# Patient Record
Sex: MALE | Race: WHITE | NOT HISPANIC OR LATINO | Employment: FULL TIME | URBAN - METROPOLITAN AREA
[De-identification: names, ages, dates, MRNs, and addresses within clinical notes are randomized per-mention and may not be internally consistent; named-entity substitution may affect disease eponyms.]

---

## 2017-01-04 ENCOUNTER — OFFICE VISIT (OUTPATIENT)
Dept: ENDOCRINOLOGY | Facility: CLINIC | Age: 67
End: 2017-01-04
Payer: COMMERCIAL

## 2017-01-04 VITALS
DIASTOLIC BLOOD PRESSURE: 86 MMHG | HEIGHT: 69 IN | BODY MASS INDEX: 30.21 KG/M2 | HEART RATE: 95 BPM | WEIGHT: 204 LBS | SYSTOLIC BLOOD PRESSURE: 123 MMHG

## 2017-01-04 DIAGNOSIS — E11.42 TYPE 2 DIABETES MELLITUS WITH DIABETIC POLYNEUROPATHY, WITH LONG-TERM CURRENT USE OF INSULIN: ICD-10-CM

## 2017-01-04 DIAGNOSIS — I10 ESSENTIAL HYPERTENSION: ICD-10-CM

## 2017-01-04 DIAGNOSIS — E66.9 OBESITY (BMI 30-39.9): ICD-10-CM

## 2017-01-04 DIAGNOSIS — Z79.4 TYPE 2 DIABETES MELLITUS WITH DIABETIC POLYNEUROPATHY, WITH LONG-TERM CURRENT USE OF INSULIN: ICD-10-CM

## 2017-01-04 DIAGNOSIS — E78.5 HYPERLIPIDEMIA LDL GOAL <100: ICD-10-CM

## 2017-01-04 DIAGNOSIS — I10 HTN (HYPERTENSION), BENIGN: ICD-10-CM

## 2017-01-04 PROCEDURE — 99214 OFFICE O/P EST MOD 30 MIN: CPT | Mod: S$GLB,,, | Performed by: NURSE PRACTITIONER

## 2017-01-04 PROCEDURE — 2022F DILAT RTA XM EVC RTNOPTHY: CPT | Mod: S$GLB,,, | Performed by: NURSE PRACTITIONER

## 2017-01-04 PROCEDURE — 3045F PR MOST RECENT HEMOGLOBIN A1C LEVEL 7.0-9.0%: CPT | Mod: S$GLB,,, | Performed by: NURSE PRACTITIONER

## 2017-01-04 PROCEDURE — 4010F ACE/ARB THERAPY RXD/TAKEN: CPT | Mod: S$GLB,,, | Performed by: NURSE PRACTITIONER

## 2017-01-04 PROCEDURE — 3074F SYST BP LT 130 MM HG: CPT | Mod: S$GLB,,, | Performed by: NURSE PRACTITIONER

## 2017-01-04 PROCEDURE — 99999 PR PBB SHADOW E&M-EST. PATIENT-LVL III: CPT | Mod: PBBFAC,,, | Performed by: NURSE PRACTITIONER

## 2017-01-04 PROCEDURE — 3079F DIAST BP 80-89 MM HG: CPT | Mod: S$GLB,,, | Performed by: NURSE PRACTITIONER

## 2017-01-04 PROCEDURE — 1159F MED LIST DOCD IN RCRD: CPT | Mod: S$GLB,,, | Performed by: NURSE PRACTITIONER

## 2017-01-04 RX ORDER — CARVEDILOL 3.12 MG/1
3.12 TABLET ORAL DAILY
Qty: 90 TABLET | Refills: 2 | Status: SHIPPED | OUTPATIENT
Start: 2017-01-04 | End: 2018-01-04 | Stop reason: SDUPTHER

## 2017-01-04 RX ORDER — INSULIN ASPART 100 [IU]/ML
INJECTION, SOLUTION INTRAVENOUS; SUBCUTANEOUS
Qty: 18 ML | Refills: 6
Start: 2017-01-04 | End: 2017-03-14 | Stop reason: SDUPTHER

## 2017-01-04 RX ORDER — RAMIPRIL 5 MG/1
5 CAPSULE ORAL DAILY
Qty: 90 CAPSULE | Refills: 2 | Status: SHIPPED | OUTPATIENT
Start: 2017-01-04 | End: 2018-01-04 | Stop reason: SDUPTHER

## 2017-01-04 NOTE — PROGRESS NOTES
CC: This 66 y.o.   male presents for management of Diabetes Mellitus   along with the current chronic medical conditions including:  Patient Active Problem List   Diagnosis    Hyperlipidemia LDL goal <100    Essential hypertension    Type II or unspecified type diabetes mellitus without mention of complication, uncontrolled    HTN (hypertension), benign    Hyperlipidemia    Allergic rhinitis    Type 2 diabetes, uncontrolled, with neuropathy    Staph skin infection    Hypertension associated with diabetes    Obesity (BMI 30-39.9)      Status of these conditions is pending review.    HPI:   Diagnosed with T2DM in 2003/2004. Pt was last seen by me x 3 mos ago in 2016 and is now being seen by me again today.  Pt has never been hospitalized r/t DM. A1c elevated this visit   Lab Results   Component Value Date    HGBA1C 8.3 (H) 12/28/2016       CURRENT DM MEDS:  toujeo 60 units nightly, novolog 22-28 units   Metformin 1000 mg bid     Pt is monitoring 2-3 times a day    100-200s  Hypos (forgot to eat)   Not often    Patria Nieto did bring logs to clinic today.     DIET/ MEAL PATTERN: 2-3 meals a day, usually skips lunch. Denies snacking. Drinks water.  Drinks etoh occasionally  [place] milk  Sugar free desserts  Diet drinks  Fish, vegetables, limiting rice/bread  Reduce sugar -orange juice  Pt is aware of carbs    EXERCISE: runs/walks 3/week for 1-1.5 miles  Runs 5K here and there    STANDARDS OF CARE:   Eye exam: (1/2016), (outside facility)  Foot exam: none  ASA: yes  Statin: yes  ACE-I/ARB: yes    ROS:   GEN: No fatigue or weakness, no change in appetite  CV: No chest pain, palpitations, edema or cyanosis, denies syncope  SKIN: Skin is intact and heals well, no rashes, pruritis, easy bruising, no hair changes, no intolerance to heat/cold.  RESP: No SOB, + cough (bronchitis/sinusitis in the past month)  FEN/GI: Nml bowel movements, no abdominal pain, normal appetite, No nausea/vomiting  RENAL: No urinary  complaints, no dysuria/hematuia/oliguria; denies nocturia  ENDO: no heat/cold intolerance, no fatigue, no change in weight  PSYCH: Denies drug/ETOH abuse, no hx. of eating disorders or depression  MS/NEURO: + numbness/ tingling in left great toe-improved; FROM of joints without swelling or pain.    Hemoglobin A1C   Date Value Ref Range Status   12/28/2016 8.3 (H) 4.5 - 6.2 % Final     Comment:     According to ADA guidelines, hemoglobin A1C <7.0% represents  optimal control in non-pregnant diabetic patients.  Different  metrics may apply to specific populations.   Standards of Medical Care in Diabetes - 2016.  For the purpose of screening for the presence of diabetes:  <5.7%     Consistent with the absence of diabetes  5.7-6.4%  Consistent with increasing risk for diabetes   (prediabetes)  >or=6.5%  Consistent with diabetes  Currently no consensus exists for use of hemoglobin A1C  for diagnosis of diabetes for children.     09/21/2016 7.9 (H) 4.5 - 6.2 % Final     Comment:     According to ADA guidelines, hemoglobin A1C <7.0% represents  optimal control in non-pregnant diabetic patients.  Different  metrics may apply to specific populations.   Standards of Medical Care in Diabetes - 2016.  For the purpose of screening for the presence of diabetes:  <5.7%     Consistent with the absence of diabetes  5.7-6.4%  Consistent with increasing risk for diabetes   (prediabetes)  >or=6.5%  Consistent with diabetes  Currently no consensus exists for use of hemoglobin A1C  for diagnosis of diabetes for children.     04/13/2016 7.9 (H) 4.5 - 6.2 % Final     Lab Results   Component Value Date    TSH 2.212 09/21/2016       Chemistry        Component Value Date/Time     12/28/2016 0709    K 4.3 12/28/2016 0709     12/28/2016 0709    CO2 26 12/28/2016 0709    BUN 17 12/28/2016 0709    CREATININE 1.2 12/28/2016 0709     (H) 12/28/2016 0709        Component Value Date/Time    CALCIUM 9.1 12/28/2016 0709    ALKPHOS 56  12/28/2016 0709    AST 13 12/28/2016 0709    ALT 23 12/28/2016 0709    BILITOT 1.8 (H) 12/28/2016 0709        Lab Results   Component Value Date    LDLCALC 56.6 (L) 12/28/2016       PE:  GEN: Patient WNWD, WF, NAD, AAOx3, Friendly, talkative, well groomed  HEENT: EOMI, no lid lag  NECK:trachea midline   CV: Regular rate and rhythm  RESP:No cough, wheeze.  ABD: soft non distended   EXT: No C/C/Edema, No skin rash/breakdown  NEURO: CN 2-12 intact, 5/5 strength in 4 extremities, nml gait  FEET: No pressure areas, blisters, ulcers, calluses. Footware appropriate.    ASSESSMENT and PLAN:  Patria was seen today for diabetes mellitus.    Diagnoses and associated orders for this visit:    Type II or unspecified type diabetes mellitus, with neurological manifestations, uncontrolled,   DM uncontrolled  Goal less than 7%  victoza not started yet r/t cost/approval  Print rx for victoza   Reviewed importance of adding glp1a for a1c reduction  Reviewed last a1c  Continue toujeo 60 units qhs  novolog 22-26 units ac   F/u in 3 mos  Add victoza max to 1.8 mg daily, coupon given  Logs given.  Discussed hypoglycemia, 15 gm/15 min rule    Hyperlipidemia- controlled, continue statin.   Lab Results   Component Value Date    LDLCALC 56.6 (L) 12/28/2016       Discussed diet/lifestyle manifestations    Hypertension- controlled. Discussed diet/lifestyle manifestations, continue meds    Obesity Body mass index is 30.13 kg/(m^2). reviewed, continue exercise, watch cho, carb book given.      Other Orders    Orders Placed This Encounter   Procedures    Hemoglobin A1c     Standing Status:   Future     Standing Expiration Date:   3/5/2018      Return in about 3 months (around 4/4/2017).

## 2017-01-04 NOTE — PATIENT INSTRUCTIONS
Snacks can be an important part of a balanced, healthy meal plan. They allow you to eat more frequently, feeling full and satisfied throughout the day. Also, they allow you to spread carbohydrates evenly, which may stabilize blood sugars.  Plus, snacks are enjoyable!     The amount of carbohydrate needed at snacks varies. Generally, about 15-30 grams of carbohydrate per snack is recommended.  Below you will find some tasty treats.       0-5 gm carb   Crystal Light   Vitamin Water Zero   Herbal tea, unsweetened   2 tsp peanut butter on celery   1./2 cup sugar-free jell-o   1 sugar-free popsicle   ¼ cup blueberries   8oz Blue Mana unsweetened almond milk   5 baby carrots & celery sticks, cucumbers, bell peppers dipped in ¼ cup salsa, 2Tbsp light ranch dressing or 2Tbsp plain Greek yogurt   10 Goldfish crackers   ½ oz low-fat cheese or string cheese   1 closed handful of nuts, unsalted   1 Tbsp of sunflower seeds, unsalted   1 cup Smart Pop popcorn   1 whole grain brown rice cake        15 gm carb   1 small piece of fruit or ½ banana or 1/2 cup lite canned fruit   3 marlon cracker squares   3 cups Smart Pop popcorn, top spray butter, Ortiz lite salt or cinnamon and Truvia   5 Vanilla Wafers   ½ cup low fat, no added sugar ice cream or frozen yogurt (Blue bell, Blue Bunny, Weight Watchers, Skinny Cow)   ½ turkey, ham, or chicken sandwich   ½ c fruit with ½ c Cottage cheese   4-6 unsalted wheat crackers with 1 oz low fat cheese or 1 tbsp peanut butter    30-45 goldfish crackers (depending on flavor)    7-8 Druze mini brown rice cakes (caramel, apple cinnamon, chocolate)    12 Druze mini brown rice cakes (cheddar, bbq, ranch)    1/3 cup hummus dip with raw veg   1/2 whole wheat fernandez, 1Tbsp hummus   Mini Pizza (1/2 whole wheat English muffin, low-fat  cheese, tomato sauce)   100 calorie snack pack (Oreo, Chips Ahoy, Ritz Mix, Baked Cheetos)   4-6 oz. light or Greek Style yogurt  (Juan, Noah, [facility], [facility])   ½ cup sugar-free pudding     6 in. wheat tortilla or fernandez oven toasted chips (topped with spray butter flavoring, cinnamon, Truvia OR spray butter, garlic powder, chili powder)    18 BBQ Popchips (available at Target, Whole Foods, Fresh Market)

## 2017-03-14 RX ORDER — INSULIN ASPART 100 [IU]/ML
INJECTION, SOLUTION INTRAVENOUS; SUBCUTANEOUS
Qty: 18 ML | Refills: 6 | Status: SHIPPED | OUTPATIENT
Start: 2017-03-14 | End: 2017-03-28 | Stop reason: SDUPTHER

## 2017-03-14 RX ORDER — INSULIN GLARGINE 300 [IU]/ML
INJECTION, SOLUTION SUBCUTANEOUS
Qty: 4 SYRINGE | Refills: 6 | Status: SHIPPED | OUTPATIENT
Start: 2017-03-14 | End: 2017-04-18 | Stop reason: SDUPTHER

## 2017-03-21 ENCOUNTER — LAB VISIT (OUTPATIENT)
Dept: LAB | Facility: HOSPITAL | Age: 67
End: 2017-03-21
Attending: NURSE PRACTITIONER
Payer: COMMERCIAL

## 2017-03-21 LAB
ESTIMATED AVG GLUCOSE: 160 MG/DL
HBA1C MFR BLD HPLC: 7.2 %

## 2017-03-21 PROCEDURE — 83036 HEMOGLOBIN GLYCOSYLATED A1C: CPT

## 2017-03-21 PROCEDURE — 36415 COLL VENOUS BLD VENIPUNCTURE: CPT | Mod: PO

## 2017-03-28 ENCOUNTER — OFFICE VISIT (OUTPATIENT)
Dept: ENDOCRINOLOGY | Facility: CLINIC | Age: 67
End: 2017-03-28
Payer: COMMERCIAL

## 2017-03-28 VITALS
SYSTOLIC BLOOD PRESSURE: 123 MMHG | BODY MASS INDEX: 31.08 KG/M2 | HEIGHT: 69 IN | DIASTOLIC BLOOD PRESSURE: 86 MMHG | HEART RATE: 104 BPM | WEIGHT: 209.81 LBS

## 2017-03-28 DIAGNOSIS — E66.9 OBESITY (BMI 30-39.9): ICD-10-CM

## 2017-03-28 DIAGNOSIS — E11.59 HYPERTENSION ASSOCIATED WITH DIABETES: ICD-10-CM

## 2017-03-28 DIAGNOSIS — I15.2 HYPERTENSION ASSOCIATED WITH DIABETES: ICD-10-CM

## 2017-03-28 DIAGNOSIS — E78.5 HYPERLIPIDEMIA LDL GOAL <100: ICD-10-CM

## 2017-03-28 PROCEDURE — 3045F PR MOST RECENT HEMOGLOBIN A1C LEVEL 7.0-9.0%: CPT | Mod: S$GLB,,, | Performed by: NURSE PRACTITIONER

## 2017-03-28 PROCEDURE — 1160F RVW MEDS BY RX/DR IN RCRD: CPT | Mod: S$GLB,,, | Performed by: NURSE PRACTITIONER

## 2017-03-28 PROCEDURE — 99999 PR PBB SHADOW E&M-EST. PATIENT-LVL III: CPT | Mod: PBBFAC,,, | Performed by: NURSE PRACTITIONER

## 2017-03-28 PROCEDURE — 4010F ACE/ARB THERAPY RXD/TAKEN: CPT | Mod: S$GLB,,, | Performed by: NURSE PRACTITIONER

## 2017-03-28 PROCEDURE — 2022F DILAT RTA XM EVC RTNOPTHY: CPT | Mod: S$GLB,,, | Performed by: NURSE PRACTITIONER

## 2017-03-28 PROCEDURE — 99214 OFFICE O/P EST MOD 30 MIN: CPT | Mod: S$GLB,,, | Performed by: NURSE PRACTITIONER

## 2017-03-28 PROCEDURE — 3079F DIAST BP 80-89 MM HG: CPT | Mod: S$GLB,,, | Performed by: NURSE PRACTITIONER

## 2017-03-28 PROCEDURE — 3074F SYST BP LT 130 MM HG: CPT | Mod: S$GLB,,, | Performed by: NURSE PRACTITIONER

## 2017-03-28 RX ORDER — INSULIN ASPART 100 [IU]/ML
INJECTION, SOLUTION INTRAVENOUS; SUBCUTANEOUS
Qty: 18 ML | Refills: 6
Start: 2017-03-28 | End: 2017-05-01 | Stop reason: SDUPTHER

## 2017-03-28 NOTE — PROGRESS NOTES
CC: This 66 y.o.   male presents for management of Diabetes Mellitus   along with the current chronic medical conditions including:  Patient Active Problem List   Diagnosis    Hyperlipidemia LDL goal <100    Essential hypertension    Type II or unspecified type diabetes mellitus without mention of complication, uncontrolled    HTN (hypertension), benign    Hyperlipidemia    Allergic rhinitis    Type 2 diabetes, uncontrolled, with neuropathy    Staph skin infection    Hypertension associated with diabetes    Obesity (BMI 30-39.9)      Status of these conditions is pending review.    HPI:   Diagnosed with T2DM in 2003/2004. Pt January 2017 and is now being seen by me again today.  Pt has never been hospitalized r/t DM. A1c has improved, has not started victoza yet.   Arrived late r/t epistaxis, resolved.   Lab Results   Component Value Date    HGBA1C 7.2 (H) 03/21/2017       CURRENT DM MEDS:  toujeo 60 units nightly, novolog 22-26 units   Metformin 1000 mg bid  Not of victoza yet     Pt is monitoring 2-3 times a day    BG 120s-160s mg/dl   Hypos (1-2 times a month) 60-70s (forget to eat)  Not often    Patria Nieto did bring logs to clinic today.     DIET/ MEAL PATTERN: 2-3 meals a day, usually skips lunch. Denies snacking. Drinks water.  Drinks etoh occasionally  [place] milk  Sugar free desserts  Diet drinks  Fish, vegetables, limiting rice/bread  Reduce sugar -orange juice  Pt is aware of carbs    EXERCISE: runs/walks 3/week for 1-1.5 miles  Runs 5K here and there    STANDARDS OF CARE:   Eye exam: (1/2016), (outside facility)-due  Foot exam: none  ASA: yes  Statin: yes  ACE-I/ARB: yes    ROS:   GEN: No fatigue or weakness, no change in appetite  CV: No chest pain, palpitations, edema or cyanosis, denies syncope  SKIN: Skin is intact and heals well, no rashes, pruritis, easy bruising, no hair changes, no intolerance to heat/cold.  RESP: No SOB, + cough (bronchitis/sinusitis in the past  month)  FEN/GI: Nml bowel movements, no abdominal pain, normal appetite, No nausea/vomiting  RENAL: No urinary complaints, no dysuria/hematuia/oliguria; denies nocturia  ENDO: no heat/cold intolerance, no fatigue, no change in weight  PSYCH: Denies drug/ETOH abuse, no hx. of eating disorders or depression  MS/NEURO: + numbness/ tingling in left great toe-improved; FROM of joints without swelling or pain.    Hemoglobin A1C   Date Value Ref Range Status   03/21/2017 7.2 (H) 4.5 - 6.2 % Final     Comment:     According to ADA guidelines, hemoglobin A1C <7.0% represents  optimal control in non-pregnant diabetic patients.  Different  metrics may apply to specific populations.   Standards of Medical Care in Diabetes - 2016.  For the purpose of screening for the presence of diabetes:  <5.7%     Consistent with the absence of diabetes  5.7-6.4%  Consistent with increasing risk for diabetes   (prediabetes)  >or=6.5%  Consistent with diabetes  Currently no consensus exists for use of hemoglobin A1C  for diagnosis of diabetes for children.     12/28/2016 8.3 (H) 4.5 - 6.2 % Final     Comment:     According to ADA guidelines, hemoglobin A1C <7.0% represents  optimal control in non-pregnant diabetic patients.  Different  metrics may apply to specific populations.   Standards of Medical Care in Diabetes - 2016.  For the purpose of screening for the presence of diabetes:  <5.7%     Consistent with the absence of diabetes  5.7-6.4%  Consistent with increasing risk for diabetes   (prediabetes)  >or=6.5%  Consistent with diabetes  Currently no consensus exists for use of hemoglobin A1C  for diagnosis of diabetes for children.     09/21/2016 7.9 (H) 4.5 - 6.2 % Final     Comment:     According to ADA guidelines, hemoglobin A1C <7.0% represents  optimal control in non-pregnant diabetic patients.  Different  metrics may apply to specific populations.   Standards of Medical Care in Diabetes - 2016.  For the purpose of screening for the  presence of diabetes:  <5.7%     Consistent with the absence of diabetes  5.7-6.4%  Consistent with increasing risk for diabetes   (prediabetes)  >or=6.5%  Consistent with diabetes  Currently no consensus exists for use of hemoglobin A1C  for diagnosis of diabetes for children.       Lab Results   Component Value Date    TSH 2.212 09/21/2016       Chemistry        Component Value Date/Time     12/28/2016 0709    K 4.3 12/28/2016 0709     12/28/2016 0709    CO2 26 12/28/2016 0709    BUN 17 12/28/2016 0709    CREATININE 1.2 12/28/2016 0709     (H) 12/28/2016 0709        Component Value Date/Time    CALCIUM 9.1 12/28/2016 0709    ALKPHOS 56 12/28/2016 0709    AST 13 12/28/2016 0709    ALT 23 12/28/2016 0709    BILITOT 1.8 (H) 12/28/2016 0709        Lab Results   Component Value Date    LDLCALC 56.6 (L) 12/28/2016       PE:  GEN: Patient WNWD, WF, NAD, AAOx3, Friendly, talkative, well groomed  HEENT: EOMI, no lid lag  NECK:trachea midline   CV: Regular rate and rhythm  RESP:No cough, wheeze.  ABD: soft non distended   EXT: No C/C/Edema, No skin rash/breakdown  NEURO: CN 2-12 intact, 5/5 strength in 4 extremities, nml gait  FEET: No pressure areas, blisters, ulcers, calluses. Footware appropriate.    ASSESSMENT and PLAN:  Patria was seen today for diabetes mellitus.    Diagnoses and associated orders for this visit:    1. Type 2 diabetes, uncontrolled, with neuropathy  a1c has improved, goal less than 7%  Current a1c review  7.2%  Add victoza, has not started-went over titration, reviewed MOA/SEs  toujeo continue at 60 units qhs  novolog decrease to 18 units ac w/ mod dose 180-230 +2, etc  F/u in 3 mos  a1c next time    2. Hypertension associated with diabetes  Controlled, continue med(s)   3. Hyperlipidemia LDL goal <100  Lab Results   Component Value Date    LDLCALC 56.6 (L) 12/28/2016     controlled   4. Obesity (BMI 30-39.9)  Body mass index is 30.98 kg/(m^2). may increase insulin resistance.       Orders Placed This Encounter   Procedures    Hemoglobin A1c     Standing Status:   Future     Standing Expiration Date:   5/27/2018    Microalbumin/creatinine urine ratio     Standing Status:   Future     Standing Expiration Date:   5/27/2018     Order Specific Question:   Specimen Source     Answer:   Urine      Return in about 3 months (around 6/28/2017).

## 2017-03-28 NOTE — MR AVS SNAPSHOT
[facility] - Endocrinology  [address]  [facility] [address]  Phone: [phone]                  Patria Nieto   3/28/2017 7:30 AM   Office Visit    Description:  Male : 1950   Provider:  LIYAH Angel, RAHUL   Department:  [facility] - Endocrinology           Reason for Visit     Diabetes Mellitus           Diagnoses this Visit        Comments    Type 2 diabetes, uncontrolled, with neuropathy    -  Primary     Hypertension associated with diabetes         Hyperlipidemia LDL goal <100         Obesity (BMI 30-39.9)                To Do List           Future Appointments        Provider Department Dept Phone    2017 9:00 AM Félix Rojas MD [place] - Internal Medicine [phone]    2017 7:30 AM LAB, [place] [facility] [phone]    2017 8:00 AM SPECIMEN, [place] [facility] [phone]    2017 7:30 AM LIYAH Angel, RAHUL [facility] [phone]      Goals (5 Years of Data)     None      Follow-Up and Disposition     Return in about 3 months (around 2017).    Follow-up and Disposition History       These Medications        Disp Refills Start End    insulin aspart (NOVOLOG FLEXPEN) 100 unit/mL InPn pen 18 mL 6 3/28/2017     Inject 18 units into skin with correction scale 150-200 +2, 201-250 +4, 251-300 +6, 301-350 +8, >380.    Pharmacy: [facility] [address] [place], [place] [address] [address] [place] [facility] AT [facility] Jeffrey Neri Ph #: [phone]         [facility]     [facility] -  Assistance  Registered nurses in the [facility] provide clinical advisement, health education, appointment booking, and other advisory services.  Call for this free service at [phone].             Medications           Message regarding Medications     Verify the changes and/or additions to your medication regime listed below are the same as discussed with your  "clinician today.  If any of these changes or additions are incorrect, please notify your healthcare provider.        CHANGE how you are taking these medications     Start Taking Instead of    insulin aspart (NOVOLOG FLEXPEN) 100 unit/mL InPn pen insulin aspart (NOVOLOG FLEXPEN) 100 unit/mL InPn pen    Dosage:  Inject 18 units into skin with correction scale 150-200 +2, 201-250 +4, 251-300 +6, 301-350 +8, >380. Dosage:  Inject 22-26 units into skin with correction scale 150-200 +2, 201-250 +4, 251-300 +6, 301-350 +8, >380.    Reason for Change:  Reorder            Verify that the below list of medications is an accurate representation of the medications you are currently taking.  If none reported, the list may be blank. If incorrect, please contact your healthcare provider. Carry this list with you in case of emergency.           Current Medications     aspirin 81 MG Chew Take 81 mg by mouth once daily.    atorvastatin (LIPITOR) 80 MG tablet Take 1 tablet (80 mg total) by mouth once daily.    carvedilol (COREG) 3.125 MG tablet Take 1 tablet (3.125 mg total) by mouth once daily.    fexofenadine (ALLEGRA) 180 MG tablet Take 180 mg by mouth daily as needed.     insulin aspart (NOVOLOG FLEXPEN) 100 unit/mL InPn pen Inject 18 units into skin with correction scale 150-200 +2, 201-250 +4, 251-300 +6, 301-350 +8, >380.    insulin glargine, TOUJEO, (TOUJEO SOLOSTAR) 300 unit/mL (1.5 mL) InPn pen Inject 60 units at night.    liraglutide 0.6 mg/0.1 mL, 18 mg/3 mL, subq PNIJ (VICTOZA 3-JOAN) 0.6 mg/0.1 mL (18 mg/3 mL) PnIj Inject 0.6 mg daily x 1 week, if tolerable 1.2 mg daily following week, then 1.8 mg daily. On metformin and insulin already.    metformin (GLUCOPHAGE) 1000 MG tablet TAKE 1 TABLET BY MOUTH TWICE DAILY    pen needle, diabetic (BD INSULIN PEN NEEDLE UF MINI) 31 gauge x 3/16" Ndle Uses 5 times a day.    ramipril (ALTACE) 5 MG capsule Take 1 capsule (5 mg total) by mouth once daily.    sulfamethoxazole-trimethoprim " "800-160mg (BACTRIM DS) 800-160 mg Tab Take 1 tablet by mouth 2 (two) times daily.           Clinical Reference Information           Your Vitals Were     BP Pulse Height Weight BMI    123/86 (BP Location: Left arm, Patient Position: Sitting) 104 5' 9" (1.753 m) 95.2 kg (209 lb 12.8 oz) 30.98 kg/m2      Blood Pressure          Most Recent Value    BP  123/86      Allergies as of 3/28/2017     Hydrochlorothiazide    Metaproterenol      Immunizations Administered on Date of Encounter - 3/28/2017     None      Orders Placed During Today's Visit     Future Labs/Procedures Expected by Expires    Hemoglobin A1c  3/28/2017 5/27/2018    Microalbumin/creatinine urine ratio  3/28/2017 5/27/2018      Instructions    Snacks can be an important part of a balanced, healthy meal plan. They allow you to eat more frequently, feeling full and satisfied throughout the day. Also, they allow you to spread carbohydrates evenly, which may stabilize blood sugars.  Plus, snacks are enjoyable!     The amount of carbohydrate needed at snacks varies. Generally, about 15-30 grams of carbohydrate per snack is recommended.  Below you will find some tasty treats.       0-5 gm carb   Crystal Light   Vitamin Water Zero   Herbal tea, unsweetened   2 tsp peanut butter on celery   1./2 cup sugar-free jell-o   1 sugar-free popsicle   ¼ cup blueberries   8oz Blue Mana unsweetened almond milk   5 baby carrots & celery sticks, cucumbers, bell peppers dipped in ¼ cup salsa, 2Tbsp light ranch dressing or 2Tbsp plain Greek yogurt   10 Goldfish crackers   ½ oz low-fat cheese or string cheese   1 closed handful of nuts, unsalted   1 Tbsp of sunflower seeds, unsalted   1 cup Smart Pop popcorn   1 whole grain brown rice cake        15 gm carb   1 small piece of fruit or ½ banana or 1/2 cup lite canned fruit   3 marlon cracker squares   3 cups Smart Pop popcorn, top spray butter, Ortiz lite salt or cinnamon and Truvia   5 Vanilla Wafers   ½ " cup low fat, no added sugar ice cream or frozen yogurt (Blue bell, Blue Bunny, Weight Watchers, Skinny Cow)   ½ turkey, ham, or chicken sandwich   ½ c fruit with ½ c Cottage cheese   4-6 unsalted wheat crackers with 1 oz low fat cheese or 1 tbsp peanut butter    30-45 goldfish crackers (depending on flavor)    7-8 [place] mini brown rice cakes (caramel, apple cinnamon, chocolate)    12 [place] mini brown rice cakes (cheddar, bbq, ranch)    1/3 cup hummus dip with raw veg   1/2 whole wheat fernandez, 1Tbsp hummus   Mini Pizza (1/2 whole wheat English muffin, low-fat  cheese, tomato sauce)   100 calorie snack pack (Oreo, Chips Ahoy, Ritz Mix, Baked Cheetos)   4-6 oz. light or Greek Style yogurt (Chobani, Yoplait, Okios, [place])   ½ cup sugar-free pudding     6 in. wheat tortilla or fernandez oven toasted chips (topped with spray butter flavoring, cinnamon, Truvia OR spray butter, garlic powder, chili powder)    18 BBQ Popchips (available at Target, Whole Foods, Fresh Market)                   Diabetes Support Group Meetings    Date Topics   February 9 Health Promotion/Nutrition   March 9 Taking Care of Your Kidneys   April 13 Taking Care of Your Feet   May 11 Ease Your Mind with Diabetes   June 8 Hurricane and Emergency Preparedness   July 13 Family & Caregiver Support for Diabetes   *August 17  Taking Care of Your Eyes   September 14 Devices & Technology   October 12 Recipes & Treats   November 9 Getting Pumped Up for Diabetes   December 14 Year-End Close Out            Meetings are held in the [place] (A) of the [facility] located at [address] [place] [id number]. Please call [phone] for additional information.    Free service, offered every 2nd Thursday of every month, except in August! Family members and/or friends are welcome as well!  Support group is for patients with type 1 or type 2 diabetes.    From 3:30p to 4:30p             Language  Assistance Services     ATTENTION: Language assistance services are available, free of charge. Please call [phone].      ATENCIÓN: Si habla tejaañol, tiene a wu disposición servicios gratuitos de asistencia lingüística. Llame al [phone].     CHÚ Ý: N?u b?n nói Ti?ng Vi?t, có các d?ch v? h? tr? ngôn ng? mi?n phí dành cho b?n. G?i s? [phone].         [facility] complies with applicable Federal civil rights laws and does not discriminate on the basis of race, color, national origin, age, disability, or sex.

## 2017-03-28 NOTE — PATIENT INSTRUCTIONS
Snacks can be an important part of a balanced, healthy meal plan. They allow you to eat more frequently, feeling full and satisfied throughout the day. Also, they allow you to spread carbohydrates evenly, which may stabilize blood sugars.  Plus, snacks are enjoyable!     The amount of carbohydrate needed at snacks varies. Generally, about 15-30 grams of carbohydrate per snack is recommended.  Below you will find some tasty treats.       0-5 gm carb   Crystal Light   Vitamin Water Zero   Herbal tea, unsweetened   2 tsp peanut butter on celery   1./2 cup sugar-free jell-o   1 sugar-free popsicle   ¼ cup blueberries   8oz Blue Mana unsweetened almond milk   5 baby carrots & celery sticks, cucumbers, bell peppers dipped in ¼ cup salsa, 2Tbsp light ranch dressing or 2Tbsp plain Greek yogurt   10 Goldfish crackers   ½ oz low-fat cheese or string cheese   1 closed handful of nuts, unsalted   1 Tbsp of sunflower seeds, unsalted   1 cup Smart Pop popcorn   1 whole grain brown rice cake        15 gm carb   1 small piece of fruit or ½ banana or 1/2 cup lite canned fruit   3 marlon cracker squares   3 cups Smart Pop popcorn, top spray butter, Ortiz lite salt or cinnamon and Truvia   5 Vanilla Wafers   ½ cup low fat, no added sugar ice cream or frozen yogurt (Blue bell, Blue Bunny, Weight Watchers, Skinny Cow)   ½ turkey, ham, or chicken sandwich   ½ c fruit with ½ c Cottage cheese   4-6 unsalted wheat crackers with 1 oz low fat cheese or 1 tbsp peanut butter    30-45 goldfish crackers (depending on flavor)    7-8 Restoration mini brown rice cakes (caramel, apple cinnamon, chocolate)    12 Restoration mini brown rice cakes (cheddar, bbq, ranch)    1/3 cup hummus dip with raw veg   1/2 whole wheat fernandez, 1Tbsp hummus   Mini Pizza (1/2 whole wheat English muffin, low-fat  cheese, tomato sauce)   100 calorie snack pack (Oreo, Chips Ahoy, Ritz Mix, Baked Cheetos)   4-6 oz. light or Greek Style yogurt  (Noah Martinez, Catrachita, [facility])   ½ cup sugar-free pudding     6 in. wheat tortilla or fernandez oven toasted chips (topped with spray butter flavoring, cinnamon, Truvia OR spray butter, garlic powder, chili powder)    18 BBQ Popchips (available at Target, Whole Foods, Fresh Market)                   Diabetes Support Group Meetings    Date Topics   February 9 Health Promotion/Nutrition   March 9 Taking Care of Your Kidneys   April 13 Taking Care of Your Feet   May 11 Ease Your Mind with Diabetes   June 8 Hurricane and Emergency Preparedness   July 13 Family & Caregiver Support for Diabetes   *August 17  Taking Care of Your Eyes   September 14 Devices & Technology   October 12 Recipes & Treats   November 9 Getting Pumped Up for Diabetes   December 14 Year-End Close Out            Meetings are held in the [place] [facility] (A) of the [facility] located at [address] [place] [address]. Please call [phone] for additional information.    Free service, offered every 2nd Thursday of every month, except in August! Family members and/or friends are welcome as well!  Support group is for patients with type 1 or type 2 diabetes.    From 3:30p to 4:30p

## 2017-04-18 RX ORDER — SULFAMETHOXAZOLE AND TRIMETHOPRIM 800; 160 MG/1; MG/1
1 TABLET ORAL 2 TIMES DAILY
Qty: 14 TABLET | Refills: 1 | Status: SHIPPED | OUTPATIENT
Start: 2017-04-18 | End: 2017-09-06 | Stop reason: ALTCHOICE

## 2017-04-18 RX ORDER — PEN NEEDLE, DIABETIC 30 GX3/16"
NEEDLE, DISPOSABLE MISCELLANEOUS
Qty: 200 EACH | Refills: 6 | Status: SHIPPED | OUTPATIENT
Start: 2017-04-18 | End: 2018-10-09 | Stop reason: SDUPTHER

## 2017-04-18 RX ORDER — INSULIN GLARGINE 300 [IU]/ML
INJECTION, SOLUTION SUBCUTANEOUS
Qty: 4 SYRINGE | Refills: 6 | Status: SHIPPED | OUTPATIENT
Start: 2017-04-18 | End: 2017-09-06 | Stop reason: SDUPTHER

## 2017-04-18 RX ORDER — ATORVASTATIN CALCIUM 80 MG/1
80 TABLET, FILM COATED ORAL DAILY
Qty: 90 TABLET | Refills: 2 | Status: SHIPPED | OUTPATIENT
Start: 2017-04-18 | End: 2018-05-15 | Stop reason: SDUPTHER

## 2017-04-19 ENCOUNTER — OFFICE VISIT (OUTPATIENT)
Dept: INTERNAL MEDICINE | Facility: CLINIC | Age: 67
End: 2017-04-19
Payer: COMMERCIAL

## 2017-04-19 VITALS
DIASTOLIC BLOOD PRESSURE: 68 MMHG | HEIGHT: 69 IN | HEART RATE: 76 BPM | SYSTOLIC BLOOD PRESSURE: 134 MMHG | WEIGHT: 207.69 LBS | BODY MASS INDEX: 30.76 KG/M2 | OXYGEN SATURATION: 96 %

## 2017-04-19 DIAGNOSIS — Z12.5 PROSTATE CANCER SCREENING: ICD-10-CM

## 2017-04-19 DIAGNOSIS — Z00.00 ANNUAL PHYSICAL EXAM: Primary | ICD-10-CM

## 2017-04-19 DIAGNOSIS — F52.0 LACK OF LIBIDO: ICD-10-CM

## 2017-04-19 DIAGNOSIS — N53.9 MALE SEXUAL DYSFUNCTION: ICD-10-CM

## 2017-04-19 DIAGNOSIS — Z11.59 ENCOUNTER FOR HEPATITIS C SCREENING TEST FOR LOW RISK PATIENT: ICD-10-CM

## 2017-04-19 PROCEDURE — 3075F SYST BP GE 130 - 139MM HG: CPT | Mod: S$GLB,,, | Performed by: INTERNAL MEDICINE

## 2017-04-19 PROCEDURE — 99387 INIT PM E/M NEW PAT 65+ YRS: CPT | Mod: S$GLB,,, | Performed by: INTERNAL MEDICINE

## 2017-04-19 PROCEDURE — 3078F DIAST BP <80 MM HG: CPT | Mod: S$GLB,,, | Performed by: INTERNAL MEDICINE

## 2017-04-19 PROCEDURE — 99999 PR PBB SHADOW E&M-EST. PATIENT-LVL III: CPT | Mod: PBBFAC,,, | Performed by: INTERNAL MEDICINE

## 2017-04-19 NOTE — PROGRESS NOTES
"Portions of this note are generated with voice recognition software. Typographical errors may exist.       Patient Name:DONNA LANGLEY  Patient MRN:   [id number]    History of Present Illness   ================================================================  DONNA LANGLEY is a 66 y.o. male here for primary care visit for  Chief Complaint   Patient presents with    Diabetes       Past Medical History:   Diagnosis Date    Diabetes mellitus type II     Hx MRSA infection        No past surgical history on file.    Review of patient's allergies indicates:   Allergen Reactions    Hydrochlorothiazide     Metaproterenol        Current Outpatient Prescriptions on File Prior to Visit   Medication Sig Dispense Refill    aspirin 81 MG Chew Take 81 mg by mouth once daily.      atorvastatin (LIPITOR) 80 MG tablet Take 1 tablet (80 mg total) by mouth once daily. 90 tablet 2    carvedilol (COREG) 3.125 MG tablet Take 1 tablet (3.125 mg total) by mouth once daily. 90 tablet 2    fexofenadine (ALLEGRA) 180 MG tablet Take 180 mg by mouth daily as needed.       insulin aspart (NOVOLOG FLEXPEN) 100 unit/mL InPn pen Inject 18 units into skin with correction scale 150-200 +2, 201-250 +4, 251-300 +6, 301-350 +8, >380. 18 mL 6    insulin glargine, TOUJEO, (TOUJEO SOLOSTAR) 300 unit/mL (1.5 mL) InPn pen Inject 60 units at night. 4 Syringe 6    metformin (GLUCOPHAGE) 1000 MG tablet TAKE 1 TABLET BY MOUTH TWICE DAILY 180 tablet 2    pen needle, diabetic (BD INSULIN PEN NEEDLE UF MINI) 31 gauge x 3/16" Ndle Uses 5 times a day. 200 each 6    ramipril (ALTACE) 5 MG capsule Take 1 capsule (5 mg total) by mouth once daily. 90 capsule 2    sulfamethoxazole-trimethoprim 800-160mg (BACTRIM DS) 800-160 mg Tab Take 1 tablet by mouth 2 (two) times daily. 14 tablet 1    [DISCONTINUED] liraglutide 0.6 mg/0.1 mL, 18 mg/3 mL, subq PNIJ (VICTOZA 3-JOAN) 0.6 mg/0.1 mL (18 mg/3 mL) PnIj Inject 0.6 mg daily x 1 week, if tolerable 1.2 mg daily following " week, then 1.8 mg daily. On metformin and insulin already. 9 mL 6     No current facility-administered medications on file prior to visit.        Family History   Problem Relation Age of Onset    Heart disease Mother     Heart disease Father     Diabetes Father     Diabetes Sister        Social History     Social History    Marital status:      Spouse name: N/A    Number of children: N/A    Years of education: N/A     Occupational History    Not on file.     Social History Main Topics    Smoking status: Never Smoker    Smokeless tobacco: Not on file    Alcohol use 2.4 oz/week     4 Cans of beer per week    Drug use: No    Sexual activity: Yes     Other Topics Concern    Not on file     Social History Narrative       History   Sexual Activity    Sexual activity: Yes         SUBJECTIVE:    Diabetes.  Patient states that he did not comply with Victoza.  States that he is not ready to incorporate more medication into his diabetes plans so long as his numbers are improving.    Hyperlipidemia.  Patient has also been reluctant to follow aspirin and statin therapy.  States that he needs more reassurance about the necessity of this medication.    Microalbuminuria.  Patient states also that he is ambivalent about continuing REM a pro.    Male sexual dysfunction.  In passing the patient states that he has problems with libido and sexual performance.  States that he also has nocturia 2-3 times nightly.  States that this has been a persistent issue.  States he has not had medical evaluation for this issue in the past.      Medications Reviewed and Updated    Past medical, family, and social histories were reviewed and updated.    Review of Systems negative unless otherwise noted in history of present illness-   General ROS: negative  Psychological ROS: negative  ENT ROS: negative  Allergy and Immunology ROS: negative  Cardiovascular ROS: negative  Gastrointestinal ROS: negative  Genito-Urinary ROS:  negative  Musculoskeletal ROS: negative  Neurological ROS: negative  Dermatological ROS: negative      Allergic:    Review of patient's allergies indicates:   Allergen Reactions    Hydrochlorothiazide     Metaproterenol        OBJECTIVE:  BP: 134/68 Pulse: 76    Wt Readings from Last 3 Encounters:   04/19/17 94.2 kg (207 lb 10.8 oz)   03/28/17 95.2 kg (209 lb 12.8 oz)   01/04/17 92.5 kg (204 lb)    Body mass index is 30.67 kg/(m^2).  Previous Blood Pressure Readings :   BP Readings from Last 3 Encounters:   04/19/17 134/68   03/28/17 123/86   01/04/17 123/86       GEN: healthy appearing  HEENT: sclera non-icteric, conjunctiva clear  CV: no peripheral edema  PULM: breathing non-labored  ABD: obese   PSYCH: appropriate affect  MSK: able to rise from chair without assistance  SKIN: normal skin turgor        Neuropathy Screen     Dermatologic Skin  - cracking: No  - thickened nails  Yes  - blistering  No    Infection:   - fungal infection between toes: No    Ulceration:  No    Musculoskeletal Deformity  - claw toes No  - prominent metatarsal heads  No  - charcot joint  No  - muscle wasting (guttering between metatarsals) No    Left DPP Pulse Diminished: No  Right DPP Pulse Diminished:  No      Pertinent Labs Reviewed           ASSESSMENT/PLAN:    Annual physical exam    Type 2 diabetes mellitus.  Improving.  Recommend compliance with current medical therapy.    Lack of libido.  Nocturia symptoms seem compatible with BPH.  -     PSA, Screening; Future; Expected date: 4/19/17    Male sexual dysfunction.  Etiology likely multifactorial.  Do not suspect significant peripheral arterial disease.  Sleep disorder and stress reaction likely  -     Testosterone; Future; Expected date: 4/19/17    Encounter for hepatitis C screening test for low risk patient  -     Hepatitis C antibody; Future; Expected date: 4/19/17    Prostate cancer screening  -     TSH; Future; Expected date: 4/19/17          Future Appointments  Date Time  Provider Department [place]   7/11/2017 7:30 AM LABVJ LAB [place]   7/11/2017 8:00 AM SPECIMEN, ALDAIR MARTINES SPECLAB [place]   7/18/2017 7:30 AM [facility]  4/19/2017  9:44 AM

## 2017-04-19 NOTE — MR AVS SNAPSHOT
[facility]   [place]  [place] [address]  Phone: [phone]  Fax: [phone]                  Patria Nieto   2017 9:00 AM   Office Visit    Description:  Male : 1950   Provider:  Félix Rojas MD   Department:  [facility]           Reason for Visit     Diabetes           Diagnoses this Visit        Comments    Annual physical exam    -  Primary     Lack of libido         Male sexual dysfunction         Encounter for hepatitis C screening test for low risk patient         Prostate cancer screening                To Do List           Future Appointments        Provider Department Dept Phone    2017 7:15 AM LAB, [place] [facility] [phone]    2017 7:30 AM LAB, [place] [facility] [phone]    2017 8:00 AM SPECIMEN, [place] [facility] [phone]    2017 7:30 AM LIYAH Angel, LILLYP [facility] - Endocrinology [phone]    10/19/2017 8:00 AM Félix Rojas MD [facility] [phone]      Goals (5 Years of Data)     None      [facility]     [facility] -  Assistance  Unless otherwise directed by your provider, please contact [facility], our nurse care line that is available for  assistance.     Registered nurses in the [facility] provide: appointment scheduling, clinical advisement, health education, and other advisory services.  Call: [phone] (toll free)               Medications           Message regarding Medications     Verify the changes and/or additions to your medication regime listed below are the same as discussed with your clinician today.  If any of these changes or additions are incorrect, please notify your healthcare provider.        STOP taking these medications     liraglutide 0.6 mg/0.1 mL, 18 mg/3 mL, subq PNIJ (VICTOZA 3-JOAN) 0.6 mg/0.1 mL (18 mg/3 mL)  "PnIj Inject 0.6 mg daily x 1 week, if tolerable 1.2 mg daily following week, then 1.8 mg daily. On metformin and insulin already.           Verify that the below list of medications is an accurate representation of the medications you are currently taking.  If none reported, the list may be blank. If incorrect, please contact your healthcare provider. Carry this list with you in case of emergency.           Current Medications     aspirin 81 MG Chew Take 81 mg by mouth once daily.    atorvastatin (LIPITOR) 80 MG tablet Take 1 tablet (80 mg total) by mouth once daily.    carvedilol (COREG) 3.125 MG tablet Take 1 tablet (3.125 mg total) by mouth once daily.    fexofenadine (ALLEGRA) 180 MG tablet Take 180 mg by mouth daily as needed.     insulin aspart (NOVOLOG FLEXPEN) 100 unit/mL InPn pen Inject 18 units into skin with correction scale 150-200 +2, 201-250 +4, 251-300 +6, 301-350 +8, >380.    insulin glargine, TOUJEO, (TOUJEO SOLOSTAR) 300 unit/mL (1.5 mL) InPn pen Inject 60 units at night.    metformin (GLUCOPHAGE) 1000 MG tablet TAKE 1 TABLET BY MOUTH TWICE DAILY    pen needle, diabetic (BD INSULIN PEN NEEDLE UF MINI) 31 gauge x 3/16" Ndle Uses 5 times a day.    ramipril (ALTACE) 5 MG capsule Take 1 capsule (5 mg total) by mouth once daily.    sulfamethoxazole-trimethoprim 800-160mg (BACTRIM DS) 800-160 mg Tab Take 1 tablet by mouth 2 (two) times daily.           Clinical Reference Information           Your Vitals Were     BP Pulse Height Weight SpO2 BMI    134/68 (BP Location: Right arm, Patient Position: Sitting, BP Method: Manual) 76 5' 9" (1.753 m) 94.2 kg (207 lb 10.8 oz) 96% 30.67 kg/m2      Blood Pressure          Most Recent Value    BP  134/68      Allergies as of 4/19/2017     Hydrochlorothiazide    Metaproterenol      Immunizations Administered on Date of Encounter - 4/19/2017     None      Orders Placed During Today's Visit     Future Labs/Procedures Expected by Expires    Hepatitis C antibody  " 4/19/2017 6/18/2018    PSA, Screening  4/19/2017 7/18/2017    Testosterone  4/19/2017 6/18/2018    TSH  4/19/2017 6/18/2018      Language Assistance Services     ATTENTION: Language assistance services are available, free of charge. Please call [phone].      ATENCIÓN: Si habla español, tiene a wu disposición servicios gratuitos de asistencia lingüística. Llame al [phone].     CHÚ Ý: N?u b?n nói Ti?ng Vi?t, có các d?ch v? h? tr? ngôn ng? mi?n phí dành cho b?n. G?i s? [phone].         [facility] complies with applicable Federal civil rights laws and does not discriminate on the basis of race, color, national origin, age, disability, or sex.

## 2017-04-20 ENCOUNTER — LAB VISIT (OUTPATIENT)
Dept: LAB | Facility: HOSPITAL | Age: 67
End: 2017-04-20
Attending: INTERNAL MEDICINE
Payer: COMMERCIAL

## 2017-04-20 DIAGNOSIS — Z11.59 ENCOUNTER FOR HEPATITIS C SCREENING TEST FOR LOW RISK PATIENT: ICD-10-CM

## 2017-04-20 DIAGNOSIS — F52.0 LACK OF LIBIDO: ICD-10-CM

## 2017-04-20 DIAGNOSIS — N53.9 MALE SEXUAL DYSFUNCTION: ICD-10-CM

## 2017-04-20 DIAGNOSIS — Z12.5 PROSTATE CANCER SCREENING: ICD-10-CM

## 2017-04-20 LAB
COMPLEXED PSA SERPL-MCNC: 0.38 NG/ML
HCV AB SERPL QL IA: NEGATIVE
TESTOST SERPL-MCNC: 474 NG/DL
TSH SERPL DL<=0.005 MIU/L-ACNC: 3.24 UIU/ML

## 2017-04-20 PROCEDURE — 84443 ASSAY THYROID STIM HORMONE: CPT

## 2017-04-20 PROCEDURE — 84153 ASSAY OF PSA TOTAL: CPT

## 2017-04-20 PROCEDURE — 84403 ASSAY OF TOTAL TESTOSTERONE: CPT

## 2017-04-20 PROCEDURE — 36415 COLL VENOUS BLD VENIPUNCTURE: CPT | Mod: PO

## 2017-04-20 PROCEDURE — 86803 HEPATITIS C AB TEST: CPT

## 2017-05-01 RX ORDER — INSULIN ASPART 100 [IU]/ML
INJECTION, SOLUTION INTRAVENOUS; SUBCUTANEOUS
Qty: 2 BOX | Refills: 6 | Status: SHIPPED | OUTPATIENT
Start: 2017-05-01 | End: 2017-11-16 | Stop reason: SDUPTHER

## 2017-07-11 ENCOUNTER — TELEPHONE (OUTPATIENT)
Dept: INTERNAL MEDICINE | Facility: CLINIC | Age: 67
End: 2017-07-11

## 2017-07-11 ENCOUNTER — LAB VISIT (OUTPATIENT)
Dept: LAB | Facility: HOSPITAL | Age: 67
End: 2017-07-11
Attending: NURSE PRACTITIONER
Payer: COMMERCIAL

## 2017-07-11 LAB
ESTIMATED AVG GLUCOSE: 186 MG/DL
HBA1C MFR BLD HPLC: 8.1 %

## 2017-07-11 PROCEDURE — 36415 COLL VENOUS BLD VENIPUNCTURE: CPT | Mod: PO

## 2017-07-11 PROCEDURE — 83036 HEMOGLOBIN GLYCOSYLATED A1C: CPT

## 2017-07-11 NOTE — TELEPHONE ENCOUNTER
----- Message from Félix Rojas MD sent at 7/11/2017  2:22 PM CDT -----  Diabetes not under control.  Therefore contact patient to reschedule appointment.  Recommend appointment 3-4 weeks after her endocrinology appointment to validate that sugar numbers are truly improving.

## 2017-07-12 ENCOUNTER — PATIENT MESSAGE (OUTPATIENT)
Dept: INTERNAL MEDICINE | Facility: CLINIC | Age: 67
End: 2017-07-12

## 2017-07-12 ENCOUNTER — TELEPHONE (OUTPATIENT)
Dept: INTERNAL MEDICINE | Facility: CLINIC | Age: 67
End: 2017-07-12

## 2017-07-12 NOTE — TELEPHONE ENCOUNTER
Pt reschedule to come in on 8/16/2017 at 11:20am. A voicemail was left and a portal message sent.

## 2017-07-17 ENCOUNTER — PATIENT MESSAGE (OUTPATIENT)
Dept: ENDOCRINOLOGY | Facility: CLINIC | Age: 67
End: 2017-07-17

## 2017-07-31 ENCOUNTER — TELEPHONE (OUTPATIENT)
Dept: INTERNAL MEDICINE | Facility: CLINIC | Age: 67
End: 2017-07-31

## 2017-07-31 NOTE — TELEPHONE ENCOUNTER
----- Message from Ciara Dukes sent at 7/31/2017 10:32 AM CDT -----  Contact:  [phone]/Pt's wife Veronica  Patient would like to be seen sooner than the next available appointment. Please advise.

## 2017-07-31 NOTE — TELEPHONE ENCOUNTER
Spoke with pt's wife who states that the pt has an appointment with ENT and wants to reschedule appointment with Dr. Rojas. Pt will now be seen on 9/6/2017 at 8:00am. Understanding voiced.

## 2017-08-10 ENCOUNTER — OFFICE VISIT (OUTPATIENT)
Dept: FAMILY MEDICINE | Facility: CLINIC | Age: 67
End: 2017-08-10
Payer: COMMERCIAL

## 2017-08-10 VITALS
HEIGHT: 69 IN | BODY MASS INDEX: 30.92 KG/M2 | OXYGEN SATURATION: 95 % | WEIGHT: 208.75 LBS | HEART RATE: 103 BPM | DIASTOLIC BLOOD PRESSURE: 78 MMHG | SYSTOLIC BLOOD PRESSURE: 112 MMHG | TEMPERATURE: 99 F

## 2017-08-10 DIAGNOSIS — J32.9 CHRONIC SINUSITIS, UNSPECIFIED LOCATION: ICD-10-CM

## 2017-08-10 DIAGNOSIS — R06.2 WHEEZING: Primary | ICD-10-CM

## 2017-08-10 PROCEDURE — 4010F ACE/ARB THERAPY RXD/TAKEN: CPT | Mod: S$GLB,,, | Performed by: FAMILY MEDICINE

## 2017-08-10 PROCEDURE — 3078F DIAST BP <80 MM HG: CPT | Mod: S$GLB,,, | Performed by: FAMILY MEDICINE

## 2017-08-10 PROCEDURE — 1126F AMNT PAIN NOTED NONE PRSNT: CPT | Mod: S$GLB,,, | Performed by: FAMILY MEDICINE

## 2017-08-10 PROCEDURE — 1159F MED LIST DOCD IN RCRD: CPT | Mod: S$GLB,,, | Performed by: FAMILY MEDICINE

## 2017-08-10 PROCEDURE — 99999 PR PBB SHADOW E&M-EST. PATIENT-LVL III: CPT | Mod: PBBFAC,,, | Performed by: FAMILY MEDICINE

## 2017-08-10 PROCEDURE — 3008F BODY MASS INDEX DOCD: CPT | Mod: S$GLB,,, | Performed by: FAMILY MEDICINE

## 2017-08-10 PROCEDURE — 99214 OFFICE O/P EST MOD 30 MIN: CPT | Mod: S$GLB,,, | Performed by: FAMILY MEDICINE

## 2017-08-10 PROCEDURE — 3074F SYST BP LT 130 MM HG: CPT | Mod: S$GLB,,, | Performed by: FAMILY MEDICINE

## 2017-08-10 PROCEDURE — 3045F PR MOST RECENT HEMOGLOBIN A1C LEVEL 7.0-9.0%: CPT | Mod: S$GLB,,, | Performed by: FAMILY MEDICINE

## 2017-08-10 RX ORDER — ALBUTEROL SULFATE 90 UG/1
2 AEROSOL, METERED RESPIRATORY (INHALATION) EVERY 6 HOURS PRN
Qty: 18 G | Refills: 0 | Status: SHIPPED | OUTPATIENT
Start: 2017-08-10 | End: 2017-09-06 | Stop reason: SDUPTHER

## 2017-09-06 ENCOUNTER — OFFICE VISIT (OUTPATIENT)
Dept: INTERNAL MEDICINE | Facility: CLINIC | Age: 67
End: 2017-09-06
Payer: COMMERCIAL

## 2017-09-06 VITALS
WEIGHT: 211 LBS | DIASTOLIC BLOOD PRESSURE: 80 MMHG | SYSTOLIC BLOOD PRESSURE: 118 MMHG | BODY MASS INDEX: 31.25 KG/M2 | HEART RATE: 90 BPM | HEIGHT: 69 IN | OXYGEN SATURATION: 97 %

## 2017-09-06 DIAGNOSIS — J30.9 ALLERGIC SINUSITIS: ICD-10-CM

## 2017-09-06 DIAGNOSIS — R05.8 UPPER AIRWAY COUGH SYNDROME: ICD-10-CM

## 2017-09-06 LAB — GLUCOSE SERPL-MCNC: 174 MG/DL (ref 70–110)

## 2017-09-06 PROCEDURE — 82948 REAGENT STRIP/BLOOD GLUCOSE: CPT | Mod: S$GLB,,, | Performed by: INTERNAL MEDICINE

## 2017-09-06 PROCEDURE — 3008F BODY MASS INDEX DOCD: CPT | Mod: S$GLB,,, | Performed by: INTERNAL MEDICINE

## 2017-09-06 PROCEDURE — 1159F MED LIST DOCD IN RCRD: CPT | Mod: S$GLB,,, | Performed by: INTERNAL MEDICINE

## 2017-09-06 PROCEDURE — 99213 OFFICE O/P EST LOW 20 MIN: CPT | Mod: S$GLB,,, | Performed by: INTERNAL MEDICINE

## 2017-09-06 PROCEDURE — 4010F ACE/ARB THERAPY RXD/TAKEN: CPT | Mod: S$GLB,,, | Performed by: INTERNAL MEDICINE

## 2017-09-06 PROCEDURE — 1126F AMNT PAIN NOTED NONE PRSNT: CPT | Mod: S$GLB,,, | Performed by: INTERNAL MEDICINE

## 2017-09-06 PROCEDURE — 3074F SYST BP LT 130 MM HG: CPT | Mod: S$GLB,,, | Performed by: INTERNAL MEDICINE

## 2017-09-06 PROCEDURE — 3045F PR MOST RECENT HEMOGLOBIN A1C LEVEL 7.0-9.0%: CPT | Mod: S$GLB,,, | Performed by: INTERNAL MEDICINE

## 2017-09-06 PROCEDURE — 3079F DIAST BP 80-89 MM HG: CPT | Mod: S$GLB,,, | Performed by: INTERNAL MEDICINE

## 2017-09-06 PROCEDURE — 99999 PR PBB SHADOW E&M-EST. PATIENT-LVL III: CPT | Mod: PBBFAC,,, | Performed by: INTERNAL MEDICINE

## 2017-09-06 RX ORDER — ALBUTEROL SULFATE 90 UG/1
2 AEROSOL, METERED RESPIRATORY (INHALATION) EVERY 6 HOURS PRN
Qty: 18 G | Refills: 0 | Status: SHIPPED | OUTPATIENT
Start: 2017-09-06 | End: 2019-11-26

## 2017-09-06 RX ORDER — INSULIN GLARGINE 300 [IU]/ML
50 INJECTION, SOLUTION SUBCUTANEOUS NIGHTLY
Qty: 4 SYRINGE | Refills: 6 | Status: SHIPPED | OUTPATIENT
Start: 2017-09-06 | End: 2017-11-16 | Stop reason: SDUPTHER

## 2017-09-06 NOTE — PATIENT INSTRUCTIONS
Recommendations for Today    We highly recommend that if anyone prescribes steroids medication in the future that you contact my office for additional support adjusting insulin.  Do not adjust insulin without medical consultation.    Continue checking blood sugar before meals.  Start creating a chart with sugar numbers and with total insulin numbers given each day and always bring this chart with your sugar meter to clinical appointments for review by your medical provider.  Target blood sugar is between 90 and 130.  If blood sugar is ever less than 85 we highly recommend that you contact the clinic for recommendations to avoid low sugar.    Please pay close attention to the recommended units for mealtime insulin and nighttime long-acting insulin.

## 2017-09-06 NOTE — PROGRESS NOTES
Portions of this note are generated with voice recognition software. Typographical errors may exist.     SUBJECTIVE:    This is a/an 67 y.o. male here for primary care visit for  Chief Complaint   Patient presents with    Diabetes     follow up     Patient states that he has difficulty understanding insulin recommendations.  Has not been paying close attention to recommended insulin dosages.  States that he might have been utilizing approximately 40-50 units on average long-acting insulin.  He cannot recall using 60 units despite prescription information showing that 60 units was the recommended amount since March 2017.  Other discrepancies exist with regard to execution of insulin plan generated by endocrinology.  By memory the patient states that range for mealtime insulin has been anywhere from 10-22 at maximum.  Patient did not seem to recognize that the longest recommended value for mealtime insulin was 18.  Patient voices anticipatory fear of hypoglycemia but has not had verified episodes recently.  Does not come today with blood glucose numbers.  States that he is fearful of sugar as low as 100.  States that in the past he has had a lot of mistrust in glucometers.  Routinely changes glucometer every 3 months.  States that he feels like he will get hypoglycemic symptoms with a sugar as low as 100.    Patient states that as a result of a respiratory illness that lasted about 5 weeks he received steroids injection at ENT clinic in addition to a prednisone taper.  States that he did this without medical consultation to adjust insulin.  He received the injection but then only completed 2-3 days of the Medrol dose pack because he was noting severely elevated blood sugar numbers.  States that respiratory symptoms have improved but he still has some postnasal drip.  Finds that albuterol does help with some symptoms regarding postnasal drip.  Does not carry a formal diagnosis of asthma.      Medications Reviewed and  Updated    Past medical, family, and social histories were reviewed and updated.    Review of Systems negative unless otherwise noted in history of present illness-  ROS    General ROS: negative  Psychological ROS: negative  ENT ROS: negative  Allergy and Immunology ROS: negative  Cardiovascular ROS: negative  Gastrointestinal ROS: negative  Genito-Urinary ROS: negative  Musculoskeletal ROS: negative        Allergic:    Review of patient's allergies indicates:   Allergen Reactions    Hydrochlorothiazide     Metaproterenol        OBJECTIVE:  BP: 118/80 Pulse: 90    Wt Readings from Last 3 Encounters:   09/06/17 95.7 kg (210 lb 15.7 oz)   08/10/17 94.7 kg (208 lb 12.4 oz)   04/19/17 94.2 kg (207 lb 10.8 oz)    Body mass index is 31.16 kg/m².  Previous Blood Pressure Readings :   BP Readings from Last 3 Encounters:   09/06/17 118/80   08/10/17 112/78   04/19/17 134/68       Physical Exam    GEN: No apparent distress  HEENT: sclera non-icteric, conjunctiva clear  CV: no peripheral edema.  Regular rate and rhythm.  PULM: breathing non-labored  ABD: Obese, protuberant abdomen.  PSYCH: appropriate affect  MSK: able to rise from chair without assistance  SKIN: normal skin turgor    Pertinent Labs Reviewed       ASSESSMENT/PLAN:    Type 2 diabetes, uncontrolled, with neuropathy.Condition not optimally controlled. Detailed counseling on self care measures. Plan to monitor clinically in addition to plan below.   -     POCT glucose  -     insulin glargine, TOUJEO, (TOUJEO SOLOSTAR) 300 unit/mL (1.5 mL) InPn pen; Inject 50 Units into the skin every evening.  Dispense: 4 Syringe; Refill: 6    Upper airway cough syndrome.Condition stable.  Counseling on self-care measures. Plan to monitor clinically. Continue current medical plan.   -     albuterol 90 mcg/actuation inhaler; Inhale 2 puffs into the lungs every 6 (six) hours as needed for Wheezing. Rescue  Dispense: 18 g; Refill: 0    ALLERGIC sinusitis..Condition stable.   Counseling on self-care measures. Plan to monitor clinically. Continue current medical plan.     Future Appointments  Date Time Provider Department Center   10/9/2017 9:20 AM Félix Rojas MD [facility]       Félix Rojas  9/6/2017  8:46 AM

## 2017-10-09 ENCOUNTER — PATIENT MESSAGE (OUTPATIENT)
Dept: INTERNAL MEDICINE | Facility: CLINIC | Age: 67
End: 2017-10-09

## 2017-11-16 ENCOUNTER — LAB VISIT (OUTPATIENT)
Dept: LAB | Facility: HOSPITAL | Age: 67
End: 2017-11-16
Attending: INTERNAL MEDICINE
Payer: COMMERCIAL

## 2017-11-16 ENCOUNTER — OFFICE VISIT (OUTPATIENT)
Dept: INTERNAL MEDICINE | Facility: CLINIC | Age: 67
End: 2017-11-16
Payer: COMMERCIAL

## 2017-11-16 VITALS
WEIGHT: 211 LBS | HEIGHT: 69 IN | BODY MASS INDEX: 31.25 KG/M2 | SYSTOLIC BLOOD PRESSURE: 126 MMHG | OXYGEN SATURATION: 96 % | HEART RATE: 98 BPM | DIASTOLIC BLOOD PRESSURE: 82 MMHG

## 2017-11-16 DIAGNOSIS — Z79.4 UNCONTROLLED TYPE 2 DIABETES MELLITUS WITH HYPERGLYCEMIA, WITH LONG-TERM CURRENT USE OF INSULIN: Primary | ICD-10-CM

## 2017-11-16 DIAGNOSIS — E66.9 OBESITY (BMI 30-39.9): ICD-10-CM

## 2017-11-16 DIAGNOSIS — Z23 NEED FOR IMMUNIZATION AGAINST INFLUENZA: ICD-10-CM

## 2017-11-16 DIAGNOSIS — Z79.4 UNCONTROLLED TYPE 2 DIABETES MELLITUS WITH HYPERGLYCEMIA, WITH LONG-TERM CURRENT USE OF INSULIN: ICD-10-CM

## 2017-11-16 DIAGNOSIS — E11.65 UNCONTROLLED TYPE 2 DIABETES MELLITUS WITH HYPERGLYCEMIA, WITH LONG-TERM CURRENT USE OF INSULIN: Primary | ICD-10-CM

## 2017-11-16 DIAGNOSIS — E11.65 UNCONTROLLED TYPE 2 DIABETES MELLITUS WITH HYPERGLYCEMIA, WITH LONG-TERM CURRENT USE OF INSULIN: ICD-10-CM

## 2017-11-16 LAB
ALBUMIN SERPL BCP-MCNC: 3.7 G/DL
ANION GAP SERPL CALC-SCNC: 6 MMOL/L
BUN SERPL-MCNC: 11 MG/DL
CALCIUM SERPL-MCNC: 9.3 MG/DL
CHLORIDE SERPL-SCNC: 111 MMOL/L
CO2 SERPL-SCNC: 26 MMOL/L
CREAT SERPL-MCNC: 1.2 MG/DL
EST. GFR  (AFRICAN AMERICAN): >60 ML/MIN/1.73 M^2
EST. GFR  (NON AFRICAN AMERICAN): >60 ML/MIN/1.73 M^2
ESTIMATED AVG GLUCOSE: 163 MG/DL
GLUCOSE SERPL-MCNC: 58 MG/DL
HBA1C MFR BLD HPLC: 7.3 %
PHOSPHATE SERPL-MCNC: 2.7 MG/DL
POTASSIUM SERPL-SCNC: 4.1 MMOL/L
SODIUM SERPL-SCNC: 143 MMOL/L

## 2017-11-16 PROCEDURE — 80069 RENAL FUNCTION PANEL: CPT

## 2017-11-16 PROCEDURE — 36415 COLL VENOUS BLD VENIPUNCTURE: CPT | Mod: PO

## 2017-11-16 PROCEDURE — 99214 OFFICE O/P EST MOD 30 MIN: CPT | Mod: 25,S$GLB,, | Performed by: INTERNAL MEDICINE

## 2017-11-16 PROCEDURE — 90471 IMMUNIZATION ADMIN: CPT | Mod: S$GLB,,, | Performed by: INTERNAL MEDICINE

## 2017-11-16 PROCEDURE — 83036 HEMOGLOBIN GLYCOSYLATED A1C: CPT

## 2017-11-16 PROCEDURE — 99999 PR PBB SHADOW E&M-EST. PATIENT-LVL III: CPT | Mod: PBBFAC,,, | Performed by: INTERNAL MEDICINE

## 2017-11-16 PROCEDURE — 90662 IIV NO PRSV INCREASED AG IM: CPT | Mod: S$GLB,,, | Performed by: INTERNAL MEDICINE

## 2017-11-16 RX ORDER — INSULIN GLARGINE 300 [IU]/ML
54 INJECTION, SOLUTION SUBCUTANEOUS NIGHTLY
Qty: 4 SYRINGE | Refills: 6 | Status: SHIPPED | OUTPATIENT
Start: 2017-11-16 | End: 2018-05-15 | Stop reason: SDUPTHER

## 2017-11-16 RX ORDER — INSULIN ASPART 100 [IU]/ML
INJECTION, SOLUTION INTRAVENOUS; SUBCUTANEOUS
Qty: 2 BOX | Refills: 6 | Status: SHIPPED | OUTPATIENT
Start: 2017-11-16 | End: 2018-11-19 | Stop reason: SDUPTHER

## 2017-11-16 NOTE — PROGRESS NOTES
Portions of this note are generated with voice recognition software. Typographical errors may exist.     SUBJECTIVE:    This is a/an 67 y.o. male here for primary care visit for  Chief Complaint   Patient presents with    Diabetes     follow up     Patient truggling a lot with food choices. Complying with diabetes meds. Sedentary but patient is willing to start physical activity.         Medications Reviewed and Updated    Past medical, family, and social histories were reviewed and updated.    Review of Systems negative unless otherwise noted in history of present illness-  ROS    General ROS: negative  Psychological ROS: negative  Cardiovascular ROS: negative  Gastrointestinal ROS: negative  Genito-Urinary ROS: negative  Musculoskeletal ROS: negative  Neurological ROS: negative      Allergic:    Review of patient's allergies indicates:   Allergen Reactions    Hydrochlorothiazide        OBJECTIVE:  BP: 126/82 Pulse: 98    Wt Readings from Last 3 Encounters:   11/16/17 95.7 kg (210 lb 15.7 oz)   09/06/17 95.7 kg (210 lb 15.7 oz)   08/10/17 94.7 kg (208 lb 12.4 oz)    Body mass index is 31.16 kg/m².  Previous Blood Pressure Readings :   BP Readings from Last 3 Encounters:   11/16/17 126/82   09/06/17 118/80   08/10/17 112/78       Physical Exam    GEN: No apparent distress  HEENT: sclera non-icteric, conjunctiva clear  CV: no peripheral edema, RRR, no r/m/g   PULM: breathing non-labored  ABD: Obese, protuberant abdomen.  PSYCH: appropriate affect  MSK: able to rise from chair without assistance  SKIN: normal skin turgor    Pertinent Labs Reviewed       ASSESSMENT/PLAN:    Uncontrolled type 2 diabetes mellitus with hyperglycemia, with long-term current use of insulin.Condition not optimally controlled. Detailed counseling on self care measures. Plan to monitor clinically in addition to plan below.   -     Hemoglobin A1c; Future; Expected date: 11/16/2017  -     Renal function panel; Future; Expected date:  11/16/2017  -     Ambulatory consult to Diabetic Education  -     insulin glargine, TOUJEO, (TOUJEO SOLOSTAR) 300 unit/mL (1.5 mL) InPn pen; Inject 54 Units into the skin every evening.  Dispense: 4 Syringe; Refill: 6  -     insulin aspart (NOVOLOG FLEXPEN) 100 unit/mL InPn pen; Inject 20 units into skin with correction scale 150-200 +2, 201-250 +4, 251-300 +6, 301-350 +8, >380. Max daily dose=84 units  Dispense: 2 Box; Refill: 6    Obesity (BMI 30-39.9).Condition not optimally controlled. Detailed counseling on self care measures. Plan to monitor clinically in addition to plan below.     Need for immunization against influenza  -     Influenza - High Dose (65+) (PF) (IM)          Future Appointments  Date Time Provider Department Center   1/16/2018 8:00 AM Félix Rojas MD [facility]       Félix Rojas  11/19/2017  9:18 AM

## 2017-11-16 NOTE — PATIENT INSTRUCTIONS
Diet Tips   · Breading on the fried food is what increasing the sugar   · Fried food also adds dangerous amounts of cholesterol   · You have a very heavy cholesterol rich diet. Leaner meats will protect you from a heart attack. Instead of regular granado, consider turkey granado. Consider making it an ocassional treat and not a daily rotuine. For using eggs, 2 egg yolks can easily cause overdose in cholesterol. Egg whites best     Checking Blood Sugar  · We recommend that you check blood sugar 3 times per day.   · First blood sugar of the day should be fasting in the morning  · You should also check before other meals of the day  · Write this down on a glucose log with the date and time and any special circumstances if sugar is below 85 (low sugar) or above 130 (high sugar). For example did you skip meals prior to low sugar? Did you eat a sugary or carb rich meal or snack before a high sugar?   · Write down a comment every time you have a sugar in the low 80s letting us know why the sugar might be lower than usual.  For example did you skip a meal?  Did you give yourself too much insulin?    AFTER CARE INSTRUCTIONS   · Please bring your glucometer at each visit or your glucose logs   · Please make sure you call the clinic if you have low sugar, lower than 85   · Please call the clinic if you ever have a sugar > 200      We recommend that you contact the clinic any time blood sugar is 85 or less.  This can help us to investigate why blood sugar dropped so low and help prevent this.       Contact the clinic any time that you have acute illness causing dehydration and if such illness does not improve significantly beyond 3 days.           We recommend insulin dosages as follows       Long Acting Insulin (Name Toujeo). 54 Units given in the evening      Mealtime Insulin (Name Novolog) before every meal plus correction scale as follows:        Breakfast 20 units   Lunch 20 units   Dinner 20 units       Mealtime Insulin  before every meal when very physically active plus correction scale       Breakfast 18 units   Lunch 18 units   Dinner 18 units       Do not make further adjustments to insulin without contacting medical provider.

## 2017-12-07 ENCOUNTER — CLINICAL SUPPORT (OUTPATIENT)
Dept: DIABETES | Facility: CLINIC | Age: 67
End: 2017-12-07
Payer: COMMERCIAL

## 2017-12-07 VITALS — WEIGHT: 210 LBS | BODY MASS INDEX: 31.01 KG/M2

## 2017-12-07 DIAGNOSIS — Z79.4 UNCONTROLLED TYPE 2 DIABETES MELLITUS WITH HYPERGLYCEMIA, WITH LONG-TERM CURRENT USE OF INSULIN: ICD-10-CM

## 2017-12-07 DIAGNOSIS — E11.65 UNCONTROLLED TYPE 2 DIABETES MELLITUS WITH HYPERGLYCEMIA, WITH LONG-TERM CURRENT USE OF INSULIN: ICD-10-CM

## 2017-12-07 PROCEDURE — 99999 PR PBB SHADOW E&M-EST. PATIENT-LVL I: CPT | Mod: PBBFAC,,,

## 2017-12-07 PROCEDURE — G0108 DIAB MANAGE TRN  PER INDIV: HCPCS | Mod: S$GLB,,, | Performed by: INTERNAL MEDICINE

## 2017-12-07 NOTE — PROGRESS NOTES
Diabetes Education  Author: Simon Ruiz RN  Date: 12/7/2017    Diabetes Education Visit  Diabetes Education Record Assessment/Progress: Initial  Diabetes Type  Diabetes Type : Type II  Diabetes History  Diabetes Diagnosis: >10 years  Nutrition  Meal Planning: skipping meals, water, eats out seldom  What type of sweetener do you use?: Sweet N Low  What type of beverages do you drink?: milk, juice, water  Meal Plan 24 Hour Recall - Breakfast: eggs, granado  Meal Plan 24 Hour Recall - Lunch: bbq ribs, broccoli, carrots, potato salad  Meal Plan 24 Hour Recall - Dinner: hamburger, bun, ramos, brussel sprouts  Monitoring   Self Monitoring : pt has a meter and is testing 3 times a day before injections,  Blood Glucose Logs: No  Exercise   Exercise Type: use exercise equipment  Intensity: Low  Frequency: 3-5 Times per week  Duration: 15 min  Current Diabetes Treatment   Current Treatment: Oral Medication, Diet, Injectable, Exercise, Insulin  Social History  Preferred Learning Method: Face to Face, Group Education, Demonstration, Reading Materials  Primary Support: Spouse  Educational Level: College Graduate  Occupation:   Smoking Status: Never a Smoker  Alcohol Use: Weekly    DDS-2 Score  ( > 3 = SIGNIFICANT DISTRESS): 2     Barriers to Change  Barriers to Change: None  Learning Challenges : None  Readiness to Learn   Readiness to Learn : Acceptance  Cultural Influences  Cultural Influences: None  Diabetes Education Assessment/Progress  Diabetes Disease Process (diabetes disease process and treatment options): Discussion, Instructed, Individual Session, Demonstrates Understanding/Competency(verbalizes/demonstrates), Written Materials Provided  Nutrition (Incorporating nutritional management into one's lifestyle): Discussion, Demonstration, Instructed, Individual Session, Demonstrates Understanding/Competency (verbalizes/demonstrates)  Physical Activity (incorporating physical activity into one's lifestyle):  Discussion, Instructed, Individual Session, Demonstrates Understanding/Competency (verbalizes/demonstrates), Written Materials Provided  Medications (states correct name, dose, onset, peak, duration, side effects & timing of meds): Discussion, Instructed, Individual Session, Demonstrates Understanding/Competency(verbalizes/demonstrates), Written Materials Provided  Monitoring (monitoring blood glucose/other parameters & using results): Discussion, Instructed, Individual Session, Demonstrates Understanding/Competency (verbalizes/demonstrates), Written Materials Provided  Acute Complications (preventing, detecting, and treating acute complications): Discussion, Instructed, Individual Session, Demonstrates Understanding/Competency (verbalizes/demonstrates), Written Materials Provided  Chronic Complications (preventing, detecting, and treating chronic complications): Discussion, Instructed, Individual Session, Demonstrates Understanding/Competency (verbalizes/demonstrates), Written Materials Provided  Clinical (diabetes and other pertinent medical history): Discussion, Instructed, Individual Session, Demonstrates Understanding/Competency (verbalizes/demonstrates)  Cognitive (knowledge of self-management skills, functional health literacy): Discussion, Instructed, Individual Session, Demonstrates Understanding/Competency (verbalizes/demonstrates)  Psychosocial (emotional response to diabetes): Discussion, Instructed, Individual Session, Demonstrates Understanding/Competency (verbalizes/demonstrates)  Diabetes Distress and Support Systems: Discussion, Instructed, Individual Session, Demonstrates Understanding/Competency (verbalizes/demonstrates)  Behavioral (readiness for change, lifestyle practices, self-care behaviors): Discussion, Instructed, Individual Session, Demonstrates Understanding/Competency (verbalizes/demonstrates)  Goals  Patient has selected/evaluated goals during today's session: Yes, selected  Healthy  Eating: Set  Start Date: 12/07/17  Target Date: 03/07/18    Diabetes Care Plan/Intervention  Education Plan/Intervention: Group Follow-Up MNT, Foot Exam  Diabetes Meal Plan  Restrictions: Restricted Carbohydrate  Calories: 1800  Carbohydrate Per Meal: 45-60g  Carbohydrate Per Snack : 15-20g  Pt usually skips lunch and his lunch time dose of Novolog. Discussed with the adverse effects of doing this and explained how important it is to eat 3 balanced meals per day and take his insulin as prescribed. Discussed with pt how he could bring lunch to work to make sure that he is eating and taking his insulin. Pt had good understanding of explanation and will work on starting this habit.  Education Units of Time   Time Spent: 60 min      Health Maintenance Topics with due status: Not Due       Topic Last Completion Date    Colonoscopy 02/10/2011    TETANUS VACCINE 08/05/2013    Lipid Panel 12/28/2016    Foot Exam 04/19/2017    Eye Exam 07/11/2017    Hemoglobin A1c 11/16/2017     Health Maintenance Due   Topic Date Due    Zoster Vaccine  07/25/2011    Pneumococcal (65+) (1 of 2 - PCV13) 07/25/2016

## 2018-01-01 ENCOUNTER — HOSPITAL ENCOUNTER (EMERGENCY)
Facility: HOSPITAL | Age: 68
Discharge: HOME | End: 2018-01-01
Attending: EMERGENCY MEDICINE
Payer: COMMERCIAL

## 2018-01-01 VITALS
BODY MASS INDEX: 31.1 KG/M2 | WEIGHT: 210 LBS | TEMPERATURE: 98 F | HEIGHT: 69 IN | OXYGEN SATURATION: 98 % | RESPIRATION RATE: 18 BRPM | HEART RATE: 86 BPM | DIASTOLIC BLOOD PRESSURE: 90 MMHG | SYSTOLIC BLOOD PRESSURE: 136 MMHG

## 2018-01-01 DIAGNOSIS — G56.32 RADIAL NERVE PALSY, LEFT: Primary | ICD-10-CM

## 2018-01-01 LAB
GLUCOSE SERPL-MCNC: 186 MG/DL (ref 70–110)
POCT GLUCOSE: 186 MG/DL (ref 70–110)

## 2018-01-01 PROCEDURE — 82962 GLUCOSE BLOOD TEST: CPT

## 2018-01-01 PROCEDURE — 99284 EMERGENCY DEPT VISIT MOD MDM: CPT | Mod: 25

## 2018-01-02 NOTE — DISCHARGE INSTRUCTIONS
There is no evidence of any venous injury in your left arm - your presentation is typical for a radial nerve palsy (often called a Saturday night palsy_). Your ulnar and radial nerves are operating normally in both arms .

## 2018-01-02 NOTE — ED PROVIDER NOTES
Encounter Date: 1/1/2018       History     Chief Complaint   Patient presents with    Extremity Weakness     pt fell asleep in a jacuzzi at about 1500 today, with left arm hanging out of the jacuzzi. Woke up at about 1600 with numbness and weakness to left arm. C/o continued numbness and mild weakness to left forearm and wrist. Denies any other numbness or weakness. Able to move all fingers. Strong hand  bilaterally.      Patient reports a weakness in his left hand and forearm started approximately 30 minutes prior to arrival.  He states that he fell asleep in the Jacuzzi tub with his left arm out of the side of the tub.  Since he woke up 30 minutes later noted could not lift his thumb could not do a dorsiflexion of the wrist feels a numbness in his forearm into his dorsum of his hand primarily his thumb.  He knows that he can close his fist and spread his fingers is no numbness on the palmar side of his hand.  Never had this before he is concerned he might have a probable stroke service quite worried.    He reports no other weakness or numbness in any extremity additionally no change in speech no change to vision or change in hearing and no change in his right arm..  Wife also is supporting this same story of his particular neurological findings.    Additionally reports no fevers chills cough swelling or throat problems with his breathing no problems with chest pain or palpitations no nausea or vomiting abdominal pain and diarrhea constipation no urinary changes no problems with swelling at his lower extremities.          Review of patient's allergies indicates:   Allergen Reactions    Hydrochlorothiazide      Past Medical History:   Diagnosis Date    Diabetes mellitus type II     Hx MRSA infection     Hypertension     MRSA infection 2002     History reviewed. No pertinent surgical history.  Family History   Problem Relation Age of Onset    Heart disease Mother     Heart disease Father     Diabetes  Father     Diabetes Sister      Social History   Substance Use Topics    Smoking status: Never Smoker    Smokeless tobacco: Never Used    Alcohol use 2.4 oz/week     4 Cans of beer per week     Review of Systems   Constitutional: Negative for activity change, appetite change, chills, fatigue and fever.   HENT: Negative for congestion, rhinorrhea, sneezing, sore throat and trouble swallowing.    Eyes: Negative for pain, redness and visual disturbance.   Respiratory: Negative for apnea and shortness of breath.    Cardiovascular: Negative for chest pain, palpitations and leg swelling.   Gastrointestinal: Negative for abdominal distention, abdominal pain, constipation, diarrhea, nausea and vomiting.   Endocrine: Negative for polyphagia and polyuria.   Genitourinary: Negative for difficulty urinating, dysuria, frequency and hematuria.   Musculoskeletal: Negative for arthralgias, back pain, joint swelling, myalgias and neck pain.   Skin: Negative for color change and pallor.   Neurological: Positive for weakness and numbness. Negative for dizziness, syncope and headaches.        See notes in both physical exam and history of present illness settings regarding the numbness and weakness he describes   Hematological: Does not bruise/bleed easily.   Psychiatric/Behavioral: Negative for confusion.       Physical Exam     Initial Vitals [01/01/18 1702]   BP Pulse Resp Temp SpO2   (!) 151/98 86 18 97.6 °F (36.4 °C) 98 %      MAP       115.67         Physical Exam    Constitutional: He appears well-developed and well-nourished. He is not diaphoretic.   HENT:   Head: Normocephalic and atraumatic.   Mouth/Throat: Oropharynx is clear and moist.   Eyes: Conjunctivae and EOM are normal. Pupils are equal, round, and reactive to light. Right eye exhibits no discharge. Left eye exhibits no discharge. No scleral icterus.   Neck: Normal range of motion. Neck supple.   Cardiovascular: Normal rate, regular rhythm and normal heart sounds.    No murmur heard.  Pulmonary/Chest: Breath sounds normal. No respiratory distress. He exhibits no tenderness.   Abdominal: Soft. Bowel sounds are normal. He exhibits no distension. There is no tenderness.   Musculoskeletal: He exhibits no edema or tenderness.   Patient shows weakness in the left radial nerve outflow with weakness at wrist dorsiflexion weakness at thumb elevation and change the sensation over the dorsum of the forearm and dorsum of hand and dorsum of the thumb.  Somewhat less numbness in the lateral sides of his on the right.  He is able to palmar flex at his left wrist and close the fist he has good sensation in the palmar side of digits 2 through 5  He Is completely intact with the same tests on his right hand wrist forearm.   Lymphadenopathy:     He has no cervical adenopathy.   Neurological: He is alert. A sensory deficit is present.   Skin: Skin is warm and dry. Capillary refill takes less than 2 seconds. No erythema. No pallor.   Psychiatric: He has a normal mood and affect.         ED Course   Procedures  Labs Reviewed - No data to display          Medical Decision Making:   Initial Assessment:   Patient presents with appears to be a classic Saturday night palsy.  We will check a veins of the left arm to make sure there are no blood clots formed but the pattern of having chest radial nerve affect that certainly sounds unlikely that we have a problem with the vessels.  Differential Diagnosis:   Saturday night (radial nerve) palsy,  left arm great vein occlusion.  Clinical Tests:   Radiological Study: Ordered and Reviewed      Patient to follow up with is PCP and may need neurological or PT support                 ED Course      Clinical Impression:   The encounter diagnosis was Radial nerve palsy, left.      Disposition:   Disposition: Discharged  Condition: Stable                        Robert Andrade MD  01/01/18 2114

## 2018-01-04 DIAGNOSIS — E11.42 TYPE 2 DIABETES MELLITUS WITH DIABETIC POLYNEUROPATHY, WITH LONG-TERM CURRENT USE OF INSULIN: ICD-10-CM

## 2018-01-04 DIAGNOSIS — Z79.4 TYPE 2 DIABETES MELLITUS WITH DIABETIC POLYNEUROPATHY, WITH LONG-TERM CURRENT USE OF INSULIN: ICD-10-CM

## 2018-01-04 RX ORDER — CARVEDILOL 3.12 MG/1
3.12 TABLET ORAL DAILY
Qty: 90 TABLET | Refills: 2 | Status: SHIPPED | OUTPATIENT
Start: 2018-01-04 | End: 2018-12-31 | Stop reason: SDUPTHER

## 2018-01-04 RX ORDER — RAMIPRIL 5 MG/1
5 CAPSULE ORAL DAILY
Qty: 90 CAPSULE | Refills: 2 | Status: SHIPPED | OUTPATIENT
Start: 2018-01-04 | End: 2018-05-15 | Stop reason: SDUPTHER

## 2018-01-19 DIAGNOSIS — E11.9 TYPE 2 DIABETES MELLITUS WITHOUT COMPLICATION: ICD-10-CM

## 2018-03-08 ENCOUNTER — CLINICAL SUPPORT (OUTPATIENT)
Dept: DIABETES | Facility: CLINIC | Age: 68
End: 2018-03-08
Payer: COMMERCIAL

## 2018-03-08 VITALS — WEIGHT: 210 LBS | BODY MASS INDEX: 31.01 KG/M2

## 2018-03-08 PROCEDURE — 99999 PR PBB SHADOW E&M-EST. PATIENT-LVL I: CPT | Mod: PBBFAC,,,

## 2018-03-08 PROCEDURE — G0108 DIAB MANAGE TRN  PER INDIV: HCPCS | Mod: S$GLB,,, | Performed by: INTERNAL MEDICINE

## 2018-03-08 NOTE — PROGRESS NOTES
Diabetes Education  Author: Simon Ruiz RN  Date: 3/8/2018    Diabetes Education Visit  Diabetes Education Record Assessment/Progress: Post Program/Follow-up  Diabetes Type  Diabetes Type : Type II  Nutrition  Meal Planning: skipping meals, water, diet drinks, artificial sweeteners, eats out often, snacks between meal  What type of sweetener do you use?: Splenda  What type of beverages do you drink?: diet soda/tea, juice, milk, water  Meal Plan 24 Hour Recall - Breakfast: 2 strips granado, egg beaters, 1 slice toast, 2 cups coffee, 12 oz juice which is 3 carbs  Meal Plan 24 Hour Recall - Lunch: hamburger 8 oz, bun,orange  Meal Plan 24 Hour Recall - Dinner: 10 oz steak, 1 cup mashed potatoes, broccoli  Meal Plan 24 Hour Recall - Snack: banana  Monitoring   Self Monitoring : pt has a meter and is testing 2-4 times a day.Pt reports bs ranges from 140-160  Blood Glucose Logs: Yes  In the last month, how often have you had a low blood sugar reaction?: once  What are your symptoms of low blood sugar?: light headed  How do you treat low blood sugar?: skittles  Can you tell when your blood sugar is too high?: no  Exercise   Frequency: Never  Current Diabetes Treatment   Current Treatment: Oral Medication, Diet, Injectable, Exercise, Insulin  Social History  Preferred Learning Method: Face to Face, Demonstration, Reading Materials  Primary Support: Spouse    DDS-2 Score  ( > 3 = SIGNIFICANT DISTRESS): 1   Barriers to Change  Barriers to Change: None  Learning Challenges : None  Readiness to Learn   Readiness to Learn : Acceptance  Cultural Influences  Cultural Influences: None  Diabetes Education Assessment/Progress  Diabetes Disease Process (diabetes disease process and treatment options): Discussion, Instructed, Individual Session, Demonstrates Understanding/Competency(verbalizes/demonstrates)  Nutrition (Incorporating nutritional management into one's lifestyle): Discussion, Demonstration, Instructed, Individual  Session, Demonstrates Understanding/Competency (verbalizes/demonstrates), Written Materials Provided  Physical Activity (incorporating physical activity into one's lifestyle): Discussion, Instructed, Individual Session, Demonstrates Understanding/Competency (verbalizes/demonstrates)  Medications (states correct name, dose, onset, peak, duration, side effects & timing of meds): Discussion, Instructed, Individual Session, Demonstrates Understanding/Competency(verbalizes/demonstrates)  Monitoring (monitoring blood glucose/other parameters & using results): Discussion, Instructed, Individual Session, Demonstrates Understanding/Competency (verbalizes/demonstrates)  Acute Complications (preventing, detecting, and treating acute complications): Not Covered/Deferred  Clinical (diabetes, other pertinent medical history, and relevant comorbidities reviewed during visit): Discussion, Instructed, Individual Session, Demonstrates Understanding/Competency (verbalizes/demonstrates)  Cognitive (knowledge of self-management skills, functional health literacy): Discussion, Instructed, Individual Session, Demonstrates Understanding/Competency (verbalizes/demonstrates)  Psychosocial (emotional response to diabetes): Not Covered/Deferred  Diabetes Distress and Support Systems: Discussion, Instructed, Individual Session, Demonstrates Understanding/Competency (verbalizes/demonstrates)  Behavioral (readiness for change, lifestyle practices, self-care behaviors): Discussion, Instructed, Individual Session, Demonstrates Understanding/Competency (verbalizes/demonstrates)  24 hour meal recall showed that pt is not eating enough carbs at his meals. Pt is eating about 8-10 oz of protein at his meals which needs to be cut in half. Reviewed diabetic meal plan in depth and pt had better understanding. Pt to follow up as needed.  Goals  Patient has selected/evaluated goals during today's session: Yes, evaluated  Met Percentage : 75%    Diabetes  Self-Management Support Plan  Diabetes Learning: DM websites  Review Status: Patient has selected and agrees to support plan., Patient was provided Diabetes Self-Management Support Plan document that includes support options.  Diabetes Meal Plan  Restrictions: Restricted Carbohydrate  Calories: 1800  Carbohydrate Per Meal: 45-60g  Carbohydrate Per Snack : 15-20g  Education Units of Time   Time Spent: 60 min    Health Maintenance was reviewed today with patient. Discussed with patient importance of routine eye exams, foot exams/foot care, blood work (i.e.: A1c, microalbumin, and lipid), dental visits, yearly flu vaccine, and pneumonia vaccine as indicated by PCP. Patient verbalized understanding.     Health Maintenance Topics with due status: Not Due       Topic Last Completion Date    Colonoscopy 02/10/2011    TETANUS VACCINE 08/05/2013    Eye Exam 07/11/2017    Low Dose Statin 11/16/2017    Hemoglobin A1c 11/16/2017     Health Maintenance Due   Topic Date Due    Zoster Vaccine  07/25/2011    Pneumococcal (65+) (1 of 2 - PCV13) 07/25/2016    Lipid Panel  12/28/2017    Foot Exam  04/19/2018

## 2018-03-08 NOTE — LETTER
March 8, 2018      Félix Rojas MD  [address] [facility] [address]         Patient: Patria Nieto   MR Number: [id number]   YOB: 1950   Date of Visit: 3/8/2018       Dear Dr. Rojas:    Thank you for referring Patria for diabetes self-management education and support. He has completed all components of our Diabetes Management Program and his Self-Management Support Plan. Below is a summary of his clinical outcomes and goal progress.    Patient Outcomes:    A1c Status:   Lab Results   Component Value Date    HGBA1C 7.3 (H) 11/16/2017    HGBA1C 8.1 (H) 07/11/2017     Goals  Patient has selected/evaluated goals during today's session: Yes, evaluated  Met Percentage : 75%    Diabetes Self-Management Support Plan  Diabetes Learning: DM websites  Review Status: Patient has selected and agrees to support plan., Patient was provided Diabetes Self-Management Support Plan document that includes support options.    Follow up:   · Patria to follow diabetes support plan indicated above  · Patria to attend medical appointments as scheduled  · Patria to update you on his DM education progress as needed      If you have questions, please do not hesitate to call me. I look forward to providing additional education and support as needed.    Sincerely,    Simon Ruiz RN

## 2018-04-06 ENCOUNTER — TELEPHONE (OUTPATIENT)
Dept: ADMINISTRATIVE | Facility: HOSPITAL | Age: 68
End: 2018-04-06

## 2018-05-15 ENCOUNTER — PATIENT MESSAGE (OUTPATIENT)
Dept: INTERNAL MEDICINE | Facility: CLINIC | Age: 68
End: 2018-05-15

## 2018-05-15 DIAGNOSIS — Z79.4 TYPE 2 DIABETES MELLITUS WITH DIABETIC POLYNEUROPATHY, WITH LONG-TERM CURRENT USE OF INSULIN: ICD-10-CM

## 2018-05-15 DIAGNOSIS — Z79.4 UNCONTROLLED TYPE 2 DIABETES MELLITUS WITH HYPERGLYCEMIA, WITH LONG-TERM CURRENT USE OF INSULIN: ICD-10-CM

## 2018-05-15 DIAGNOSIS — E11.65 UNCONTROLLED TYPE 2 DIABETES MELLITUS WITH HYPERGLYCEMIA, WITH LONG-TERM CURRENT USE OF INSULIN: ICD-10-CM

## 2018-05-15 DIAGNOSIS — Z79.4 UNCONTROLLED TYPE 2 DIABETES MELLITUS WITH HYPERGLYCEMIA, WITH LONG-TERM CURRENT USE OF INSULIN: Primary | ICD-10-CM

## 2018-05-15 DIAGNOSIS — E11.42 TYPE 2 DIABETES MELLITUS WITH DIABETIC POLYNEUROPATHY, WITH LONG-TERM CURRENT USE OF INSULIN: ICD-10-CM

## 2018-05-15 DIAGNOSIS — E11.65 UNCONTROLLED TYPE 2 DIABETES MELLITUS WITH HYPERGLYCEMIA, WITH LONG-TERM CURRENT USE OF INSULIN: Primary | ICD-10-CM

## 2018-05-15 RX ORDER — ATORVASTATIN CALCIUM 80 MG/1
80 TABLET, FILM COATED ORAL DAILY
Qty: 90 TABLET | Refills: 2 | Status: SHIPPED | OUTPATIENT
Start: 2018-05-15 | End: 2019-01-14 | Stop reason: SDUPTHER

## 2018-05-15 RX ORDER — METFORMIN HYDROCHLORIDE 1000 MG/1
1000 TABLET ORAL 2 TIMES DAILY
Qty: 180 TABLET | Refills: 2 | Status: SHIPPED | OUTPATIENT
Start: 2018-05-15 | End: 2018-11-19 | Stop reason: SDUPTHER

## 2018-05-15 RX ORDER — INSULIN GLARGINE 300 [IU]/ML
54 INJECTION, SOLUTION SUBCUTANEOUS NIGHTLY
Qty: 4 SYRINGE | Refills: 6 | Status: SHIPPED | OUTPATIENT
Start: 2018-05-15 | End: 2018-06-29 | Stop reason: SDUPTHER

## 2018-05-15 RX ORDER — RAMIPRIL 5 MG/1
5 CAPSULE ORAL DAILY
Qty: 90 CAPSULE | Refills: 2 | Status: SHIPPED | OUTPATIENT
Start: 2018-05-15 | End: 2019-04-12 | Stop reason: SDUPTHER

## 2018-05-25 ENCOUNTER — LAB VISIT (OUTPATIENT)
Dept: LAB | Facility: HOSPITAL | Age: 68
End: 2018-05-25
Attending: INTERNAL MEDICINE
Payer: COMMERCIAL

## 2018-05-25 DIAGNOSIS — E11.9 TYPE 2 DIABETES MELLITUS WITHOUT COMPLICATION: ICD-10-CM

## 2018-05-25 LAB
CHOLEST SERPL-MCNC: 149 MG/DL
CHOLEST/HDLC SERPL: 3.5 {RATIO}
HDLC SERPL-MCNC: 42 MG/DL
HDLC SERPL: 28.2 %
LDLC SERPL CALC-MCNC: 91.2 MG/DL
NONHDLC SERPL-MCNC: 107 MG/DL
TRIGL SERPL-MCNC: 79 MG/DL

## 2018-05-25 PROCEDURE — 36415 COLL VENOUS BLD VENIPUNCTURE: CPT | Mod: PO

## 2018-05-25 PROCEDURE — 80061 LIPID PANEL: CPT

## 2018-05-31 ENCOUNTER — OFFICE VISIT (OUTPATIENT)
Dept: INTERNAL MEDICINE | Facility: CLINIC | Age: 68
End: 2018-05-31
Payer: COMMERCIAL

## 2018-05-31 VITALS
WEIGHT: 210.75 LBS | HEART RATE: 96 BPM | HEIGHT: 69 IN | OXYGEN SATURATION: 96 % | DIASTOLIC BLOOD PRESSURE: 70 MMHG | SYSTOLIC BLOOD PRESSURE: 118 MMHG | BODY MASS INDEX: 31.21 KG/M2

## 2018-05-31 DIAGNOSIS — E11.42 DIABETIC POLYNEUROPATHY ASSOCIATED WITH TYPE 2 DIABETES MELLITUS: ICD-10-CM

## 2018-05-31 DIAGNOSIS — E11.59 HYPERTENSION ASSOCIATED WITH DIABETES: ICD-10-CM

## 2018-05-31 DIAGNOSIS — R79.89 PLATELET COUNT LESS 140,000 PER CUBIC MILLIMETER: ICD-10-CM

## 2018-05-31 DIAGNOSIS — I15.2 HYPERTENSION ASSOCIATED WITH DIABETES: ICD-10-CM

## 2018-05-31 DIAGNOSIS — E80.6 HYPERBILIRUBINEMIA: ICD-10-CM

## 2018-05-31 DIAGNOSIS — E11.65 UNCONTROLLED TYPE 2 DIABETES MELLITUS WITH HYPERGLYCEMIA, WITH LONG-TERM CURRENT USE OF INSULIN: Primary | ICD-10-CM

## 2018-05-31 DIAGNOSIS — Z79.4 UNCONTROLLED TYPE 2 DIABETES MELLITUS WITH HYPERGLYCEMIA, WITH LONG-TERM CURRENT USE OF INSULIN: Primary | ICD-10-CM

## 2018-05-31 PROCEDURE — 3045F PR MOST RECENT HEMOGLOBIN A1C LEVEL 7.0-9.0%: CPT | Mod: CPTII,S$GLB,, | Performed by: INTERNAL MEDICINE

## 2018-05-31 PROCEDURE — 99214 OFFICE O/P EST MOD 30 MIN: CPT | Mod: S$GLB,,, | Performed by: INTERNAL MEDICINE

## 2018-05-31 PROCEDURE — 3078F DIAST BP <80 MM HG: CPT | Mod: CPTII,S$GLB,, | Performed by: INTERNAL MEDICINE

## 2018-05-31 PROCEDURE — 99999 PR PBB SHADOW E&M-EST. PATIENT-LVL III: CPT | Mod: PBBFAC,,, | Performed by: INTERNAL MEDICINE

## 2018-05-31 PROCEDURE — 3074F SYST BP LT 130 MM HG: CPT | Mod: CPTII,S$GLB,, | Performed by: INTERNAL MEDICINE

## 2018-05-31 NOTE — PROGRESS NOTES
Portions of this note are generated with voice recognition software. Typographical errors may exist.     SUBJECTIVE:    This is a/an 67 y.o. male here for primary care visit for  Chief Complaint   Patient presents with    Diabetes     follow up     Results     Patient states that over the last 6 weeks he has had 2 corticosteroid injections to help with bronchitis type illness.  Patient did not think to contact the office to have insulin adjusted.  States that he had significant hyperglycemia following each injection.  Since these shots the patient has actually had a few episodes of severe hypoglycemia.  This was associated with giving himself mealtime insulin and then getting distracted.  Finds that this is something that happens because he has difficulty with work priorities in self-care for insulin.      Patient states that he has good exertional capacity.  He was seen by cardiologist many years ago with remarks that he might have a reduced ejection fraction but the patient states that he has not had further cardiology evaluation and is ambivalent about doing aggressive evaluation at this time given his excellent exertional capacity.  He denies any angina symptoms    14 day 153  30 day 166     2 shots. CSI over 6 weeks    Last shot was     145  140  139  86  99  116  147    Medications Reviewed and Updated    Past medical, family, and social histories were reviewed and updated.    Review of Systems negative unless otherwise noted in history of present illness-  ROS    General ROS: negative  Psychological ROS: negative  ENT ROS: negative  Endocrine ROS: Negative  Allergy and Immunology ROS: negative  Cardiovascular ROS: negative  Pulmonary ROS: Negative  Gastrointestinal ROS: negative  Genito-Urinary ROS: negative  Musculoskeletal ROS: negative  Neurological ROS: negative  Dermatological ROS: negative        Allergic:    Review of patient's allergies indicates:   Allergen Reactions    Hydrochlorothiazide         OBJECTIVE:  BP: 118/70 Pulse: 96    Wt Readings from Last 3 Encounters:   05/31/18 95.6 kg (210 lb 12.2 oz)   03/08/18 95.3 kg (210 lb)   01/01/18 95.3 kg (210 lb)    Body mass index is 31.12 kg/m².  Previous Blood Pressure Readings :   BP Readings from Last 3 Encounters:   05/31/18 118/70   01/01/18 (!) 136/90   11/16/17 126/82       Physical Exam    GEN: No apparent distress  HEENT: sclera non-icteric, conjunctiva clear  CV: no peripheral edema regular rate and rhythm. No carotid bruits.  No murmurs.  PULM: breathing non-labored  ABD: Obese, protuberant abdomen.  PSYCH: appropriate affect  MSK: able to rise from chair without assistance  SKIN: normal skin turgor    Echocardiogram from 2008 with ejection fraction of 40%.  Patient did not have further cardiology follow-up.  Denies angina symptoms with moderate activities of daily living.        Neuropathy Screen   Left: Filament test present 9 sites  Right:   Filament test present 9 sites    Dermatologic Skin  - cracking: No  - thickened nails  No  - blistering  No    Infection:   - fungal infection between toes: No    Ulceration:  No    Musculoskeletal Deformity  - claw toes Other  - prominent metatarsal heads  N/A  - charcot joint  No  - muscle wasting (guttering between metatarsals) No    Left DPP Pulse Diminished: 1+  Right DPP Pulse Diminished:  1+        Pertinent Labs Reviewed       ASSESSMENT/PLAN:    Uncontrolled type 2 diabetes mellitus with hyperglycemia, with long-term current use of insulin.Condition not optimally controlled. Detailed counseling on self care measures. Plan to monitor clinically in addition to plan below and on After Visit Summary.   -     Hemoglobin A1c; Standing    Diabetic polyneuropathy associated with type 2 diabetes mellitus.Condition stable.  Counseling on self-care measures. Plan to monitor clinically. Continue current medical plan.     Hyperbilirubinemia.Further evaluation warranted.  Recommendations as below.  -      Hepatitis B surface antigen; Future; Expected date: 05/31/2018    Platelet count less 140,000 per cubic millimeter  -     Vitamin B12; Future; Expected date: 05/31/2018  -     Folate; Future; Expected date: 05/31/2018    Hypertension associated with diabetes  -     Brain natriuretic peptide; Future; Expected date: 05/31/2018          Future Appointments  Date Time Provider Department Center   6/25/2018 7:00 AM LAB, [facility] [place]   6/29/2018 9:20 AM Félix Rojas MD [facility]  6/1/2018  3:23 PM

## 2018-05-31 NOTE — PATIENT INSTRUCTIONS
Recommendations for today    Take the following precautions so that you do not have further episodes of low sugar.  · Get out of the habit of going more than 4 hr without eating something  · Do not inject mealtime insulin until food is in front of you to prevent injecting with insulin and then forgetting to eat    Always contact my clinic should you received a medication from another medical provider that will raise blood sugar.    What Is Angina?  Angina is a warning that your heart muscle is not getting enough oxygen-rich blood and is at risk for damage. Medicines, certain medical procedures, and lifestyle changes can help control angina. Talk with your healthcare provider about how to prevent angina and what to do if you get it.    How does angina feel?  Angina is often described as chest pain, but this can be misleading. Angina is not always painful, and it isnt always felt in the chest. Angina might feel like:  · Discomfort, aching, tightness, or pressure that comes and goes. You may feel this in your chest, upper back, upper abdomen, either arm, shoulder, neck, or jaw and gets worse with physical exertion.  · Tiredness that gets worse or you have more tiredness than usual for no clear reason  · Shortness of breath while doing something that used to be easy  · Heartburn, indigestion, nausea, or sweating that does not get better with immediate relief antacid medicines      Call 911 right away if any of your symptoms lasts for more than a few minutes. Or if they go away and come back. Or if they happen at rest and don't go away after taking nitroglycerin. Or if they continue to get worse. You could be having a heart attack (acute myocardial infarction).  When does angina happen?  · Angina usually happens during activity. It can also occur when youre upset or after a large meal. Sometimes angina can happen when the weather is too hot or too cold. All of these things can put more stress on your body and your  heart.  · You may have unstable angina if angina starts occurring more often, lasts longer, happens even when you are resting, or causes more discomfort. Its a sign that your heart problem may be getting worse. You need to call your healthcare provider right away.  Date Last Reviewed: 10/1/2016  © 7322-9593 [employer]. [address] [place] [address], [place] [address]. All rights reserved. This information is not intended as a substitute for professional medical care. Always follow your healthcare professional's instructions.        Diabetes and Alcohol Consumption    If you have diabetes, you need to be careful with alcohol. Alcohol can affect how well you control your blood sugar (glucose) level. It can also increase risks to your health. Before choosing to drink alcohol, discuss it with your healthcare provider. He or she can help you decide whether you can drink safely. This sheet tells you more about the risks of drinking alcohol. It also gives you tips for staying safe when you drink.  How alcohol can affect your diabetes  Here are some of the ways alcohol can affect your health if you have diabetes:  · It can make certain health problems worse. Alcohol may worsen disease of the liver, kidney, or pancreas. It may also make nerve or eye damage more likely. If you have any of these health problems, your healthcare provider will likely advise you not to drink alcohol. It can increase your blood pressure and triglycerides.   · It can increase your risk for low blood sugar (hypoglycemia). The liver helps prevent low blood sugar by releasing extra glucose into the blood. Alcohol in the blood keeps the liver from doing this. Low blood sugar is more likely if you drink alcohol on an empty stomach or during or right after exercise. It is also more likely if you take insulin or medicines that help lower blood sugar. Also, alcohol may affect your ability to tell whether you have symptoms of low blood sugar. This  may keep you from getting needed treatment.  · It can increase your risk for high blood sugar (hyperglycemia). Many alcoholic drinks contain carbohydrates (carbs). These include beers, sweeter julieta, and drinks mixed with fruit juices or sugar. Carbs raise blood sugar levels higher and faster than other kinds of foods. Drinking may throw off your ability to monitor your carbs. Be sure to check your blood sugar more often if you are drinking alcohol.   · It can affect how well you manage your weight. Alcohol is high in calories and has no nutrition. If you are on a meal plan to help control your weight, you will need to count alcohol as part of your daily calorie intake. A standard drink is usually counted as 90 calories or 2 fat exchanges. In addition, alcohol can cause you to feel hungrier than normal. This makes you more likely to overeat. This can affect your weight and blood sugar level.  Tips for safer drinking  Your healthcare provider may give you the OK to drink in moderation. Here are some steps you can take to drink safely:  · Strictly follow the drink limits given to you by your healthcare provider. Or use the American Diabetes Association guidelines (see box below).  · Check your blood sugar level before drinking. Do not drink if your blood sugar level is too low or too high. Also, check your blood sugar level after drinking or before going to bed. This is because alcohol can stay in the blood for many hours after drinking. If your blood sugar level is low or dropping, you may be able to treat it with a snack or glucose tablet before it worsens.  · If you use carbohydrate counting to match your meals with your insulin doses, do not count calories from alcohol to substitute for nutritious food.   · Ask your healthcare provider, including your pharmacist, how alcohol will affect insulin or any medicines you take.  · Never drink on an empty stomach.  · Never drink during or after exercise.  · Do not drink  any alcohol if you are going to drive.  · Be smart about what you drink. This means choosing drinks that are lower in alcohol, calories, and carbohydrates. Choices include dry julieta, light beers, or mixed drinks with sugar-free juice, club soda, or sparkling water.  · Carry medical ID that tells others you have diabetes. This helps ensure that you receive proper treatment, if needed.  American Diabetes Association Alcohol guidelines  If your healthcare provider has cleared you to drink, limit drinking to:  · Women: No more than 1 drink a day  · Men: No more than 2 drinks a day  One drink equals 12 ounces of beer, 5 ounces of wine, or 1.5 ounces of hard liquor (for example, vodka, whiskey, or gin).    Date Last Reviewed: 5/1/2016  © 8806-3669 [employer]. [address] [place] [address]. All rights reserved. This information is not intended as a substitute for professional medical care. Always follow your healthcare professional's instructions.

## 2018-06-25 ENCOUNTER — LAB VISIT (OUTPATIENT)
Dept: LAB | Facility: HOSPITAL | Age: 68
End: 2018-06-25
Attending: INTERNAL MEDICINE
Payer: COMMERCIAL

## 2018-06-25 DIAGNOSIS — E80.6 HYPERBILIRUBINEMIA: ICD-10-CM

## 2018-06-25 DIAGNOSIS — E11.65 UNCONTROLLED TYPE 2 DIABETES MELLITUS WITH HYPERGLYCEMIA, WITH LONG-TERM CURRENT USE OF INSULIN: ICD-10-CM

## 2018-06-25 DIAGNOSIS — E11.59 HYPERTENSION ASSOCIATED WITH DIABETES: ICD-10-CM

## 2018-06-25 DIAGNOSIS — R79.89 PLATELET COUNT LESS 140,000 PER CUBIC MILLIMETER: ICD-10-CM

## 2018-06-25 DIAGNOSIS — Z79.4 UNCONTROLLED TYPE 2 DIABETES MELLITUS WITH HYPERGLYCEMIA, WITH LONG-TERM CURRENT USE OF INSULIN: ICD-10-CM

## 2018-06-25 DIAGNOSIS — I15.2 HYPERTENSION ASSOCIATED WITH DIABETES: ICD-10-CM

## 2018-06-25 LAB
BNP SERPL-MCNC: <10 PG/ML
ESTIMATED AVG GLUCOSE: 183 MG/DL
FOLATE SERPL-MCNC: 6.5 NG/ML
HBA1C MFR BLD HPLC: 8 %
VIT B12 SERPL-MCNC: 466 PG/ML

## 2018-06-25 PROCEDURE — 82607 VITAMIN B-12: CPT

## 2018-06-25 PROCEDURE — 36415 COLL VENOUS BLD VENIPUNCTURE: CPT | Mod: PO

## 2018-06-25 PROCEDURE — 83036 HEMOGLOBIN GLYCOSYLATED A1C: CPT

## 2018-06-25 PROCEDURE — 82746 ASSAY OF FOLIC ACID SERUM: CPT

## 2018-06-25 PROCEDURE — 87340 HEPATITIS B SURFACE AG IA: CPT

## 2018-06-25 PROCEDURE — 83880 ASSAY OF NATRIURETIC PEPTIDE: CPT

## 2018-06-26 LAB — HBV SURFACE AG SERPL QL IA: NEGATIVE

## 2018-06-29 ENCOUNTER — OFFICE VISIT (OUTPATIENT)
Dept: INTERNAL MEDICINE | Facility: CLINIC | Age: 68
End: 2018-06-29
Payer: COMMERCIAL

## 2018-06-29 VITALS
HEIGHT: 69 IN | DIASTOLIC BLOOD PRESSURE: 76 MMHG | HEART RATE: 94 BPM | WEIGHT: 212.94 LBS | SYSTOLIC BLOOD PRESSURE: 124 MMHG | OXYGEN SATURATION: 95 % | BODY MASS INDEX: 31.54 KG/M2

## 2018-06-29 DIAGNOSIS — E11.65 UNCONTROLLED TYPE 2 DIABETES MELLITUS WITH HYPERGLYCEMIA, WITH LONG-TERM CURRENT USE OF INSULIN: Primary | ICD-10-CM

## 2018-06-29 DIAGNOSIS — Z79.4 UNCONTROLLED TYPE 2 DIABETES MELLITUS WITH HYPERGLYCEMIA, WITH LONG-TERM CURRENT USE OF INSULIN: Primary | ICD-10-CM

## 2018-06-29 DIAGNOSIS — J30.9 ALLERGIC RHINITIS, UNSPECIFIED SEASONALITY, UNSPECIFIED TRIGGER: ICD-10-CM

## 2018-06-29 DIAGNOSIS — R79.89 PLATELET COUNT LESS 140,000 PER CUBIC MILLIMETER: ICD-10-CM

## 2018-06-29 DIAGNOSIS — E80.6 HYPERBILIRUBINEMIA: ICD-10-CM

## 2018-06-29 PROCEDURE — 3045F PR MOST RECENT HEMOGLOBIN A1C LEVEL 7.0-9.0%: CPT | Mod: CPTII,S$GLB,, | Performed by: INTERNAL MEDICINE

## 2018-06-29 PROCEDURE — 3074F SYST BP LT 130 MM HG: CPT | Mod: CPTII,S$GLB,, | Performed by: INTERNAL MEDICINE

## 2018-06-29 PROCEDURE — 3078F DIAST BP <80 MM HG: CPT | Mod: CPTII,S$GLB,, | Performed by: INTERNAL MEDICINE

## 2018-06-29 PROCEDURE — 99999 PR PBB SHADOW E&M-EST. PATIENT-LVL III: CPT | Mod: PBBFAC,,, | Performed by: INTERNAL MEDICINE

## 2018-06-29 PROCEDURE — 99214 OFFICE O/P EST MOD 30 MIN: CPT | Mod: S$GLB,,, | Performed by: INTERNAL MEDICINE

## 2018-06-29 RX ORDER — INSULIN GLARGINE 300 [IU]/ML
59 INJECTION, SOLUTION SUBCUTANEOUS NIGHTLY
Qty: 17.7 ML | Refills: 3 | Status: SHIPPED | OUTPATIENT
Start: 2018-06-29 | End: 2018-08-30

## 2018-06-29 NOTE — PROGRESS NOTES
Portions of this note are generated with voice recognition software. Typographical errors may exist.     SUBJECTIVE:    This is a/an 67 y.o. male here for primary care visit for  Chief Complaint   Patient presents with    Diabetes     follow up      Patient states that he has adopted some changes in nutrition as a result of diabetic education.  He is now more conscious of reading labels for carbohydrates.  He has not had any severe hypoglycemic episodes.  The    Patient is complying with insulin and is willing to adjust the dosage modestly.      140 14 day    155  106  157  152    The patient says that he has not had formal evaluation for hyperbilirubinemia nor low platelets.  If the the patient remarks that this has been in his chart going back to 2004.  The patient states that he has not had formal evaluation for fatty liver noticed in 2011 on liver imaging.  This was done during an evaluation for abdominal pain that never became a recurring problem.  At the time the patient was not taking cholesterol medication but since then has had excellent control of cholesterol and has been consistent with taking statin medication.      The patient states that he has improved symptoms regarding rhinosinusitis and is willing to avoid high-dose steroid medication in the future to prevent worsening of glycemic control        Medications Reviewed and Updated    Past medical, family, and social histories were reviewed and updated.    Review of Systems negative unless otherwise noted in history of present illness-  ROS    General ROS: negative  Psychological ROS: negative  ENT ROS: negative  Endocrine ROS: Negative  Allergy and Immunology ROS: negative  Cardiovascular ROS: negative  Pulmonary ROS: Negative  Gastrointestinal ROS: negative  Genito-Urinary ROS: negative  Musculoskeletal ROS: negative  Neurological ROS: negative  Dermatological ROS: negative        Allergic:    Review of patient's allergies indicates:   Allergen  Reactions    Hydrochlorothiazide        OBJECTIVE:  BP: 124/76 Pulse: 94    Wt Readings from Last 3 Encounters:   06/29/18 96.6 kg (212 lb 15.4 oz)   05/31/18 95.6 kg (210 lb 12.2 oz)   03/08/18 95.3 kg (210 lb)    Body mass index is 31.45 kg/m².  Previous Blood Pressure Readings :   BP Readings from Last 3 Encounters:   06/29/18 124/76   05/31/18 118/70   01/01/18 (!) 136/90       Physical Exam    GEN: No apparent distress  HEENT: sclera non-icteric, conjunctiva clear  CV: no peripheral edema  PULM: breathing non-labored  ABD: Obese, protuberant abdomen.  PSYCH: appropriate affect  MSK: able to rise from chair without assistance  SKIN: normal skin turgor    Pertinent Labs Reviewed       ASSESSMENT/PLAN:    Uncontrolled type 2 diabetes mellitus with hyperglycemia, with long-term current use of insulin.Condition improving.  Counseling on self-care measures today.  Continue with current plan in addition to recommendations written on After Visit Summary.   -     insulin glargine, TOUJEO, (TOUJEO SOLOSTAR U-300 INSULIN) 300 unit/mL (1.5 mL) InPn pen; Inject 59 Units into the skin every evening.  Dispense: 17.7 mL; Refill: 3    Platelet count less 140,000 per cubic millimeter.Etiology unclear. Not controlled. Further evaluation warranted.  Recommendations as below or as written on After Visit Summary.   -     US Abdomen Complete; Future; Expected date: 06/29/2018    Hyperbilirubinemia.Etiology unclear. Not controlled. Further evaluation warranted.  Recommendations as below or as written on After Visit Summary.   -     US Abdomen Complete; Future; Expected date: 06/29/2018    Allergic rhinitis, unspecified seasonality, unspecified trigger.Condition stable.  Counseling given today on self-care measures. Plan to monitor clinically. Continue current medical plan.     No future appointments.    Félix Rojas  6/29/2018  9:39 AM

## 2018-06-29 NOTE — PATIENT INSTRUCTIONS
Recommendations for today    We recommend increasing the dosage of long-acting insulin in making additional changes to diet to help bring blood sugar down.    We recommend that you continue to have something for lunch time to prevent low sugar episodes in to prevent over eating in the evening    Any time that you are asked to be fasting we recommend that you changed the use of long-acting insulin.  Give yourself half of the dose in the morning and then half of the dose midday    When receiving treatment for sinusitis we recommend that you avoid steroid medication high dose in the form of pills or injections.  ENT should try as hard as possible to find alternative treatments for sinusitis

## 2018-07-05 ENCOUNTER — PATIENT MESSAGE (OUTPATIENT)
Dept: INTERNAL MEDICINE | Facility: CLINIC | Age: 68
End: 2018-07-05

## 2018-08-03 DIAGNOSIS — E11.9 TYPE 2 DIABETES MELLITUS WITHOUT COMPLICATION, UNSPECIFIED WHETHER LONG TERM INSULIN USE: ICD-10-CM

## 2018-08-10 ENCOUNTER — PATIENT MESSAGE (OUTPATIENT)
Dept: INTERNAL MEDICINE | Facility: CLINIC | Age: 68
End: 2018-08-10

## 2018-08-10 DIAGNOSIS — B95.8 INFECTION, SKIN, STAPH: Primary | ICD-10-CM

## 2018-08-10 DIAGNOSIS — L08.9 INFECTION, SKIN, STAPH: Primary | ICD-10-CM

## 2018-08-10 RX ORDER — SULFAMETHOXAZOLE AND TRIMETHOPRIM 800; 160 MG/1; MG/1
1 TABLET ORAL 2 TIMES DAILY
Qty: 6 TABLET | Refills: 0 | Status: SHIPPED | OUTPATIENT
Start: 2018-08-10 | End: 2018-08-13

## 2018-08-23 ENCOUNTER — LAB VISIT (OUTPATIENT)
Dept: LAB | Facility: HOSPITAL | Age: 68
End: 2018-08-23
Attending: INTERNAL MEDICINE
Payer: COMMERCIAL

## 2018-08-23 DIAGNOSIS — E11.65 UNCONTROLLED TYPE 2 DIABETES MELLITUS WITH HYPERGLYCEMIA, WITH LONG-TERM CURRENT USE OF INSULIN: ICD-10-CM

## 2018-08-23 DIAGNOSIS — Z79.4 UNCONTROLLED TYPE 2 DIABETES MELLITUS WITH HYPERGLYCEMIA, WITH LONG-TERM CURRENT USE OF INSULIN: ICD-10-CM

## 2018-08-23 LAB
ESTIMATED AVG GLUCOSE: 180 MG/DL
HBA1C MFR BLD HPLC: 7.9 %

## 2018-08-23 PROCEDURE — 83036 HEMOGLOBIN GLYCOSYLATED A1C: CPT

## 2018-08-23 PROCEDURE — 36415 COLL VENOUS BLD VENIPUNCTURE: CPT | Mod: PO

## 2018-08-30 ENCOUNTER — OFFICE VISIT (OUTPATIENT)
Dept: INTERNAL MEDICINE | Facility: CLINIC | Age: 68
End: 2018-08-30
Payer: COMMERCIAL

## 2018-08-30 VITALS
WEIGHT: 212.5 LBS | SYSTOLIC BLOOD PRESSURE: 112 MMHG | BODY MASS INDEX: 31.47 KG/M2 | OXYGEN SATURATION: 96 % | HEART RATE: 92 BPM | HEIGHT: 69 IN | DIASTOLIC BLOOD PRESSURE: 70 MMHG

## 2018-08-30 DIAGNOSIS — E11.65 UNCONTROLLED TYPE 2 DIABETES MELLITUS WITH HYPERGLYCEMIA, WITH LONG-TERM CURRENT USE OF INSULIN: Primary | ICD-10-CM

## 2018-08-30 DIAGNOSIS — Z79.4 UNCONTROLLED TYPE 2 DIABETES MELLITUS WITH HYPERGLYCEMIA, WITH LONG-TERM CURRENT USE OF INSULIN: Primary | ICD-10-CM

## 2018-08-30 DIAGNOSIS — R79.89 PLATELET COUNT LESS 140,000 PER CUBIC MILLIMETER: ICD-10-CM

## 2018-08-30 DIAGNOSIS — R17 ELEVATED BILIRUBIN: ICD-10-CM

## 2018-08-30 PROCEDURE — 3074F SYST BP LT 130 MM HG: CPT | Mod: CPTII,S$GLB,, | Performed by: INTERNAL MEDICINE

## 2018-08-30 PROCEDURE — 99999 PR PBB SHADOW E&M-EST. PATIENT-LVL III: CPT | Mod: PBBFAC,,, | Performed by: INTERNAL MEDICINE

## 2018-08-30 PROCEDURE — 99214 OFFICE O/P EST MOD 30 MIN: CPT | Mod: S$GLB,,, | Performed by: INTERNAL MEDICINE

## 2018-08-30 PROCEDURE — 3078F DIAST BP <80 MM HG: CPT | Mod: CPTII,S$GLB,, | Performed by: INTERNAL MEDICINE

## 2018-08-30 PROCEDURE — 3045F PR MOST RECENT HEMOGLOBIN A1C LEVEL 7.0-9.0%: CPT | Mod: CPTII,S$GLB,, | Performed by: INTERNAL MEDICINE

## 2018-08-30 RX ORDER — INSULIN GLARGINE 300 [IU]/ML
68 INJECTION, SOLUTION SUBCUTANEOUS NIGHTLY
Qty: 20.4 ML | Refills: 3 | Status: SHIPPED | OUTPATIENT
Start: 2018-08-30 | End: 2019-08-13

## 2018-08-30 NOTE — PROGRESS NOTES
Portions of this note are generated with voice recognition software. Typographical errors may exist.     SUBJECTIVE:    This is a/an 68 y.o. male here for primary care visit for  Chief Complaint   Patient presents with    Diabetes     follow up                Medications Reviewed and Updated    Past medical, family, and social histories were reviewed and updated.    Review of Systems negative unless otherwise noted in history of present illness-  ROS    General ROS: negative  Psychological ROS: negative  ENT ROS: negative  Endocrine ROS: Negative  Allergy and Immunology ROS: negative  Cardiovascular ROS: negative  Gastrointestinal ROS: negative  Genito-Urinary ROS: negative  Musculoskeletal ROS: negative  Neurological ROS: negative  Dermatological ROS: negative        Allergic:    Review of patient's allergies indicates:   Allergen Reactions    Hydrochlorothiazide        OBJECTIVE:  BP: 112/70 Pulse: 92    Wt Readings from Last 3 Encounters:   08/30/18 96.4 kg (212 lb 8.4 oz)   06/29/18 96.6 kg (212 lb 15.4 oz)   05/31/18 95.6 kg (210 lb 12.2 oz)    Body mass index is 31.38 kg/m².  Previous Blood Pressure Readings :   BP Readings from Last 3 Encounters:   08/30/18 112/70   06/29/18 124/76   05/31/18 118/70       Physical Exam    GEN: No apparent distress  HEENT: sclera non-icteric, conjunctiva clear  CV: no peripheral edema  PULM: breathing non-labored  ABD: Obese, protuberant abdomen.  PSYCH: appropriate affect  MSK: able to rise from chair without assistance  SKIN: normal skin turgor    Pertinent Labs Reviewed       ASSESSMENT/PLAN:    Uncontrolled type 2 diabetes mellitus with hyperglycemia, with long-term current use of insulin.Condition not optimally controlled. Detailed counseling on self care measures. Plan to monitor clinically in addition to plan below or as listed on After Visit Summary.   -     insulin glargine, TOUJEO, (TOUJEO SOLOSTAR U-300 INSULIN) 300 unit/mL (1.5 mL) InPn pen; Inject 68 Units into  the skin every evening.  Dispense: 20.4 mL; Refill: 3    Elevated bilirubin..Etiology unclear. Not controlled. Further evaluation warranted.  Recommendations as below or as written on After Visit Summary.   -     CBC auto differential; Standing; Expected date: 08/30/2018  -     Protime-INR; Future; Expected date: 08/30/2018    Platelet count less 140,000 per cubic millimeter.Condition stable.  Counseling given today on self-care measures. Plan to monitor clinically. Continue current medical plan.         Future Appointments   Date Time Provider Department [place]   10/15/2018  7:15 AM LAB, [facility]   10/18/2018  8:00 AM Félix Rojas MD [facility] [place]       Félix Rojas  8/30/2018  12:10 PM

## 2018-10-09 RX ORDER — PEN NEEDLE, DIABETIC 30 GX3/16"
NEEDLE, DISPOSABLE MISCELLANEOUS
Qty: 200 EACH | Refills: 6 | Status: SHIPPED | OUTPATIENT
Start: 2018-10-09 | End: 2020-06-17 | Stop reason: SDUPTHER

## 2018-11-19 ENCOUNTER — PATIENT MESSAGE (OUTPATIENT)
Dept: INTERNAL MEDICINE | Facility: CLINIC | Age: 68
End: 2018-11-19

## 2018-11-19 DIAGNOSIS — Z79.4 UNCONTROLLED TYPE 2 DIABETES MELLITUS WITH HYPERGLYCEMIA, WITH LONG-TERM CURRENT USE OF INSULIN: Primary | ICD-10-CM

## 2018-11-19 DIAGNOSIS — E11.65 UNCONTROLLED TYPE 2 DIABETES MELLITUS WITH HYPERGLYCEMIA, WITH LONG-TERM CURRENT USE OF INSULIN: ICD-10-CM

## 2018-11-19 DIAGNOSIS — Z79.4 UNCONTROLLED TYPE 2 DIABETES MELLITUS WITH HYPERGLYCEMIA, WITH LONG-TERM CURRENT USE OF INSULIN: ICD-10-CM

## 2018-11-19 DIAGNOSIS — E11.65 UNCONTROLLED TYPE 2 DIABETES MELLITUS WITH HYPERGLYCEMIA, WITH LONG-TERM CURRENT USE OF INSULIN: Primary | ICD-10-CM

## 2018-11-19 RX ORDER — METFORMIN HYDROCHLORIDE 1000 MG/1
1000 TABLET ORAL 2 TIMES DAILY WITH MEALS
Qty: 180 TABLET | Refills: 2 | Status: SHIPPED | OUTPATIENT
Start: 2018-11-19 | End: 2020-02-17 | Stop reason: SDUPTHER

## 2018-11-19 RX ORDER — INSULIN ASPART 100 [IU]/ML
INJECTION, SOLUTION INTRAVENOUS; SUBCUTANEOUS
Qty: 2 BOX | Refills: 6 | Status: SHIPPED | OUTPATIENT
Start: 2018-11-19 | End: 2020-02-06 | Stop reason: SDUPTHER

## 2018-11-19 RX ORDER — METFORMIN HYDROCHLORIDE 1000 MG/1
1000 TABLET ORAL 2 TIMES DAILY
Qty: 180 TABLET | Refills: 2 | Status: CANCELLED | OUTPATIENT
Start: 2018-11-19

## 2018-11-19 RX ORDER — METFORMIN HYDROCHLORIDE 1000 MG/1
1000 TABLET ORAL 2 TIMES DAILY
Qty: 180 TABLET | Refills: 2 | Status: SHIPPED | OUTPATIENT
Start: 2018-11-19 | End: 2018-11-19 | Stop reason: SDUPTHER

## 2018-11-20 ENCOUNTER — PATIENT MESSAGE (OUTPATIENT)
Dept: INTERNAL MEDICINE | Facility: CLINIC | Age: 68
End: 2018-11-20

## 2018-12-31 RX ORDER — CARVEDILOL 3.12 MG/1
3.12 TABLET ORAL DAILY
Qty: 90 TABLET | Refills: 0 | Status: SHIPPED | OUTPATIENT
Start: 2018-12-31 | End: 2018-12-31 | Stop reason: SDUPTHER

## 2019-01-02 RX ORDER — CARVEDILOL 3.12 MG/1
3.12 TABLET ORAL DAILY
Qty: 90 TABLET | Refills: 0 | Status: SHIPPED | OUTPATIENT
Start: 2019-01-02 | End: 2019-10-02 | Stop reason: SDUPTHER

## 2019-01-14 RX ORDER — ATORVASTATIN CALCIUM 80 MG/1
80 TABLET, FILM COATED ORAL DAILY
Qty: 90 TABLET | Refills: 2 | Status: SHIPPED | OUTPATIENT
Start: 2019-01-14 | End: 2019-03-28

## 2019-01-24 ENCOUNTER — PATIENT MESSAGE (OUTPATIENT)
Dept: INTERNAL MEDICINE | Facility: CLINIC | Age: 69
End: 2019-01-24

## 2019-01-29 ENCOUNTER — TELEPHONE (OUTPATIENT)
Dept: INTERNAL MEDICINE | Facility: CLINIC | Age: 69
End: 2019-01-29

## 2019-01-29 NOTE — TELEPHONE ENCOUNTER
----- Message from Félix Rojas MD sent at 1/24/2019  6:26 PM CST -----  .Patient overdue for diabetes follow-up appointment.  Please contact patient to schedule  all standing labs signed by me and a follow up visit as soon as possible. Remind the patient that following up with labs and routine visits is required to ensure the safety and effectiveness of the medical treatments offered by my office and if these routine follow up activities are not met, we may have to temporarily suspend medication refills until they can get caught up on follow up activities. Thanks

## 2019-01-29 NOTE — TELEPHONE ENCOUNTER
Spoke with pt's wife to try and schedule pt for a diabetes follow up and labs right before. Pt's wife states that she will speak with pt and send a [facility] message to get pt scheduled.

## 2019-03-22 ENCOUNTER — TELEPHONE (OUTPATIENT)
Dept: INTERNAL MEDICINE | Facility: CLINIC | Age: 69
End: 2019-03-22

## 2019-03-22 ENCOUNTER — PATIENT MESSAGE (OUTPATIENT)
Dept: INTERNAL MEDICINE | Facility: CLINIC | Age: 69
End: 2019-03-22

## 2019-03-22 DIAGNOSIS — E11.65 UNCONTROLLED TYPE 2 DIABETES MELLITUS WITH HYPERGLYCEMIA, WITH LONG-TERM CURRENT USE OF INSULIN: Primary | ICD-10-CM

## 2019-03-22 DIAGNOSIS — Z79.4 UNCONTROLLED TYPE 2 DIABETES MELLITUS WITH HYPERGLYCEMIA, WITH LONG-TERM CURRENT USE OF INSULIN: Primary | ICD-10-CM

## 2019-03-22 NOTE — TELEPHONE ENCOUNTER
Spoke with pt's wife to schedule pre labs. Pt will be in on 3/23/19 to get labs done. Understanding voiced.

## 2019-03-23 ENCOUNTER — LAB VISIT (OUTPATIENT)
Dept: LAB | Facility: HOSPITAL | Age: 69
End: 2019-03-23
Attending: INTERNAL MEDICINE
Payer: COMMERCIAL

## 2019-03-23 DIAGNOSIS — Z79.4 UNCONTROLLED TYPE 2 DIABETES MELLITUS WITH HYPERGLYCEMIA, WITH LONG-TERM CURRENT USE OF INSULIN: ICD-10-CM

## 2019-03-23 DIAGNOSIS — E11.65 UNCONTROLLED TYPE 2 DIABETES MELLITUS WITH HYPERGLYCEMIA, WITH LONG-TERM CURRENT USE OF INSULIN: ICD-10-CM

## 2019-03-23 LAB
ALBUMIN SERPL BCP-MCNC: 3.8 G/DL
ALP SERPL-CCNC: 58 U/L
ALT SERPL W/O P-5'-P-CCNC: 17 U/L
ANION GAP SERPL CALC-SCNC: 7 MMOL/L
AST SERPL-CCNC: 13 U/L
BILIRUB SERPL-MCNC: 1.8 MG/DL
BNP SERPL-MCNC: <10 PG/ML
BUN SERPL-MCNC: 14 MG/DL
CALCIUM SERPL-MCNC: 9.6 MG/DL
CHLORIDE SERPL-SCNC: 112 MMOL/L
CHOLEST SERPL-MCNC: 118 MG/DL
CHOLEST/HDLC SERPL: 3.4 {RATIO}
CO2 SERPL-SCNC: 27 MMOL/L
CREAT SERPL-MCNC: 1.1 MG/DL
EST. GFR  (AFRICAN AMERICAN): >60 ML/MIN/1.73 M^2
EST. GFR  (NON AFRICAN AMERICAN): >60 ML/MIN/1.73 M^2
ESTIMATED AVG GLUCOSE: 157 MG/DL
GLUCOSE SERPL-MCNC: 101 MG/DL
HBA1C MFR BLD HPLC: 7.1 %
HDLC SERPL-MCNC: 35 MG/DL
HDLC SERPL: 29.7 %
LDLC SERPL CALC-MCNC: 65.8 MG/DL
NONHDLC SERPL-MCNC: 83 MG/DL
POTASSIUM SERPL-SCNC: 4.4 MMOL/L
PROT SERPL-MCNC: 6.5 G/DL
SODIUM SERPL-SCNC: 146 MMOL/L
TRIGL SERPL-MCNC: 86 MG/DL

## 2019-03-23 PROCEDURE — 80053 COMPREHEN METABOLIC PANEL: CPT

## 2019-03-23 PROCEDURE — 83036 HEMOGLOBIN GLYCOSYLATED A1C: CPT

## 2019-03-23 PROCEDURE — 80061 LIPID PANEL: CPT

## 2019-03-23 PROCEDURE — 36415 COLL VENOUS BLD VENIPUNCTURE: CPT | Mod: PO

## 2019-03-23 PROCEDURE — 83880 ASSAY OF NATRIURETIC PEPTIDE: CPT

## 2019-03-28 ENCOUNTER — OFFICE VISIT (OUTPATIENT)
Dept: INTERNAL MEDICINE | Facility: CLINIC | Age: 69
End: 2019-03-28
Payer: COMMERCIAL

## 2019-03-28 VITALS
HEART RATE: 108 BPM | WEIGHT: 213.38 LBS | OXYGEN SATURATION: 97 % | SYSTOLIC BLOOD PRESSURE: 136 MMHG | DIASTOLIC BLOOD PRESSURE: 84 MMHG | HEIGHT: 69 IN | BODY MASS INDEX: 31.6 KG/M2

## 2019-03-28 DIAGNOSIS — Z00.00 ANNUAL PHYSICAL EXAM: Primary | ICD-10-CM

## 2019-03-28 DIAGNOSIS — E11.8 CONTROLLED TYPE 2 DIABETES MELLITUS WITH COMPLICATION, WITHOUT LONG-TERM CURRENT USE OF INSULIN: ICD-10-CM

## 2019-03-28 DIAGNOSIS — L60.2 THICKENED NAILS: ICD-10-CM

## 2019-03-28 DIAGNOSIS — E80.6 HYPERBILIRUBINEMIA: ICD-10-CM

## 2019-03-28 DIAGNOSIS — N52.1 ERECTILE DYSFUNCTION ASSOCIATED WITH TYPE 2 DIABETES MELLITUS: ICD-10-CM

## 2019-03-28 DIAGNOSIS — E11.9 ENCOUNTER FOR DIABETIC FOOT EXAM: ICD-10-CM

## 2019-03-28 DIAGNOSIS — E11.69 ERECTILE DYSFUNCTION ASSOCIATED WITH TYPE 2 DIABETES MELLITUS: ICD-10-CM

## 2019-03-28 DIAGNOSIS — Z12.11 COLON CANCER SCREENING: ICD-10-CM

## 2019-03-28 PROCEDURE — 99999 PR PBB SHADOW E&M-EST. PATIENT-LVL III: CPT | Mod: PBBFAC,,, | Performed by: INTERNAL MEDICINE

## 2019-03-28 PROCEDURE — 99999 PR PBB SHADOW E&M-EST. PATIENT-LVL III: ICD-10-PCS | Mod: PBBFAC,,, | Performed by: INTERNAL MEDICINE

## 2019-03-28 PROCEDURE — 99397 PER PM REEVAL EST PAT 65+ YR: CPT | Mod: S$GLB,,, | Performed by: INTERNAL MEDICINE

## 2019-03-28 PROCEDURE — 99397 PR PREVENTIVE VISIT,EST,65 & OVER: ICD-10-PCS | Mod: S$GLB,,, | Performed by: INTERNAL MEDICINE

## 2019-03-28 PROCEDURE — 3075F SYST BP GE 130 - 139MM HG: CPT | Mod: CPTII,S$GLB,, | Performed by: INTERNAL MEDICINE

## 2019-03-28 PROCEDURE — 3075F PR MOST RECENT SYSTOLIC BLOOD PRESS GE 130-139MM HG: ICD-10-PCS | Mod: CPTII,S$GLB,, | Performed by: INTERNAL MEDICINE

## 2019-03-28 PROCEDURE — 3079F DIAST BP 80-89 MM HG: CPT | Mod: CPTII,S$GLB,, | Performed by: INTERNAL MEDICINE

## 2019-03-28 PROCEDURE — 3045F PR MOST RECENT HEMOGLOBIN A1C LEVEL 7.0-9.0%: ICD-10-PCS | Mod: CPTII,S$GLB,, | Performed by: INTERNAL MEDICINE

## 2019-03-28 PROCEDURE — 3045F PR MOST RECENT HEMOGLOBIN A1C LEVEL 7.0-9.0%: CPT | Mod: CPTII,S$GLB,, | Performed by: INTERNAL MEDICINE

## 2019-03-28 PROCEDURE — 3079F PR MOST RECENT DIASTOLIC BLOOD PRESSURE 80-89 MM HG: ICD-10-PCS | Mod: CPTII,S$GLB,, | Performed by: INTERNAL MEDICINE

## 2019-03-28 RX ORDER — ATORVASTATIN CALCIUM 40 MG/1
40 TABLET, FILM COATED ORAL DAILY
Qty: 90 TABLET | Refills: 1 | Status: SHIPPED | OUTPATIENT
Start: 2019-03-28 | End: 2020-02-17 | Stop reason: SDUPTHER

## 2019-03-28 RX ORDER — SILDENAFIL 25 MG/1
25 TABLET, FILM COATED ORAL DAILY PRN
Qty: 30 TABLET | Refills: 0 | Status: SHIPPED | OUTPATIENT
Start: 2019-03-28 | End: 2019-11-26

## 2019-03-28 NOTE — PATIENT INSTRUCTIONS
ecommendations for today    Please give us nail specimens as soon as possible so that we can confirm that this is due to fungus or not another dermatologic condition    We recommend that you reduce the dosage of atorvastatin from 80 mg to 40 mg because this will help to facilitate further evaluation of liver abnormality elevated bilirubin.

## 2019-03-28 NOTE — PROGRESS NOTES
Portions of this note are generated with voice recognition software. Typographical errors may exist.     SUBJECTIVE:    This is a/an 68 y.o. male here for primary care visit for  Chief Complaint   Patient presents with    Diabetes     follow up      Patient states that he has been improving his diet to help better control blood sugar.  No symptomatic hypoglycemia.    Patient states that he exercises sporadically.  Has not been motivated to exercise with more frequency or intensity until now.  He strongly desires to come off some of his medication.  States that he will try to increase physical activity with a goal of 15 lb weight loss so that he can come off of some of his medicine.    Patient states that he has seen an outside dermatologist for thick and brittle nails affecting both the feet and the right hand.  No diagnostic nail cultures were completed.  Patient was told that he could try Lamisil systemic therapy if he had clearance from a liver standpoint.  Patient has a history of chronic hyperbilirubinemia and was not cleared for treatment.  He wants to have liver health evaluated more.  Denies alcohol misuse or abuse.    Patient states that he has occasional problems with erectile dysfunction.  States that he has had this problem for more than a year.  Has never tried sildenafil.  Does not have a history of coronary artery disease.    Patient states that with occasional hard stools and straining he will have a small amount of blood in bowel movements.  This has happened twice in the past 2 months.  Patient had a colonoscopy 2011 that revealed a small hyperplastic polyp.  Appropriate recommendations at that time was to repeat colonoscopy in 10 years.          Medications Reviewed and Updated    Past medical, family, and social histories were reviewed and updated.    Review of Systems negative unless otherwise noted in history of present illness-  ROS    General ROS: negative  Psychological ROS: negative  ENT  ROS: negative  Endocrine ROS: Negative  Allergy and Immunology ROS: negative  Cardiovascular ROS: negative  Pulmonary ROS: Negative  Gastrointestinal ROS: negative  Genito-Urinary ROS: negative  Musculoskeletal ROS: negative  Neurological ROS: negative  Dermatological ROS: negative        Allergic:    Review of patient's allergies indicates:   Allergen Reactions    Hydrochlorothiazide        OBJECTIVE:  BP: 136/84 Pulse: 108    Wt Readings from Last 3 Encounters:   03/28/19 96.8 kg (213 lb 6.5 oz)   08/30/18 96.4 kg (212 lb 8.4 oz)   06/29/18 96.6 kg (212 lb 15.4 oz)    Body mass index is 31.51 kg/m².  Previous Blood Pressure Readings :   BP Readings from Last 3 Encounters:   03/28/19 136/84   08/30/18 112/70   06/29/18 124/76       Physical Exam    GEN: No apparent distress  HEENT: sclera non-icteric, conjunctiva clear  CV: no peripheral edema  PULM: breathing non-labored  ABD: Obese, protuberant abdomen.  PSYCH: appropriate affect  MSK: able to rise from chair without assistance  SKIN: normal skin turgor    .    Neuropathy Screen   Left: Filament test present 9 sites  Right:   Filament test present 9 sites    Dermatologic Skin  - cracking: No  - thickened nails  Yes  - blistering  No    Infection:   - fungal infection between toes: No    Ulceration: No    Musculoskeletal Deformity  - claw toes No  - prominent metatarsal heads  No  - charcot joint  No  - muscle wasting (guttering between metatarsals) No    Left DPP Pulse Diminished: 1+  Right DPP Pulse Diminished:  1+      Pertinent Labs Reviewed       ASSESSMENT/PLAN:    Annual physical exam    Controlled type 2 diabetes mellitus with complication, without long-term current use of insulin  -     atorvastatin (LIPITOR) 40 MG tablet; Take 1 tablet (40 mg total) by mouth once daily.  Dispense: 90 tablet; Refill: 1    Colon cancer screening  -     Cancel: Case request GI: COLONOSCOPY    Thickened nails  -     CULTURE, FUNGUS - SKIN, HAIR, OR NAILS  -     CULTURE,  FUNGUS - SKIN, HAIR, OR NAILS    Erectile dysfunction associated with type 2 diabetes mellitus  -     sildenafil (VIAGRA) 25 MG tablet; Take 1 tablet (25 mg total) by mouth daily as needed for Erectile Dysfunction.  Dispense: 30 tablet; Refill: 0    Hyperbilirubinemia  -     CBC auto differential; Future; Expected date: 03/28/2019  -     Gamma GT; Future; Expected date: 03/28/2019  -     Bilirubin, direct; Future; Expected date: 03/28/2019  -     Protime-INR; Future; Expected date: 03/28/2019          Future Appointments   Date Time Provider Department Center   4/29/2019  7:10 AM LAB, APPOINTMENT [facility]   5/7/2019  8:40 AM Félix Rojas MD [facility]       Félix Rojas  3/28/2019  8:44 AM

## 2019-04-12 DIAGNOSIS — Z79.4 TYPE 2 DIABETES MELLITUS WITH DIABETIC POLYNEUROPATHY, WITH LONG-TERM CURRENT USE OF INSULIN: ICD-10-CM

## 2019-04-12 DIAGNOSIS — E11.42 TYPE 2 DIABETES MELLITUS WITH DIABETIC POLYNEUROPATHY, WITH LONG-TERM CURRENT USE OF INSULIN: ICD-10-CM

## 2019-04-12 RX ORDER — RAMIPRIL 5 MG/1
CAPSULE ORAL
Qty: 90 CAPSULE | Refills: 2 | Status: SHIPPED | OUTPATIENT
Start: 2019-04-12 | End: 2020-05-28 | Stop reason: SDUPTHER

## 2019-05-02 ENCOUNTER — PATIENT OUTREACH (OUTPATIENT)
Dept: ADMINISTRATIVE | Facility: HOSPITAL | Age: 69
End: 2019-05-02

## 2019-06-19 ENCOUNTER — OFFICE VISIT (OUTPATIENT)
Dept: INTERNAL MEDICINE | Facility: CLINIC | Age: 69
End: 2019-06-19
Payer: COMMERCIAL

## 2019-06-19 VITALS
SYSTOLIC BLOOD PRESSURE: 128 MMHG | WEIGHT: 206.81 LBS | OXYGEN SATURATION: 97 % | BODY MASS INDEX: 30.63 KG/M2 | HEART RATE: 95 BPM | DIASTOLIC BLOOD PRESSURE: 78 MMHG | HEIGHT: 69 IN

## 2019-06-19 DIAGNOSIS — E66.9 OBESITY (BMI 30-39.9): ICD-10-CM

## 2019-06-19 DIAGNOSIS — E11.8 CONTROLLED TYPE 2 DIABETES MELLITUS WITH COMPLICATION, WITHOUT LONG-TERM CURRENT USE OF INSULIN: ICD-10-CM

## 2019-06-19 DIAGNOSIS — K57.92 DIVERTICULITIS: Primary | ICD-10-CM

## 2019-06-19 PROCEDURE — 99214 PR OFFICE/OUTPT VISIT, EST, LEVL IV, 30-39 MIN: ICD-10-PCS | Mod: S$GLB,,, | Performed by: INTERNAL MEDICINE

## 2019-06-19 PROCEDURE — 3078F PR MOST RECENT DIASTOLIC BLOOD PRESSURE < 80 MM HG: ICD-10-PCS | Mod: CPTII,S$GLB,, | Performed by: INTERNAL MEDICINE

## 2019-06-19 PROCEDURE — 3078F DIAST BP <80 MM HG: CPT | Mod: CPTII,S$GLB,, | Performed by: INTERNAL MEDICINE

## 2019-06-19 PROCEDURE — 99999 PR PBB SHADOW E&M-EST. PATIENT-LVL III: CPT | Mod: PBBFAC,,, | Performed by: INTERNAL MEDICINE

## 2019-06-19 PROCEDURE — 99214 OFFICE O/P EST MOD 30 MIN: CPT | Mod: S$GLB,,, | Performed by: INTERNAL MEDICINE

## 2019-06-19 PROCEDURE — 3045F PR MOST RECENT HEMOGLOBIN A1C LEVEL 7.0-9.0%: CPT | Mod: CPTII,S$GLB,, | Performed by: INTERNAL MEDICINE

## 2019-06-19 PROCEDURE — 3045F PR MOST RECENT HEMOGLOBIN A1C LEVEL 7.0-9.0%: ICD-10-PCS | Mod: CPTII,S$GLB,, | Performed by: INTERNAL MEDICINE

## 2019-06-19 PROCEDURE — 99999 PR PBB SHADOW E&M-EST. PATIENT-LVL III: ICD-10-PCS | Mod: PBBFAC,,, | Performed by: INTERNAL MEDICINE

## 2019-06-19 PROCEDURE — 3074F SYST BP LT 130 MM HG: CPT | Mod: CPTII,S$GLB,, | Performed by: INTERNAL MEDICINE

## 2019-06-19 PROCEDURE — 1101F PR PT FALLS ASSESS DOC 0-1 FALLS W/OUT INJ PAST YR: ICD-10-PCS | Mod: CPTII,S$GLB,, | Performed by: INTERNAL MEDICINE

## 2019-06-19 PROCEDURE — 1101F PT FALLS ASSESS-DOCD LE1/YR: CPT | Mod: CPTII,S$GLB,, | Performed by: INTERNAL MEDICINE

## 2019-06-19 PROCEDURE — 3074F PR MOST RECENT SYSTOLIC BLOOD PRESSURE < 130 MM HG: ICD-10-PCS | Mod: CPTII,S$GLB,, | Performed by: INTERNAL MEDICINE

## 2019-06-19 RX ORDER — AMOXICILLIN AND CLAVULANATE POTASSIUM 875; 125 MG/1; MG/1
1 TABLET, FILM COATED ORAL 2 TIMES DAILY
Qty: 14 TABLET | Refills: 0 | Status: SHIPPED | OUTPATIENT
Start: 2019-06-19 | End: 2019-06-26

## 2019-06-19 NOTE — PATIENT INSTRUCTIONS
Recommendations for today    It is important that you start the antibiotic Augmentin the soon as possible to treat presumed diverticulitis.  Within 3-4 days of starting the antibiotic pain in the left lower abdomen should improve significantly.  If it does not please contact the clinic for additional support.  If symptoms resolve and then relapse soon after discontinuing the antibiotic you should also contact the clinic.  Diarrhea after antibiotics can occur.  So long as it does not become severe such as 5 watery bowel movements in a day and so long as there is no fever associated with the diarrhea taking a probiotic over-the-counter for 2 weeks should be sufficient to help regain normal bowel habits after antibiotic.  However should there be fevers or very frequent watery bowel movement you should contact the clinic for an extra appointment.    Augmentin does not cause interactions with alcohol.  Restrictions are not necessary.

## 2019-06-21 NOTE — PROGRESS NOTES
Portions of this note are generated with voice recognition software. Typographical errors may exist.     SUBJECTIVE:    This is a/an 68 y.o. male here for primary care visit for  Chief Complaint   Patient presents with    Follow-up     1 mo     Diabetes     Patient states that for at least the past week he has been having recurring left lower quadrant abdominal pain at times very uncomfortable.  States that there has not been any rectal pain or melanotic stool.  States that he has had diverticulitis many years ago and fears that this might be happening again.  With this episode he has not been having fevers or chills but he does feel a sense of fatigue.  No recent antibiotic exposure.    Patient has been complying with diabetic medications.  States that he has not had any symptomatic hypoglycemia.  No recent hyperglycemia      Medications Reviewed and Updated    Past medical, family, and social histories were reviewed and updated.    Review of Systems negative unless otherwise noted in history of present illness-  ROS    General ROS: negative  Psychological ROS: negative  ENT ROS: negative  Endocrine ROS: Negative  Allergy and Immunology ROS: negative  Cardiovascular ROS: negative  Pulmonary ROS: Negative  Gastrointestinal ROS: negative  Genito-Urinary ROS: negative  Musculoskeletal ROS: negative      Allergic:    Review of patient's allergies indicates:   Allergen Reactions    Hydrochlorothiazide        OBJECTIVE:  BP: 128/78 Pulse: 95    Wt Readings from Last 3 Encounters:   06/19/19 93.8 kg (206 lb 12.7 oz)   03/28/19 96.8 kg (213 lb 6.5 oz)   08/30/18 96.4 kg (212 lb 8.4 oz)    Body mass index is 30.54 kg/m².  Previous Blood Pressure Readings :   BP Readings from Last 3 Encounters:   06/19/19 128/78   03/28/19 136/84   08/30/18 112/70       Physical Exam    GEN: No apparent distress  HEENT: sclera non-icteric, conjunctiva clear  CV: no peripheral edema  PULM: breathing non-labored  ABD: Obese, protuberant  abdomen.  Left lower quadrant tenderness.  PSYCH: appropriate affect  MSK: able to rise from chair without assistance  SKIN: normal skin turgor    Pertinent Labs Reviewed       ASSESSMENT/PLAN:    Diverticulitis.Condition not optimally controlled. Detailed counseling on self care measures. Plan to monitor clinically in addition to plan below or as listed on After Visit Summary.   -     amoxicillin-clavulanate 875-125mg (AUGMENTIN) 875-125 mg per tablet; Take 1 tablet by mouth 2 (two) times daily. for 7 days  Dispense: 14 tablet; Refill: 0    Controlled type 2 diabetes mellitus with complication, without long-term current use of insulin.Condition stable.  Counseling given today on self-care measures. Plan to monitor clinically. Continue current medical plan.     Obesity (BMI 30-39.9).Condition not optimally controlled. Detailed counseling on self care measures. Plan to monitor clinically in addition to plan below or as listed on After Visit Summary.       Future Appointments   Date Time Provider Department Center   8/5/2019  8:00 AM Félix Rojas MD [facility]       Félix Rojas  6/21/2019  4:24 PM

## 2019-08-09 ENCOUNTER — LAB VISIT (OUTPATIENT)
Dept: LAB | Facility: HOSPITAL | Age: 69
End: 2019-08-09
Attending: INTERNAL MEDICINE
Payer: COMMERCIAL

## 2019-08-09 DIAGNOSIS — E80.6 HYPERBILIRUBINEMIA: ICD-10-CM

## 2019-08-09 DIAGNOSIS — Z79.4 UNCONTROLLED TYPE 2 DIABETES MELLITUS WITH HYPERGLYCEMIA, WITH LONG-TERM CURRENT USE OF INSULIN: ICD-10-CM

## 2019-08-09 DIAGNOSIS — E11.65 UNCONTROLLED TYPE 2 DIABETES MELLITUS WITH HYPERGLYCEMIA, WITH LONG-TERM CURRENT USE OF INSULIN: ICD-10-CM

## 2019-08-09 LAB
ALBUMIN SERPL BCP-MCNC: 3.8 G/DL (ref 3.5–5.2)
ALP SERPL-CCNC: 66 U/L (ref 55–135)
ALT SERPL W/O P-5'-P-CCNC: 18 U/L (ref 10–44)
ANION GAP SERPL CALC-SCNC: 8 MMOL/L (ref 8–16)
AST SERPL-CCNC: 11 U/L (ref 10–40)
BASOPHILS # BLD AUTO: 0.02 K/UL (ref 0–0.2)
BASOPHILS NFR BLD: 0.4 % (ref 0–1.9)
BILIRUB DIRECT SERPL-MCNC: 0.6 MG/DL (ref 0.1–0.3)
BILIRUB SERPL-MCNC: 1.5 MG/DL (ref 0.1–1)
BUN SERPL-MCNC: 14 MG/DL (ref 8–23)
CALCIUM SERPL-MCNC: 9.3 MG/DL (ref 8.7–10.5)
CHLORIDE SERPL-SCNC: 113 MMOL/L (ref 95–110)
CO2 SERPL-SCNC: 21 MMOL/L (ref 23–29)
CREAT SERPL-MCNC: 1 MG/DL (ref 0.5–1.4)
DIFFERENTIAL METHOD: ABNORMAL
EOSINOPHIL # BLD AUTO: 0.3 K/UL (ref 0–0.5)
EOSINOPHIL NFR BLD: 5.4 % (ref 0–8)
ERYTHROCYTE [DISTWIDTH] IN BLOOD BY AUTOMATED COUNT: 13.3 % (ref 11.5–14.5)
EST. GFR  (AFRICAN AMERICAN): >60 ML/MIN/1.73 M^2
EST. GFR  (NON AFRICAN AMERICAN): >60 ML/MIN/1.73 M^2
ESTIMATED AVG GLUCOSE: 186 MG/DL (ref 68–131)
GLUCOSE SERPL-MCNC: 226 MG/DL (ref 70–110)
HBA1C MFR BLD HPLC: 8.1 % (ref 4–5.6)
HCT VFR BLD AUTO: 45.1 % (ref 40–54)
HGB BLD-MCNC: 15.1 G/DL (ref 14–18)
IMM GRANULOCYTES # BLD AUTO: 0.02 K/UL (ref 0–0.04)
IMM GRANULOCYTES NFR BLD AUTO: 0.4 % (ref 0–0.5)
INR PPP: 0.9 (ref 0.8–1.2)
LYMPHOCYTES # BLD AUTO: 1.4 K/UL (ref 1–4.8)
LYMPHOCYTES NFR BLD: 24.5 % (ref 18–48)
MCH RBC QN AUTO: 32.8 PG (ref 27–31)
MCHC RBC AUTO-ENTMCNC: 33.5 G/DL (ref 32–36)
MCV RBC AUTO: 98 FL (ref 82–98)
MONOCYTES # BLD AUTO: 0.4 K/UL (ref 0.3–1)
MONOCYTES NFR BLD: 7 % (ref 4–15)
NEUTROPHILS # BLD AUTO: 3.5 K/UL (ref 1.8–7.7)
NEUTROPHILS NFR BLD: 62.3 % (ref 38–73)
NRBC BLD-RTO: 0 /100 WBC
PLATELET # BLD AUTO: 127 K/UL (ref 150–350)
PMV BLD AUTO: 13 FL (ref 9.2–12.9)
POTASSIUM SERPL-SCNC: 4.2 MMOL/L (ref 3.5–5.1)
PROT SERPL-MCNC: 6.7 G/DL (ref 6–8.4)
PROTHROMBIN TIME: 9.9 SEC (ref 9–12.5)
RBC # BLD AUTO: 4.61 M/UL (ref 4.6–6.2)
SODIUM SERPL-SCNC: 142 MMOL/L (ref 136–145)
WBC # BLD AUTO: 5.54 K/UL (ref 3.9–12.7)

## 2019-08-09 PROCEDURE — 82248 BILIRUBIN DIRECT: CPT

## 2019-08-09 PROCEDURE — 80053 COMPREHEN METABOLIC PANEL: CPT

## 2019-08-09 PROCEDURE — 85025 COMPLETE CBC W/AUTO DIFF WBC: CPT

## 2019-08-09 PROCEDURE — 83036 HEMOGLOBIN GLYCOSYLATED A1C: CPT

## 2019-08-09 PROCEDURE — 36415 COLL VENOUS BLD VENIPUNCTURE: CPT | Mod: PO

## 2019-08-09 PROCEDURE — 85610 PROTHROMBIN TIME: CPT

## 2019-08-13 ENCOUNTER — OFFICE VISIT (OUTPATIENT)
Dept: INTERNAL MEDICINE | Facility: CLINIC | Age: 69
End: 2019-08-13
Payer: COMMERCIAL

## 2019-08-13 VITALS
HEIGHT: 69 IN | HEART RATE: 94 BPM | WEIGHT: 207 LBS | OXYGEN SATURATION: 95 % | SYSTOLIC BLOOD PRESSURE: 122 MMHG | DIASTOLIC BLOOD PRESSURE: 80 MMHG | BODY MASS INDEX: 30.66 KG/M2

## 2019-08-13 DIAGNOSIS — E11.65 UNCONTROLLED TYPE 2 DIABETES MELLITUS WITH HYPERGLYCEMIA: ICD-10-CM

## 2019-08-13 DIAGNOSIS — E11.649 HYPOGLYCEMIA DUE TO TYPE 2 DIABETES MELLITUS: ICD-10-CM

## 2019-08-13 DIAGNOSIS — E66.9 OBESITY (BMI 30-39.9): ICD-10-CM

## 2019-08-13 DIAGNOSIS — Z79.4 UNCONTROLLED TYPE 2 DIABETES MELLITUS WITH HYPERGLYCEMIA, WITH LONG-TERM CURRENT USE OF INSULIN: Primary | ICD-10-CM

## 2019-08-13 DIAGNOSIS — E11.65 UNCONTROLLED TYPE 2 DIABETES MELLITUS WITH HYPERGLYCEMIA, WITH LONG-TERM CURRENT USE OF INSULIN: Primary | ICD-10-CM

## 2019-08-13 PROCEDURE — 99214 PR OFFICE/OUTPT VISIT, EST, LEVL IV, 30-39 MIN: ICD-10-PCS | Mod: S$GLB,,, | Performed by: INTERNAL MEDICINE

## 2019-08-13 PROCEDURE — 3074F PR MOST RECENT SYSTOLIC BLOOD PRESSURE < 130 MM HG: ICD-10-PCS | Mod: CPTII,S$GLB,, | Performed by: INTERNAL MEDICINE

## 2019-08-13 PROCEDURE — 3074F SYST BP LT 130 MM HG: CPT | Mod: CPTII,S$GLB,, | Performed by: INTERNAL MEDICINE

## 2019-08-13 PROCEDURE — 3045F PR MOST RECENT HEMOGLOBIN A1C LEVEL 7.0-9.0%: CPT | Mod: CPTII,S$GLB,, | Performed by: INTERNAL MEDICINE

## 2019-08-13 PROCEDURE — 99214 OFFICE O/P EST MOD 30 MIN: CPT | Mod: S$GLB,,, | Performed by: INTERNAL MEDICINE

## 2019-08-13 PROCEDURE — 99999 PR PBB SHADOW E&M-EST. PATIENT-LVL III: ICD-10-PCS | Mod: PBBFAC,,, | Performed by: INTERNAL MEDICINE

## 2019-08-13 PROCEDURE — 3079F PR MOST RECENT DIASTOLIC BLOOD PRESSURE 80-89 MM HG: ICD-10-PCS | Mod: CPTII,S$GLB,, | Performed by: INTERNAL MEDICINE

## 2019-08-13 PROCEDURE — 3079F DIAST BP 80-89 MM HG: CPT | Mod: CPTII,S$GLB,, | Performed by: INTERNAL MEDICINE

## 2019-08-13 PROCEDURE — 1101F PR PT FALLS ASSESS DOC 0-1 FALLS W/OUT INJ PAST YR: ICD-10-PCS | Mod: CPTII,S$GLB,, | Performed by: INTERNAL MEDICINE

## 2019-08-13 PROCEDURE — 3045F PR MOST RECENT HEMOGLOBIN A1C LEVEL 7.0-9.0%: ICD-10-PCS | Mod: CPTII,S$GLB,, | Performed by: INTERNAL MEDICINE

## 2019-08-13 PROCEDURE — 1101F PT FALLS ASSESS-DOCD LE1/YR: CPT | Mod: CPTII,S$GLB,, | Performed by: INTERNAL MEDICINE

## 2019-08-13 PROCEDURE — 99999 PR PBB SHADOW E&M-EST. PATIENT-LVL III: CPT | Mod: PBBFAC,,, | Performed by: INTERNAL MEDICINE

## 2019-08-13 RX ORDER — INSULIN GLARGINE 300 [IU]/ML
50 INJECTION, SOLUTION SUBCUTANEOUS NIGHTLY
Qty: 15 ML | Refills: 0 | Status: SHIPPED | OUTPATIENT
Start: 2019-08-13 | End: 2019-12-09 | Stop reason: SDUPTHER

## 2019-08-13 NOTE — PROGRESS NOTES
Portions of this note are generated with voice recognition software. Typographical errors may exist.     SUBJECTIVE:    This is a/an 69 y.o. male here for primary care visit for  Chief Complaint   Patient presents with    Diabetes     1m follow up      Patient states that he continues to struggle with dietary indiscretions.  He has started a ketogenic diet recently.  States that there might have been more low blood sugar episodes as a result of this diet.  Patient states that he did not know that this time was not ideal for diabetic patients.  States that he is willing to abandon the diet and try something else.  States that he had 1 blood sugar reading in the past month that was 52.  He did recover quickly from the hypoglycemic episode.  He continues to use 60 units of long-acting insulin and 20 units mealtime in addition to correction scale.  Patient states that he has not yet started a significant exercise program.  He is interested to try other medications to help lose weight and better control blood sugar      Medications Reviewed and Updated    Past medical, family, and social histories were reviewed and updated.    Review of Systems negative unless otherwise noted in history of present illness-  ROS    General ROS: negative  Psychological ROS: negative  ENT ROS: negative  Endocrine ROS: Negative  Allergy and Immunology ROS: negative  Cardiovascular ROS: negative  Pulmonary ROS: Negative  Gastrointestinal ROS: negative  Genito-Urinary ROS: negative  Musculoskeletal ROS: negative      Allergic:    Review of patient's allergies indicates:   Allergen Reactions    Hydrochlorothiazide        OBJECTIVE:  BP: 122/80 Pulse: 94    Wt Readings from Last 3 Encounters:   08/13/19 93.9 kg (207 lb 0.2 oz)   06/19/19 93.8 kg (206 lb 12.7 oz)   03/28/19 96.8 kg (213 lb 6.5 oz)    Body mass index is 30.57 kg/m².  Previous Blood Pressure Readings :   BP Readings from Last 3 Encounters:   08/13/19 122/80   06/19/19 128/78    03/28/19 136/84       Physical Exam    GEN: No apparent distress  HEENT: sclera non-icteric, conjunctiva clear  CV: no peripheral edema regular rate and rhythm. No murmurs.  PULM: breathing non-labored  ABD: Obese, protuberant abdomen.  PSYCH: appropriate affect  MSK: able to rise from chair without assistance  SKIN: normal skin turgor    Pertinent Labs Reviewed       ASSESSMENT/PLAN:    Uncontrolled type 2 diabetes mellitus with hyperglycemia, with long-term current use of insulin.Condition not optimally controlled. Detailed counseling on self care measures. Plan to monitor clinically in addition to plan below or as listed on After Visit Summary.   -     insulin glargine, TOUJEO, (TOUJEO SOLOSTAR U-300 INSULIN) 300 unit/mL (1.5 mL) InPn pen; Inject 50 Units into the skin every evening.  Dispense: 15 mL; Refill: 0    Obesity (BMI 30-39.9).Condition not optimally controlled. Detailed counseling on self care measures. Plan to monitor clinically in addition to plan below or as listed on After Visit Summary.   -     dulaglutide (TRULICITY) 0.75 mg/0.5 mL PnIj; Inject 0.5 mLs (0.75 mg total) into the skin once a week.  Dispense: 4 Syringe; Refill: 0    Hypoglycemia due to type 2 diabetes mellitus.Condition not optimally controlled. Detailed counseling on self care measures. Plan to monitor clinically in addition to plan below or as listed on After Visit Summary.       Future Appointments   Date Time Provider Department Center   10/3/2019  8:20 AM Félix Rojas MD [facility]       Félix Rojas  8/13/2019  1:35 PM

## 2019-08-13 NOTE — PATIENT INSTRUCTIONS
Diet Plan    We recommend that going forward you restrict portion sizes and work to reduce daily calorie count to about 1800 calories.  Increase the amount of vegetables in the diet.  Do not be overly restrictive with carbohydrates because this can cause low blood sugar.  Increase lean protein such as fish and chicken avoid high fat and high cholesterol foods.    [facility]  [phone]    Trulicity instead dose Long acting being 60 units 50 units     Once weekly injection    The day prior to the first Trulicity shot is when you start the lower dose of long acting insulin   (Toujeo)     Sugar drops < 80 . Drop the Toujeo another 5 units call us

## 2019-09-20 DIAGNOSIS — E11.9 TYPE 2 DIABETES MELLITUS WITHOUT COMPLICATION, UNSPECIFIED WHETHER LONG TERM INSULIN USE: ICD-10-CM

## 2019-10-03 RX ORDER — CARVEDILOL 3.12 MG/1
TABLET ORAL
Qty: 90 TABLET | Refills: 1 | Status: SHIPPED | OUTPATIENT
Start: 2019-10-03 | End: 2020-02-11 | Stop reason: DRUGHIGH

## 2019-10-11 ENCOUNTER — PATIENT OUTREACH (OUTPATIENT)
Dept: ADMINISTRATIVE | Facility: HOSPITAL | Age: 69
End: 2019-10-11

## 2019-10-16 ENCOUNTER — PATIENT MESSAGE (OUTPATIENT)
Dept: INTERNAL MEDICINE | Facility: CLINIC | Age: 69
End: 2019-10-16

## 2019-10-16 DIAGNOSIS — E11.65 UNCONTROLLED TYPE 2 DIABETES MELLITUS WITH HYPERGLYCEMIA: ICD-10-CM

## 2019-10-16 DIAGNOSIS — E66.9 OBESITY (BMI 30-39.9): ICD-10-CM

## 2019-10-21 ENCOUNTER — PATIENT MESSAGE (OUTPATIENT)
Dept: INTERNAL MEDICINE | Facility: CLINIC | Age: 69
End: 2019-10-21

## 2019-10-21 DIAGNOSIS — L08.9 STAPH SKIN INFECTION: Primary | ICD-10-CM

## 2019-10-21 DIAGNOSIS — B95.8 STAPH SKIN INFECTION: Primary | ICD-10-CM

## 2019-10-21 RX ORDER — SULFAMETHOXAZOLE AND TRIMETHOPRIM 800; 160 MG/1; MG/1
1 TABLET ORAL 2 TIMES DAILY
Qty: 10 TABLET | Refills: 0 | Status: SHIPPED | OUTPATIENT
Start: 2019-10-21 | End: 2019-10-26

## 2019-10-21 RX ORDER — MUPIROCIN 20 MG/G
OINTMENT TOPICAL 3 TIMES DAILY
Qty: 15 G | Refills: 0 | Status: SHIPPED | OUTPATIENT
Start: 2019-10-21 | End: 2019-10-31

## 2019-10-21 NOTE — TELEPHONE ENCOUNTER
Antibiotic sent to the pharmacy however the patient should come to be seen in clinic or urgent care to evaluate the infection if it is not completely resolved after the 1st course of antibiotic.  It is not possible to dictate the proper length of antibiotic without examining the infection.  Emergency room is never appropriate for the situation.  Urgent care would be more appropriate if he cannot be seen here

## 2019-10-22 ENCOUNTER — TELEPHONE (OUTPATIENT)
Dept: INTERNAL MEDICINE | Facility: CLINIC | Age: 69
End: 2019-10-22

## 2019-10-22 NOTE — TELEPHONE ENCOUNTER
----- Message from Félix Rojas MD sent at 10/22/2019  6:27 PM CDT -----  Contact: [phone]/wife Veronica  Already sent antibiotic to the local pharmacy    ----- Message -----  From: Christina Montoya  Sent: 10/21/2019   8:16 AM CDT  To: Félix Rojas MD    Type:  Needs Medical Advice    Who Called:  wife  Symptoms (please be specific):  2 infected boils   How long has patient had these symptoms:   3 days trying to use OTC  Pharmacy name and phone #:   [id number] - [facility] [place] - [address] AT [facility] [address]  Would the patient rather a call back or a response via [facility]?  call  Best Call Back Number:    Additional Information:  Please call Veronica

## 2019-10-22 NOTE — TELEPHONE ENCOUNTER
Message sent to pt portal and already confirmed he read message informing antibiotic sent to pharmacy.

## 2019-11-12 ENCOUNTER — PATIENT OUTREACH (OUTPATIENT)
Dept: ADMINISTRATIVE | Facility: HOSPITAL | Age: 69
End: 2019-11-12

## 2019-11-26 ENCOUNTER — OFFICE VISIT (OUTPATIENT)
Dept: INTERNAL MEDICINE | Facility: CLINIC | Age: 69
End: 2019-11-26
Payer: COMMERCIAL

## 2019-11-26 VITALS
WEIGHT: 209.19 LBS | BODY MASS INDEX: 30.98 KG/M2 | SYSTOLIC BLOOD PRESSURE: 126 MMHG | OXYGEN SATURATION: 96 % | DIASTOLIC BLOOD PRESSURE: 88 MMHG | HEART RATE: 97 BPM | HEIGHT: 69 IN

## 2019-11-26 DIAGNOSIS — E11.65 UNCONTROLLED TYPE 2 DIABETES MELLITUS WITH HYPERGLYCEMIA: Primary | ICD-10-CM

## 2019-11-26 DIAGNOSIS — D69.6 PLATELETS DECREASED: ICD-10-CM

## 2019-11-26 DIAGNOSIS — R17 HIGH BILIRUBIN: ICD-10-CM

## 2019-11-26 PROCEDURE — 1159F MED LIST DOCD IN RCRD: CPT | Mod: S$GLB,,, | Performed by: INTERNAL MEDICINE

## 2019-11-26 PROCEDURE — 99999 PR PBB SHADOW E&M-EST. PATIENT-LVL III: CPT | Mod: PBBFAC,,, | Performed by: INTERNAL MEDICINE

## 2019-11-26 PROCEDURE — 1126F PR PAIN SEVERITY QUANTIFIED, NO PAIN PRESENT: ICD-10-PCS | Mod: S$GLB,,, | Performed by: INTERNAL MEDICINE

## 2019-11-26 PROCEDURE — 3079F PR MOST RECENT DIASTOLIC BLOOD PRESSURE 80-89 MM HG: ICD-10-PCS | Mod: CPTII,S$GLB,, | Performed by: INTERNAL MEDICINE

## 2019-11-26 PROCEDURE — 99999 PR PBB SHADOW E&M-EST. PATIENT-LVL III: ICD-10-PCS | Mod: PBBFAC,,, | Performed by: INTERNAL MEDICINE

## 2019-11-26 PROCEDURE — 99214 PR OFFICE/OUTPT VISIT, EST, LEVL IV, 30-39 MIN: ICD-10-PCS | Mod: S$GLB,,, | Performed by: INTERNAL MEDICINE

## 2019-11-26 PROCEDURE — 1101F PT FALLS ASSESS-DOCD LE1/YR: CPT | Mod: CPTII,S$GLB,, | Performed by: INTERNAL MEDICINE

## 2019-11-26 PROCEDURE — 1159F PR MEDICATION LIST DOCUMENTED IN MEDICAL RECORD: ICD-10-PCS | Mod: S$GLB,,, | Performed by: INTERNAL MEDICINE

## 2019-11-26 PROCEDURE — 3079F DIAST BP 80-89 MM HG: CPT | Mod: CPTII,S$GLB,, | Performed by: INTERNAL MEDICINE

## 2019-11-26 PROCEDURE — 1126F AMNT PAIN NOTED NONE PRSNT: CPT | Mod: S$GLB,,, | Performed by: INTERNAL MEDICINE

## 2019-11-26 PROCEDURE — 3074F PR MOST RECENT SYSTOLIC BLOOD PRESSURE < 130 MM HG: ICD-10-PCS | Mod: CPTII,S$GLB,, | Performed by: INTERNAL MEDICINE

## 2019-11-26 PROCEDURE — 3074F SYST BP LT 130 MM HG: CPT | Mod: CPTII,S$GLB,, | Performed by: INTERNAL MEDICINE

## 2019-11-26 PROCEDURE — 99214 OFFICE O/P EST MOD 30 MIN: CPT | Mod: S$GLB,,, | Performed by: INTERNAL MEDICINE

## 2019-11-26 PROCEDURE — 1101F PR PT FALLS ASSESS DOC 0-1 FALLS W/OUT INJ PAST YR: ICD-10-PCS | Mod: CPTII,S$GLB,, | Performed by: INTERNAL MEDICINE

## 2019-11-27 ENCOUNTER — LAB VISIT (OUTPATIENT)
Dept: LAB | Facility: HOSPITAL | Age: 69
End: 2019-11-27
Attending: INTERNAL MEDICINE
Payer: COMMERCIAL

## 2019-11-27 DIAGNOSIS — E11.65 UNCONTROLLED TYPE 2 DIABETES MELLITUS WITH HYPERGLYCEMIA, WITH LONG-TERM CURRENT USE OF INSULIN: ICD-10-CM

## 2019-11-27 DIAGNOSIS — E80.6 HYPERBILIRUBINEMIA: ICD-10-CM

## 2019-11-27 DIAGNOSIS — D69.6 PLATELETS DECREASED: ICD-10-CM

## 2019-11-27 DIAGNOSIS — Z79.4 UNCONTROLLED TYPE 2 DIABETES MELLITUS WITH HYPERGLYCEMIA, WITH LONG-TERM CURRENT USE OF INSULIN: ICD-10-CM

## 2019-11-27 LAB
ALBUMIN SERPL BCP-MCNC: 4 G/DL (ref 3.5–5.2)
ALP SERPL-CCNC: 66 U/L (ref 55–135)
ALT SERPL W/O P-5'-P-CCNC: 21 U/L (ref 10–44)
ANION GAP SERPL CALC-SCNC: 7 MMOL/L (ref 8–16)
AST SERPL-CCNC: 14 U/L (ref 10–40)
BILIRUB SERPL-MCNC: 1.8 MG/DL (ref 0.1–1)
BUN SERPL-MCNC: 14 MG/DL (ref 8–23)
CALCIUM SERPL-MCNC: 9.7 MG/DL (ref 8.7–10.5)
CHLORIDE SERPL-SCNC: 110 MMOL/L (ref 95–110)
CO2 SERPL-SCNC: 25 MMOL/L (ref 23–29)
CREAT SERPL-MCNC: 1.1 MG/DL (ref 0.5–1.4)
EST. GFR  (AFRICAN AMERICAN): >60 ML/MIN/1.73 M^2
EST. GFR  (NON AFRICAN AMERICAN): >60 ML/MIN/1.73 M^2
ESTIMATED AVG GLUCOSE: 174 MG/DL (ref 68–131)
GGT SERPL-CCNC: 35 U/L (ref 8–55)
GLUCOSE SERPL-MCNC: 146 MG/DL (ref 70–110)
HBA1C MFR BLD HPLC: 7.7 % (ref 4–5.6)
POTASSIUM SERPL-SCNC: 3.8 MMOL/L (ref 3.5–5.1)
PROT SERPL-MCNC: 7.1 G/DL (ref 6–8.4)
SODIUM SERPL-SCNC: 142 MMOL/L (ref 136–145)
VIT B12 SERPL-MCNC: 405 PG/ML (ref 210–950)

## 2019-11-27 PROCEDURE — 82607 VITAMIN B-12: CPT

## 2019-11-27 PROCEDURE — 80053 COMPREHEN METABOLIC PANEL: CPT

## 2019-11-27 PROCEDURE — 36415 COLL VENOUS BLD VENIPUNCTURE: CPT | Mod: PO

## 2019-11-27 PROCEDURE — 83036 HEMOGLOBIN GLYCOSYLATED A1C: CPT

## 2019-11-27 PROCEDURE — 82977 ASSAY OF GGT: CPT

## 2019-11-28 NOTE — PROGRESS NOTES
Portions of this note are generated with voice recognition software. Typographical errors may exist.     SUBJECTIVE:    This is a/an 69 y.o. male here for primary care visit for  Chief Complaint   Patient presents with    Diabetes     1mo     Patient denies alcohol misuse or abuse.  Continues to have dietary indiscretions that make diabetic control difficult.  Patient like to increase his dosage of Trulicity.  Has been tolerating current dosage.      Medications Reviewed and Updated    Past medical, family, and social histories were reviewed and updated.    Review of Systems negative unless otherwise noted in history of present illness-  ROS    General ROS: negative  Psychological ROS: negative  ENT ROS: negative  Endocrine ROS: Negative  Allergy and Immunology ROS: negative  Cardiovascular ROS: negative  Pulmonary ROS: Negative  Gastrointestinal ROS: negative  Genito-Urinary ROS: negative  Musculoskeletal ROS: negative  Neurological ROS: negative      Allergic:    Review of patient's allergies indicates:   Allergen Reactions    Hydrochlorothiazide        OBJECTIVE:  BP: 126/88 Pulse: 97    Wt Readings from Last 3 Encounters:   11/26/19 94.9 kg (209 lb 3.5 oz)   08/13/19 93.9 kg (207 lb 0.2 oz)   06/19/19 93.8 kg (206 lb 12.7 oz)    Body mass index is 30.9 kg/m².  Previous Blood Pressure Readings :   BP Readings from Last 3 Encounters:   11/26/19 126/88   08/13/19 122/80   06/19/19 128/78       Physical Exam    GEN: No apparent distress  HEENT: sclera non-icteric, conjunctiva clear  CV: no peripheral edema  PULM: breathing non-labored  ABD: Obese, protuberant abdomen.  PSYCH: appropriate affect  MSK: able to rise from chair without assistance  SKIN: normal skin turgor    Pertinent Labs Reviewed       ASSESSMENT/PLAN:    Uncontrolled type 2 diabetes mellitus with hyperglycemia.Condition not optimally controlled. Detailed counseling on self care measures. Plan to monitor clinically in addition to plan below or as  listed on After Visit Summary.   -     dulaglutide (TRULICITY) 1.5 mg/0.5 mL PnIj; Inject 1.5 mg into the skin once a week.  Dispense: 12 Syringe; Refill: 0    Platelets decreased.Etiology unclear. Not controlled. Further evaluation warranted.  Recommendations as below or as written on After Visit Summary.   -     Vitamin B12; Standing  -     Ambulatory Referral to Hepatology    High bilirubin  -     Ambulatory Referral to Hepatology          Future Appointments   Date Time Provider Department Center   1/9/2020 10:40 AM Chavez Nelson MD [facility]   2/7/2020  7:30 AM LAB, [facility] [place]   2/11/2020  8:00 AM Félix Rojas MD [facility] Rojas  11/28/2019  11:39 AM

## 2019-12-09 DIAGNOSIS — Z79.4 UNCONTROLLED TYPE 2 DIABETES MELLITUS WITH HYPERGLYCEMIA, WITH LONG-TERM CURRENT USE OF INSULIN: ICD-10-CM

## 2019-12-09 DIAGNOSIS — E11.65 UNCONTROLLED TYPE 2 DIABETES MELLITUS WITH HYPERGLYCEMIA, WITH LONG-TERM CURRENT USE OF INSULIN: ICD-10-CM

## 2019-12-09 RX ORDER — INSULIN GLARGINE 300 [IU]/ML
50 INJECTION, SOLUTION SUBCUTANEOUS NIGHTLY
Qty: 15 ML | Refills: 0 | Status: SHIPPED | OUTPATIENT
Start: 2019-12-09 | End: 2020-02-12

## 2020-01-07 ENCOUNTER — PATIENT OUTREACH (OUTPATIENT)
Dept: ADMINISTRATIVE | Facility: OTHER | Age: 70
End: 2020-01-07

## 2020-01-09 ENCOUNTER — PROCEDURE VISIT (OUTPATIENT)
Dept: HEPATOLOGY | Facility: CLINIC | Age: 70
End: 2020-01-09
Attending: INTERNAL MEDICINE
Payer: COMMERCIAL

## 2020-01-09 ENCOUNTER — LAB VISIT (OUTPATIENT)
Dept: LAB | Facility: HOSPITAL | Age: 70
End: 2020-01-09
Attending: INTERNAL MEDICINE
Payer: COMMERCIAL

## 2020-01-09 VITALS
OXYGEN SATURATION: 98 % | WEIGHT: 206.56 LBS | HEIGHT: 69 IN | SYSTOLIC BLOOD PRESSURE: 113 MMHG | HEART RATE: 72 BPM | BODY MASS INDEX: 30.59 KG/M2 | DIASTOLIC BLOOD PRESSURE: 75 MMHG | RESPIRATION RATE: 18 BRPM

## 2020-01-09 DIAGNOSIS — E80.6 HYPERBILIRUBINEMIA: Primary | ICD-10-CM

## 2020-01-09 DIAGNOSIS — D69.6 THROMBOCYTOPENIA: Chronic | ICD-10-CM

## 2020-01-09 DIAGNOSIS — E80.6 HYPERBILIRUBINEMIA: ICD-10-CM

## 2020-01-09 LAB
ALBUMIN SERPL BCP-MCNC: 4.1 G/DL (ref 3.5–5.2)
ALP SERPL-CCNC: 79 U/L (ref 55–135)
ALT SERPL W/O P-5'-P-CCNC: 27 U/L (ref 10–44)
ANION GAP SERPL CALC-SCNC: 8 MMOL/L (ref 8–16)
AST SERPL-CCNC: 13 U/L (ref 10–40)
BILIRUB DIRECT SERPL-MCNC: 0.8 MG/DL (ref 0.1–0.3)
BILIRUB SERPL-MCNC: 2.4 MG/DL (ref 0.1–1)
BUN SERPL-MCNC: 14 MG/DL (ref 8–23)
CALCIUM SERPL-MCNC: 9.5 MG/DL (ref 8.7–10.5)
CHLORIDE SERPL-SCNC: 110 MMOL/L (ref 95–110)
CO2 SERPL-SCNC: 25 MMOL/L (ref 23–29)
CREAT SERPL-MCNC: 1.3 MG/DL (ref 0.5–1.4)
EST. GFR  (AFRICAN AMERICAN): >60 ML/MIN/1.73 M^2
EST. GFR  (NON AFRICAN AMERICAN): 56.1 ML/MIN/1.73 M^2
GLUCOSE SERPL-MCNC: 217 MG/DL (ref 70–110)
POTASSIUM SERPL-SCNC: 4.1 MMOL/L (ref 3.5–5.1)
PROT SERPL-MCNC: 7 G/DL (ref 6–8.4)
SODIUM SERPL-SCNC: 143 MMOL/L (ref 136–145)

## 2020-01-09 PROCEDURE — 99999 PR PBB SHADOW E&M-EST. PATIENT-LVL III: ICD-10-PCS | Mod: PBBFAC,,, | Performed by: INTERNAL MEDICINE

## 2020-01-09 PROCEDURE — 1159F MED LIST DOCD IN RCRD: CPT | Mod: S$GLB,,, | Performed by: INTERNAL MEDICINE

## 2020-01-09 PROCEDURE — 3078F DIAST BP <80 MM HG: CPT | Mod: CPTII,S$GLB,, | Performed by: INTERNAL MEDICINE

## 2020-01-09 PROCEDURE — 36415 COLL VENOUS BLD VENIPUNCTURE: CPT

## 2020-01-09 PROCEDURE — 1126F PR PAIN SEVERITY QUANTIFIED, NO PAIN PRESENT: ICD-10-PCS | Mod: S$GLB,,, | Performed by: INTERNAL MEDICINE

## 2020-01-09 PROCEDURE — 91200 LIVER ELASTOGRAPHY: CPT | Mod: S$GLB,,, | Performed by: INTERNAL MEDICINE

## 2020-01-09 PROCEDURE — 99203 PR OFFICE/OUTPT VISIT, NEW, LEVL III, 30-44 MIN: ICD-10-PCS | Mod: S$GLB,,, | Performed by: INTERNAL MEDICINE

## 2020-01-09 PROCEDURE — 1101F PT FALLS ASSESS-DOCD LE1/YR: CPT | Mod: CPTII,S$GLB,, | Performed by: INTERNAL MEDICINE

## 2020-01-09 PROCEDURE — 91200 PR LIVER ELASTOGRAPHY W/OUT IMAG W/INTERP & REPORT: ICD-10-PCS | Mod: S$GLB,,, | Performed by: INTERNAL MEDICINE

## 2020-01-09 PROCEDURE — 3074F PR MOST RECENT SYSTOLIC BLOOD PRESSURE < 130 MM HG: ICD-10-PCS | Mod: CPTII,S$GLB,, | Performed by: INTERNAL MEDICINE

## 2020-01-09 PROCEDURE — 80053 COMPREHEN METABOLIC PANEL: CPT

## 2020-01-09 PROCEDURE — 3074F SYST BP LT 130 MM HG: CPT | Mod: CPTII,S$GLB,, | Performed by: INTERNAL MEDICINE

## 2020-01-09 PROCEDURE — 1101F PR PT FALLS ASSESS DOC 0-1 FALLS W/OUT INJ PAST YR: ICD-10-PCS | Mod: CPTII,S$GLB,, | Performed by: INTERNAL MEDICINE

## 2020-01-09 PROCEDURE — 99203 OFFICE O/P NEW LOW 30 MIN: CPT | Mod: S$GLB,,, | Performed by: INTERNAL MEDICINE

## 2020-01-09 PROCEDURE — 1126F AMNT PAIN NOTED NONE PRSNT: CPT | Mod: S$GLB,,, | Performed by: INTERNAL MEDICINE

## 2020-01-09 PROCEDURE — 99999 PR PBB SHADOW E&M-EST. PATIENT-LVL III: CPT | Mod: PBBFAC,,, | Performed by: INTERNAL MEDICINE

## 2020-01-09 PROCEDURE — 1159F PR MEDICATION LIST DOCUMENTED IN MEDICAL RECORD: ICD-10-PCS | Mod: S$GLB,,, | Performed by: INTERNAL MEDICINE

## 2020-01-09 PROCEDURE — 3078F PR MOST RECENT DIASTOLIC BLOOD PRESSURE < 80 MM HG: ICD-10-PCS | Mod: CPTII,S$GLB,, | Performed by: INTERNAL MEDICINE

## 2020-01-09 PROCEDURE — 82248 BILIRUBIN DIRECT: CPT

## 2020-01-09 NOTE — LETTER
January 9, 2020      Félix Rojas MD  [address]  [place] [address]           [facility] - Hepatology  [address]  [place] [address]  Phone: [phone]  Fax: [phone]          Patient: Patria Nieto   MR Number: [id number]   YOB: 1950   Date of Visit: 1/9/2020       Dear Dr. Félix Rojas:    Thank you for referring Patria Nieto to me for evaluation. Attached you will find relevant portions of my assessment and plan of care.    If you have questions, please do not hesitate to call me. I look forward to following Patria Nieto along with you.    Sincerely,    Chavez Nelson MD    Enclosure  CC:  No Recipients    If you would like to receive this communication electronically, please contact [email or web address] or [phone] to request more information on [employer] Link access.    For providers and/or their staff who would like to refer a patient to [facility], please contact us through our one-stop-shop provider referral line, [facility], at [phone].    If you feel you have received this communication in error or would no longer like to receive these types of communications, please e-mail [email or web address]

## 2020-01-09 NOTE — PROCEDURES
Procedures Fibroscan Procedure     Name: Patria Nieto  Date of Procedure : 2020   :: Chavez Nelson MD  Diagnosis: NAFLD    Probe: M    Fibroscan readin.3 KPa    Fibrosis:F 0-1     CAP readin dB/m    Steatosis: :S3

## 2020-01-09 NOTE — PROGRESS NOTES
HEPATOLOGY CONSULTATION    Referring Physician: Dr. BUD Rojas  Current Corresponding Physician: Dr. BUD Rojas    Reason for Consultation: Consultation for evaluation of Advice Only (elevated bilirubin)    History of Present Illness: Patria Nieto is a 69 y.o. malewho presents for evaluation of   Chief Complaint   Patient presents with    Advice Only     elevated bilirubin    Mr. Nieto was seen in the hepatology clinic today having been referred for an elevated bilirubin and low platelet count.  He is a 69-year-old gentleman with no prior history of chronic liver disease. He does have type 2 diabetes as well as a body mass index of 30.5.  He does drink alcohol in modest amounts regularly.  He has no symptoms of hepatic decompensation.  Viral hepatitis testing has been negative.  He has been found to have a mildly elevated bilirubin.  His liver biochemistry and alkaline phosphatase is normal. I suspect Gilbert's syndrome.  He was also found to have a mildly low platelet count.  I will be doing a fibroscan today to rule out cirrhosis and assess the degree of steatosis in the liver.    Past Medical History:   Diagnosis Date    Diabetes mellitus type II     Hx MRSA infection     Hypertension     MRSA infection 2002     Outpatient Encounter Medications as of 1/9/2020   Medication Sig Dispense Refill    aspirin 81 MG Chew Take 81 mg by mouth once daily.      atorvastatin (LIPITOR) 40 MG tablet Take 1 tablet (40 mg total) by mouth once daily. 90 tablet 1    carvedilol (COREG) 3.125 MG tablet TAKE 1 TABLET BY MOUTH EVERY DAY 90 tablet 1    dulaglutide (TRULICITY) 1.5 mg/0.5 mL PnIj Inject 1.5 mg into the skin once a week. 12 Syringe 0    insulin aspart U-100 (NOVOLOG FLEXPEN U-100 INSULIN) 100 unit/mL InPn pen Inject 20 units into skin with correction scale 150-200 +2, 201-250 +4, 251-300 +6, 301-350 +8, >380. Max daily dose=84 units 2 Box 6    insulin glargine, TOUJEO, (TOUJEO SOLOSTAR U-300 INSULIN) 300 unit/mL  "(1.5 mL) InPn pen Inject 50 Units into the skin every evening. 15 mL 0    metFORMIN (GLUCOPHAGE) 1000 MG tablet Take 1 tablet (1,000 mg total) by mouth 2 (two) times daily with meals. 180 tablet 2    pen needle, diabetic (BD ULTRA-FINE MINI PEN NEEDLE) 31 gauge x 3/16" Ndle Uses 5 times a day. 200 each 6    ramipril (ALTACE) 5 MG capsule TAKE 1 CAPSULE BY MOUTH ONCE A DAY 90 capsule 2    fexofenadine (ALLEGRA) 180 MG tablet Take 180 mg by mouth daily as needed.        No facility-administered encounter medications on file as of 1/9/2020.      Review of patient's allergies indicates:  No Known Allergies  Family History   Problem Relation Age of Onset    Heart disease Mother     Heart disease Father     Diabetes Father     Diabetes Sister        Social History     Socioeconomic History    Marital status:      Spouse name: Not on file    Number of children: Not on file    Years of education: Not on file    Highest education level: Not on file   Occupational History    Not on file   Social Needs    Financial resource strain: Not on file    Food insecurity:     Worry: Not on file     Inability: Not on file    Transportation needs:     Medical: Not on file     Non-medical: Not on file   Tobacco Use    Smoking status: Never Smoker    Smokeless tobacco: Never Used   Substance and Sexual Activity    Alcohol use: Yes     Alcohol/week: 4.0 standard drinks     Types: 4 Cans of beer per week    Drug use: No    Sexual activity: Yes   Lifestyle    Physical activity:     Days per week: Not on file     Minutes per session: Not on file    Stress: Not on file   Relationships    Social connections:     Talks on phone: Not on file     Gets together: Not on file     Attends Yarsani service: Not on file     Active member of club or organization: Not on file     Attends meetings of clubs or organizations: Not on file     Relationship status: Not on file   Other Topics Concern    Not on file   Social " History Narrative    [facility] - active        [facility] - derm      Review of Systems   Constitutional: Negative for activity change, appetite change, chills, fatigue and unexpected weight change.   HENT: Negative for congestion, facial swelling and tinnitus.    Eyes: Negative for visual disturbance.   Respiratory: Negative for cough, shortness of breath and wheezing.    Cardiovascular: Negative for chest pain and palpitations.   Gastrointestinal: Negative for abdominal distention.   Genitourinary: Negative for dysuria.   Musculoskeletal: Negative for arthralgias, joint swelling and myalgias.   Neurological: Negative for syncope and headaches.   Hematological: Does not bruise/bleed easily.   Psychiatric/Behavioral: Negative for confusion.     Vitals:    01/09/20 1046   BP: 113/75   Pulse: 72   Resp: 18       Physical Exam   Constitutional: He is oriented to person, place, and time. He appears well-developed and well-nourished.   Eyes: No scleral icterus.   Cardiovascular: Normal rate, regular rhythm and normal heart sounds.   Pulmonary/Chest: Effort normal and breath sounds normal. No respiratory distress. He has no wheezes.   Abdominal: Soft. Bowel sounds are normal. He exhibits no distension and no mass. There is no tenderness. There is no rebound.   Musculoskeletal: Normal range of motion.   Lymphadenopathy:     He has no cervical adenopathy.   Neurological: He is alert and oriented to person, place, and time.   Skin: Skin is warm and dry.       MELD-Na score: 8 at 8/9/2019  7:35 AM  MELD score: 8 at 8/9/2019  7:35 AM  Calculated from:  Serum Creatinine: 1.0 mg/dL at 8/9/2019  7:35 AM  Serum Sodium: 142 mmol/L (Rounded to 137 mmol/L) at 8/9/2019  7:35 AM  Total Bilirubin: 1.5 mg/dL at 8/9/2019  7:35 AM  INR(ratio): 0.9 (Rounded to 1) at 8/9/2019  7:35 AM  Age: 69 years    Lab Results   Component Value Date     (H) 11/27/2019    BUN 14 11/27/2019    CREATININE 1.1 11/27/2019    CALCIUM 9.7 11/27/2019      11/27/2019    K 3.8 11/27/2019     11/27/2019    PROT 7.1 11/27/2019    CO2 25 11/27/2019    ANIONGAP 7 (L) 11/27/2019    WBC 5.54 08/09/2019    RBC 4.61 08/09/2019    HGB 15.1 08/09/2019    HCT 45.1 08/09/2019    MCV 98 08/09/2019    MCH 32.8 (H) 08/09/2019    MCHC 33.5 08/09/2019     Lab Results   Component Value Date    RDW 13.3 08/09/2019     (L) 08/09/2019    MPV 13.0 (H) 08/09/2019    GRAN 3.5 08/09/2019    GRAN 62.3 08/09/2019    LYMPH 1.4 08/09/2019    LYMPH 24.5 08/09/2019    MONO 0.4 08/09/2019    MONO 7.0 08/09/2019    EOSINOPHIL 5.4 08/09/2019    BASOPHIL 0.4 08/09/2019    EOS 0.3 08/09/2019    BASO 0.02 08/09/2019    APTT 24.9 12/12/2007    BNP <10 03/23/2019    CHOL 118 (L) 03/23/2019    TRIG 86 03/23/2019    HDL 35 (L) 03/23/2019    CHOLHDL 29.7 03/23/2019    TOTALCHOLEST 3.4 03/23/2019    ALBUMIN 4.0 11/27/2019    BILIDIR 0.6 (H) 08/09/2019    AST 14 11/27/2019    ALT 21 11/27/2019    ALKPHOS 66 11/27/2019    MG 2.3 12/12/2007    LABPROT 9.9 08/09/2019    INR 0.9 08/09/2019    PSA 0.38 04/20/2017       Assessment and Plan:  Patient Active Problem List   Diagnosis    Hyperlipidemia LDL goal <100    Hyperlipidemia    Allergic rhinitis    Type 2 diabetes, uncontrolled, with neuropathy    Hypertension associated with diabetes    Obesity (BMI 30-39.9)    Upper airway cough syndrome    Uncontrolled type 2 diabetes mellitus with hyperglycemia, with long-term current use of insulin    Hyperbilirubinemia    Thickened nails    Erectile dysfunction associated with type 2 diabetes mellitus    Thrombocytopenia     Patria Nieto is a 69 y.o. male withAdvice Only (elevated bilirubin)    Impression:  1. Suspect Gilbert's syndrme  2. Rule out NAFLD    Plan:  1. Total and direct bilirubin  2. Fibroscan to estimate fibrosis/steatosis; rule out or in cirrhosis  3. Abdominal ultrasound  4. RTC in 6 months

## 2020-02-06 DIAGNOSIS — E11.65 UNCONTROLLED TYPE 2 DIABETES MELLITUS WITH HYPERGLYCEMIA, WITH LONG-TERM CURRENT USE OF INSULIN: ICD-10-CM

## 2020-02-06 DIAGNOSIS — Z79.4 UNCONTROLLED TYPE 2 DIABETES MELLITUS WITH HYPERGLYCEMIA, WITH LONG-TERM CURRENT USE OF INSULIN: ICD-10-CM

## 2020-02-06 RX ORDER — INSULIN ASPART 100 [IU]/ML
INJECTION, SOLUTION INTRAVENOUS; SUBCUTANEOUS
Qty: 30 ML | Status: CANCELLED | OUTPATIENT
Start: 2020-02-06

## 2020-02-06 RX ORDER — INSULIN ASPART 100 [IU]/ML
INJECTION, SOLUTION INTRAVENOUS; SUBCUTANEOUS
Qty: 1 CARTRIDGE | Refills: 0 | Status: SHIPPED | OUTPATIENT
Start: 2020-02-06 | End: 2020-02-12

## 2020-02-10 ENCOUNTER — LAB VISIT (OUTPATIENT)
Dept: LAB | Facility: HOSPITAL | Age: 70
End: 2020-02-10
Attending: INTERNAL MEDICINE
Payer: COMMERCIAL

## 2020-02-10 DIAGNOSIS — Z79.4 UNCONTROLLED TYPE 2 DIABETES MELLITUS WITH HYPERGLYCEMIA, WITH LONG-TERM CURRENT USE OF INSULIN: ICD-10-CM

## 2020-02-10 DIAGNOSIS — E11.65 UNCONTROLLED TYPE 2 DIABETES MELLITUS WITH HYPERGLYCEMIA, WITH LONG-TERM CURRENT USE OF INSULIN: ICD-10-CM

## 2020-02-10 LAB
ESTIMATED AVG GLUCOSE: 171 MG/DL (ref 68–131)
HBA1C MFR BLD HPLC: 7.6 % (ref 4–5.6)

## 2020-02-10 PROCEDURE — 83036 HEMOGLOBIN GLYCOSYLATED A1C: CPT

## 2020-02-10 PROCEDURE — 36415 COLL VENOUS BLD VENIPUNCTURE: CPT | Mod: PO

## 2020-02-11 ENCOUNTER — OFFICE VISIT (OUTPATIENT)
Dept: INTERNAL MEDICINE | Facility: CLINIC | Age: 70
End: 2020-02-11
Payer: COMMERCIAL

## 2020-02-11 ENCOUNTER — TELEPHONE (OUTPATIENT)
Dept: FAMILY MEDICINE | Facility: CLINIC | Age: 70
End: 2020-02-11

## 2020-02-11 ENCOUNTER — PATIENT MESSAGE (OUTPATIENT)
Dept: INTERNAL MEDICINE | Facility: CLINIC | Age: 70
End: 2020-02-11

## 2020-02-11 ENCOUNTER — LAB VISIT (OUTPATIENT)
Dept: LAB | Facility: HOSPITAL | Age: 70
End: 2020-02-11
Attending: INTERNAL MEDICINE
Payer: COMMERCIAL

## 2020-02-11 DIAGNOSIS — R00.0 TACHYCARDIA: ICD-10-CM

## 2020-02-11 DIAGNOSIS — I15.2 HYPERTENSION ASSOCIATED WITH DIABETES: ICD-10-CM

## 2020-02-11 DIAGNOSIS — I47.10 SVT (SUPRAVENTRICULAR TACHYCARDIA): Primary | ICD-10-CM

## 2020-02-11 DIAGNOSIS — E11.59 HYPERTENSION ASSOCIATED WITH DIABETES: ICD-10-CM

## 2020-02-11 LAB
ALBUMIN SERPL BCP-MCNC: 3.9 G/DL (ref 3.5–5.2)
ALP SERPL-CCNC: 72 U/L (ref 55–135)
ALT SERPL W/O P-5'-P-CCNC: 42 U/L (ref 10–44)
ANION GAP SERPL CALC-SCNC: 12 MMOL/L (ref 8–16)
AST SERPL-CCNC: 21 U/L (ref 10–40)
BASOPHILS # BLD AUTO: 0.02 K/UL (ref 0–0.2)
BASOPHILS NFR BLD: 0.2 % (ref 0–1.9)
BILIRUB SERPL-MCNC: 1.5 MG/DL (ref 0.1–1)
BNP SERPL-MCNC: 23 PG/ML (ref 0–99)
BUN SERPL-MCNC: 11 MG/DL (ref 8–23)
CALCIUM SERPL-MCNC: 9.5 MG/DL (ref 8.7–10.5)
CHLORIDE SERPL-SCNC: 113 MMOL/L (ref 95–110)
CO2 SERPL-SCNC: 21 MMOL/L (ref 23–29)
CREAT SERPL-MCNC: 1 MG/DL (ref 0.5–1.4)
DIFFERENTIAL METHOD: ABNORMAL
EOSINOPHIL # BLD AUTO: 0.3 K/UL (ref 0–0.5)
EOSINOPHIL NFR BLD: 3.3 % (ref 0–8)
ERYTHROCYTE [DISTWIDTH] IN BLOOD BY AUTOMATED COUNT: 12.9 % (ref 11.5–14.5)
EST. GFR  (AFRICAN AMERICAN): >60 ML/MIN/1.73 M^2
EST. GFR  (NON AFRICAN AMERICAN): >60 ML/MIN/1.73 M^2
GLUCOSE SERPL-MCNC: 53 MG/DL (ref 70–110)
HCT VFR BLD AUTO: 48.6 % (ref 40–54)
HGB BLD-MCNC: 15.8 G/DL (ref 14–18)
IMM GRANULOCYTES # BLD AUTO: 0.04 K/UL (ref 0–0.04)
IMM GRANULOCYTES NFR BLD AUTO: 0.5 % (ref 0–0.5)
LYMPHOCYTES # BLD AUTO: 1.5 K/UL (ref 1–4.8)
LYMPHOCYTES NFR BLD: 18 % (ref 18–48)
MCH RBC QN AUTO: 32.2 PG (ref 27–31)
MCHC RBC AUTO-ENTMCNC: 32.5 G/DL (ref 32–36)
MCV RBC AUTO: 99 FL (ref 82–98)
MONOCYTES # BLD AUTO: 0.5 K/UL (ref 0.3–1)
MONOCYTES NFR BLD: 5.7 % (ref 4–15)
NEUTROPHILS # BLD AUTO: 5.9 K/UL (ref 1.8–7.7)
NEUTROPHILS NFR BLD: 72.3 % (ref 38–73)
NRBC BLD-RTO: 0 /100 WBC
PLATELET # BLD AUTO: 210 K/UL (ref 150–350)
PMV BLD AUTO: 12.2 FL (ref 9.2–12.9)
POTASSIUM SERPL-SCNC: 4.2 MMOL/L (ref 3.5–5.1)
PROT SERPL-MCNC: 6.9 G/DL (ref 6–8.4)
RBC # BLD AUTO: 4.91 M/UL (ref 4.6–6.2)
SODIUM SERPL-SCNC: 146 MMOL/L (ref 136–145)
TSH SERPL DL<=0.005 MIU/L-ACNC: 1.98 UIU/ML (ref 0.4–4)
WBC # BLD AUTO: 8.18 K/UL (ref 3.9–12.7)

## 2020-02-11 PROCEDURE — 1126F AMNT PAIN NOTED NONE PRSNT: CPT | Mod: S$GLB,,, | Performed by: INTERNAL MEDICINE

## 2020-02-11 PROCEDURE — 93005 ELECTROCARDIOGRAM TRACING: CPT | Mod: S$GLB,,, | Performed by: INTERNAL MEDICINE

## 2020-02-11 PROCEDURE — 84443 ASSAY THYROID STIM HORMONE: CPT

## 2020-02-11 PROCEDURE — 3051F PR MOST RECENT HEMOGLOBIN A1C LEVEL 7.0 - < 8.0%: ICD-10-PCS | Mod: CPTII,S$GLB,, | Performed by: INTERNAL MEDICINE

## 2020-02-11 PROCEDURE — 93010 ELECTROCARDIOGRAM REPORT: CPT | Mod: S$GLB,,, | Performed by: INTERNAL MEDICINE

## 2020-02-11 PROCEDURE — 3051F HG A1C>EQUAL 7.0%<8.0%: CPT | Mod: CPTII,S$GLB,, | Performed by: INTERNAL MEDICINE

## 2020-02-11 PROCEDURE — 36415 COLL VENOUS BLD VENIPUNCTURE: CPT | Mod: PO

## 2020-02-11 PROCEDURE — 1101F PT FALLS ASSESS-DOCD LE1/YR: CPT | Mod: CPTII,S$GLB,, | Performed by: INTERNAL MEDICINE

## 2020-02-11 PROCEDURE — 99999 PR PBB SHADOW E&M-EST. PATIENT-LVL IV: ICD-10-PCS | Mod: PBBFAC,,, | Performed by: INTERNAL MEDICINE

## 2020-02-11 PROCEDURE — 80053 COMPREHEN METABOLIC PANEL: CPT

## 2020-02-11 PROCEDURE — 3077F SYST BP >= 140 MM HG: CPT | Mod: CPTII,S$GLB,, | Performed by: INTERNAL MEDICINE

## 2020-02-11 PROCEDURE — 99214 OFFICE O/P EST MOD 30 MIN: CPT | Mod: S$GLB,,, | Performed by: INTERNAL MEDICINE

## 2020-02-11 PROCEDURE — 93005 EKG 12-LEAD: ICD-10-PCS | Mod: S$GLB,,, | Performed by: INTERNAL MEDICINE

## 2020-02-11 PROCEDURE — 1159F MED LIST DOCD IN RCRD: CPT | Mod: S$GLB,,, | Performed by: INTERNAL MEDICINE

## 2020-02-11 PROCEDURE — 1101F PR PT FALLS ASSESS DOC 0-1 FALLS W/OUT INJ PAST YR: ICD-10-PCS | Mod: CPTII,S$GLB,, | Performed by: INTERNAL MEDICINE

## 2020-02-11 PROCEDURE — 3078F DIAST BP <80 MM HG: CPT | Mod: CPTII,S$GLB,, | Performed by: INTERNAL MEDICINE

## 2020-02-11 PROCEDURE — 83880 ASSAY OF NATRIURETIC PEPTIDE: CPT

## 2020-02-11 PROCEDURE — 93010 EKG 12-LEAD: ICD-10-PCS | Mod: S$GLB,,, | Performed by: INTERNAL MEDICINE

## 2020-02-11 PROCEDURE — 1159F PR MEDICATION LIST DOCUMENTED IN MEDICAL RECORD: ICD-10-PCS | Mod: S$GLB,,, | Performed by: INTERNAL MEDICINE

## 2020-02-11 PROCEDURE — 85025 COMPLETE CBC W/AUTO DIFF WBC: CPT

## 2020-02-11 PROCEDURE — 99999 PR PBB SHADOW E&M-EST. PATIENT-LVL IV: CPT | Mod: PBBFAC,,, | Performed by: INTERNAL MEDICINE

## 2020-02-11 PROCEDURE — 3078F PR MOST RECENT DIASTOLIC BLOOD PRESSURE < 80 MM HG: ICD-10-PCS | Mod: CPTII,S$GLB,, | Performed by: INTERNAL MEDICINE

## 2020-02-11 PROCEDURE — 1126F PR PAIN SEVERITY QUANTIFIED, NO PAIN PRESENT: ICD-10-PCS | Mod: S$GLB,,, | Performed by: INTERNAL MEDICINE

## 2020-02-11 PROCEDURE — 99214 PR OFFICE/OUTPT VISIT, EST, LEVL IV, 30-39 MIN: ICD-10-PCS | Mod: S$GLB,,, | Performed by: INTERNAL MEDICINE

## 2020-02-11 PROCEDURE — 3077F PR MOST RECENT SYSTOLIC BLOOD PRESSURE >= 140 MM HG: ICD-10-PCS | Mod: CPTII,S$GLB,, | Performed by: INTERNAL MEDICINE

## 2020-02-11 RX ORDER — CARVEDILOL 6.25 MG/1
6.25 TABLET ORAL 2 TIMES DAILY WITH MEALS
Qty: 60 TABLET | Refills: 1 | Status: SHIPPED | OUTPATIENT
Start: 2020-02-11 | End: 2020-03-14

## 2020-02-11 RX ORDER — LEVOFLOXACIN 500 MG/1
TABLET, FILM COATED ORAL
COMMUNITY
Start: 2020-02-04 | End: 2020-03-14

## 2020-02-11 NOTE — TELEPHONE ENCOUNTER
Spoke to Zarina, pharmacy is requesting clarification. The pt has been receiving Flexpen but a Rx was sent as a Penfill. Please advise thank you.

## 2020-02-11 NOTE — TELEPHONE ENCOUNTER
----- Message from Zeynep Gregg sent at 2/11/2020  2:16 PM CST -----  Contact: [facility] [phone]  Pharmacy would like to clarify insulin aspart U-100 (NOVOLOG PENFILL U-100 INSULIN) 100 unit/mL Crtg. Please advise.

## 2020-02-11 NOTE — PATIENT INSTRUCTIONS
Recommendations for today    We recommend carotid massage any time you notice that resting heart rate is above 100. Should you have unusual fatigue dizziness attempt carotid massage.  If symptoms do not promptly improved few should call 911.     we strongly recommend that you do not resume quinolone antibiotics.  We recommend that you increase your dosage of carvedilol as soon as possible.

## 2020-02-11 NOTE — PROGRESS NOTES
Portions of this note are generated with voice recognition software. Typographical errors may exist.     SUBJECTIVE:    This is a/an 69 y.o. male here for primary care visit for  Chief Complaint   Patient presents with    Diabetes     3 mo       Patient states that for the past month he has had a symptomatic tachycardia.  States that his blood pressure machine started to say sporadically that his resting heart rate was around 150-160.  This has not been a problem for him in the past.  States that there has been no unusual dyspnea of, dizziness, chest discomfort.  States that he started Levaquin as a treatment for acute bacterial sinusitis and has completed the 7 day course of antibiotic no other new medications during this time.  No melanotic stool.  No unusual swelling in the lower extremities.   Patient has been complying with carvedilol twice daily.  Recently picked up the medication.      Patient under more stress than usual.  Not sleeping as well.    Medications Reviewed and Updated    Past medical, family, and social histories were reviewed and updated.    Review of Systems negative unless otherwise noted in history of present illness-  ROS    General ROS: negative  Psychological ROS: negative  ENT ROS: negative  Endocrine ROS: Negative  Allergy and Immunology ROS: negative  Cardiovascular ROS: negative  Pulmonary ROS: Negative  Gastrointestinal ROS: negative  Genito-Urinary ROS: negative  Musculoskeletal ROS: negative  Neurological ROS: negative      Allergic:  Review of patient's allergies indicates:  No Known Allergies    OBJECTIVE:  BP: (!) 148/68 Pulse: 78    Wt Readings from Last 3 Encounters:   02/11/20 92.8 kg (204 lb 9.4 oz)   01/09/20 93.7 kg (206 lb 9.1 oz)   11/26/19 94.9 kg (209 lb 3.5 oz)    Body mass index is 30.21 kg/m².  Previous Blood Pressure Readings :   BP Readings from Last 3 Encounters:   02/11/20 (!) 148/68   01/09/20 113/75   11/26/19 126/88       Physical Exam    GEN: No apparent  distress  HEENT: sclera non-icteric, conjunctiva clear  CV: no peripheral edema. Regular rhythm.  Tachycardic.  PULM: breathing non-labored  ABD: Obese, protuberant abdomen.  PSYCH: appropriate affect  MSK: able to rise from chair without assistance  SKIN: normal skin turgor      EKG showing narrow complex tachycardia.  P wave present.  At rest around 156-160. .  With vigorous carotid assigned tachycardia aborted with resting heart rate between 90 and 99.    Pertinent Labs Reviewed       ASSESSMENT/PLAN:    SVT (supraventricular tachycardia).Etiology unclear. Not controlled. Further evaluation warranted.  Recommendations as below or as written on After Visit Summary. .patient advised if symptoms change or intensify to seek care in the nearest [facility]   -     Ambulatory referral/consult to Electrophysiology; Future; Expected date: 02/18/2020  -     EKG 12-lead; Future  -     CBC auto differential; Future; Expected date: 02/11/2020  -     TSH; Future; Expected date: 02/11/2020  -     Comprehensive metabolic panel; Future; Expected date: 02/11/2020  -     Brain natriuretic peptide; Future; Expected date: 02/11/2020    Hypertension associated with diabetes.Condition not optimally controlled. Detailed counseling on self care measures. Plan to monitor clinically in addition to plan below or as listed on After Visit Summary.   -     carvediloL (COREG) 6.25 MG tablet; Take 1 tablet (6.25 mg total) by mouth 2 (two) times daily with meals.  Dispense: 60 tablet; Refill: 1          Future Appointments   Date Time Provider Department Center   3/30/2020  8:20 AM Félix Rojas MD [facility]       Félix Rojas  2/11/2020  8:47 AM    Answers for HPI/ROS submitted by the patient on 2/10/2020   Diabetes problem  Diabetes type: type 2  MedicAlert ID: No  Disease duration: 20 years  fatigue: No  foot paresthesias: No  foot ulcerations: No  polyphagia: No  polyuria: Yes  visual change:  No  Symptom course: stable  confusion: No  hunger: No  mood changes: No  pallor: No  sleepiness: No  speech difficulty: No  sweats: No  blackouts: No  hospitalization: No  nocturnal hypoglycemia: No  required assistance: No  required glucagon: No  CVA: No  heart disease: No  impotence: Yes  nephropathy: No  peripheral neuropathy: No  PVD: No  retinopathy: No  autonomic neuropathy: No  CAD risks: diabetes mellitus  Current treatments: insulin injections, oral agent (dual therapy)  Treatment compliance: all of the time  Dose schedule: pre-breakfast, pre-lunch, pre-dinner, at bedtime  Given by: patient  Injection sites: abdominal wall  Home blood tests: 3-4 x per day  Home urines: <1 x per month  Monitoring compliance: good  Blood glucose trend: fluctuating minimally  Weight trend: fluctuating minimally  Current diet: generally healthy  Meal planning: avoidance of concentrated sweets, carbohydrate counting  Exercise: three times a week  Dietitian visit: No  Eye exam current: Yes  Sees podiatrist: No

## 2020-02-12 DIAGNOSIS — E11.65 UNCONTROLLED TYPE 2 DIABETES MELLITUS WITH HYPERGLYCEMIA, WITH LONG-TERM CURRENT USE OF INSULIN: ICD-10-CM

## 2020-02-12 DIAGNOSIS — Z79.4 UNCONTROLLED TYPE 2 DIABETES MELLITUS WITH HYPERGLYCEMIA, WITH LONG-TERM CURRENT USE OF INSULIN: ICD-10-CM

## 2020-02-12 RX ORDER — INSULIN GLARGINE 300 [IU]/ML
68 INJECTION, SOLUTION SUBCUTANEOUS NIGHTLY
Qty: 20.4 ML | Refills: 0 | Status: SHIPPED | OUTPATIENT
Start: 2020-02-12 | End: 2020-05-12

## 2020-02-12 NOTE — TELEPHONE ENCOUNTER
Patient requested a refill on his tujeo pen,refill request routed to Dr. Rojas. pharmacy was called and verified rx with Gabrielle for his Novolog flex pen.

## 2020-02-12 NOTE — TELEPHONE ENCOUNTER
----- Message from Gisela Posada sent at 2/12/2020  9:18 AM CST -----  Contact: Veronica (wife)  Veronica called regarding medication for patient (insulin aspart U-100 (NOVOLOG PENFILL U-100 INSULIN) 100 unit/mL Crtg).  The pharmacy still needs clarification on  the medication in order to fill it out.  Please [phone] to confirm. The patient only has one pen left.

## 2020-02-12 NOTE — TELEPHONE ENCOUNTER
Yes, contact the patient stating that we have received the information stating that the formulary has changed in that the prescription needs to change.

## 2020-02-13 NOTE — TELEPHONE ENCOUNTER
Spoke with patient and informed of change in insulin due to prescription drug coverage. Patient voices understanding.

## 2020-02-14 ENCOUNTER — PATIENT MESSAGE (OUTPATIENT)
Dept: INTERNAL MEDICINE | Facility: CLINIC | Age: 70
End: 2020-02-14

## 2020-02-14 ENCOUNTER — PATIENT MESSAGE (OUTPATIENT)
Dept: ELECTROPHYSIOLOGY | Facility: CLINIC | Age: 70
End: 2020-02-14

## 2020-02-16 ENCOUNTER — PATIENT MESSAGE (OUTPATIENT)
Dept: INTERNAL MEDICINE | Facility: CLINIC | Age: 70
End: 2020-02-16

## 2020-02-16 DIAGNOSIS — Z79.4 UNCONTROLLED TYPE 2 DIABETES MELLITUS WITH HYPERGLYCEMIA, WITH LONG-TERM CURRENT USE OF INSULIN: ICD-10-CM

## 2020-02-16 DIAGNOSIS — E11.8 CONTROLLED TYPE 2 DIABETES MELLITUS WITH COMPLICATION, WITHOUT LONG-TERM CURRENT USE OF INSULIN: ICD-10-CM

## 2020-02-16 DIAGNOSIS — E11.65 UNCONTROLLED TYPE 2 DIABETES MELLITUS WITH HYPERGLYCEMIA, WITH LONG-TERM CURRENT USE OF INSULIN: ICD-10-CM

## 2020-02-17 ENCOUNTER — PATIENT MESSAGE (OUTPATIENT)
Dept: INTERNAL MEDICINE | Facility: CLINIC | Age: 70
End: 2020-02-17

## 2020-02-18 ENCOUNTER — PATIENT OUTREACH (OUTPATIENT)
Dept: ADMINISTRATIVE | Facility: OTHER | Age: 70
End: 2020-02-18

## 2020-02-18 ENCOUNTER — TELEPHONE (OUTPATIENT)
Dept: ELECTROPHYSIOLOGY | Facility: CLINIC | Age: 70
End: 2020-02-18

## 2020-02-18 VITALS
HEIGHT: 69 IN | OXYGEN SATURATION: 98 % | SYSTOLIC BLOOD PRESSURE: 118 MMHG | HEART RATE: 78 BPM | DIASTOLIC BLOOD PRESSURE: 76 MMHG | BODY MASS INDEX: 30.3 KG/M2 | WEIGHT: 204.56 LBS

## 2020-02-18 DIAGNOSIS — I49.8 OTHER SPECIFIED CARDIAC ARRHYTHMIAS: Primary | ICD-10-CM

## 2020-02-18 RX ORDER — ATORVASTATIN CALCIUM 40 MG/1
40 TABLET, FILM COATED ORAL DAILY
Qty: 90 TABLET | Refills: 1 | Status: SHIPPED | OUTPATIENT
Start: 2020-02-18 | End: 2020-05-28 | Stop reason: SDUPTHER

## 2020-02-18 RX ORDER — METFORMIN HYDROCHLORIDE 1000 MG/1
1000 TABLET ORAL 2 TIMES DAILY WITH MEALS
Qty: 180 TABLET | Refills: 0 | Status: SHIPPED | OUTPATIENT
Start: 2020-02-18 | End: 2020-06-14 | Stop reason: SDUPTHER

## 2020-02-19 ENCOUNTER — INITIAL CONSULT (OUTPATIENT)
Dept: ELECTROPHYSIOLOGY | Facility: CLINIC | Age: 70
End: 2020-02-19
Payer: COMMERCIAL

## 2020-02-19 ENCOUNTER — HOSPITAL ENCOUNTER (OUTPATIENT)
Dept: CARDIOLOGY | Facility: CLINIC | Age: 70
Discharge: HOME | End: 2020-02-19
Payer: COMMERCIAL

## 2020-02-19 ENCOUNTER — TELEPHONE (OUTPATIENT)
Dept: INTERNAL MEDICINE | Facility: CLINIC | Age: 70
End: 2020-02-19

## 2020-02-19 VITALS — SYSTOLIC BLOOD PRESSURE: 123 MMHG | DIASTOLIC BLOOD PRESSURE: 78 MMHG | HEART RATE: 91 BPM

## 2020-02-19 DIAGNOSIS — I49.8 OTHER SPECIFIED CARDIAC ARRHYTHMIAS: ICD-10-CM

## 2020-02-19 DIAGNOSIS — I47.10 SVT (SUPRAVENTRICULAR TACHYCARDIA): ICD-10-CM

## 2020-02-19 DIAGNOSIS — E78.5 HYPERLIPIDEMIA, UNSPECIFIED HYPERLIPIDEMIA TYPE: ICD-10-CM

## 2020-02-19 DIAGNOSIS — E11.59 HYPERTENSION ASSOCIATED WITH DIABETES: ICD-10-CM

## 2020-02-19 DIAGNOSIS — E78.5 HYPERLIPIDEMIA LDL GOAL <100: Primary | ICD-10-CM

## 2020-02-19 DIAGNOSIS — I15.2 HYPERTENSION ASSOCIATED WITH DIABETES: ICD-10-CM

## 2020-02-19 DIAGNOSIS — I47.10 SVT (SUPRAVENTRICULAR TACHYCARDIA): Primary | ICD-10-CM

## 2020-02-19 PROCEDURE — 99999 PR PBB SHADOW E&M-EST. PATIENT-LVL III: ICD-10-PCS | Mod: PBBFAC,,, | Performed by: INTERNAL MEDICINE

## 2020-02-19 PROCEDURE — 3051F HG A1C>EQUAL 7.0%<8.0%: CPT | Mod: CPTII,S$GLB,, | Performed by: INTERNAL MEDICINE

## 2020-02-19 PROCEDURE — 93010 RHYTHM STRIP: ICD-10-PCS | Mod: S$GLB,,, | Performed by: INTERNAL MEDICINE

## 2020-02-19 PROCEDURE — 1101F PT FALLS ASSESS-DOCD LE1/YR: CPT | Mod: CPTII,S$GLB,, | Performed by: INTERNAL MEDICINE

## 2020-02-19 PROCEDURE — 93005 RHYTHM STRIP: ICD-10-PCS | Mod: S$GLB,,, | Performed by: INTERNAL MEDICINE

## 2020-02-19 PROCEDURE — 3074F SYST BP LT 130 MM HG: CPT | Mod: CPTII,S$GLB,, | Performed by: INTERNAL MEDICINE

## 2020-02-19 PROCEDURE — 1101F PR PT FALLS ASSESS DOC 0-1 FALLS W/OUT INJ PAST YR: ICD-10-PCS | Mod: CPTII,S$GLB,, | Performed by: INTERNAL MEDICINE

## 2020-02-19 PROCEDURE — 93005 ELECTROCARDIOGRAM TRACING: CPT | Mod: S$GLB,,, | Performed by: INTERNAL MEDICINE

## 2020-02-19 PROCEDURE — 99999 PR PBB SHADOW E&M-EST. PATIENT-LVL III: CPT | Mod: PBBFAC,,, | Performed by: INTERNAL MEDICINE

## 2020-02-19 PROCEDURE — 1126F AMNT PAIN NOTED NONE PRSNT: CPT | Mod: S$GLB,,, | Performed by: INTERNAL MEDICINE

## 2020-02-19 PROCEDURE — 3078F DIAST BP <80 MM HG: CPT | Mod: CPTII,S$GLB,, | Performed by: INTERNAL MEDICINE

## 2020-02-19 PROCEDURE — 1126F PR PAIN SEVERITY QUANTIFIED, NO PAIN PRESENT: ICD-10-PCS | Mod: S$GLB,,, | Performed by: INTERNAL MEDICINE

## 2020-02-19 PROCEDURE — 3078F PR MOST RECENT DIASTOLIC BLOOD PRESSURE < 80 MM HG: ICD-10-PCS | Mod: CPTII,S$GLB,, | Performed by: INTERNAL MEDICINE

## 2020-02-19 PROCEDURE — 99205 PR OFFICE/OUTPT VISIT, NEW, LEVL V, 60-74 MIN: ICD-10-PCS | Mod: S$GLB,,, | Performed by: INTERNAL MEDICINE

## 2020-02-19 PROCEDURE — 93010 ELECTROCARDIOGRAM REPORT: CPT | Mod: S$GLB,,, | Performed by: INTERNAL MEDICINE

## 2020-02-19 PROCEDURE — 1159F PR MEDICATION LIST DOCUMENTED IN MEDICAL RECORD: ICD-10-PCS | Mod: S$GLB,,, | Performed by: INTERNAL MEDICINE

## 2020-02-19 PROCEDURE — 99205 OFFICE O/P NEW HI 60 MIN: CPT | Mod: S$GLB,,, | Performed by: INTERNAL MEDICINE

## 2020-02-19 PROCEDURE — 3074F PR MOST RECENT SYSTOLIC BLOOD PRESSURE < 130 MM HG: ICD-10-PCS | Mod: CPTII,S$GLB,, | Performed by: INTERNAL MEDICINE

## 2020-02-19 PROCEDURE — 3051F PR MOST RECENT HEMOGLOBIN A1C LEVEL 7.0 - < 8.0%: ICD-10-PCS | Mod: CPTII,S$GLB,, | Performed by: INTERNAL MEDICINE

## 2020-02-19 PROCEDURE — 1159F MED LIST DOCD IN RCRD: CPT | Mod: S$GLB,,, | Performed by: INTERNAL MEDICINE

## 2020-02-19 NOTE — LETTER
February 19, 2020      Félix Rojas MD  [address]  [place] [address]           [facility]  [address]  [place] [address]  Phone: [phone]  Fax: [phone]          Patient: Patria Nieto   MR Number: [id number]   YOB: 1950   Date of Visit: 2/19/2020       Dear Dr. Félix Rojas:    Thank you for referring Patria Nieto to me for evaluation. Attached you will find relevant portions of my assessment and plan of care.    If you have questions, please do not hesitate to call me. I look forward to following Patria Nieto along with you.    Sincerely,    Geovanni Chowdary MD    Enclosure  CC:  No Recipients    If you would like to receive this communication electronically, please contact [email or web address] or [phone] to request more information on [employer] access.    For providers and/or their staff who would like to refer a patient to [facility], please contact us through our one-stop-shop provider referral line, [facility], at [phone].    If you feel you have received this communication in error or would no longer like to receive these types of communications, please e-mail [email or web address]

## 2020-02-19 NOTE — TELEPHONE ENCOUNTER
Spoke with patient who informed me an appointment has been scheduled for him . Patient is also on his way to appointment during call..

## 2020-02-19 NOTE — TELEPHONE ENCOUNTER
----- Message from Tim Strange sent at 2/17/2020  4:33 PM CST -----  Contact: pt  Pt would like a call back regarding scheduling an appt. Pt and MA are missing each other's calls!    Pt can be reached at [phone]

## 2020-02-24 ENCOUNTER — OFFICE VISIT (OUTPATIENT)
Dept: URGENT CARE | Facility: CLINIC | Age: 70
End: 2020-02-24
Payer: COMMERCIAL

## 2020-02-24 VITALS
WEIGHT: 199 LBS | OXYGEN SATURATION: 96 % | HEIGHT: 69 IN | DIASTOLIC BLOOD PRESSURE: 84 MMHG | BODY MASS INDEX: 29.47 KG/M2 | HEART RATE: 88 BPM | SYSTOLIC BLOOD PRESSURE: 128 MMHG | TEMPERATURE: 99 F | RESPIRATION RATE: 18 BRPM

## 2020-02-24 DIAGNOSIS — J30.1 SEASONAL ALLERGIC RHINITIS DUE TO POLLEN: Primary | ICD-10-CM

## 2020-02-24 DIAGNOSIS — J06.9 URI, ACUTE: ICD-10-CM

## 2020-02-24 PROCEDURE — 96372 PR INJECTION,THERAP/PROPH/DIAG2ST, IM OR SUBCUT: ICD-10-PCS | Mod: S$GLB,,, | Performed by: FAMILY MEDICINE

## 2020-02-24 PROCEDURE — 99214 OFFICE O/P EST MOD 30 MIN: CPT | Mod: 25,S$GLB,, | Performed by: FAMILY MEDICINE

## 2020-02-24 PROCEDURE — 96372 THER/PROPH/DIAG INJ SC/IM: CPT | Mod: S$GLB,,, | Performed by: FAMILY MEDICINE

## 2020-02-24 PROCEDURE — 99214 PR OFFICE/OUTPT VISIT, EST, LEVL IV, 30-39 MIN: ICD-10-PCS | Mod: 25,S$GLB,, | Performed by: FAMILY MEDICINE

## 2020-02-24 RX ORDER — BETAMETHASONE SODIUM PHOSPHATE AND BETAMETHASONE ACETATE 3; 3 MG/ML; MG/ML
6 INJECTION, SUSPENSION INTRA-ARTICULAR; INTRALESIONAL; INTRAMUSCULAR; SOFT TISSUE
Status: COMPLETED | OUTPATIENT
Start: 2020-02-24 | End: 2020-02-24

## 2020-02-24 RX ADMIN — BETAMETHASONE SODIUM PHOSPHATE AND BETAMETHASONE ACETATE 6 MG: 3; 3 INJECTION, SUSPENSION INTRA-ARTICULAR; INTRALESIONAL; INTRAMUSCULAR; SOFT TISSUE at 10:02

## 2020-02-24 NOTE — PROGRESS NOTES
"Subjective:       Patient ID: Patria Nieto is a 69 y.o. male.    Vitals:  height is 5' 9" (1.753 m) and weight is 90.3 kg (199 lb). His temperature is 98.6 °F (37 °C). His blood pressure is 128/84 and his pulse is 88. His respiration is 18 and oxygen saturation is 96%.     Chief Complaint: URI    69-year-old male with complaint of sinus congestion sore throat postnasal drip and some ear pressure no fever no chills    URI    This is a new problem. The current episode started yesterday. The problem has been rapidly worsening. There has been no fever. Associated symptoms include congestion, coughing and sinus pain. Pertinent negatives include no ear pain, nausea, rash, sore throat, vomiting or wheezing. Treatments tried: otc sinus meds  The treatment provided mild relief.       Constitution: Negative for chills, sweating, fatigue and fever.   HENT: Positive for congestion, sinus pain and sinus pressure. Negative for ear pain, sore throat and voice change.    Neck: Negative for painful lymph nodes.   Eyes: Positive for eye discharge. Negative for eye redness.   Respiratory: Positive for cough. Negative for chest tightness, bloody sputum, COPD, shortness of breath, stridor, wheezing and asthma.    Gastrointestinal: Negative for nausea and vomiting.   Musculoskeletal: Negative for muscle ache.   Skin: Negative for rash.   Allergic/Immunologic: Negative for seasonal allergies and asthma.   Hematologic/Lymphatic: Negative for swollen lymph nodes.       Objective:      Physical Exam   Constitutional: He is oriented to person, place, and time. He appears well-developed and well-nourished. He is cooperative.  Non-toxic appearance. He does not appear ill. No distress.   HENT:   Head: Normocephalic and atraumatic.   Right Ear: Hearing, tympanic membrane, external ear and ear canal normal.   Left Ear: Hearing, tympanic membrane, external ear and ear canal normal.   Nose: Nose normal. No mucosal edema, rhinorrhea or nasal " deformity. No epistaxis. Right sinus exhibits no maxillary sinus tenderness and no frontal sinus tenderness. Left sinus exhibits no maxillary sinus tenderness and no frontal sinus tenderness.   Mouth/Throat: Uvula is midline, oropharynx is clear and moist and mucous membranes are normal. No trismus in the jaw. Normal dentition. No uvula swelling. No posterior oropharyngeal erythema.   Throat is clear TMs with fluid no erythema no lymphadenopathy   Eyes: Conjunctivae and lids are normal. Right eye exhibits no discharge. Left eye exhibits no discharge. No scleral icterus.   Neck: Trachea normal, normal range of motion, full passive range of motion without pain and phonation normal. Neck supple.   Cardiovascular: Normal rate, regular rhythm, normal heart sounds, intact distal pulses and normal pulses.   Pulmonary/Chest: Effort normal and breath sounds normal. No stridor. No respiratory distress. He has no wheezes. He has no rales.   Abdominal: Soft. Normal appearance and bowel sounds are normal. He exhibits no distension, no pulsatile midline mass and no mass. There is no tenderness.   Musculoskeletal: Normal range of motion. He exhibits no edema or deformity.   Neurological: He is alert and oriented to person, place, and time. He exhibits normal muscle tone. Coordination normal.   Skin: Skin is warm, dry, intact, not diaphoretic and not pale.   Psychiatric: He has a normal mood and affect. His speech is normal and behavior is normal. Judgment and thought content normal. Cognition and memory are normal.   Nursing note and vitals reviewed.        Assessment:       1. Seasonal allergic rhinitis due to pollen    2. URI, acute        Plan:         Seasonal allergic rhinitis due to pollen    URI, acute    Other orders  -     betamethasone acetate-betamethasone sodium phosphate injection 6 mg        patient advised to monitor his blood glucose which is pretty much in normal range Mucinex D 1 p.o. b.i.d. if symptoms worsens  to return

## 2020-03-14 ENCOUNTER — HOSPITAL ENCOUNTER (EMERGENCY)
Facility: HOSPITAL | Age: 70
Discharge: HOME | End: 2020-03-14
Attending: EMERGENCY MEDICINE
Payer: COMMERCIAL

## 2020-03-14 VITALS
WEIGHT: 199 LBS | BODY MASS INDEX: 29.47 KG/M2 | HEIGHT: 69 IN | SYSTOLIC BLOOD PRESSURE: 123 MMHG | RESPIRATION RATE: 20 BRPM | HEART RATE: 90 BPM | TEMPERATURE: 98 F | DIASTOLIC BLOOD PRESSURE: 81 MMHG | OXYGEN SATURATION: 96 %

## 2020-03-14 DIAGNOSIS — R00.0 TACHYCARDIA: ICD-10-CM

## 2020-03-14 DIAGNOSIS — I47.19 ATRIAL TACHYCARDIA: ICD-10-CM

## 2020-03-14 DIAGNOSIS — I47.10 SVT (SUPRAVENTRICULAR TACHYCARDIA): ICD-10-CM

## 2020-03-14 DIAGNOSIS — R00.2 PALPITATIONS: Primary | ICD-10-CM

## 2020-03-14 LAB
ALBUMIN SERPL BCP-MCNC: 3.7 G/DL (ref 3.5–5.2)
ALP SERPL-CCNC: 59 U/L (ref 55–135)
ALT SERPL W/O P-5'-P-CCNC: 30 U/L (ref 10–44)
ANION GAP SERPL CALC-SCNC: 8 MMOL/L (ref 8–16)
AST SERPL-CCNC: 19 U/L (ref 10–40)
BASOPHILS # BLD AUTO: 0.02 K/UL (ref 0–0.2)
BASOPHILS NFR BLD: 0.3 % (ref 0–1.9)
BILIRUB SERPL-MCNC: 2.3 MG/DL (ref 0.1–1)
BUN SERPL-MCNC: 15 MG/DL (ref 8–23)
CALCIUM SERPL-MCNC: 9.3 MG/DL (ref 8.7–10.5)
CHLORIDE SERPL-SCNC: 113 MMOL/L (ref 95–110)
CO2 SERPL-SCNC: 20 MMOL/L (ref 23–29)
CREAT SERPL-MCNC: 0.9 MG/DL (ref 0.5–1.4)
DIFFERENTIAL METHOD: ABNORMAL
EOSINOPHIL # BLD AUTO: 0.2 K/UL (ref 0–0.5)
EOSINOPHIL NFR BLD: 2.8 % (ref 0–8)
ERYTHROCYTE [DISTWIDTH] IN BLOOD BY AUTOMATED COUNT: 13.4 % (ref 11.5–14.5)
EST. GFR  (AFRICAN AMERICAN): >60 ML/MIN/1.73 M^2
EST. GFR  (NON AFRICAN AMERICAN): >60 ML/MIN/1.73 M^2
GLUCOSE SERPL-MCNC: 107 MG/DL (ref 70–110)
HCT VFR BLD AUTO: 45 % (ref 40–54)
HGB BLD-MCNC: 15.2 G/DL (ref 14–18)
IMM GRANULOCYTES # BLD AUTO: 0.02 K/UL (ref 0–0.04)
IMM GRANULOCYTES NFR BLD AUTO: 0.3 % (ref 0–0.5)
LYMPHOCYTES # BLD AUTO: 1.3 K/UL (ref 1–4.8)
LYMPHOCYTES NFR BLD: 16.6 % (ref 18–48)
MCH RBC QN AUTO: 32.9 PG (ref 27–31)
MCHC RBC AUTO-ENTMCNC: 33.8 G/DL (ref 32–36)
MCV RBC AUTO: 97 FL (ref 82–98)
MONOCYTES # BLD AUTO: 0.5 K/UL (ref 0.3–1)
MONOCYTES NFR BLD: 6.9 % (ref 4–15)
NEUTROPHILS # BLD AUTO: 5.7 K/UL (ref 1.8–7.7)
NEUTROPHILS NFR BLD: 73.1 % (ref 38–73)
NRBC BLD-RTO: 0 /100 WBC
PLATELET # BLD AUTO: 131 K/UL (ref 150–350)
PMV BLD AUTO: 12.3 FL (ref 9.2–12.9)
POTASSIUM SERPL-SCNC: 3.8 MMOL/L (ref 3.5–5.1)
PROT SERPL-MCNC: 6.3 G/DL (ref 6–8.4)
RBC # BLD AUTO: 4.62 M/UL (ref 4.6–6.2)
SODIUM SERPL-SCNC: 141 MMOL/L (ref 136–145)
TSH SERPL DL<=0.005 MIU/L-ACNC: 1.67 UIU/ML (ref 0.4–4)
WBC # BLD AUTO: 7.72 K/UL (ref 3.9–12.7)

## 2020-03-14 PROCEDURE — 96375 TX/PRO/DX INJ NEW DRUG ADDON: CPT

## 2020-03-14 PROCEDURE — 93005 ELECTROCARDIOGRAM TRACING: CPT

## 2020-03-14 PROCEDURE — 99291 CRITICAL CARE FIRST HOUR: CPT | Mod: ,,, | Performed by: EMERGENCY MEDICINE

## 2020-03-14 PROCEDURE — 84443 ASSAY THYROID STIM HORMONE: CPT

## 2020-03-14 PROCEDURE — 96374 THER/PROPH/DIAG INJ IV PUSH: CPT

## 2020-03-14 PROCEDURE — 25000003 PHARM REV CODE 250

## 2020-03-14 PROCEDURE — 85025 COMPLETE CBC W/AUTO DIFF WBC: CPT

## 2020-03-14 PROCEDURE — 99291 PR CRITICAL CARE, E/M 30-74 MINUTES: ICD-10-PCS | Mod: ,,, | Performed by: EMERGENCY MEDICINE

## 2020-03-14 PROCEDURE — 80053 COMPREHEN METABOLIC PANEL: CPT

## 2020-03-14 PROCEDURE — 63600175 PHARM REV CODE 636 W HCPCS: Performed by: EMERGENCY MEDICINE

## 2020-03-14 PROCEDURE — 93010 ELECTROCARDIOGRAM REPORT: CPT | Mod: ,,, | Performed by: INTERNAL MEDICINE

## 2020-03-14 PROCEDURE — 93010 EKG 12-LEAD: ICD-10-PCS | Mod: 76,,, | Performed by: INTERNAL MEDICINE

## 2020-03-14 PROCEDURE — 63600175 PHARM REV CODE 636 W HCPCS

## 2020-03-14 PROCEDURE — 93010 ELECTROCARDIOGRAM REPORT: CPT | Mod: 76,,, | Performed by: INTERNAL MEDICINE

## 2020-03-14 PROCEDURE — 99291 CRITICAL CARE FIRST HOUR: CPT | Mod: 25

## 2020-03-14 PROCEDURE — 25000003 PHARM REV CODE 250: Performed by: EMERGENCY MEDICINE

## 2020-03-14 RX ORDER — METOPROLOL TARTRATE 100 MG/1
50 TABLET ORAL 2 TIMES DAILY
Qty: 60 TABLET | Refills: 0 | Status: SHIPPED | OUTPATIENT
Start: 2020-03-14 | End: 2020-05-15 | Stop reason: SDUPTHER

## 2020-03-14 RX ORDER — METOPROLOL TARTRATE 1 MG/ML
5 INJECTION, SOLUTION INTRAVENOUS EVERY 5 MIN PRN
Status: COMPLETED | OUTPATIENT
Start: 2020-03-14 | End: 2020-03-14

## 2020-03-14 RX ORDER — ADENOSINE 3 MG/ML
6 INJECTION, SOLUTION INTRAVENOUS
Status: COMPLETED | OUTPATIENT
Start: 2020-03-14 | End: 2020-03-14

## 2020-03-14 RX ORDER — ADENOSINE 3 MG/ML
INJECTION, SOLUTION INTRAVENOUS
Status: COMPLETED
Start: 2020-03-14 | End: 2020-03-14

## 2020-03-14 RX ORDER — METOPROLOL TARTRATE 1 MG/ML
INJECTION, SOLUTION INTRAVENOUS
Status: COMPLETED
Start: 2020-03-14 | End: 2020-03-14

## 2020-03-14 RX ORDER — METOPROLOL TARTRATE 50 MG/1
50 TABLET ORAL
Status: COMPLETED | OUTPATIENT
Start: 2020-03-14 | End: 2020-03-14

## 2020-03-14 RX ORDER — ADENOSINE 3 MG/ML
12 INJECTION, SOLUTION INTRAVENOUS
Status: COMPLETED | OUTPATIENT
Start: 2020-03-14 | End: 2020-03-14

## 2020-03-14 RX ADMIN — METOPROLOL TARTRATE 50 MG: 50 TABLET ORAL at 05:03

## 2020-03-14 RX ADMIN — SODIUM CHLORIDE 1000 ML: 0.9 INJECTION, SOLUTION INTRAVENOUS at 04:03

## 2020-03-14 RX ADMIN — METOPROLOL TARTRATE 5 MG: 5 INJECTION INTRAVENOUS at 03:03

## 2020-03-14 RX ADMIN — ADENOSINE 6 MG: 3 INJECTION, SOLUTION INTRAVENOUS at 04:03

## 2020-03-14 RX ADMIN — ADENOSINE 12 MG: 3 INJECTION, SOLUTION INTRAVENOUS at 04:03

## 2020-03-14 RX ADMIN — METOPROLOL TARTRATE 5 MG: 5 INJECTION INTRAVENOUS at 02:03

## 2020-03-14 NOTE — ED PROVIDER NOTES
Encounter Date: 3/14/2020       History     Chief Complaint   Patient presents with    Irregular Heart Beat     'fast     69-year-old male presents with palpitations.  He has noted that his heart is been going fast since last night.  This happened to twice before and was terminated with carotid massage.  He has tried this without success.  He has seen a EP specialist who is planning to do an ablation.  He denies any chest discomfort shortness of breath.  He has no exertional dyspnea.  There is no swelling to his legs.  He has been taking his medications        Review of patient's allergies indicates:  No Known Allergies  Past Medical History:   Diagnosis Date    Diabetes mellitus type II     Hx MRSA infection     Hypertension     MRSA infection 2002    Supraventricular tachycardia      Past Surgical History:   Procedure Laterality Date    none       Family History   Problem Relation Age of Onset    Heart disease Mother     Heart disease Father     Diabetes Father     Diabetes Sister      Social History     Tobacco Use    Smoking status: Never Smoker    Smokeless tobacco: Never Used   Substance Use Topics    Alcohol use: Yes     Alcohol/week: 4.0 standard drinks     Types: 4 Cans of beer per week     Frequency: 2-4 times a month     Drinks per session: 1 or 2     Binge frequency: Never    Drug use: No     Review of Systems   Constitutional: Negative for chills and fever.   HENT: Negative for congestion.    Respiratory: Negative for shortness of breath.    Cardiovascular: Positive for palpitations. Negative for chest pain.   Gastrointestinal: Negative for abdominal pain.   Endocrine: Negative for polydipsia.   Genitourinary: Negative for difficulty urinating.   Musculoskeletal: Negative for arthralgias.   Neurological: Negative for tremors.   Hematological: Negative for adenopathy.   Psychiatric/Behavioral: Negative for agitation.       Physical Exam     Initial Vitals [03/14/20 1418]   BP Pulse Resp  Temp SpO2   131/84 (!) 153 18 97.6 °F (36.4 °C) 97 %      MAP       --         Physical Exam    Constitutional: He appears well-developed and well-nourished.   HENT:   Head: Normocephalic.   Eyes: EOM are normal. Pupils are equal, round, and reactive to light.   Neck: Normal range of motion. Neck supple. No thyromegaly present.   Cardiovascular:   Tachycardic   Pulmonary/Chest: Breath sounds normal.   Abdominal: Soft. Bowel sounds are normal.   Musculoskeletal: Normal range of motion. He exhibits no edema.   Neurological: He is alert. He has normal strength. No sensory deficit. GCS score is 15. GCS eye subscore is 4. GCS verbal subscore is 5. GCS motor subscore is 6.   Skin: Skin is warm. Capillary refill takes less than 2 seconds. No rash noted.   Psychiatric: He has a normal mood and affect.         ED Course   Procedures  Labs Reviewed   CBC W/ AUTO DIFFERENTIAL - Abnormal; Notable for the following components:       Result Value    Mean Corpuscular Hemoglobin 32.9 (*)     Platelets 131 (*)     Gran% 73.1 (*)     Lymph% 16.6 (*)     All other components within normal limits   COMPREHENSIVE METABOLIC PANEL - Abnormal; Notable for the following components:    Chloride 113 (*)     CO2 20 (*)     Total Bilirubin 2.3 (*)     All other components within normal limits   TSH   TSH    Narrative:     ADD ON TSH PER DR MICHAEL BUSH/ORDER# [id number] @ 17:07 3/14/2020     EKG Readings: (Independently Interpreted)   Narrow complex regular tachycardia without sign of acute infarction.          Imaging Results    None          Medical Decision Making:   Initial Assessment:   Patient with tachyarrhythmia.  Will give Lopressor 5 mg IV x3.    Patient's heart rate drip down to 125.  Still appears to be a tachyarrhythmia.  I doubt this is pure sinus tachycardia.    Discussed case with Cardiology.  Will give adenosine  ED Management:  Patient received adenosine twice.  Did not cardiovert.  Head P waves underlying it.  Not atrial  flutter.    20 min after adenosine patient converted into normal sinus rhythm.    Discussed case with cart.  Will switch Coreg to Lopressor and discharge     Adnosine 6      Adenosine 12               Attending Attestation:         Attending Critical Care:   Critical Care Times:   Direct Patient Care (initial evaluation, reassessments, and time considering the case)................................................................22 minutes.   Additional History from reviewing old medical records or taking additional history from the family, EMS, PCP, etc.......................3 minutes.   Ordering, Reviewing, and Interpreting Diagnostic Studies...............................................................................................................3 minutes.   Documentation..................................................................................................................................................................................3 minutes.   Consultation with other Physicians. .................................................................................................................................................3 minutes.   ==============================================================  · Total Critical Care Time - exclusive of procedural time: 34 minutes.  ==============================================================                            Clinical Impression:       ICD-10-CM ICD-9-CM   1. Palpitations R00.2 785.1   2. Tachycardia R00.0 785.0   3. SVT (supraventricular tachycardia) I47.1 427.89   4. Atrial tachycardia I47.1 427.89         Disposition:   Disposition: Discharged  Condition: Stable     ED Disposition Condition    Discharge Stable        ED Prescriptions     Medication Sig Dispense Start Date End Date Auth. Provider    metoprolol tartrate (LOPRESSOR) 100 MG tablet Take 0.5 tablets (50 mg total) by mouth 2 (two) times daily. 60 tablet 3/14/2020 3/14/2021 Mihai NAZARIO  MD Padmini        Follow-up Information    None                     F/U with Cards               Mihai Washington MD  03/14/20 1947

## 2020-03-14 NOTE — DISCHARGE INSTRUCTIONS
Switch from Coreg to metoprolol  Follow-up your thyroid test with your primary care doctor by calling on Monday  Call your doctor if her blood pressure is elevated  Keep follow-up appointment with your cardiologist  Return if symptoms worsen

## 2020-03-14 NOTE — ED NOTES
Patient identifiers verified and correct for Patria Nieto.    LOC: The patient is awake, alert and aware of environment with an appropriate affect, the patient is oriented x 4 and speaking appropriately.    APPEARANCE: Patient resting comfortably and in no acute distress, patient is clean and well groomed, patient's clothing is properly fastened.    SKIN: The skin is warm and dry, color consistent with ethnicity, patient has normal skin turgor and moist mucus membranes, skin intact, no breakdown or bruising noted.    MUSCULOSKELETAL: Patient moving all extremities spontaneously, no obvious swelling or deformities noted.  RESPIRATORY: Airway is open and patent, respirations are spontaneous, patient has a normal effort and rate, no accessory muscle use noted, bilateral breath sounds clear  CARDIAC: Tachycardia, no periphreal edema noted, capillary refill < 3 seconds.    ABDOMEN: Soft and non tender to palpation, no distention noted, normoactive bowel sounds present in all four quadrants.    NEUROLOGIC: PERRLA, 3 mm bilaterally, eyes open spontaneously, behavior appropriate to situation, follows commands, facial expression symmetrical, bilateral hand grasp equal and even, purposeful motor response noted, normal sensation in all extremities when touched with a finger.

## 2020-03-14 NOTE — PLAN OF CARE
Plan of Care    Mr. Patria Nieto, 69 y.o. male with prior history of SVT that was initially seen on his PCP clinic visit Dr. Rojas on 2/11 then referred to Dr. Chowdary for evaluation of his SVT. The plan was to continue his medications and elective outpatient SVT ablation on 4/7/2020. He presented to ED with palpitation. He noted his rate ~ 140-150 yesterday and decreased to 110 then this afternoon again his rate ~ 140. He presented to ED. His ECG showed SVT (similr morphologies to prior 2/11). HE received two doses of Adenosine in ED and with carotid massage he converted to Sinus rhythm. His labs are stable and he is feeling good after restoring sinus rhythm.     Assessment:   69 year old male with recurrent symptomatic SVT presented with another SVT successfully converted with Adenosine and carotid massage. His RP is long.     Plan:  - Change Coreg to Metoprolol 50 mg PO BID (his BP is normal to high on heis daily logs)  - Continue other medication with no change   - Outpatient EP SVT ablation on 4/7 - Discussed with EP team and ED cardiology Fellow

## 2020-03-23 ENCOUNTER — TELEPHONE (OUTPATIENT)
Dept: ELECTROPHYSIOLOGY | Facility: CLINIC | Age: 70
End: 2020-03-23

## 2020-03-23 NOTE — TELEPHONE ENCOUNTER
Called pt re: upcoming procedure (RFA SVT).    In light of COVID concerns, will postpone the upcoming procedure. I encouraged patient to contact me/us as needed, and/or seek other medical care as needed.    AK/DC: Postpone SVT until we can do elective cases again. He'll continue BB until then.

## 2020-03-30 ENCOUNTER — TELEPHONE (OUTPATIENT)
Dept: INTERNAL MEDICINE | Facility: CLINIC | Age: 70
End: 2020-03-30

## 2020-03-30 DIAGNOSIS — Z71.89 ADVICE GIVEN ABOUT COVID-19 VIRUS BY TELEPHONE: ICD-10-CM

## 2020-03-30 DIAGNOSIS — Z79.4 UNCONTROLLED TYPE 2 DIABETES MELLITUS WITH HYPERGLYCEMIA, WITH LONG-TERM CURRENT USE OF INSULIN: Primary | ICD-10-CM

## 2020-03-30 DIAGNOSIS — E11.65 UNCONTROLLED TYPE 2 DIABETES MELLITUS WITH HYPERGLYCEMIA, WITH LONG-TERM CURRENT USE OF INSULIN: Primary | ICD-10-CM

## 2020-03-30 RX ORDER — BUDESONIDE AND FORMOTEROL FUMARATE DIHYDRATE 160; 4.5 UG/1; UG/1
AEROSOL RESPIRATORY (INHALATION)
COMMUNITY
Start: 2020-03-05 | End: 2020-06-10

## 2020-03-30 RX ORDER — LEVOFLOXACIN 750 MG/1
TABLET ORAL
COMMUNITY
Start: 2020-03-05 | End: 2020-06-10

## 2020-03-30 NOTE — TELEPHONE ENCOUNTER
Onset of Illness: 3/5/20     Vital Signs   BG: last week sugar much higher  180 -200   14 day avg 197  30 day avg 135     Symptoms     ENT 3/5/20 was having   Cough symptoms, on and off, since 1/2020.   Pressure behind the eyes. Progressing to post nasal drip and wheezing  Bronchial sx or allergies  No fever  Symbicort, use was sporadic and then quit. Not much active with cough or wheezing now    Occupational History   Still working      Treatments    L50   N 20      Little more frequent skipping    PLAN   Increase adherence to mealtime novolog   RTC 3 months for routine care

## 2020-05-06 RX ORDER — INSULIN ASPART 100 [IU]/ML
INJECTION, SOLUTION INTRAVENOUS; SUBCUTANEOUS
Qty: 78 ML | Refills: 0 | Status: SHIPPED | OUTPATIENT
Start: 2020-05-06 | End: 2020-08-10 | Stop reason: SDUPTHER

## 2020-05-10 ENCOUNTER — PATIENT MESSAGE (OUTPATIENT)
Dept: INTERNAL MEDICINE | Facility: CLINIC | Age: 70
End: 2020-05-10

## 2020-05-10 DIAGNOSIS — L02.32 BOIL OF BUTTOCK: Primary | ICD-10-CM

## 2020-05-11 RX ORDER — DOXYCYCLINE 100 MG/1
100 CAPSULE ORAL 2 TIMES DAILY
Qty: 10 CAPSULE | Refills: 0 | Status: SHIPPED | OUTPATIENT
Start: 2020-05-11 | End: 2020-05-26 | Stop reason: SDUPTHER

## 2020-05-11 RX ORDER — MUPIROCIN 20 MG/G
OINTMENT TOPICAL 3 TIMES DAILY
Qty: 15 G | Refills: 0 | Status: SHIPPED | OUTPATIENT
Start: 2020-05-11 | End: 2020-12-12 | Stop reason: RX

## 2020-05-12 ENCOUNTER — PATIENT MESSAGE (OUTPATIENT)
Dept: INTERNAL MEDICINE | Facility: CLINIC | Age: 70
End: 2020-05-12

## 2020-05-15 ENCOUNTER — TELEPHONE (OUTPATIENT)
Dept: ELECTROPHYSIOLOGY | Facility: CLINIC | Age: 70
End: 2020-05-15

## 2020-05-15 ENCOUNTER — PATIENT MESSAGE (OUTPATIENT)
Dept: INTERNAL MEDICINE | Facility: CLINIC | Age: 70
End: 2020-05-15

## 2020-05-15 RX ORDER — METOPROLOL TARTRATE 100 MG/1
50 TABLET ORAL 2 TIMES DAILY
Qty: 180 TABLET | Refills: 0 | Status: SHIPPED | OUTPATIENT
Start: 2020-05-15 | End: 2020-08-10

## 2020-05-26 ENCOUNTER — PATIENT MESSAGE (OUTPATIENT)
Dept: INTERNAL MEDICINE | Facility: CLINIC | Age: 70
End: 2020-05-26

## 2020-05-26 DIAGNOSIS — L02.32 BOIL OF BUTTOCK: ICD-10-CM

## 2020-05-26 RX ORDER — DOXYCYCLINE 100 MG/1
100 CAPSULE ORAL 2 TIMES DAILY
Qty: 10 CAPSULE | Refills: 0 | Status: SHIPPED | OUTPATIENT
Start: 2020-05-26 | End: 2020-05-31

## 2020-05-28 ENCOUNTER — PATIENT MESSAGE (OUTPATIENT)
Dept: INTERNAL MEDICINE | Facility: CLINIC | Age: 70
End: 2020-05-28

## 2020-05-29 ENCOUNTER — LAB VISIT (OUTPATIENT)
Dept: LAB | Facility: HOSPITAL | Age: 70
End: 2020-05-29
Attending: INTERNAL MEDICINE
Payer: COMMERCIAL

## 2020-05-29 DIAGNOSIS — Z79.4 UNCONTROLLED TYPE 2 DIABETES MELLITUS WITH HYPERGLYCEMIA, WITH LONG-TERM CURRENT USE OF INSULIN: ICD-10-CM

## 2020-05-29 DIAGNOSIS — E11.65 UNCONTROLLED TYPE 2 DIABETES MELLITUS WITH HYPERGLYCEMIA, WITH LONG-TERM CURRENT USE OF INSULIN: ICD-10-CM

## 2020-05-29 LAB
ALBUMIN SERPL BCP-MCNC: 4 G/DL (ref 3.5–5.2)
ALP SERPL-CCNC: 61 U/L (ref 55–135)
ALT SERPL W/O P-5'-P-CCNC: 39 U/L (ref 10–44)
ANION GAP SERPL CALC-SCNC: 8 MMOL/L (ref 8–16)
AST SERPL-CCNC: 18 U/L (ref 10–40)
BILIRUB SERPL-MCNC: 1.8 MG/DL (ref 0.1–1)
BUN SERPL-MCNC: 11 MG/DL (ref 8–23)
CALCIUM SERPL-MCNC: 9.5 MG/DL (ref 8.7–10.5)
CHLORIDE SERPL-SCNC: 114 MMOL/L (ref 95–110)
CHOLEST SERPL-MCNC: 162 MG/DL (ref 120–199)
CHOLEST/HDLC SERPL: 3.7 {RATIO} (ref 2–5)
CO2 SERPL-SCNC: 24 MMOL/L (ref 23–29)
CREAT SERPL-MCNC: 1 MG/DL (ref 0.5–1.4)
EST. GFR  (AFRICAN AMERICAN): >60 ML/MIN/1.73 M^2
EST. GFR  (NON AFRICAN AMERICAN): >60 ML/MIN/1.73 M^2
ESTIMATED AVG GLUCOSE: 171 MG/DL (ref 68–131)
GLUCOSE SERPL-MCNC: 70 MG/DL (ref 70–110)
HBA1C MFR BLD HPLC: 7.6 % (ref 4–5.6)
HDLC SERPL-MCNC: 44 MG/DL (ref 40–75)
HDLC SERPL: 27.2 % (ref 20–50)
LDLC SERPL CALC-MCNC: 90 MG/DL (ref 63–159)
NONHDLC SERPL-MCNC: 118 MG/DL
POTASSIUM SERPL-SCNC: 4.1 MMOL/L (ref 3.5–5.1)
PROT SERPL-MCNC: 6.6 G/DL (ref 6–8.4)
SODIUM SERPL-SCNC: 146 MMOL/L (ref 136–145)
TRIGL SERPL-MCNC: 140 MG/DL (ref 30–150)

## 2020-05-29 PROCEDURE — 80061 LIPID PANEL: CPT

## 2020-05-29 PROCEDURE — 83036 HEMOGLOBIN GLYCOSYLATED A1C: CPT

## 2020-05-29 PROCEDURE — 36415 COLL VENOUS BLD VENIPUNCTURE: CPT | Mod: PO

## 2020-05-29 PROCEDURE — 80053 COMPREHEN METABOLIC PANEL: CPT

## 2020-06-10 ENCOUNTER — OFFICE VISIT (OUTPATIENT)
Dept: INTERNAL MEDICINE | Facility: CLINIC | Age: 70
End: 2020-06-10
Payer: COMMERCIAL

## 2020-06-10 VITALS
DIASTOLIC BLOOD PRESSURE: 74 MMHG | TEMPERATURE: 97 F | HEIGHT: 69 IN | SYSTOLIC BLOOD PRESSURE: 112 MMHG | HEART RATE: 77 BPM | BODY MASS INDEX: 30.57 KG/M2 | OXYGEN SATURATION: 97 % | WEIGHT: 206.38 LBS

## 2020-06-10 DIAGNOSIS — M67.432 GANGLION CYST OF WRIST, LEFT: ICD-10-CM

## 2020-06-10 DIAGNOSIS — B95.8 STAPH SKIN INFECTION: ICD-10-CM

## 2020-06-10 DIAGNOSIS — L08.9 STAPH SKIN INFECTION: ICD-10-CM

## 2020-06-10 DIAGNOSIS — Z79.4 UNCONTROLLED TYPE 2 DIABETES MELLITUS WITH HYPERGLYCEMIA, WITH LONG-TERM CURRENT USE OF INSULIN: Primary | ICD-10-CM

## 2020-06-10 DIAGNOSIS — E11.65 UNCONTROLLED TYPE 2 DIABETES MELLITUS WITH HYPERGLYCEMIA, WITH LONG-TERM CURRENT USE OF INSULIN: Primary | ICD-10-CM

## 2020-06-10 PROCEDURE — 1126F PR PAIN SEVERITY QUANTIFIED, NO PAIN PRESENT: ICD-10-PCS | Mod: S$GLB,,, | Performed by: INTERNAL MEDICINE

## 2020-06-10 PROCEDURE — 99999 PR PBB SHADOW E&M-EST. PATIENT-LVL IV: CPT | Mod: PBBFAC,,, | Performed by: INTERNAL MEDICINE

## 2020-06-10 PROCEDURE — 3078F DIAST BP <80 MM HG: CPT | Mod: CPTII,S$GLB,, | Performed by: INTERNAL MEDICINE

## 2020-06-10 PROCEDURE — 3051F PR MOST RECENT HEMOGLOBIN A1C LEVEL 7.0 - < 8.0%: ICD-10-PCS | Mod: CPTII,S$GLB,, | Performed by: INTERNAL MEDICINE

## 2020-06-10 PROCEDURE — 3051F HG A1C>EQUAL 7.0%<8.0%: CPT | Mod: CPTII,S$GLB,, | Performed by: INTERNAL MEDICINE

## 2020-06-10 PROCEDURE — 99999 PR PBB SHADOW E&M-EST. PATIENT-LVL IV: ICD-10-PCS | Mod: PBBFAC,,, | Performed by: INTERNAL MEDICINE

## 2020-06-10 PROCEDURE — 3074F SYST BP LT 130 MM HG: CPT | Mod: CPTII,S$GLB,, | Performed by: INTERNAL MEDICINE

## 2020-06-10 PROCEDURE — 1126F AMNT PAIN NOTED NONE PRSNT: CPT | Mod: S$GLB,,, | Performed by: INTERNAL MEDICINE

## 2020-06-10 PROCEDURE — 99214 OFFICE O/P EST MOD 30 MIN: CPT | Mod: S$GLB,,, | Performed by: INTERNAL MEDICINE

## 2020-06-10 PROCEDURE — 1101F PR PT FALLS ASSESS DOC 0-1 FALLS W/OUT INJ PAST YR: ICD-10-PCS | Mod: CPTII,S$GLB,, | Performed by: INTERNAL MEDICINE

## 2020-06-10 PROCEDURE — 3074F PR MOST RECENT SYSTOLIC BLOOD PRESSURE < 130 MM HG: ICD-10-PCS | Mod: CPTII,S$GLB,, | Performed by: INTERNAL MEDICINE

## 2020-06-10 PROCEDURE — 99214 PR OFFICE/OUTPT VISIT, EST, LEVL IV, 30-39 MIN: ICD-10-PCS | Mod: S$GLB,,, | Performed by: INTERNAL MEDICINE

## 2020-06-10 PROCEDURE — 1101F PT FALLS ASSESS-DOCD LE1/YR: CPT | Mod: CPTII,S$GLB,, | Performed by: INTERNAL MEDICINE

## 2020-06-10 PROCEDURE — 1159F MED LIST DOCD IN RCRD: CPT | Mod: S$GLB,,, | Performed by: INTERNAL MEDICINE

## 2020-06-10 PROCEDURE — 1159F PR MEDICATION LIST DOCUMENTED IN MEDICAL RECORD: ICD-10-PCS | Mod: S$GLB,,, | Performed by: INTERNAL MEDICINE

## 2020-06-10 PROCEDURE — 3078F PR MOST RECENT DIASTOLIC BLOOD PRESSURE < 80 MM HG: ICD-10-PCS | Mod: CPTII,S$GLB,, | Performed by: INTERNAL MEDICINE

## 2020-06-10 RX ORDER — INSULIN GLARGINE 300 [IU]/ML
INJECTION, SOLUTION SUBCUTANEOUS
Qty: 12 SYRINGE | Refills: 0 | Status: SHIPPED | OUTPATIENT
Start: 2020-06-10 | End: 2020-06-17 | Stop reason: SDUPTHER

## 2020-06-10 RX ORDER — INSULIN GLARGINE 300 [IU]/ML
INJECTION, SOLUTION SUBCUTANEOUS
COMMUNITY
End: 2020-06-10 | Stop reason: SDUPTHER

## 2020-06-10 RX ORDER — DOXYCYCLINE 100 MG/1
100 CAPSULE ORAL 2 TIMES DAILY
Qty: 14 CAPSULE | Refills: 0 | Status: SHIPPED | OUTPATIENT
Start: 2020-06-10 | End: 2020-06-17

## 2020-06-10 NOTE — PROGRESS NOTES
Portions of this note are generated with voice recognition software. Typographical errors may exist.     SUBJECTIVE:    This is a/an 69 y.o. male here for primary care visit for  Chief Complaint   Patient presents with    Diabetes     Patient states that when he was having the staph infection in the gluteal area sugars were higher than usual.  Patient has not been adjusting long-acting insulin but understands how to do so.  When he travels it is hard for him to have consistent healthy meals.    No symptomatic hypoglycemia.  States that 1 round of doxycycline resolved staph skin infection but when he is traveling any has a relapse it is hard toget to a pharmacy and he is requesting Another prescription as needed       Medications Reviewed and Updated    Past medical, family, and social histories were reviewed and updated.    Review of Systems negative unless otherwise noted in history of present illness-  ROS    General ROS: negative  Psychological ROS: negative  ENT ROS: negative  Endocrine ROS: Negative  Allergy and Immunology ROS: negative  Cardiovascular ROS: negative  Pulmonary ROS: Negative  Gastrointestinal ROS: negative  Genito-Urinary ROS: negative  Musculoskeletal ROS: negative  Neurological ROS: negative  Dermatological ROS: negative        Allergic:  Review of patient's allergies indicates:  No Known Allergies    OBJECTIVE:  BP: 112/74 Pulse: 77 Temp: 97.1 °F (36.2 °C)  Wt Readings from Last 3 Encounters:   06/10/20 93.6 kg (206 lb 5.6 oz)   03/14/20 90.3 kg (199 lb)   02/24/20 90.3 kg (199 lb)    Body mass index is 30.47 kg/m².  Previous Blood Pressure Readings :   BP Readings from Last 3 Encounters:   06/10/20 112/74   03/14/20 123/81   02/24/20 128/84       Physical Exam    GEN: No apparent distress  HEENT: sclera non-icteric, conjunctiva clear  CV: no peripheral edema  PULM: breathing non-labored  ABD: non protuberant abdomen.  PSYCH: appropriate affect  MSK: able to rise from chair without  assistance  SKIN: normal skin turgor    Pertinent Labs Reviewed       ASSESSMENT/PLAN:    Uncontrolled type 2 diabetes mellitus with hyperglycemia, with long-term current use of insulin.Condition improving.  Counseling on self-care measures today.  Continue with current plan in addition to recommendations written on After Visit Summary.   -     insulin glargine, TOUJEO, (TOUJEO) 300 unit/mL (1.5 mL) InPn pen; Inject 55 units daily. Adjust to max 70 units per MD instructions  Dispense: 12 Syringe; Refill: 0    Staph skin infection.Condition improving.  Counseling on self-care measures today.  Continue with current plan in addition to recommendations written on After Visit Summary.   -     doxycycline (MONODOX) 100 MG capsule; Take 1 capsule (100 mg total) by mouth 2 (two) times daily. for 7 days  Dispense: 14 capsule; Refill: 0    Ganglion cyst of wrist, left.Condition not optimally controlled. Detailed counseling on self care measures. Plan to monitor clinically in addition to plan below or as listed on After Visit Summary.   -     Ambulatory referral/consult to Orthopedics          Future Appointments   Date Time Provider Department Center   6/22/2020  9:00 AM Micheal Scanlon Jr., MD [facility]   7/29/2020  9:40 AM Geovanni Chowdary MD [facility]   8/7/2020  8:25 AM [facility], [facility] [place]   8[facility]10/2020  9:40 [facility]       Félix Rojas  6/10/2020  9:25 AM

## 2020-06-10 NOTE — PATIENT INSTRUCTIONS
20-30 second scrub of the skin with soap of the problem area. Do this especially when you have a day of sweating. Change clothes / underwear, consider more than 1 shower per day.     .  YOU ARE CURRENTLY TAKING 50-60 UNITS OF INSULIN    YOU ARE ALLOWED TO INCREASE YOUR HOME INSULIN UP TO 70 UNITS IF NEEDED    GOAL SUGAR IN THE MORNING, FASTING, IS BETWEEN     Until you follow-up with your doctor you may use the following strategy.  Do not continue making changes beyond this plan unless your receive such advice from another health care professional.    WHEN TO INCREASE YOUR TYPICAL LONG ACTING INSULIN DOSE    When the fasting sugar (first check in the morning before eating breakfast)  is above 130 FOR MORE THAN 2 DAYS IN A ROW:       Increase next INSULIN dose by 2 UNITS later that night  (for example, instead of taking 10 units, take 12 units)       IF the fasting sugar (first check in the morning before eating breakfast)  is above 180 FOR MORE THAN 2 DAYS IN A ROW:       Increase next INSULIN dose by 4 UNITS later that night  (for example, instead of taking 10 units, take 14 units)        WHEN TO DECREASE YOUR TYPICAL LONG ACTING INSULIN    When the fasting sugar (first check in the morning before eating breakfast)  is below 90:     Decrease the next insulin dose that night by 2 units (subtract 2)      When the fasting sugar (first check in the morning before eating breakfast)  In low 80s:       CALL YOUR PRIMARY DOCTOR FOR ASSISTANCE and  Decrease next INSULIN dose based on your doctors directions

## 2020-06-16 ENCOUNTER — TELEPHONE (OUTPATIENT)
Dept: ELECTROPHYSIOLOGY | Facility: CLINIC | Age: 70
End: 2020-06-16

## 2020-06-18 ENCOUNTER — PATIENT OUTREACH (OUTPATIENT)
Dept: ADMINISTRATIVE | Facility: OTHER | Age: 70
End: 2020-06-18

## 2020-06-19 ENCOUNTER — TELEPHONE (OUTPATIENT)
Dept: ORTHOPEDICS | Facility: CLINIC | Age: 70
End: 2020-06-19

## 2020-06-19 DIAGNOSIS — M25.532 LEFT WRIST PAIN: Primary | ICD-10-CM

## 2020-06-22 ENCOUNTER — OFFICE VISIT (OUTPATIENT)
Dept: ORTHOPEDICS | Facility: CLINIC | Age: 70
End: 2020-06-22
Payer: COMMERCIAL

## 2020-06-22 ENCOUNTER — HOSPITAL ENCOUNTER (OUTPATIENT)
Dept: RADIOLOGY | Facility: HOSPITAL | Age: 70
Discharge: HOME | End: 2020-06-22
Attending: ORTHOPAEDIC SURGERY
Payer: COMMERCIAL

## 2020-06-22 VITALS — HEIGHT: 69 IN | WEIGHT: 206 LBS | BODY MASS INDEX: 30.51 KG/M2

## 2020-06-22 DIAGNOSIS — M67.432 GANGLION CYST OF DORSUM OF LEFT WRIST: ICD-10-CM

## 2020-06-22 DIAGNOSIS — M25.532 LEFT WRIST PAIN: ICD-10-CM

## 2020-06-22 PROCEDURE — 20612 ASPIRATE/INJ GANGLION CYST: CPT | Mod: LT,S$GLB,, | Performed by: ORTHOPAEDIC SURGERY

## 2020-06-22 PROCEDURE — 73110 X-RAY EXAM OF WRIST: CPT | Mod: TC,PN,LT

## 2020-06-22 PROCEDURE — 1159F MED LIST DOCD IN RCRD: CPT | Mod: S$GLB,,, | Performed by: ORTHOPAEDIC SURGERY

## 2020-06-22 PROCEDURE — 99203 PR OFFICE/OUTPT VISIT, NEW, LEVL III, 30-44 MIN: ICD-10-PCS | Mod: 25,S$GLB,, | Performed by: ORTHOPAEDIC SURGERY

## 2020-06-22 PROCEDURE — 20612 PR ASPIRAT/INJECTION GANGLION CYST(S): ICD-10-PCS | Mod: LT,S$GLB,, | Performed by: ORTHOPAEDIC SURGERY

## 2020-06-22 PROCEDURE — 99999 PR PBB SHADOW E&M-EST. PATIENT-LVL III: CPT | Mod: PBBFAC,,, | Performed by: ORTHOPAEDIC SURGERY

## 2020-06-22 PROCEDURE — 1159F PR MEDICATION LIST DOCUMENTED IN MEDICAL RECORD: ICD-10-PCS | Mod: S$GLB,,, | Performed by: ORTHOPAEDIC SURGERY

## 2020-06-22 PROCEDURE — 1101F PR PT FALLS ASSESS DOC 0-1 FALLS W/OUT INJ PAST YR: ICD-10-PCS | Mod: CPTII,S$GLB,, | Performed by: ORTHOPAEDIC SURGERY

## 2020-06-22 PROCEDURE — 73110 X-RAY EXAM OF WRIST: CPT | Mod: 26,LT,, | Performed by: RADIOLOGY

## 2020-06-22 PROCEDURE — 1101F PT FALLS ASSESS-DOCD LE1/YR: CPT | Mod: CPTII,S$GLB,, | Performed by: ORTHOPAEDIC SURGERY

## 2020-06-22 PROCEDURE — 1125F AMNT PAIN NOTED PAIN PRSNT: CPT | Mod: S$GLB,,, | Performed by: ORTHOPAEDIC SURGERY

## 2020-06-22 PROCEDURE — 99203 OFFICE O/P NEW LOW 30 MIN: CPT | Mod: 25,S$GLB,, | Performed by: ORTHOPAEDIC SURGERY

## 2020-06-22 PROCEDURE — 1125F PR PAIN SEVERITY QUANTIFIED, PAIN PRESENT: ICD-10-PCS | Mod: S$GLB,,, | Performed by: ORTHOPAEDIC SURGERY

## 2020-06-22 PROCEDURE — 73110 XR WRIST COMPLETE 3 VIEWS LEFT: ICD-10-PCS | Mod: 26,LT,, | Performed by: RADIOLOGY

## 2020-06-22 PROCEDURE — 99999 PR PBB SHADOW E&M-EST. PATIENT-LVL III: ICD-10-PCS | Mod: PBBFAC,,, | Performed by: ORTHOPAEDIC SURGERY

## 2020-06-22 RX ORDER — TRIAMCINOLONE ACETONIDE 40 MG/ML
20 INJECTION, SUSPENSION INTRA-ARTICULAR; INTRAMUSCULAR
Status: COMPLETED | OUTPATIENT
Start: 2020-06-22 | End: 2020-06-22

## 2020-06-22 RX ADMIN — TRIAMCINOLONE ACETONIDE 20 MG: 40 INJECTION, SUSPENSION INTRA-ARTICULAR; INTRAMUSCULAR at 09:06

## 2020-06-22 NOTE — LETTER
June 22, 2020      Félix Rojas MD  [address]  [place] [address]           [facility]  [address], [address]  [place] [address]  Phone: [phone]          Patient: Patria Nieto   MR Number: [id number]   YOB: 1950   Date of Visit: 6/22/2020       Dear Dr. Félix Rojas:    Thank you for referring Patria Nieto to me for evaluation. Attached you will find relevant portions of my assessment and plan of care.    If you have questions, please do not hesitate to call me. I look forward to following Patria Nieto along with you.    Sincerely,    Micheal Scanlon Jr., MD    Enclosure  CC:  No Recipients    If you would like to receive this communication electronically, please contact [email or web address] or [phone] to request more information on [employer] access.    For providers and/or their staff who would like to refer a patient to [facility], please contact us through our one-stop-shop provider referral line, [facility] , at [phone].    If you feel you have received this communication in error or would no longer like to receive these types of communications, please e-mail [email or web address]

## 2020-06-22 NOTE — PROGRESS NOTES
"Subjective:      Patient ID: Patria Nieto is a 69 y.o. male.    Chief Complaint: Cyst of the Left Wrist      HPI  Patria Nieto is a  69 y.o. male presenting today for small cyst left wrist.  There was not a history of trauma.  Onset of symptoms began about 6 months ago but it has gotten larger recently  Causes pain occasionally when he bumps it  No numbness or tingling reported.      Review of patient's allergies indicates:  No Known Allergies      Current Outpatient Medications   Medication Sig Dispense Refill    aspirin 81 MG Chew Take 81 mg by mouth once daily.      atorvastatin (LIPITOR) 40 MG tablet Take 1 tablet (40 mg total) by mouth once daily. 90 tablet 0    fexofenadine (ALLEGRA) 180 MG tablet Take 180 mg by mouth daily as needed.       insulin aspart U-100 (NOVOLOG FLEXPEN U-100 INSULIN) 100 unit/mL (3 mL) InPn pen INJECT 20 UNITS INTO THE SKIN WITH CORRECTION SCALE(MAX 84 UNITS PER DAY) 78 mL 0    insulin glargine, TOUJEO, (TOUJEO) 300 unit/mL (1.5 mL) InPn pen Inject 55 units daily. Adjust to max 70 units per MD instructions 12 Syringe 0    metFORMIN (GLUCOPHAGE) 1000 MG tablet Take 1 tablet (1,000 mg total) by mouth 2 (two) times daily with meals. 180 tablet 0    metoprolol tartrate (LOPRESSOR) 100 MG tablet Take 0.5 tablets (50 mg total) by mouth 2 (two) times daily. 180 tablet 0    mupirocin (BACTROBAN) 2 % ointment Apply topically 3 (three) times daily. 15 g 0    pen needle, diabetic (BD ULTRA-FINE MINI PEN NEEDLE) 31 gauge x 3/16" Ndle Uses 5 times a day. 200 each 6    ramipriL (ALTACE) 5 MG capsule TAKE 1 CAPSULE BY MOUTH EVERY DAY 90 capsule 2     No current facility-administered medications for this visit.        Past Medical History:   Diagnosis Date    Diabetes mellitus type II     Hx MRSA infection     Hypertension     MRSA infection 2002    Supraventricular tachycardia        Past Surgical History:   Procedure Laterality Date    none         Review of " "Systems:  ROS    OBJECTIVE:     PHYSICAL EXAM:  Height: 5' 9" (175.3 cm) Weight: 93.4 kg (206 lb)  Vitals:    06/22/20 0915   Weight: 93.4 kg (206 lb)   Height: 5' 9" (1.753 m)   PainSc:   4     Well developed, well nourished male in no acute distress  Alert and oriented x 3  HEENT- Normal exam  Lungs- Clear to auscultation  Heart- Regular rate and rhythm  Abdomen- Soft nontender  Extremity exam- examination left hand a small cyst is present volar radial side of the wrist measuring 1.5 x 2 cm in size  Little bit more painful with hyperextension  Range of motion wrist fingers full  Tinel sign negative      RADIOGRAPHS:  AP lateral x-rays left wrist demonstrate no significant abnormalities  Comments: I have personally reviewed the imaging and I agree with the above radiologist's report.    ASSESSMENT/PLAN:     IMPRESSION:  Volar ganglion cyst left wrist mildly symptomatic    PLAN:  Discussed options for treatment including injection versus surgery  Patient would like try injection  After pause for time-out identified the cyst injected and aspirated with Kenalog 20 mg 0.5 cc xylocaine sterile technique  Tolerated the procedure well without complication  Recommended wrist splint part-time use  Follow-up 4-6 weeks       - We talked at length about the anatomy and pathophysiology of   Encounter Diagnosis   Name Primary?    Ganglion cyst of dorsum of left wrist            Disclaimer: This note has been generated using voice-recognition software. There may be typographical errors that have been missed during proof-reading.    "

## 2020-06-23 ENCOUNTER — HOSPITAL ENCOUNTER (OUTPATIENT)
Facility: HOSPITAL | Age: 70
Discharge: HOME | End: 2020-06-24
Attending: EMERGENCY MEDICINE | Admitting: HOSPITALIST
Payer: COMMERCIAL

## 2020-06-23 DIAGNOSIS — I21.4 NSTEMI (NON-ST ELEVATED MYOCARDIAL INFARCTION): ICD-10-CM

## 2020-06-23 DIAGNOSIS — R07.9 CHEST PAIN: Primary | ICD-10-CM

## 2020-06-23 PROBLEM — R79.89 ELEVATED TROPONIN: Status: ACTIVE | Noted: 2020-06-23

## 2020-06-23 LAB
ALBUMIN SERPL BCP-MCNC: 3.9 G/DL (ref 3.5–5.2)
ALP SERPL-CCNC: 57 U/L (ref 55–135)
ALT SERPL W/O P-5'-P-CCNC: 17 U/L (ref 10–44)
ANION GAP SERPL CALC-SCNC: 7 MMOL/L (ref 8–16)
AORTIC ROOT ANNULUS: 4.32 CM
ASCENDING AORTA: 3.35 CM
AST SERPL-CCNC: 11 U/L (ref 10–40)
AV INDEX (PROSTH): 0.79
AV MEAN GRADIENT: 4 MMHG
AV PEAK GRADIENT: 7 MMHG
AV VALVE AREA: 2.9 CM2
AV VELOCITY RATIO: 0.77
BASOPHILS # BLD AUTO: 0.02 K/UL (ref 0–0.2)
BASOPHILS NFR BLD: 0.2 % (ref 0–1.9)
BILIRUB SERPL-MCNC: 1.5 MG/DL (ref 0.1–1)
BSA FOR ECHO PROCEDURE: 2.13 M2
BUN SERPL-MCNC: 12 MG/DL (ref 8–23)
CALCIUM SERPL-MCNC: 10 MG/DL (ref 8.7–10.5)
CHLORIDE SERPL-SCNC: 112 MMOL/L (ref 95–110)
CO2 SERPL-SCNC: 23 MMOL/L (ref 23–29)
CREAT SERPL-MCNC: 1.1 MG/DL (ref 0.5–1.4)
CV ECHO LV RWT: 0.52 CM
CV STRESS BASE HR: 83 BPM
DIASTOLIC BLOOD PRESSURE: 84 MMHG
DIFFERENTIAL METHOD: ABNORMAL
DOP CALC AO PEAK VEL: 1.31 M/S
DOP CALC AO VTI: 27.17 CM
DOP CALC LVOT AREA: 3.7 CM2
DOP CALC LVOT DIAMETER: 2.16 CM
DOP CALC LVOT PEAK VEL: 1.01 M/S
DOP CALC LVOT STROKE VOLUME: 78.85 CM3
DOP CALCLVOT PEAK VEL VTI: 21.53 CM
E WAVE DECELERATION TIME: 103.64 MSEC
E/A RATIO: 0.7
E/E' RATIO: 11.09 M/S
ECHO LV POSTERIOR WALL: 1.31 CM (ref 0.6–1.1)
EOSINOPHIL # BLD AUTO: 0.4 K/UL (ref 0–0.5)
EOSINOPHIL NFR BLD: 4.2 % (ref 0–8)
ERYTHROCYTE [DISTWIDTH] IN BLOOD BY AUTOMATED COUNT: 12.8 % (ref 11.5–14.5)
EST. GFR  (AFRICAN AMERICAN): >60 ML/MIN/1.73 M^2
EST. GFR  (NON AFRICAN AMERICAN): >60 ML/MIN/1.73 M^2
FRACTIONAL SHORTENING: 33 % (ref 28–44)
GLUCOSE SERPL-MCNC: 202 MG/DL (ref 70–110)
HCT VFR BLD AUTO: 43.5 % (ref 40–54)
HGB BLD-MCNC: 15 G/DL (ref 14–18)
IMM GRANULOCYTES # BLD AUTO: 0.03 K/UL (ref 0–0.04)
IMM GRANULOCYTES NFR BLD AUTO: 0.3 % (ref 0–0.5)
INTERVENTRICULAR SEPTUM: 1.36 CM (ref 0.6–1.1)
LA MAJOR: 4.42 CM
LA MINOR: 4.65 CM
LA WIDTH: 3.02 CM
LEFT ATRIUM SIZE: 3.55 CM
LEFT ATRIUM VOLUME INDEX: 19.7 ML/M2
LEFT ATRIUM VOLUME: 41.3 CM3
LEFT INTERNAL DIMENSION IN SYSTOLE: 3.39 CM (ref 2.1–4)
LEFT VENTRICLE DIASTOLIC VOLUME INDEX: 57.71 ML/M2
LEFT VENTRICLE DIASTOLIC VOLUME: 120.71 ML
LEFT VENTRICLE MASS INDEX: 132 G/M2
LEFT VENTRICLE SYSTOLIC VOLUME INDEX: 22.5 ML/M2
LEFT VENTRICLE SYSTOLIC VOLUME: 47.11 ML
LEFT VENTRICULAR INTERNAL DIMENSION IN DIASTOLE: 5.04 CM (ref 3.5–6)
LEFT VENTRICULAR MASS: 275.4 G
LIPASE SERPL-CCNC: 40 U/L (ref 4–60)
LV LATERAL E/E' RATIO: 10.17 M/S
LV SEPTAL E/E' RATIO: 12.2 M/S
LYMPHOCYTES # BLD AUTO: 1.4 K/UL (ref 1–4.8)
LYMPHOCYTES NFR BLD: 15 % (ref 18–48)
MCH RBC QN AUTO: 33.2 PG (ref 27–31)
MCHC RBC AUTO-ENTMCNC: 34.5 G/DL (ref 32–36)
MCV RBC AUTO: 96 FL (ref 82–98)
MONOCYTES # BLD AUTO: 0.7 K/UL (ref 0.3–1)
MONOCYTES NFR BLD: 7.7 % (ref 4–15)
MV PEAK A VEL: 0.87 M/S
MV PEAK E VEL: 0.61 M/S
MV STENOSIS PRESSURE HALF TIME: 30.05 MS
MV VALVE AREA P 1/2 METHOD: 7.32 CM2
NEUTROPHILS # BLD AUTO: 6.6 K/UL (ref 1.8–7.7)
NEUTROPHILS NFR BLD: 72.6 % (ref 38–73)
NRBC BLD-RTO: 0 /100 WBC
OHS CV CPX 1 MINUTE RECOVERY HEART RATE: 120 BPM
OHS CV CPX 85 PERCENT MAX PREDICTED HEART RATE MALE: 129
OHS CV CPX ESTIMATED METS: 9
OHS CV CPX MAX PREDICTED HEART RATE: 152
OHS CV CPX PATIENT IS FEMALE: 0
OHS CV CPX PATIENT IS MALE: 1
OHS CV CPX PEAK DIASTOLIC BLOOD PRESSURE: 88 MMHG
OHS CV CPX PEAK HEAR RATE: 130 BPM
OHS CV CPX PEAK RATE PRESSURE PRODUCT: NORMAL
OHS CV CPX PEAK SYSTOLIC BLOOD PRESSURE: 177 MMHG
OHS CV CPX PERCENT MAX PREDICTED HEART RATE ACHIEVED: 86
OHS CV CPX RATE PRESSURE PRODUCT PRESENTING: NORMAL
PLATELET # BLD AUTO: 146 K/UL (ref 150–350)
PMV BLD AUTO: 12.2 FL (ref 9.2–12.9)
POCT GLUCOSE: 144 MG/DL (ref 70–110)
POCT GLUCOSE: 149 MG/DL (ref 70–110)
POCT GLUCOSE: 217 MG/DL (ref 70–110)
POTASSIUM SERPL-SCNC: 4.1 MMOL/L (ref 3.5–5.1)
PROT SERPL-MCNC: 6.7 G/DL (ref 6–8.4)
PULM VEIN S/D RATIO: 1.83
PV PEAK D VEL: 0.3 M/S
PV PEAK S VEL: 0.55 M/S
RA MAJOR: 4.4 CM
RA PRESSURE: 3 MMHG
RA WIDTH: 3.51 CM
RBC # BLD AUTO: 4.52 M/UL (ref 4.6–6.2)
SARS-COV-2 RDRP RESP QL NAA+PROBE: NEGATIVE
SODIUM SERPL-SCNC: 142 MMOL/L (ref 136–145)
STJ: 3.16 CM
STRESS ECHO POST EXERCISE DUR MIN: 7 MINUTES
STRESS ECHO POST EXERCISE DUR SEC: 31 SECONDS
SYSTOLIC BLOOD PRESSURE: 122 MMHG
TDI LATERAL: 0.06 M/S
TDI SEPTAL: 0.05 M/S
TDI: 0.06 M/S
TRICUSPID ANNULAR PLANE SYSTOLIC EXCURSION: 1.26 CM
TROPONIN I SERPL DL<=0.01 NG/ML-MCNC: 0.01 NG/ML (ref 0–0.03)
TROPONIN I SERPL DL<=0.01 NG/ML-MCNC: 0.01 NG/ML (ref 0–0.03)
TROPONIN I SERPL DL<=0.01 NG/ML-MCNC: 0.02 NG/ML (ref 0–0.03)
TROPONIN I SERPL DL<=0.01 NG/ML-MCNC: 0.04 NG/ML (ref 0–0.03)
WBC # BLD AUTO: 9.13 K/UL (ref 3.9–12.7)

## 2020-06-23 PROCEDURE — U0002 COVID-19 LAB TEST NON-CDC: HCPCS

## 2020-06-23 PROCEDURE — 94761 N-INVAS EAR/PLS OXIMETRY MLT: CPT

## 2020-06-23 PROCEDURE — 36415 COLL VENOUS BLD VENIPUNCTURE: CPT

## 2020-06-23 PROCEDURE — G0378 HOSPITAL OBSERVATION PER HR: HCPCS

## 2020-06-23 PROCEDURE — 99244 OFF/OP CNSLTJ NEW/EST MOD 40: CPT | Mod: 25,,, | Performed by: INTERNAL MEDICINE

## 2020-06-23 PROCEDURE — 36000 PLACE NEEDLE IN VEIN: CPT | Mod: 59

## 2020-06-23 PROCEDURE — 84484 ASSAY OF TROPONIN QUANT: CPT | Mod: 91

## 2020-06-23 PROCEDURE — 93005 ELECTROCARDIOGRAM TRACING: CPT | Mod: 59

## 2020-06-23 PROCEDURE — 85025 COMPLETE CBC W/AUTO DIFF WBC: CPT

## 2020-06-23 PROCEDURE — 99285 EMERGENCY DEPT VISIT HI MDM: CPT | Mod: 25

## 2020-06-23 PROCEDURE — 99244 PR OFFICE CONSULTATION,LEVEL IV: ICD-10-PCS | Mod: 25,,, | Performed by: INTERNAL MEDICINE

## 2020-06-23 PROCEDURE — 25000003 PHARM REV CODE 250: Performed by: EMERGENCY MEDICINE

## 2020-06-23 PROCEDURE — C9399 UNCLASSIFIED DRUGS OR BIOLOG: HCPCS | Performed by: NURSE PRACTITIONER

## 2020-06-23 PROCEDURE — 80053 COMPREHEN METABOLIC PANEL: CPT

## 2020-06-23 PROCEDURE — 96372 THER/PROPH/DIAG INJ SC/IM: CPT | Mod: 59

## 2020-06-23 PROCEDURE — 25000003 PHARM REV CODE 250: Performed by: NURSE PRACTITIONER

## 2020-06-23 PROCEDURE — 63600175 PHARM REV CODE 636 W HCPCS: Performed by: NURSE PRACTITIONER

## 2020-06-23 PROCEDURE — 83690 ASSAY OF LIPASE: CPT

## 2020-06-23 RX ORDER — INSULIN ASPART 100 [IU]/ML
0-5 INJECTION, SOLUTION INTRAVENOUS; SUBCUTANEOUS EVERY 6 HOURS PRN
Status: DISCONTINUED | OUTPATIENT
Start: 2020-06-23 | End: 2020-06-23

## 2020-06-23 RX ORDER — DEXTROSE 50 % IN WATER (D50W) INTRAVENOUS SYRINGE
25
Status: DISCONTINUED | OUTPATIENT
Start: 2020-06-23 | End: 2020-06-23 | Stop reason: SDUPTHER

## 2020-06-23 RX ORDER — IBUPROFEN 200 MG
16 TABLET ORAL
Status: DISCONTINUED | OUTPATIENT
Start: 2020-06-23 | End: 2020-06-23 | Stop reason: SDUPTHER

## 2020-06-23 RX ORDER — SODIUM CHLORIDE 0.9 % (FLUSH) 0.9 %
10 SYRINGE (ML) INJECTION
Status: DISCONTINUED | OUTPATIENT
Start: 2020-06-23 | End: 2020-06-24 | Stop reason: HOSPADM

## 2020-06-23 RX ORDER — ONDANSETRON 2 MG/ML
4 INJECTION INTRAMUSCULAR; INTRAVENOUS EVERY 6 HOURS PRN
Status: DISCONTINUED | OUTPATIENT
Start: 2020-06-23 | End: 2020-06-24 | Stop reason: HOSPADM

## 2020-06-23 RX ORDER — FAMOTIDINE 20 MG/1
20 TABLET, FILM COATED ORAL
Status: COMPLETED | OUTPATIENT
Start: 2020-06-23 | End: 2020-06-23

## 2020-06-23 RX ORDER — AMLODIPINE BESYLATE 5 MG/1
5 TABLET ORAL
Status: COMPLETED | OUTPATIENT
Start: 2020-06-23 | End: 2020-06-23

## 2020-06-23 RX ORDER — NAPROXEN SODIUM 220 MG/1
81 TABLET, FILM COATED ORAL DAILY
Status: DISCONTINUED | OUTPATIENT
Start: 2020-06-23 | End: 2020-06-24 | Stop reason: HOSPADM

## 2020-06-23 RX ORDER — DEXTROSE 50 % IN WATER (D50W) INTRAVENOUS SYRINGE
12.5
Status: DISCONTINUED | OUTPATIENT
Start: 2020-06-23 | End: 2020-06-23 | Stop reason: SDUPTHER

## 2020-06-23 RX ORDER — IBUPROFEN 200 MG
16 TABLET ORAL
Status: DISCONTINUED | OUTPATIENT
Start: 2020-06-23 | End: 2020-06-24 | Stop reason: HOSPADM

## 2020-06-23 RX ORDER — GLUCAGON 1 MG
1 KIT INJECTION
Status: DISCONTINUED | OUTPATIENT
Start: 2020-06-23 | End: 2020-06-23

## 2020-06-23 RX ORDER — ATORVASTATIN CALCIUM 40 MG/1
40 TABLET, FILM COATED ORAL DAILY
Status: DISCONTINUED | OUTPATIENT
Start: 2020-06-23 | End: 2020-06-24 | Stop reason: HOSPADM

## 2020-06-23 RX ORDER — METOPROLOL TARTRATE 50 MG/1
50 TABLET ORAL 2 TIMES DAILY
Status: DISCONTINUED | OUTPATIENT
Start: 2020-06-23 | End: 2020-06-24 | Stop reason: HOSPADM

## 2020-06-23 RX ORDER — DEXTROSE 50 % IN WATER (D50W) INTRAVENOUS SYRINGE
25
Status: DISCONTINUED | OUTPATIENT
Start: 2020-06-23 | End: 2020-06-24 | Stop reason: HOSPADM

## 2020-06-23 RX ORDER — DEXTROSE 50 % IN WATER (D50W) INTRAVENOUS SYRINGE
12.5
Status: DISCONTINUED | OUTPATIENT
Start: 2020-06-23 | End: 2020-06-24 | Stop reason: HOSPADM

## 2020-06-23 RX ORDER — IBUPROFEN 200 MG
24 TABLET ORAL
Status: DISCONTINUED | OUTPATIENT
Start: 2020-06-23 | End: 2020-06-23 | Stop reason: SDUPTHER

## 2020-06-23 RX ORDER — ACETAMINOPHEN 325 MG/1
650 TABLET ORAL EVERY 6 HOURS PRN
Status: DISCONTINUED | OUTPATIENT
Start: 2020-06-23 | End: 2020-06-24 | Stop reason: HOSPADM

## 2020-06-23 RX ORDER — GLUCAGON 1 MG
1 KIT INJECTION
Status: DISCONTINUED | OUTPATIENT
Start: 2020-06-23 | End: 2020-06-23 | Stop reason: SDUPTHER

## 2020-06-23 RX ORDER — GLUCAGON 1 MG
1 KIT INJECTION
Status: DISCONTINUED | OUTPATIENT
Start: 2020-06-23 | End: 2020-06-24 | Stop reason: HOSPADM

## 2020-06-23 RX ORDER — MAG HYDROX/ALUMINUM HYD/SIMETH 200-200-20
5 SUSPENSION, ORAL (FINAL DOSE FORM) ORAL
Status: COMPLETED | OUTPATIENT
Start: 2020-06-23 | End: 2020-06-23

## 2020-06-23 RX ORDER — IBUPROFEN 200 MG
24 TABLET ORAL
Status: DISCONTINUED | OUTPATIENT
Start: 2020-06-23 | End: 2020-06-24 | Stop reason: HOSPADM

## 2020-06-23 RX ORDER — NITROGLYCERIN 0.4 MG/1
0.4 TABLET SUBLINGUAL EVERY 5 MIN PRN
Status: DISCONTINUED | OUTPATIENT
Start: 2020-06-23 | End: 2020-06-24 | Stop reason: HOSPADM

## 2020-06-23 RX ORDER — INSULIN ASPART 100 [IU]/ML
1-10 INJECTION, SOLUTION INTRAVENOUS; SUBCUTANEOUS
Status: DISCONTINUED | OUTPATIENT
Start: 2020-06-23 | End: 2020-06-24 | Stop reason: HOSPADM

## 2020-06-23 RX ORDER — RAMIPRIL 1.25 MG/1
5 CAPSULE ORAL DAILY
Status: DISCONTINUED | OUTPATIENT
Start: 2020-06-23 | End: 2020-06-24 | Stop reason: HOSPADM

## 2020-06-23 RX ORDER — ENOXAPARIN SODIUM 100 MG/ML
40 INJECTION SUBCUTANEOUS EVERY 24 HOURS
Status: DISCONTINUED | OUTPATIENT
Start: 2020-06-23 | End: 2020-06-24 | Stop reason: HOSPADM

## 2020-06-23 RX ADMIN — METOPROLOL TARTRATE 50 MG: 50 TABLET, FILM COATED ORAL at 03:06

## 2020-06-23 RX ADMIN — FAMOTIDINE 20 MG: 20 TABLET ORAL at 04:06

## 2020-06-23 RX ADMIN — METOPROLOL TARTRATE 50 MG: 50 TABLET, FILM COATED ORAL at 08:06

## 2020-06-23 RX ADMIN — ATORVASTATIN CALCIUM 40 MG: 40 TABLET, FILM COATED ORAL at 03:06

## 2020-06-23 RX ADMIN — ALUMINUM HYDROXIDE, MAGNESIUM HYDROXIDE, AND SIMETHICONE 5 ML: 200; 200; 20 SUSPENSION ORAL at 04:06

## 2020-06-23 RX ADMIN — RAMIPRIL 5 MG: 1.25 CAPSULE ORAL at 03:06

## 2020-06-23 RX ADMIN — ASPIRIN 81 MG 81 MG: 81 TABLET ORAL at 03:06

## 2020-06-23 RX ADMIN — INSULIN DETEMIR 23 UNITS: 100 INJECTION, SOLUTION SUBCUTANEOUS at 03:06

## 2020-06-23 RX ADMIN — ENOXAPARIN SODIUM 40 MG: 40 INJECTION SUBCUTANEOUS at 06:06

## 2020-06-23 RX ADMIN — AMLODIPINE BESYLATE 5 MG: 5 TABLET ORAL at 05:06

## 2020-06-23 NOTE — ASSESSMENT & PLAN NOTE
Elevated troponin    Trend troponin  Consult cardiology- appreciate the recs  Echo ordered  Nuclear stress test ordered  NPO   Cont BB, statin, ASA

## 2020-06-23 NOTE — HPI
Mr. Nieto is a 70yo male with past medical history of DM2, HLD, HTN, and SVT who presented to the ED with chest pain that started at 9pm last night and lasted for several hours at home. He described the pain as a burning mid chest/epigastric pain and also a pressure. He states he did vomit once so he thought it was reflux. He took Pepto bismol, aspirin, and tums with no relief. Has had no nausea since. He denies SOB, weakness, claudication, radiation of pain. His labs revealed a troponin bump to 0.040. His EKG showed T wave inversion in V4, V5, and V6. He was admitted to [facility] for further workup.

## 2020-06-23 NOTE — HPI
Lokesh Nieto is a 69 y.o. male with Diabetes mellitus type II, Hypertension, HLD, and history of ? Supraventricular tachycardia. Patient presented to the ED with complaints of mid sternal chest pain. Patient reports that he woke up at 9 PM and noted chest pain. Pain was constant lasting >30 minutes. Pain was reported as a pressure. His SBP was reported at >200 per home cuff - usually in the 120s-130s.  He denies radiation of pain, diaphoresis, palpitations, SOB, CABRAL, NV.  He took 2 Pepto Bismol and 2 Aspirin without improvement. He reports compliance with medications. Pain has resolved at this time. Troponin mildly elevated at 0.04. EKG SR LAFB- chronic.  TTE and exercise nuclear stress test pending.

## 2020-06-23 NOTE — PLAN OF CARE
VN note: Patient asked to speak with virtual nurse to know results of his tests and when he would be seeing a doctor. VN reviewed chart. Per chart review, Attending MD and cardiology met with patient today. I also informed patient could not give results of tests. VN left message for MD team to see if they would be seeing patient again.

## 2020-06-23 NOTE — CONSULTS
[facility]  Cardiology  Consult Note    Patient Name: Patria Nieto  MRN: [id number]  Admission Date: 6/23/2020  Hospital Length of Stay: 0 days  Code Status: Full Code   Attending Provider: Michael Galindo DO   Consulting Provider: Ramirez Sheikh NP  Primary Care Physician: Félix Rojas MD  Principal Problem:NSTEMI (non-ST elevated myocardial infarction)    Patient information was obtained from patient and ER records.     Inpatient consult to [facility]  Consult performed by: Ramirez Sheikh NP  Consult ordered by: Elise Uribe NP        Subjective:     Chief Complaint:  Chest Pain      HPI:   Lokesh Nieto is a 69 y.o. male with Diabetes mellitus type II, Hypertension, HLD, and history of ? Supraventricular tachycardia. Patient presented to the ED with complaints of mid sternal chest pain. Patient reports that he woke up at 9 PM and noted chest pain. Pain was constant lasting >30 minutes. Pain was reported as a pressure. His SBP was reported at >200 per home cuff - usually in the 120s-130s.  He denies radiation of pain, diaphoresis, palpitations, SOB, CABRAL, NV.  He took 2 Pepto Bismol and 2 Aspirin without improvement. He reports compliance with medications. Pain has resolved at this time. Troponin mildly elevated at 0.04. EKG SR LAFB- chronic.  TTE and exercise nuclear stress test pending.     Past Medical History:   Diagnosis Date    Diabetes mellitus type II     Hx MRSA infection     Hypertension     MRSA infection 2002    Supraventricular tachycardia        Past Surgical History:   Procedure Laterality Date    none         Review of patient's allergies indicates:  No Known Allergies    No current facility-administered medications on file prior to encounter.      Current Outpatient Medications on File Prior to Encounter   Medication Sig    aspirin 81 MG Chew Take 81 mg by mouth once daily.    atorvastatin (LIPITOR) 40 MG tablet Take 1 tablet (40 mg total) by  "mouth once daily.    fexofenadine (ALLEGRA) 180 MG tablet Take 180 mg by mouth daily as needed.     insulin aspart U-100 (NOVOLOG FLEXPEN U-100 INSULIN) 100 unit/mL (3 mL) InPn pen INJECT 20 UNITS INTO THE SKIN WITH CORRECTION SCALE(MAX 84 UNITS PER DAY)    insulin glargine, TOUJEO, (TOUJEO) 300 unit/mL (1.5 mL) InPn pen Inject 55 units daily. Adjust to max 70 units per MD instructions    metFORMIN (GLUCOPHAGE) 1000 MG tablet Take 1 tablet (1,000 mg total) by mouth 2 (two) times daily with meals.    metoprolol tartrate (LOPRESSOR) 100 MG tablet Take 0.5 tablets (50 mg total) by mouth 2 (two) times daily.    mupirocin (BACTROBAN) 2 % ointment Apply topically 3 (three) times daily.    pen needle, diabetic (BD ULTRA-FINE MINI PEN NEEDLE) 31 gauge x 3/16" Ndle Uses 5 times a day.    ramipriL (ALTACE) 5 MG capsule TAKE 1 CAPSULE BY MOUTH EVERY DAY     Family History     Problem Relation (Age of Onset)    Diabetes Father, Sister    Heart disease Mother, Father        Tobacco Use    Smoking status: Never Smoker    Smokeless tobacco: Never Used   Substance and Sexual Activity    Alcohol use: Yes     Alcohol/week: 4.0 standard drinks     Types: 4 Cans of beer per week     Frequency: 2-4 times a month     Drinks per session: 1 or 2     Binge frequency: Never    Drug use: No    Sexual activity: Yes     Review of Systems   Constitution: Negative for diaphoresis.   HENT: Negative.    Eyes: Negative.    Cardiovascular: Positive for chest pain. Negative for dyspnea on exertion, irregular heartbeat, leg swelling, near-syncope, orthopnea, palpitations, paroxysmal nocturnal dyspnea and syncope.   Respiratory: Negative.  Negative for cough and shortness of breath.    Endocrine: Negative.    Hematologic/Lymphatic: Negative.    Skin: Negative.    Musculoskeletal: Negative.    Gastrointestinal: Negative.  Negative for nausea and vomiting.   Genitourinary: Negative.    Neurological: Negative.  Negative for dizziness, focal " weakness, light-headedness and weakness.   Psychiatric/Behavioral: Negative.    Allergic/Immunologic: Negative.      Objective:     Vital Signs (Most Recent):  Temp: 96.8 °F (36 °C) (06/23/20 1236)  Pulse: 82 (06/23/20 1236)  Resp: 19 (06/23/20 1236)  BP: 131/87 (06/23/20 1236)  SpO2: 97 % (06/23/20 1236) Vital Signs (24h Range):  Temp:  [96.8 °F (36 °C)-98.6 °F (37 °C)] 96.8 °F (36 °C)  Pulse:  [65-85] 82  Resp:  [16-19] 19  SpO2:  [95 %-98 %] 97 %  BP: (131-202)/() 131/87     Weight: 93.4 kg (205 lb 14.6 oz)  Body mass index is 30.41 kg/m².    SpO2: 97 %  O2 Device (Oxygen Therapy): room air    No intake or output data in the 24 hours ending 06/23/20 1312    Lines/Drains/Airways     Peripheral Intravenous Line                 Peripheral IV - Single Lumen 06/23/20 0429 20 G Left Forearm less than 1 day                Physical Exam   Constitutional: He is oriented to person, place, and time. He appears well-developed and well-nourished. No distress.   HENT:   Head: Normocephalic and atraumatic.   Eyes: Right eye exhibits no discharge. Left eye exhibits no discharge.   Neck: No JVD present.   Cardiovascular: Normal rate, regular rhythm and normal heart sounds. Exam reveals no gallop and no friction rub.   No murmur heard.  Pulmonary/Chest: Effort normal and breath sounds normal. He has no rales. He exhibits no tenderness.   Abdominal: Soft. Bowel sounds are normal.   Musculoskeletal:         General: No edema.   Neurological: He is alert and oriented to person, place, and time.   Skin: Skin is warm and dry. He is not diaphoretic.   Psychiatric: He has a normal mood and affect. His behavior is normal. Judgment and thought content normal.       Significant Labs:   BMP:   Recent Labs   Lab 06/23/20  0428   *      K 4.1   *   CO2 23   BUN 12   CREATININE 1.1   CALCIUM 10.0   , CMP   Recent Labs   Lab 06/23/20  0428      K 4.1   *   CO2 23   *   BUN 12   CREATININE 1.1   CALCIUM  10.0   PROT 6.7   ALBUMIN 3.9   BILITOT 1.5*   ALKPHOS 57   AST 11   ALT 17   ANIONGAP 7*   ESTGFRAFRICA >60   EGFRNONAA >60   , CBC   Recent Labs   Lab 06/23/20  0428   WBC 9.13   HGB 15.0   HCT 43.5   *   , INR No results for input(s): INR, PROTIME in the last 48 hours., Lipid Panel No results for input(s): CHOL, HDL, LDLCALC, TRIG, CHOLHDL in the last 48 hours., Troponin   Recent Labs   Lab 06/23/20 0428 06/23/20  0903   TROPONINI 0.040* 0.024  0.015    and All pertinent lab results from the last 24 hours have been reviewed.    Significant Imaging: Echocardiogram:   Transthoracic echo (TTE) complete (Cupid Only):   Results for orders placed or performed during the hospital encounter of 06/23/20   Echo Color Flow Doppler? Yes   Result Value Ref Range    BSA 2.13 m2    TDI SEPTAL 0.05 m/s    LV LATERAL E/E' RATIO 10.17 m/s    LV SEPTAL E/E' RATIO 12.20 m/s    LA WIDTH 3.02 cm    TDI LATERAL 0.06 m/s    LVIDD 5.04 3.5 - 6.0 cm    IVS 1.36 (A) 0.6 - 1.1 cm    PW 1.31 (A) 0.6 - 1.1 cm    Ao root annulus 4.32 cm    LVIDS 3.39 2.1 - 4.0 cm    FS 33 28 - 44 %    LA volume 41.30 cm3    STJ 3.16 cm    Ascending aorta 3.35 cm    LV mass 275.40 g    LA size 3.55 cm    TAPSE 1.26 cm    Left Ventricle Relative Wall Thickness 0.52 cm    AV mean gradient 4 mmHg    AV valve area 2.90 cm2    AV Velocity Ratio 0.77     AV index (prosthetic) 0.79     MV valve area p 1/2 method 7.32 cm2    E/A ratio 0.70     Mean e' 0.06 m/s    E wave decelartion time 103.64 msec    Pulm vein S/D ratio 1.83     LVOT diameter 2.16 cm    LVOT area 3.7 cm2    LVOT peak bhaskar 1.01 m/s    LVOT peak VTI 21.53 cm    Ao peak bhaskar 1.31 m/s    Ao VTI 27.17 cm    LVOT stroke volume 78.85 cm3    AV peak gradient 7 mmHg    E/E' ratio 11.09 m/s    MV Peak E Bhaskar 0.61 m/s    MV stenosis pressure 1/2 time 30.05 ms    MV Peak A Bhaskar 0.87 m/s    PV Peak S Bhaskar 0.55 m/s    PV Peak D Bhaskar 0.30 m/s    LV Systolic Volume 47.11 mL    LV Systolic Volume Index 22.5 mL/m2     LV Diastolic Volume 120.71 mL    LV Diastolic Volume Index 57.71 mL/m2    LA Volume Index 19.7 mL/m2    LV Mass Index 132 g/m2    RA Major Axis 4.40 cm    Left Atrium Minor Axis 4.65 cm    Left Atrium Major Axis 4.42 cm    RA Width 3.51 cm     Assessment and Plan:     Elevated troponin  Trop mildly elevated on admission at 0.04, now normal range  EKG SR LAFB- chronic without acute ischemic changes  SBP >200 with episode of chest pain  Will proceed with exercise nuclear stress test for completeness, if abnormal, he will need [facility]    Hypertension associated with diabetes  SBP >200 with episode of chest pain  Usually runs in the 120s-130s  Continue home Norvasc    Type 2 diabetes, uncontrolled, with neuropathy  HA1c 7.6    Hyperlipidemia  Continue statin         VTE Risk Mitigation (From admission, onward)         Ordered     enoxaparin injection 40 mg  Every 24 hours      06/23/20 0858     Place sequential compression device  Until discontinued      06/23/20 0659                Thank you for your consult. I will follow-up with patient. Please contact us if you have any additional questions.    Ramirez Sheikh NP  Cardiology   [facility]

## 2020-06-23 NOTE — ED PROVIDER NOTES
Encounter Date: 6/23/2020    SCRIBE #1 NOTE: I, Rhonda Page, am scribing for, and in the presence of,  Dr. Mcginnis. I have scribed the entire note.       History     Chief Complaint   Patient presents with    Chest Pain     Pt reports having epigastric burning pain and heaviness that started approx. 11pm. Denies radiation of pain. Pt took Pepto Bismol, 2 regular ASA, and TUMS over the past 6 hours without relief. Denies SOB, N/V/D       Time seen by provider: 4:21 AM on 06/23/2020    The patient is a 69 y.o. male who presents to the ED with complaint of mid sternal chest pain which onset suddenly at 9 PM. Symptoms are constant in severity. No mitigating or exacerbating factors reported. Patient denies any nausea, vomiting, shortness of breath, lightheadedness, diarrhea, cough, fever, chills, and all other sxs at this time. Prior Tx includes Pepto Bismol and 2 Aspirin without improvement pta. Patient notes he was sleeping when the pain presented itself. He describes the pain to be a burning and heaviness that stays in place. Patient is not a smoker.     has a past medical history of Diabetes mellitus type II, MRSA infection, Hypertension, MRSA infection (2002), and Supraventricular tachycardia. No PSHx.    The history is provided by the patient.     Review of patient's allergies indicates:  No Known Allergies  Past Medical History:   Diagnosis Date    Diabetes mellitus type II     Hx MRSA infection     Hypertension     MRSA infection 2002    Supraventricular tachycardia      Past Surgical History:   Procedure Laterality Date    none       Family History   Problem Relation Age of Onset    Heart disease Mother     Heart disease Father     Diabetes Father     Diabetes Sister      Social History     Tobacco Use    Smoking status: Never Smoker    Smokeless tobacco: Never Used   Substance Use Topics    Alcohol use: Yes     Alcohol/week: 4.0 standard drinks     Types: 4 Cans of beer per week     Frequency:  2-4 times a month     Drinks per session: 1 or 2     Binge frequency: Never    Drug use: No     Review of Systems   Constitutional: Negative for chills and fever.   HENT: Negative for sore throat.    Eyes: Negative for redness.   Respiratory: Negative for shortness of breath.    Cardiovascular: Positive for chest pain.   Gastrointestinal: Negative for abdominal pain, diarrhea, nausea and vomiting.   Genitourinary: Negative for dysuria and hematuria.   Musculoskeletal: Negative for back pain.   Skin: Negative for rash.   Neurological: Negative for headaches.       Physical Exam     Initial Vitals [06/23/20 0414]   BP Pulse Resp Temp SpO2   (!) 202/108 70 18 98.3 °F (36.8 °C) 98 %      MAP       --         Physical Exam    Nursing note and vitals reviewed.  Constitutional: He appears well-developed and well-nourished. He is not diaphoretic. No distress.   HENT:   Head: Normocephalic and atraumatic.   Mouth/Throat: Oropharynx is clear and moist.   Eyes: Conjunctivae and EOM are normal.   Neck: Normal range of motion. Neck supple.   Cardiovascular: Normal rate, regular rhythm and normal heart sounds.   Pulmonary/Chest: Breath sounds normal. No respiratory distress.   Abdominal: Soft. There is abdominal tenderness.   Mild epigastric and RUQ tenderness to palpation.   Musculoskeletal: Normal range of motion. No tenderness or edema.   Neurological: He is alert and oriented to person, place, and time. He has normal strength.   Skin: Skin is warm and dry.         ED Course   Procedures  Labs Reviewed   CBC W/ AUTO DIFFERENTIAL - Abnormal; Notable for the following components:       Result Value    RBC 4.52 (*)     Mean Corpuscular Hemoglobin 33.2 (*)     Platelets 146 (*)     Lymph% 15.0 (*)     All other components within normal limits   COMPREHENSIVE METABOLIC PANEL - Abnormal; Notable for the following components:    Chloride 112 (*)     Glucose 202 (*)     Total Bilirubin 1.5 (*)     Anion Gap 7 (*)     All other  components within normal limits   TROPONIN I - Abnormal; Notable for the following components:    Troponin I 0.040 (*)     All other components within normal limits   LIPASE   SARS-COV-2 RNA AMPLIFICATION, QUAL     EKG Readings: (Independently Interpreted)   NSR; Rate 78; T wave inversions in V4 V5 V6; Left axis deviation     ECG Results          EKG 12-lead (Final result)  Result time 06/23/20 11:14:37    Final result by Interface, Lab In [facility] (06/23/20 11:14:37)                 Narrative:    Test Reason : R07.9,    Vent. Rate : 078 BPM     Atrial Rate : 078 BPM     P-R Int : 164 ms          QRS Dur : 110 ms      QT Int : 374 ms       P-R-T Axes : 073 -45 013 degrees     QTc Int : 426 ms    Normal sinus rhythm  Left anterior fascicular block  Nonspecific ST and T wave abnormality  Abnormal ECG  When compared with ECG of 14-MAR-2020 16:46,  QT has shortened  Confirmed by Jeanie Tinoco MD ([id number]) on 6/23/2020 11:14:32 AM    Referred By: System System           Confirmed By:Jeanie Tinoco MD                            Imaging Results          X-Ray Chest AP Portable (Final result)  Result time 06/23/20 05:01:27    Final result by Chai Jones MD (06/23/20 05:01:27)                 Impression:      No radiographic evidence of acute intrathoracic process on this single view.      Electronically signed by: Chai Jones MD  Date:    06/23/2020  Time:    05:01             Narrative:    EXAMINATION:  XR CHEST AP PORTABLE    CLINICAL HISTORY:  Chest Pain;    TECHNIQUE:  Single frontal view of the chest was performed.    COMPARISON:  09/09/2011    FINDINGS:  Cardiac monitoring leads overlie the chest.  Cardiomediastinal silhouette appears stable.  There is mild aortic atherosclerosis.  Lungs appear well expanded without evidence of confluent airspace consolidation.  No large pleural effusion or pneumothorax identified.  Osseous structures demonstrate degenerative changes.                                  Medical Decision Making:   History:   Old Medical Records: I decided to obtain old medical records.  Initial Assessment:   The patient is a 69 y.o. male with DM II and HTN presents to the ED with complaint of mid sternal chest pain which onset suddenly at 9 PM. BP:202/108 PE noted mild epigastric and RUQ tenderness to palpation.    Plan: CXR, Labs, EKG, monitor, and reassess  Independently Interpreted Test(s):   I have ordered and independently interpreted EKG Reading(s) - see prior notes  Clinical Tests:   Lab Tests: Ordered and Reviewed  Radiological Study: Ordered and Reviewed  Medical Tests: Ordered and Reviewed                   ED Course as of Jun 24 0322   Tue Jun 23, 2020   0440 Right upper quadrant bedside sono done with no abnormality of gallbladder noted.    [DC]   0536 Noted elevated, trop, EKG T wave inversion w/ depressions in lateral lead, changed from prior EKG. Will admit pending COVID test, reassess.    Troponin I(!): 0.040 [DC]      ED Course User Index  [DC] Christina Mcginnis Jr., MD                Clinical Impression:       ICD-10-CM ICD-9-CM   1. Chest pain  R07.9 786.50   2. NSTEMI (non-ST elevated myocardial infarction)  I21.4 410.70             ED Disposition Condition    Observation             I, Christina Mcginnis,  personally performed the services described in this documentation. All medical record entries made by the scribe were at my direction and in my presence.  I have reviewed the chart and agree that the record reflects my personal performance and is accurate and complete. Christina Mcginnis Jr., MD  06/24/20 5044

## 2020-06-23 NOTE — SUBJECTIVE & OBJECTIVE
"Past Medical History:   Diagnosis Date    Diabetes mellitus type II     Hx MRSA infection     Hypertension     MRSA infection 2002    Supraventricular tachycardia        Past Surgical History:   Procedure Laterality Date    none         Review of patient's allergies indicates:  No Known Allergies    No current facility-administered medications on file prior to encounter.      Current Outpatient Medications on File Prior to Encounter   Medication Sig    aspirin 81 MG Chew Take 81 mg by mouth once daily.    atorvastatin (LIPITOR) 40 MG tablet Take 1 tablet (40 mg total) by mouth once daily.    fexofenadine (ALLEGRA) 180 MG tablet Take 180 mg by mouth daily as needed.     insulin aspart U-100 (NOVOLOG FLEXPEN U-100 INSULIN) 100 unit/mL (3 mL) InPn pen INJECT 20 UNITS INTO THE SKIN WITH CORRECTION SCALE(MAX 84 UNITS PER DAY)    insulin glargine, TOUJEO, (TOUJEO) 300 unit/mL (1.5 mL) InPn pen Inject 55 units daily. Adjust to max 70 units per MD instructions    metFORMIN (GLUCOPHAGE) 1000 MG tablet Take 1 tablet (1,000 mg total) by mouth 2 (two) times daily with meals.    metoprolol tartrate (LOPRESSOR) 100 MG tablet Take 0.5 tablets (50 mg total) by mouth 2 (two) times daily.    mupirocin (BACTROBAN) 2 % ointment Apply topically 3 (three) times daily.    pen needle, diabetic (BD ULTRA-FINE MINI PEN NEEDLE) 31 gauge x 3/16" Ndle Uses 5 times a day.    ramipriL (ALTACE) 5 MG capsule TAKE 1 CAPSULE BY MOUTH EVERY DAY     Family History     Problem Relation (Age of Onset)    Diabetes Father, Sister    Heart disease Mother, Father        Tobacco Use    Smoking status: Never Smoker    Smokeless tobacco: Never Used   Substance and Sexual Activity    Alcohol use: Yes     Alcohol/week: 4.0 standard drinks     Types: 4 Cans of beer per week     Frequency: 2-4 times a month     Drinks per session: 1 or 2     Binge frequency: Never    Drug use: No    Sexual activity: Yes     Review of Systems   Constitutional: " Negative for chills, diaphoresis, fatigue and fever.   HENT: Negative for congestion, ear pain, postnasal drip, rhinorrhea and sore throat.    Eyes: Negative for pain and redness.   Respiratory: Negative for cough and shortness of breath.    Cardiovascular: Positive for chest pain (resolved). Negative for leg swelling.   Gastrointestinal: Positive for vomiting (resolved). Negative for abdominal pain and nausea.   Genitourinary: Negative for difficulty urinating and dysuria.   Musculoskeletal: Negative for arthralgias and myalgias.   Skin: Negative for rash and wound.   Neurological: Negative for weakness and headaches.     Objective:     Vital Signs (Most Recent):  Temp: 96.4 °F (35.8 °C) (06/23/20 1508)  Pulse: 84 (06/23/20 1508)  Resp: 16 (06/23/20 1508)  BP: 119/70 (06/23/20 1508)  SpO2: 97 % (06/23/20 1508) Vital Signs (24h Range):  Temp:  [96.4 °F (35.8 °C)-98.6 °F (37 °C)] 96.4 °F (35.8 °C)  Pulse:  [65-85] 84  Resp:  [16-19] 16  SpO2:  [95 %-98 %] 97 %  BP: (119-202)/() 119/70     Weight: 93 kg (205 lb)  Body mass index is 30.27 kg/m².    Physical Exam  Vitals signs and nursing note reviewed.   Constitutional:       General: He is not in acute distress.     Appearance: Normal appearance.   HENT:      Head: Normocephalic and atraumatic.   Eyes:      Pupils: Pupils are equal, round, and reactive to light.   Neck:      Musculoskeletal: Normal range of motion.   Cardiovascular:      Rate and Rhythm: Normal rate and regular rhythm.      Pulses: Normal pulses.   Pulmonary:      Effort: Pulmonary effort is normal.      Breath sounds: Normal breath sounds.   Abdominal:      General: Bowel sounds are normal.   Musculoskeletal: Normal range of motion.         General: No swelling.   Skin:     General: Skin is warm and dry.      Capillary Refill: Capillary refill takes less than 2 seconds.   Neurological:      Mental Status: He is alert and oriented to person, place, and time.   Psychiatric:         Mood and  Affect: Mood normal.         Behavior: Behavior normal.         Thought Content: Thought content normal.           CRANIAL NERVES     CN III, IV, VI   Pupils are equal, round, and reactive to light.       Significant Labs:   A1C:   Recent Labs   Lab 02/10/20  0744 05/29/20  1429   HGBA1C 7.6* 7.6*     CBC:   Recent Labs   Lab 06/23/20  0428   WBC 9.13   HGB 15.0   HCT 43.5   *     CMP:   Recent Labs   Lab 06/23/20  0428      K 4.1   *   CO2 23   *   BUN 12   CREATININE 1.1   CALCIUM 10.0   PROT 6.7   ALBUMIN 3.9   BILITOT 1.5*   ALKPHOS 57   AST 11   ALT 17   ANIONGAP 7*   EGFRNONAA >60     POCT Glucose:   Recent Labs   Lab 06/23/20  1504   POCTGLUCOSE 144*     Troponin:   Recent Labs   Lab 06/23/20  0428 06/23/20  0903   TROPONINI 0.040* 0.024  0.015         Significant Imaging: I have reviewed all pertinent imaging results/findings within the past 24 hours.

## 2020-06-23 NOTE — H&P
[facility]  History & Physical    Patient Name: Patria Nieto  MRN: [id number]  Admission Date: 6/23/2020  Attending Physician: Michael Galindo DO   Primary Care Provider: Félix Rojas MD         Patient information was obtained from patient, past medical records and ER records.     Subjective:     Principal Problem:NSTEMI (non-ST elevated myocardial infarction)    Chief Complaint:   Chief Complaint   Patient presents with    Chest Pain     Pt reports having epigastric burning pain and heaviness that started approx. 11pm. Denies radiation of pain. Pt took Pepto Bismol, 2 regular ASA, and TUMS over the past 6 hours without relief. Denies SOB, N/V/D        HPI: Mr. Nieto is a 70yo male with past medical history of DM2, HLD, HTN, and SVT who presented to the ED with chest pain that started at 9pm last night and lasted for several hours at home. He described the pain as a burning mid chest/epigastric pain and also a pressure. He states he did vomit once so he thought it was reflux. He took Pepto bismol, aspirin, and tums with no relief. Has had no nausea since. He denies SOB, weakness, claudication, radiation of pain. His labs revealed a troponin bump to 0.040. His EKG showed T wave inversion in V4, V5, and V6. He was admitted to [facility] for further workup.     Past Medical History:   Diagnosis Date    Diabetes mellitus type II     Hx MRSA infection     Hypertension     MRSA infection 2002    Supraventricular tachycardia        Past Surgical History:   Procedure Laterality Date    none         Review of patient's allergies indicates:  No Known Allergies    No current facility-administered medications on file prior to encounter.      Current Outpatient Medications on File Prior to Encounter   Medication Sig    aspirin 81 MG Chew Take 81 mg by mouth once daily.    atorvastatin (LIPITOR) 40 MG tablet Take 1 tablet (40 mg total) by mouth once  "daily.    fexofenadine (ALLEGRA) 180 MG tablet Take 180 mg by mouth daily as needed.     insulin aspart U-100 (NOVOLOG FLEXPEN U-100 INSULIN) 100 unit/mL (3 mL) InPn pen INJECT 20 UNITS INTO THE SKIN WITH CORRECTION SCALE(MAX 84 UNITS PER DAY)    insulin glargine, TOUJEO, (TOUJEO) 300 unit/mL (1.5 mL) InPn pen Inject 55 units daily. Adjust to max 70 units per MD instructions    metFORMIN (GLUCOPHAGE) 1000 MG tablet Take 1 tablet (1,000 mg total) by mouth 2 (two) times daily with meals.    metoprolol tartrate (LOPRESSOR) 100 MG tablet Take 0.5 tablets (50 mg total) by mouth 2 (two) times daily.    mupirocin (BACTROBAN) 2 % ointment Apply topically 3 (three) times daily.    pen needle, diabetic (BD ULTRA-FINE MINI PEN NEEDLE) 31 gauge x 3/16" Ndle Uses 5 times a day.    ramipriL (ALTACE) 5 MG capsule TAKE 1 CAPSULE BY MOUTH EVERY DAY     Family History     Problem Relation (Age of Onset)    Diabetes Father, Sister    Heart disease Mother, Father        Tobacco Use    Smoking status: Never Smoker    Smokeless tobacco: Never Used   Substance and Sexual Activity    Alcohol use: Yes     Alcohol/week: 4.0 standard drinks     Types: 4 Cans of beer per week     Frequency: 2-4 times a month     Drinks per session: 1 or 2     Binge frequency: Never    Drug use: No    Sexual activity: Yes     Review of Systems   Constitutional: Negative for chills, diaphoresis, fatigue and fever.   HENT: Negative for congestion, ear pain, postnasal drip, rhinorrhea and sore throat.    Eyes: Negative for pain and redness.   Respiratory: Negative for cough and shortness of breath.    Cardiovascular: Positive for chest pain (resolved). Negative for leg swelling.   Gastrointestinal: Positive for vomiting (resolved). Negative for abdominal pain and nausea.   Genitourinary: Negative for difficulty urinating and dysuria.   Musculoskeletal: Negative for arthralgias and myalgias.   Skin: Negative for rash and wound.   Neurological: Negative " for weakness and headaches.     Objective:     Vital Signs (Most Recent):  Temp: 96.4 °F (35.8 °C) (06/23/20 1508)  Pulse: 84 (06/23/20 1508)  Resp: 16 (06/23/20 1508)  BP: 119/70 (06/23/20 1508)  SpO2: 97 % (06/23/20 1508) Vital Signs (24h Range):  Temp:  [96.4 °F (35.8 °C)-98.6 °F (37 °C)] 96.4 °F (35.8 °C)  Pulse:  [65-85] 84  Resp:  [16-19] 16  SpO2:  [95 %-98 %] 97 %  BP: (119-202)/() 119/70     Weight: 93 kg (205 lb)  Body mass index is 30.27 kg/m².    Physical Exam  Vitals signs and nursing note reviewed.   Constitutional:       General: He is not in acute distress.     Appearance: Normal appearance.   HENT:      Head: Normocephalic and atraumatic.   Eyes:      Pupils: Pupils are equal, round, and reactive to light.   Neck:      Musculoskeletal: Normal range of motion.   Cardiovascular:      Rate and Rhythm: Normal rate and regular rhythm.      Pulses: Normal pulses.   Pulmonary:      Effort: Pulmonary effort is normal.      Breath sounds: Normal breath sounds.   Abdominal:      General: Bowel sounds are normal.   Musculoskeletal: Normal range of motion.         General: No swelling.   Skin:     General: Skin is warm and dry.      Capillary Refill: Capillary refill takes less than 2 seconds.   Neurological:      Mental Status: He is alert and oriented to person, place, and time.   Psychiatric:         Mood and Affect: Mood normal.         Behavior: Behavior normal.         Thought Content: Thought content normal.           CRANIAL NERVES     CN III, IV, VI   Pupils are equal, round, and reactive to light.       Significant Labs:   A1C:   Recent Labs   Lab 02/10/20  0744 05/29/20  1429   HGBA1C 7.6* 7.6*     CBC:   Recent Labs   Lab 06/23/20  0428   WBC 9.13   HGB 15.0   HCT 43.5   *     CMP:   Recent Labs   Lab 06/23/20  0428      K 4.1   *   CO2 23   *   BUN 12   CREATININE 1.1   CALCIUM 10.0   PROT 6.7   ALBUMIN 3.9   BILITOT 1.5*   ALKPHOS 57   AST 11   ALT 17   ANIONGAP 7*    EGFRNONAA >60     POCT Glucose:   Recent Labs   Lab 06/23/20  1504   POCTGLUCOSE 144*     Troponin:   Recent Labs   Lab 06/23/20  0428 06/23/20  0903   TROPONINI 0.040* 0.024  0.015         Significant Imaging: I have reviewed all pertinent imaging results/findings within the past 24 hours.    Assessment/Plan:     * NSTEMI (non-ST elevated myocardial infarction)  Elevated troponin    Trend troponin  Consult cardiology- appreciate the recs  Echo ordered  Nuclear stress test ordered  NPO   Cont BB, statin, ASA      Elevated troponin        Hypertension associated with diabetes  A1C 7.6   Start detemir   Start SSI  accuchecks  NPO for now pending cardiology recs      Hyperlipidemia  Cont statin        VTE Risk Mitigation (From admission, onward)         Ordered     enoxaparin injection 40 mg  Every 24 hours      06/23/20 0858     Place sequential compression device  Until discontinued      06/23/20 0659                   Elise Uribe NP  Department of Hospital Medicine   [facility]

## 2020-06-23 NOTE — SUBJECTIVE & OBJECTIVE
"Past Medical History:   Diagnosis Date    Diabetes mellitus type II     Hx MRSA infection     Hypertension     MRSA infection 2002    Supraventricular tachycardia        Past Surgical History:   Procedure Laterality Date    none         Review of patient's allergies indicates:  No Known Allergies    No current facility-administered medications on file prior to encounter.      Current Outpatient Medications on File Prior to Encounter   Medication Sig    aspirin 81 MG Chew Take 81 mg by mouth once daily.    atorvastatin (LIPITOR) 40 MG tablet Take 1 tablet (40 mg total) by mouth once daily.    fexofenadine (ALLEGRA) 180 MG tablet Take 180 mg by mouth daily as needed.     insulin aspart U-100 (NOVOLOG FLEXPEN U-100 INSULIN) 100 unit/mL (3 mL) InPn pen INJECT 20 UNITS INTO THE SKIN WITH CORRECTION SCALE(MAX 84 UNITS PER DAY)    insulin glargine, TOUJEO, (TOUJEO) 300 unit/mL (1.5 mL) InPn pen Inject 55 units daily. Adjust to max 70 units per MD instructions    metFORMIN (GLUCOPHAGE) 1000 MG tablet Take 1 tablet (1,000 mg total) by mouth 2 (two) times daily with meals.    metoprolol tartrate (LOPRESSOR) 100 MG tablet Take 0.5 tablets (50 mg total) by mouth 2 (two) times daily.    mupirocin (BACTROBAN) 2 % ointment Apply topically 3 (three) times daily.    pen needle, diabetic (BD ULTRA-FINE MINI PEN NEEDLE) 31 gauge x 3/16" Ndle Uses 5 times a day.    ramipriL (ALTACE) 5 MG capsule TAKE 1 CAPSULE BY MOUTH EVERY DAY     Family History     Problem Relation (Age of Onset)    Diabetes Father, Sister    Heart disease Mother, Father        Tobacco Use    Smoking status: Never Smoker    Smokeless tobacco: Never Used   Substance and Sexual Activity    Alcohol use: Yes     Alcohol/week: 4.0 standard drinks     Types: 4 Cans of beer per week     Frequency: 2-4 times a month     Drinks per session: 1 or 2     Binge frequency: Never    Drug use: No    Sexual activity: Yes     Review of Systems   Constitution: " Negative for diaphoresis.   HENT: Negative.    Eyes: Negative.    Cardiovascular: Positive for chest pain. Negative for dyspnea on exertion, irregular heartbeat, leg swelling, near-syncope, orthopnea, palpitations, paroxysmal nocturnal dyspnea and syncope.   Respiratory: Negative.  Negative for cough and shortness of breath.    Endocrine: Negative.    Hematologic/Lymphatic: Negative.    Skin: Negative.    Musculoskeletal: Negative.    Gastrointestinal: Negative.  Negative for nausea and vomiting.   Genitourinary: Negative.    Neurological: Negative.  Negative for dizziness, focal weakness, light-headedness and weakness.   Psychiatric/Behavioral: Negative.    Allergic/Immunologic: Negative.      Objective:     Vital Signs (Most Recent):  Temp: 96.8 °F (36 °C) (06/23/20 1236)  Pulse: 82 (06/23/20 1236)  Resp: 19 (06/23/20 1236)  BP: 131/87 (06/23/20 1236)  SpO2: 97 % (06/23/20 1236) Vital Signs (24h Range):  Temp:  [96.8 °F (36 °C)-98.6 °F (37 °C)] 96.8 °F (36 °C)  Pulse:  [65-85] 82  Resp:  [16-19] 19  SpO2:  [95 %-98 %] 97 %  BP: (131-202)/() 131/87     Weight: 93.4 kg (205 lb 14.6 oz)  Body mass index is 30.41 kg/m².    SpO2: 97 %  O2 Device (Oxygen Therapy): room air    No intake or output data in the 24 hours ending 06/23/20 1312    Lines/Drains/Airways     Peripheral Intravenous Line                 Peripheral IV - Single Lumen 06/23/20 0429 20 G Left Forearm less than 1 day                Physical Exam   Constitutional: He is oriented to person, place, and time. He appears well-developed and well-nourished. No distress.   HENT:   Head: Normocephalic and atraumatic.   Eyes: Right eye exhibits no discharge. Left eye exhibits no discharge.   Neck: No JVD present.   Cardiovascular: Normal rate, regular rhythm and normal heart sounds. Exam reveals no gallop and no friction rub.   No murmur heard.  Pulmonary/Chest: Effort normal and breath sounds normal. He has no rales. He exhibits no tenderness.   Abdominal:  Soft. Bowel sounds are normal.   Musculoskeletal:         General: No edema.   Neurological: He is alert and oriented to person, place, and time.   Skin: Skin is warm and dry. He is not diaphoretic.   Psychiatric: He has a normal mood and affect. His behavior is normal. Judgment and thought content normal.       Significant Labs:   BMP:   Recent Labs   Lab 06/23/20 0428   *      K 4.1   *   CO2 23   BUN 12   CREATININE 1.1   CALCIUM 10.0   , CMP   Recent Labs   Lab 06/23/20 0428      K 4.1   *   CO2 23   *   BUN 12   CREATININE 1.1   CALCIUM 10.0   PROT 6.7   ALBUMIN 3.9   BILITOT 1.5*   ALKPHOS 57   AST 11   ALT 17   ANIONGAP 7*   ESTGFRAFRICA >60   EGFRNONAA >60   , CBC   Recent Labs   Lab 06/23/20 0428   WBC 9.13   HGB 15.0   HCT 43.5   *   , INR No results for input(s): INR, PROTIME in the last 48 hours., Lipid Panel No results for input(s): CHOL, HDL, LDLCALC, TRIG, CHOLHDL in the last 48 hours., Troponin   Recent Labs   Lab 06/23/20 0428 06/23/20  0903   TROPONINI 0.040* 0.024  0.015    and All pertinent lab results from the last 24 hours have been reviewed.    Significant Imaging: Echocardiogram:   Transthoracic echo (TTE) complete (Cupid Only):   Results for orders placed or performed during the hospital encounter of 06/23/20   Echo Color Flow Doppler? Yes   Result Value Ref Range    BSA 2.13 m2    TDI SEPTAL 0.05 m/s    LV LATERAL E/E' RATIO 10.17 m/s    LV SEPTAL E/E' RATIO 12.20 m/s    LA WIDTH 3.02 cm    TDI LATERAL 0.06 m/s    LVIDD 5.04 3.5 - 6.0 cm    IVS 1.36 (A) 0.6 - 1.1 cm    PW 1.31 (A) 0.6 - 1.1 cm    Ao root annulus 4.32 cm    LVIDS 3.39 2.1 - 4.0 cm    FS 33 28 - 44 %    LA volume 41.30 cm3    STJ 3.16 cm    Ascending aorta 3.35 cm    LV mass 275.40 g    LA size 3.55 cm    TAPSE 1.26 cm    Left Ventricle Relative Wall Thickness 0.52 cm    AV mean gradient 4 mmHg    AV valve area 2.90 cm2    AV Velocity Ratio 0.77     AV index (prosthetic) 0.79      MV valve area p 1/2 method 7.32 cm2    E/A ratio 0.70     Mean e' 0.06 m/s    E wave decelartion time 103.64 msec    Pulm vein S/D ratio 1.83     LVOT diameter 2.16 cm    LVOT area 3.7 cm2    LVOT peak bhaskar 1.01 m/s    LVOT peak VTI 21.53 cm    Ao peak bhaskar 1.31 m/s    Ao VTI 27.17 cm    LVOT stroke volume 78.85 cm3    AV peak gradient 7 mmHg    E/E' ratio 11.09 m/s    MV Peak E Bhaskar 0.61 m/s    MV stenosis pressure 1/2 time 30.05 ms    MV Peak A Bhaskar 0.87 m/s    PV Peak S Bhaskar 0.55 m/s    PV Peak D Bhaskar 0.30 m/s    LV Systolic Volume 47.11 mL    LV Systolic Volume Index 22.5 mL/m2    LV Diastolic Volume 120.71 mL    LV Diastolic Volume Index 57.71 mL/m2    LA Volume Index 19.7 mL/m2    LV Mass Index 132 g/m2    RA Major Axis 4.40 cm    Left Atrium Minor Axis 4.65 cm    Left Atrium Major Axis 4.42 cm    RA Width 3.51 cm

## 2020-06-23 NOTE — ASSESSMENT & PLAN NOTE
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

## 2020-06-23 NOTE — ED NOTES
"Patient presents to the ED with c/o midsternal chest pain that began approximately at 10 PM last night. Patient describes that pain as constant "pressure" and "burning". Took TUMS, aspirin, pepto-bismol, and a pepcid at home with no relief.     Patient identifiers verified and correct for Patria Nieto.    LOC: The patient is awake, alert and aware of environment with an appropriate affect, the patient is oriented x 3 and speaking appropriately.  APPEARANCE: Patient resting comfortably and in no acute distress, patient is clean and well groomed, patient's clothing is properly fastened.  SKIN: The skin is warm and dry, color consistent with ethnicity, patient has normal skin turgor and moist mucus membranes, skin intact, no breakdown or bruising noted.  MUSCULOSKELETAL: Patient moving all extremities spontaneously, no obvious swelling or deformities noted.  RESPIRATORY: Airway is open and patent, respirations are spontaneous, patient has a normal effort and rate, no accessory muscle use noted  CARDIAC: Patient has a normal rate and regular rhythm, no periphreal edema noted, capillary refill < 3 seconds. +midsternal chest pain  ABDOMEN: Soft and non tender to palpation, no distention noted.  "

## 2020-06-23 NOTE — NURSING
Plan of care reviewed with patient. Voices understanding. NSR on monitor with no red alarms noted. No acute distress noted. Side rails x3, bed low, call bell within reach. Maintain bed alarm for patient safety. Patient will be monitored overnight.

## 2020-06-24 ENCOUNTER — TELEPHONE (OUTPATIENT)
Dept: INTERNAL MEDICINE | Facility: CLINIC | Age: 70
End: 2020-06-24

## 2020-06-24 VITALS
WEIGHT: 203.25 LBS | RESPIRATION RATE: 20 BRPM | DIASTOLIC BLOOD PRESSURE: 91 MMHG | TEMPERATURE: 99 F | BODY MASS INDEX: 30.1 KG/M2 | OXYGEN SATURATION: 95 % | HEART RATE: 72 BPM | SYSTOLIC BLOOD PRESSURE: 131 MMHG | HEIGHT: 69 IN

## 2020-06-24 PROBLEM — R79.89 ELEVATED TROPONIN: Status: RESOLVED | Noted: 2020-06-23 | Resolved: 2020-06-24

## 2020-06-24 LAB
ALBUMIN SERPL BCP-MCNC: 3.9 G/DL (ref 3.5–5.2)
ALP SERPL-CCNC: 61 U/L (ref 55–135)
ALT SERPL W/O P-5'-P-CCNC: 14 U/L (ref 10–44)
ANION GAP SERPL CALC-SCNC: 9 MMOL/L (ref 8–16)
AST SERPL-CCNC: 10 U/L (ref 10–40)
BASOPHILS # BLD AUTO: 0.03 K/UL (ref 0–0.2)
BASOPHILS NFR BLD: 0.3 % (ref 0–1.9)
BILIRUB SERPL-MCNC: 2.7 MG/DL (ref 0.1–1)
BUN SERPL-MCNC: 11 MG/DL (ref 8–23)
CALCIUM SERPL-MCNC: 9.7 MG/DL (ref 8.7–10.5)
CHLORIDE SERPL-SCNC: 108 MMOL/L (ref 95–110)
CO2 SERPL-SCNC: 27 MMOL/L (ref 23–29)
CREAT SERPL-MCNC: 1.1 MG/DL (ref 0.5–1.4)
DIFFERENTIAL METHOD: ABNORMAL
EOSINOPHIL # BLD AUTO: 0.3 K/UL (ref 0–0.5)
EOSINOPHIL NFR BLD: 3.1 % (ref 0–8)
ERYTHROCYTE [DISTWIDTH] IN BLOOD BY AUTOMATED COUNT: 13.1 % (ref 11.5–14.5)
EST. GFR  (AFRICAN AMERICAN): >60 ML/MIN/1.73 M^2
EST. GFR  (NON AFRICAN AMERICAN): >60 ML/MIN/1.73 M^2
GLUCOSE SERPL-MCNC: 133 MG/DL (ref 70–110)
HCT VFR BLD AUTO: 46 % (ref 40–54)
HGB BLD-MCNC: 15.6 G/DL (ref 14–18)
IMM GRANULOCYTES # BLD AUTO: 0.04 K/UL (ref 0–0.04)
IMM GRANULOCYTES NFR BLD AUTO: 0.4 % (ref 0–0.5)
LYMPHOCYTES # BLD AUTO: 1.3 K/UL (ref 1–4.8)
LYMPHOCYTES NFR BLD: 14.1 % (ref 18–48)
MAGNESIUM SERPL-MCNC: 2.1 MG/DL (ref 1.6–2.6)
MCH RBC QN AUTO: 33.3 PG (ref 27–31)
MCHC RBC AUTO-ENTMCNC: 33.9 G/DL (ref 32–36)
MCV RBC AUTO: 98 FL (ref 82–98)
MONOCYTES # BLD AUTO: 0.6 K/UL (ref 0.3–1)
MONOCYTES NFR BLD: 6.7 % (ref 4–15)
NEUTROPHILS # BLD AUTO: 7 K/UL (ref 1.8–7.7)
NEUTROPHILS NFR BLD: 75.4 % (ref 38–73)
NRBC BLD-RTO: 0 /100 WBC
PHOSPHATE SERPL-MCNC: 4.4 MG/DL (ref 2.7–4.5)
PLATELET # BLD AUTO: 152 K/UL (ref 150–350)
PMV BLD AUTO: 12.1 FL (ref 9.2–12.9)
POCT GLUCOSE: 106 MG/DL (ref 70–110)
POCT GLUCOSE: 225 MG/DL (ref 70–110)
POTASSIUM SERPL-SCNC: 4.1 MMOL/L (ref 3.5–5.1)
PROT SERPL-MCNC: 6.7 G/DL (ref 6–8.4)
RBC # BLD AUTO: 4.69 M/UL (ref 4.6–6.2)
SODIUM SERPL-SCNC: 144 MMOL/L (ref 136–145)
TSH SERPL DL<=0.005 MIU/L-ACNC: 2.94 UIU/ML (ref 0.4–4)
WBC # BLD AUTO: 9.25 K/UL (ref 3.9–12.7)

## 2020-06-24 PROCEDURE — 80053 COMPREHEN METABOLIC PANEL: CPT

## 2020-06-24 PROCEDURE — 84443 ASSAY THYROID STIM HORMONE: CPT

## 2020-06-24 PROCEDURE — C9399 UNCLASSIFIED DRUGS OR BIOLOG: HCPCS | Performed by: NURSE PRACTITIONER

## 2020-06-24 PROCEDURE — 85025 COMPLETE CBC W/AUTO DIFF WBC: CPT

## 2020-06-24 PROCEDURE — 25000003 PHARM REV CODE 250: Performed by: NURSE PRACTITIONER

## 2020-06-24 PROCEDURE — 94761 N-INVAS EAR/PLS OXIMETRY MLT: CPT

## 2020-06-24 PROCEDURE — 96372 THER/PROPH/DIAG INJ SC/IM: CPT

## 2020-06-24 PROCEDURE — 84100 ASSAY OF PHOSPHORUS: CPT

## 2020-06-24 PROCEDURE — 36415 COLL VENOUS BLD VENIPUNCTURE: CPT

## 2020-06-24 PROCEDURE — G0378 HOSPITAL OBSERVATION PER HR: HCPCS

## 2020-06-24 PROCEDURE — 83735 ASSAY OF MAGNESIUM: CPT

## 2020-06-24 RX ADMIN — ASPIRIN 81 MG 81 MG: 81 TABLET ORAL at 09:06

## 2020-06-24 RX ADMIN — RAMIPRIL 5 MG: 1.25 CAPSULE ORAL at 09:06

## 2020-06-24 RX ADMIN — ATORVASTATIN CALCIUM 40 MG: 40 TABLET, FILM COATED ORAL at 09:06

## 2020-06-24 RX ADMIN — METOPROLOL TARTRATE 50 MG: 50 TABLET, FILM COATED ORAL at 09:06

## 2020-06-24 RX ADMIN — INSULIN DETEMIR 23 UNITS: 100 INJECTION, SOLUTION SUBCUTANEOUS at 09:06

## 2020-06-24 NOTE — ASSESSMENT & PLAN NOTE
Cont statin  6/24 pt has a stress test that showed no ischemia in the ecg portion and he reached METS level of 9.  The pt wants to go home, he feels good.  He is instructed to continue his cardiac diabetic diet  F/u with PCP and cardiology as well.  Pt is on ASA, statin, lopressor, ramipril.

## 2020-06-24 NOTE — PLAN OF CARE
VN note: progress notes, labs and vital signs reviewed. Will be available to intervene if needed.

## 2020-06-24 NOTE — TELEPHONE ENCOUNTER
Spoke with pt. Informed him of needing an appointment . Pt. Informed me he is doing fine after hospital visit but may not be in town to have appointment .

## 2020-06-24 NOTE — DISCHARGE SUMMARY
[facility]  Discharge Summary      Patient Name: Patria Nieto  MRN: [id number]  Admission Date: 6/23/2020  Hospital Length of Stay: 0 days  Discharge Date and Time: No discharge date for patient encounter.  Attending Physician: Michael Galindo DO   Discharging Provider: Michael Galindo DO  Primary Care Provider: Félix Rojas MD      HPI:   Mr. Nieto is a 70yo male with past medical history of DM2, HLD, HTN, and SVT who presented to the ED with chest pain that started at 9pm last night and lasted for several hours at home. He described the pain as a burning mid chest/epigastric pain and also a pressure. He states he did vomit once so he thought it was reflux. He took Pepto bismol, aspirin, and tums with no relief. Has had no nausea since. He denies SOB, weakness, claudication, radiation of pain. His labs revealed a troponin bump to 0.040. His EKG showed T wave inversion in V4, V5, and V6. He was admitted to [facility] for further workup.     * No surgery found *      Hospital Course:   6/24 pt has a stress test that showed no ischemia in the ecg portion and he reached METS level of 9.  The pt wants to go home, he feels good.  He is instructed to continue his cardiac diabetic diet  F/u with PCP and cardiology as well.  Pt is on ASA, statin, lopressor, ramipril.     Consults:   Consults (From admission, onward)        Status Ordering Provider     Inpatient consult to [facility]  Once     Provider:  (Not yet assigned)    MILI Flores          * NSTEMI (non-ST elevated myocardial infarction)  Elevated troponin    Trend troponin  Consult cardiology- appreciate the recs  Echo ordered  Nuclear stress test ordered  NPO   Cont BB, statin, ASA  6/24 pt has a stress test that showed no ischemia in the ecg portion and he reached METS level of 9.  The pt wants to go home, he feels good.  He is instructed to continue his cardiac diabetic  diet  F/u with PCP and cardiology as well.  Pt is on ASA, statin, lopressor, ramipril.    Hypertension associated with diabetes  A1C 7.6   Start detemir   Start SSI  accuchecks  NPO for now pending cardiology recs  6/24 pt has a stress test that showed no ischemia in the ecg portion and he reached METS level of 9.  The pt wants to go home, he feels good.  He is instructed to continue his cardiac diabetic diet  F/u with PCP and cardiology as well.  Pt is on ASA, statin, lopressor, ramipril.    Type 2 diabetes, uncontrolled, with neuropathy    6/24 pt has a stress test that showed no ischemia in the ecg portion and he reached METS level of 9.  The pt wants to go home, he feels good.  He is instructed to continue his cardiac diabetic diet  F/u with PCP and cardiology as well.  Pt is on ASA, statin, lopressor, ramipril.    Hyperlipidemia  Cont statin  6/24 pt has a stress test that showed no ischemia in the ecg portion and he reached METS level of 9.  The pt wants to go home, he feels good.  He is instructed to continue his cardiac diabetic diet  F/u with PCP and cardiology as well.  Pt is on ASA, statin, lopressor, ramipril.    Elevated troponin-resolved as of 6/24/2020          Final Active Diagnoses:    Diagnosis Date Noted POA    PRINCIPAL PROBLEM:  NSTEMI (non-ST elevated myocardial infarction) [I21.4] 06/23/2020 Yes    Hypertension associated with diabetes [E11.59, I10] 12/30/2015 Yes    Type 2 diabetes, uncontrolled, with neuropathy [E11.40, E11.65] 02/12/2014 Yes    Hyperlipidemia [E78.5] 02/19/2013 Yes      Problems Resolved During this Admission:    Diagnosis Date Noted Date Resolved POA    Elevated troponin [R79.89] 06/23/2020 06/24/2020 Yes       Discharged Condition: stable    Disposition: Home or Self Care    Follow Up:  Follow-up Information     Félix Rojas MD In 3 days.    Specialty: Internal Medicine  Contact information:  [address]  [place] [address] [phone]                  Patient Instructions:      Ambulatory referral/consult to Cardiology   Standing Status: Future   Referral Priority: Routine Referral Type: Consultation   Referral Reason: Specialty Services Required   Requested Specialty: Cardiology   Number of Visits Requested: 1     Diet Cardiac     Diet diabetic     Notify your health care provider if you experience any of the following:  temperature >100.4     Notify your health care provider if you experience any of the following:  persistent nausea and vomiting or diarrhea     Notify your health care provider if you experience any of the following:  severe uncontrolled pain     Notify your health care provider if you experience any of the following:  difficulty breathing or increased cough     Notify your health care provider if you experience any of the following:  severe persistent headache     Notify your health care provider if you experience any of the following:  worsening rash     Notify your health care provider if you experience any of the following:  persistent dizziness, light-headedness, or visual disturbances     Notify your health care provider if you experience any of the following:  increased confusion or weakness     Activity as tolerated       Significant Diagnostic Studies: Labs:   BMP:   Recent Labs   Lab 06/23/20 0428 06/24/20  0541   * 133*    144   K 4.1 4.1   * 108   CO2 23 27   BUN 12 11   CREATININE 1.1 1.1   CALCIUM 10.0 9.7   MG  --  2.1   , CMP   Recent Labs   Lab 06/23/20 0428 06/24/20  0541    144   K 4.1 4.1   * 108   CO2 23 27   * 133*   BUN 12 11   CREATININE 1.1 1.1   CALCIUM 10.0 9.7   PROT 6.7 6.7   ALBUMIN 3.9 3.9   BILITOT 1.5* 2.7*   ALKPHOS 57 61   AST 11 10   ALT 17 14   ANIONGAP 7* 9   ESTGFRAFRICA >60 >60   EGFRNONAA >60 >60    and CBC   Recent Labs   Lab 06/23/20  0428 06/24/20  0541   WBC 9.13 9.25   HGB 15.0 15.6   HCT 43.5 46.0   * 152       Pending Diagnostic Studies:     None  "        Medications:  Reconciled Home Medications:      Medication List      CONTINUE taking these medications    aspirin 81 MG Chew  Take 81 mg by mouth once daily.     atorvastatin 40 MG tablet  Commonly known as: LIPITOR  Take 1 tablet (40 mg total) by mouth once daily.     fexofenadine 180 MG tablet  Commonly known as: ALLEGRA  Take 180 mg by mouth daily as needed.     insulin aspart U-100 100 unit/mL (3 mL) Inpn pen  Commonly known as: NovoLOG Flexpen U-100 Insulin  INJECT 20 UNITS INTO THE SKIN WITH CORRECTION SCALE(MAX 84 UNITS PER DAY)     insulin glargine (TOUJEO) 300 unit/mL (1.5 mL) Inpn pen  Commonly known as: TOUJEO  Inject 55 units daily. Adjust to max 70 units per MD instructions     metFORMIN 1000 MG tablet  Commonly known as: GLUCOPHAGE  Take 1 tablet (1,000 mg total) by mouth 2 (two) times daily with meals.     metoprolol tartrate 100 MG tablet  Commonly known as: LOPRESSOR  Take 0.5 tablets (50 mg total) by mouth 2 (two) times daily.     mupirocin 2 % ointment  Commonly known as: BACTROBAN  Apply topically 3 (three) times daily.     pen needle, diabetic 31 gauge x 3/16" Ndle  Commonly known as: BD ULTRA-FINE MINI PEN NEEDLE  Uses 5 times a day.     ramipriL 5 MG capsule  Commonly known as: ALTACE  TAKE 1 CAPSULE BY MOUTH EVERY DAY            Indwelling Lines/Drains at time of discharge:   Lines/Drains/Airways     None                 Time spent on the discharge of patient: 54 minutes  Patient was seen and examined on the date of discharge and determined to be suitable for discharge.         Michael Galindo DO  Department of Hospital Medicine  [facility]  "

## 2020-06-24 NOTE — ASSESSMENT & PLAN NOTE
6/24 pt has a stress test that showed no ischemia in the ecg portion and he reached METS level of 9.  The pt wants to go home, he feels good.  He is instructed to continue his cardiac diabetic diet  F/u with PCP and cardiology as well.  Pt is on ASA, statin, lopressor, ramipril.

## 2020-06-24 NOTE — TELEPHONE ENCOUNTER
----- Message from Charles Hanna DO sent at 6/24/2020 11:04 AM CDT -----  Please set up hospital f/u     Thanks.     ALMA  ----- Message -----  From: Farzaneh Goodman RN  Sent: 6/24/2020  10:13 AM CDT  To: Cyndi Soto Staff    Good morning,  Pt is discharging from [facility] today and will need a hospital follow-up appt please.  Thank you for your help,    Dennis Goodman, RN,   [phone]

## 2020-06-24 NOTE — ASSESSMENT & PLAN NOTE
Elevated troponin    Trend troponin  Consult cardiology- appreciate the recs  Echo ordered  Nuclear stress test ordered  NPO   Cont BB, statin, ASA  6/24 pt has a stress test that showed no ischemia in the ecg portion and he reached METS level of 9.  The pt wants to go home, he feels good.  He is instructed to continue his cardiac diabetic diet  F/u with PCP and cardiology as well.  Pt is on ASA, statin, lopressor, ramipril.

## 2020-06-24 NOTE — PLAN OF CARE
VN reviewed discharge instructions with pt.  AVS printed and handed to pt by bedside nurse.  Reviewed follow-up appointments, medications, diet, and importance of medication compliance.  Reviewed home care instructions, treatment plan, self-management, and when to seek medical attention.  Allowed time for questions.  All questions answered.  Patient verbalized complete understanding of discharge instructions and voices no concerns.     Discharge instructions complete.   Transport/wheelchair requested.  Bedside nurse notified.

## 2020-06-24 NOTE — PLAN OF CARE
Patient's VS are stable, on tele with NSR, piv is patent, and the patient is currently on RA. No complaints of chest pain for the night. Currently monitoring

## 2020-06-24 NOTE — PLAN OF CARE
Pt lives in [place] with his spouse.  He requests to follow-up with Dr. Hanna and declines cardiology follow-up appt.  His wife will transport him home at time of discharge.  White board updated with CM name and contact information.  Discharge brochure provided.  Pt encouraged to call with any questions or concerns.  Cm will continue to follow pt through transitions of care and assist with any discharge needs.     06/24/20 1020   Discharge Assessment   Assessment Type Discharge Planning Assessment   Confirmed/corrected address and phone number on facesheet? Yes   Assessment information obtained from? Patient   Communicated expected length of stay with patient/caregiver yes   Prior to hospitilization cognitive status: Alert/Oriented   Prior to hospitalization functional status: Independent   Current cognitive status: Alert/Oriented   Current Functional Status: Independent   Facility Arrived From: home   Lives With spouse   Able to Return to Prior Arrangements yes   Is patient able to care for self after discharge? Yes   Readmission Within the Last 30 Days no previous admission in last 30 days   Patient currently being followed by outpatient case management? No   Patient currently receives any other outside agency services? No   Equipment Currently Used at Home none   Do you have any problems affording any of your prescribed medications? No   Is the patient taking medications as prescribed? yes   Does the patient have transportation home? Yes   Transportation Anticipated family or friend will provide   Discharge Plan A Home   Discharge Plan B Home with family   DME Needed Upon Discharge  none   Patient/Family in Agreement with Plan yes     Dennis Goodman RN,   [phone]

## 2020-06-29 ENCOUNTER — OFFICE VISIT (OUTPATIENT)
Dept: INTERNAL MEDICINE | Facility: CLINIC | Age: 70
End: 2020-06-29
Payer: COMMERCIAL

## 2020-06-29 VITALS — DIASTOLIC BLOOD PRESSURE: 81 MMHG | SYSTOLIC BLOOD PRESSURE: 121 MMHG

## 2020-06-29 DIAGNOSIS — F41.0 PANIC ATTACK: Primary | ICD-10-CM

## 2020-06-29 DIAGNOSIS — I16.0 HYPERTENSIVE URGENCY: ICD-10-CM

## 2020-06-29 PROCEDURE — 99213 OFFICE O/P EST LOW 20 MIN: CPT | Mod: S$GLB,,, | Performed by: INTERNAL MEDICINE

## 2020-06-29 PROCEDURE — 99999 PR PBB SHADOW E&M-EST. PATIENT-LVL II: ICD-10-PCS | Mod: PBBFAC,,, | Performed by: INTERNAL MEDICINE

## 2020-06-29 PROCEDURE — 3079F DIAST BP 80-89 MM HG: CPT | Mod: CPTII,S$GLB,, | Performed by: INTERNAL MEDICINE

## 2020-06-29 PROCEDURE — 3079F PR MOST RECENT DIASTOLIC BLOOD PRESSURE 80-89 MM HG: ICD-10-PCS | Mod: CPTII,S$GLB,, | Performed by: INTERNAL MEDICINE

## 2020-06-29 PROCEDURE — 1159F PR MEDICATION LIST DOCUMENTED IN MEDICAL RECORD: ICD-10-PCS | Mod: S$GLB,,, | Performed by: INTERNAL MEDICINE

## 2020-06-29 PROCEDURE — 1159F MED LIST DOCD IN RCRD: CPT | Mod: S$GLB,,, | Performed by: INTERNAL MEDICINE

## 2020-06-29 PROCEDURE — 1101F PT FALLS ASSESS-DOCD LE1/YR: CPT | Mod: CPTII,S$GLB,, | Performed by: INTERNAL MEDICINE

## 2020-06-29 PROCEDURE — 99213 PR OFFICE/OUTPT VISIT, EST, LEVL III, 20-29 MIN: ICD-10-PCS | Mod: S$GLB,,, | Performed by: INTERNAL MEDICINE

## 2020-06-29 PROCEDURE — 3074F PR MOST RECENT SYSTOLIC BLOOD PRESSURE < 130 MM HG: ICD-10-PCS | Mod: CPTII,S$GLB,, | Performed by: INTERNAL MEDICINE

## 2020-06-29 PROCEDURE — 99999 PR PBB SHADOW E&M-EST. PATIENT-LVL II: CPT | Mod: PBBFAC,,, | Performed by: INTERNAL MEDICINE

## 2020-06-29 PROCEDURE — 3074F SYST BP LT 130 MM HG: CPT | Mod: CPTII,S$GLB,, | Performed by: INTERNAL MEDICINE

## 2020-06-29 PROCEDURE — 1101F PR PT FALLS ASSESS DOC 0-1 FALLS W/OUT INJ PAST YR: ICD-10-PCS | Mod: CPTII,S$GLB,, | Performed by: INTERNAL MEDICINE

## 2020-06-29 RX ORDER — ALPRAZOLAM 0.25 MG/1
TABLET ORAL
Qty: 8 TABLET | Refills: 0 | Status: SHIPPED | OUTPATIENT
Start: 2020-06-29 | End: 2020-11-13

## 2020-06-29 NOTE — PROGRESS NOTES
Portions of this note are generated with voice recognition software. Typographical errors may exist.     SUBJECTIVE:    This is a/an 69 y.o. male here for primary care visit for  No chief complaint on file.      After finishing comprehensive observation in the hospital for substernal chest pain and completing cardiac stress test which was normal the patient is back to his normal routine.  Has not had recurrence of substernal chest discomfort but feels that several factors likely contributed.  Patient states that he had a fatty meal less than 3 hr before bedtime.  Stomach sleeping also contributed to reflux which was oppressive in nature.   Patient states that he did develop an anxiety response when using Tums and Pepto-Bismol failed to induce significant relief in symptoms.  Patient had hypertensive urgency in the setting of anxiety.  Noting that home blood pressure was in excess of 180 systolic which is unusual for him.    Patient remarks that he has been under more stress than usual.  Managing more workplace obligations with less help than usual.  Patient is fearful that if he has another episode of overwhelming anxiety he will not be able to distinguish this from a coronary event.  Has not used benzodiazepine in the past.  Denies alcohol misuse or abuse.        Medications Reviewed and Updated    Past medical, family, and social histories were reviewed and updated.    Review of Systems negative unless otherwise noted in history of present illness-  ROS    General ROS: negative  Psychological ROS: negative  ENT ROS: negative  Endocrine ROS: Negative  Allergy and Immunology ROS: negative  Cardiovascular ROS: negative  Pulmonary ROS: Negative  Gastrointestinal ROS: negative  Genito-Urinary ROS: negative  Musculoskeletal ROS: negative      Allergic:  Review of patient's allergies indicates:  No Known Allergies    OBJECTIVE:  BP: 121/81      Wt Readings from Last 3 Encounters:   06/23/20 92.2 kg (203 lb 4.2 oz)    06/22/20 93.4 kg (206 lb)   06/10/20 93.6 kg (206 lb 5.6 oz)    There is no height or weight on file to calculate BMI.  Previous Blood Pressure Readings :   BP Readings from Last 3 Encounters:   06/29/20 121/81   06/24/20 (!) 131/91   06/10/20 112/74       Physical Exam    GEN: No apparent distress  HEENT: sclera non-icteric, conjunctiva clear  CV: no peripheral edema  Regular rate and rhythm.  No murmurs.  PULM: breathing non-labored  ABD: Obese, protuberant abdomen.  PSYCH: appropriate affect  MSK: able to rise from chair without assistance  SKIN: normal skin turgor    Pertinent Labs Reviewed       ASSESSMENT/PLAN:    Panic attack.Condition not optimally controlled. Detailed counseling on self care measures. Plan to monitor clinically in addition to plan below or as listed on After Visit Summary.   -     ALPRAZolam (XANAX) 0.25 MG tablet; Take 1-2 tablets as needed for panic attack symptoms. Max 2 per week  Dispense: 8 tablet; Refill: 0    Hypertensive urgency.Condition improving.  Counseling on self-care measures today.  Continue with current plan in addition to recommendations written on After Visit Summary.    - continue same BP meds     Future Appointments   Date Time Provider Department Center   7/20/2020  9:20 AM Micheal Scanlon Jr., MD [facility]   7/29/2020  9:40 AM Geovanni Chowdary MD [facility]   8/7/2020  8:25 AM LAB, [facility] [place]   8/10/2020  9:40 AM [facility]       Félix Rojas  7/1/2020  8:28 AM

## 2020-06-29 NOTE — PATIENT INSTRUCTIONS
Your Bodys Response to Anxiety    Normal anxiety is part of the bodys natural defense system. It's an alert to a threat that is unknown, vague, or comes from your own internal fears. While youre in this state, your feelings can range from a vague sense of worry to physical sensations such as a pounding heartbeat. These feelings make you want to react to the threat. An anxiety response is normal in many situations. But when you have an anxiety disorder, the same response can occur at the wrong times.  Anxiety can be helpful  Normal anxiety is a signal from your brain that warns you of a threat and is a normal response to help you prevent something or decrease the bad effects of something you can't control. For example, anxiety is a normal response to situations that might damage your body, separate you from a loved one, or lose your job. The symptoms of anxiety can be physical and mental.  How does it feel?  At certain times, people with anxiety may have:  · Dizziness  · Muscle tension or pain  · Restlessness  · Sleeplessness  · Trouble concentrating  · Racing heartbeat  · Fast breathing  · Shaking or trembling  · Stomachache  · Diarrhea  · Loss of energy  · Sweating  · Cold, clammy hands  · Chest pain  · Dry mouth  Anxiety can also be a problem  Anxiety can become a problem when it is hard to control, occurs for months, and interferes with important parts of your life. With an anxiety disorder, your body has the response described above, but in inappropriate ways. The response a person has depends on the anxiety disorder he or she has. With some disorders, the anxiety is way out of proportion to the threat that triggers it. With others, anxiety may occur even when there isnt a clear threat or trigger.  Who does it affect?  Some people are more prone to persistent anxiety than others. It tends to run in families, and it affects more younger people than older people, and more women than men. But no age, race, or  "gender is immune to anxiety problems.  Anxiety can be treated  The good news is that the anxiety thats disrupting your life can be treated. Check with your healthcare provider and rule out any physical problems that may be causing the anxiety symptoms. If an anxiety disorder is diagnosed seek mental healthcare. This is an illness and it can respond to treatment. Most types of anxiety disorders will respond to "talk therapy" and medicines. Working with your doctor or other healthcare provider, you can develop skills to help you cope with anxiety. You can also gain the perspective you need to overcome your fears. Note: Good sources of support or guidance can be found at your local hospital, mental health clinic, or an employee assistance program.  How to cope with anxiety  If anxiety is wearing you down, here are some things you can do to cope:  · Keep in mind that you cant control everything about a situation. Change what you can and let the rest take its course.  · Exercise--its a great way to relieve tension and help your body feel relaxed.  · Avoid caffeine and nicotine, which can make anxiety symptoms worse.  · Fight the temptation to turn to alcohol or unprescribed drugs for relief. They only make things worse in the long run.  · Educate yourself about anxiety disorders. Keep track of helpful online resources and books you can use during stressful periods.  · Try stress management techniques such as meditation.  · Consider online or in-person support groups.   Date Last Reviewed: 1/1/2017  © 1056-5756 [employer]. [address] [place] [address], [place] [address]. All rights reserved. This information is not intended as a substitute for professional medical care. Always follow your healthcare professional's instructions.        "

## 2020-07-16 ENCOUNTER — PATIENT OUTREACH (OUTPATIENT)
Dept: ADMINISTRATIVE | Facility: OTHER | Age: 70
End: 2020-07-16

## 2020-07-16 NOTE — PROGRESS NOTES
Requested updates within Care Everywhere.  Patient's chart was reviewed for overdue DOIR topics.  Immunizations reconciled.    Orders placed:  Tasked appts:  Labs Linked:

## 2020-07-20 ENCOUNTER — OFFICE VISIT (OUTPATIENT)
Dept: ORTHOPEDICS | Facility: CLINIC | Age: 70
End: 2020-07-20
Payer: COMMERCIAL

## 2020-07-20 ENCOUNTER — TELEPHONE (OUTPATIENT)
Dept: ORTHOPEDICS | Facility: CLINIC | Age: 70
End: 2020-07-20

## 2020-07-20 VITALS — BODY MASS INDEX: 30.07 KG/M2 | WEIGHT: 203 LBS | HEIGHT: 69 IN

## 2020-07-20 DIAGNOSIS — Z41.9 ELECTIVE SURGERY: ICD-10-CM

## 2020-07-20 DIAGNOSIS — M67.432 GANGLION CYST OF DORSUM OF LEFT WRIST: Primary | ICD-10-CM

## 2020-07-20 PROCEDURE — 99999 PR PBB SHADOW E&M-EST. PATIENT-LVL III: ICD-10-PCS | Mod: PBBFAC,,, | Performed by: ORTHOPAEDIC SURGERY

## 2020-07-20 PROCEDURE — 3008F PR BODY MASS INDEX (BMI) DOCUMENTED: ICD-10-PCS | Mod: CPTII,S$GLB,, | Performed by: ORTHOPAEDIC SURGERY

## 2020-07-20 PROCEDURE — 99999 PR PBB SHADOW E&M-EST. PATIENT-LVL III: CPT | Mod: PBBFAC,,, | Performed by: ORTHOPAEDIC SURGERY

## 2020-07-20 PROCEDURE — 3008F BODY MASS INDEX DOCD: CPT | Mod: CPTII,S$GLB,, | Performed by: ORTHOPAEDIC SURGERY

## 2020-07-20 PROCEDURE — 99214 PR OFFICE/OUTPT VISIT, EST, LEVL IV, 30-39 MIN: ICD-10-PCS | Mod: S$GLB,,, | Performed by: ORTHOPAEDIC SURGERY

## 2020-07-20 PROCEDURE — 1125F PR PAIN SEVERITY QUANTIFIED, PAIN PRESENT: ICD-10-PCS | Mod: S$GLB,,, | Performed by: ORTHOPAEDIC SURGERY

## 2020-07-20 PROCEDURE — 1101F PR PT FALLS ASSESS DOC 0-1 FALLS W/OUT INJ PAST YR: ICD-10-PCS | Mod: CPTII,S$GLB,, | Performed by: ORTHOPAEDIC SURGERY

## 2020-07-20 PROCEDURE — 1159F PR MEDICATION LIST DOCUMENTED IN MEDICAL RECORD: ICD-10-PCS | Mod: S$GLB,,, | Performed by: ORTHOPAEDIC SURGERY

## 2020-07-20 PROCEDURE — 1101F PT FALLS ASSESS-DOCD LE1/YR: CPT | Mod: CPTII,S$GLB,, | Performed by: ORTHOPAEDIC SURGERY

## 2020-07-20 PROCEDURE — 99214 OFFICE O/P EST MOD 30 MIN: CPT | Mod: S$GLB,,, | Performed by: ORTHOPAEDIC SURGERY

## 2020-07-20 PROCEDURE — 1125F AMNT PAIN NOTED PAIN PRSNT: CPT | Mod: S$GLB,,, | Performed by: ORTHOPAEDIC SURGERY

## 2020-07-20 PROCEDURE — 1159F MED LIST DOCD IN RCRD: CPT | Mod: S$GLB,,, | Performed by: ORTHOPAEDIC SURGERY

## 2020-07-20 NOTE — PROGRESS NOTES
CC:  Ganglion cyst left wrist        HPI:  Patria Nieto is a very pleasant 69 y.o. male with enlarging mass left wrist for the past 6-8 months  We previously tried an aspiration which worked for about a week or so than the cyst recurred is having some pain especially when he bumps his left hand and wrist no numbness or tingling reported         PAST MEDICAL HISTORY:   Past Medical History:   Diagnosis Date    Diabetes mellitus type II     Hx MRSA infection     Hypertension     MRSA infection 2002    Supraventricular tachycardia      PAST SURGICAL HISTORY:   Past Surgical History:   Procedure Laterality Date    none       FAMILY HISTORY:   Family History   Problem Relation Age of Onset    Heart disease Mother     Heart disease Father     Diabetes Father     Diabetes Sister      SOCIAL HISTORY:   Social History     Socioeconomic History    Marital status:      Spouse name: Not on file    Number of children: Not on file    Years of education: Not on file    Highest education level: Not on file   Occupational History    Not on file   Social Needs    Financial resource strain: Not on file    Food insecurity     Worry: Not on file     Inability: Not on file    Transportation needs     Medical: No     Non-medical: No   Tobacco Use    Smoking status: Never Smoker    Smokeless tobacco: Never Used   Substance and Sexual Activity    Alcohol use: Yes     Alcohol/week: 4.0 standard drinks     Types: 4 Cans of beer per week     Frequency: 2-4 times a month     Drinks per session: 1 or 2     Binge frequency: Never    Drug use: No    Sexual activity: Yes   Lifestyle    Physical activity     Days per week: 2 days     Minutes per session: 30 min    Stress: Only a little   Relationships    Social connections     Talks on phone: Patient refused     Gets together: Patient refused     Attends Sikhism service: Not on file     Active member of club or organization: Patient refused     Attends meetings of  "clubs or organizations: Patient refused     Relationship status:    Other Topics Concern    Not on file   Social History Narrative    [facility] [employer] - active        [employer] - derm        MEDICATIONS:   Current Outpatient Medications:     aspirin 81 MG Chew, Take 81 mg by mouth once daily., Disp: , Rfl:     atorvastatin (LIPITOR) 40 MG tablet, Take 1 tablet (40 mg total) by mouth once daily., Disp: 90 tablet, Rfl: 0    fexofenadine (ALLEGRA) 180 MG tablet, Take 180 mg by mouth daily as needed. , Disp: , Rfl:     insulin aspart U-100 (NOVOLOG FLEXPEN U-100 INSULIN) 100 unit/mL (3 mL) InPn pen, INJECT 20 UNITS INTO THE SKIN WITH CORRECTION SCALE(MAX 84 UNITS PER DAY), Disp: 78 mL, Rfl: 0    insulin glargine, TOUJEO, (TOUJEO) 300 unit/mL (1.5 mL) InPn pen, Inject 55 units daily. Adjust to max 70 units per MD instructions, Disp: 12 Syringe, Rfl: 0    metFORMIN (GLUCOPHAGE) 1000 MG tablet, Take 1 tablet (1,000 mg total) by mouth 2 (two) times daily with meals., Disp: 180 tablet, Rfl: 0    metoprolol tartrate (LOPRESSOR) 100 MG tablet, Take 0.5 tablets (50 mg total) by mouth 2 (two) times daily., Disp: 180 tablet, Rfl: 0    mupirocin (BACTROBAN) 2 % ointment, Apply topically 3 (three) times daily., Disp: 15 g, Rfl: 0    pen needle, diabetic (BD ULTRA-FINE MINI PEN NEEDLE) 31 gauge x 3/16" Ndle, Uses 5 times a day., Disp: 200 each, Rfl: 6    ramipriL (ALTACE) 5 MG capsule, TAKE 1 CAPSULE BY MOUTH EVERY DAY, Disp: 90 capsule, Rfl: 2    ALPRAZolam (XANAX) 0.25 MG tablet, Take 1-2 tablets as needed for panic attack symptoms. Max 2 per week (Patient not taking: Reported on 7/20/2020), Disp: 8 tablet, Rfl: 0  ALLERGIES: Review of patient's allergies indicates:  No Known Allergies    Review of Systems:  Constitutional: no fever or chills  ENT: no nasal congestion or sore throat  Respiratory: no cough or shortness of breath  Cardiovascular: no chest pain or palpitations  Gastrointestinal: no nausea or vomiting, " "PUD, GERD, NSAID intolerance  Genitourinary: no hematuria or dysuria  Integument/Breast: no rash or pruritis  Hematologic/Lymphatic: no easy bruising or lymphadenopathy  Musculoskeletal: see HPI  Neurological: no seizures or tremors  Behavioral/Psych: no auditory or visual hallucinations      Physical Exam   Vitals:    07/20/20 0934   Weight: 92.1 kg (203 lb)   Height: 5' 9" (1.753 m)   PainSc:   2       Constitutional: Oriented to person, place, and time. Appears well-developed and well-nourished.   HENT:   Head: Normocephalic and atraumatic.   Nose: Nose normal.   Eyes: No scleral icterus.   Neck: Normal range of motion.   Cardiovascular: Normal rate and regular rhythm.    Pulses:       Radial pulses are 2+ on the right side, and 2+ on the left side.   Pulmonary/Chest: Effort normal and breath sounds normal.   Abdominal: Soft.   Neurological: Alert and oriented to person, place, and time.   Skin: Skin is warm.   Psychiatric: Normal mood and affect.     MUSCULOSKELETAL UPPER EXTREMITY:  Examination left hand and wrist soft tissue mass consistent with ganglion cyst on the volar radial side of the wrist measuring about 1.5 x 1.5 cm in size  Tinel sign negative range of motion wrist fingers full  strength slightly decreased            Diagnostic Studies:  None        Assessment:  Ganglion cyst left wrist symptomatic    Plan:  Patient would like to set up surgery for excisional biopsy of the mass  The risks and benefits explained as well as those risks associated with the COVID crisis he understands      The risks and benefits of surgery were discussed with the patient today and they understand.  The consent was signed in the office for surgery.      Micheal Scanlon MD (Jay)  [facility]  Orthopedic Upper Extremity Surgery      "

## 2020-07-21 DIAGNOSIS — I47.10 SVT (SUPRAVENTRICULAR TACHYCARDIA): Primary | ICD-10-CM

## 2020-08-07 ENCOUNTER — LAB VISIT (OUTPATIENT)
Dept: LAB | Facility: HOSPITAL | Age: 70
End: 2020-08-07
Attending: INTERNAL MEDICINE
Payer: COMMERCIAL

## 2020-08-07 DIAGNOSIS — E11.65 UNCONTROLLED TYPE 2 DIABETES MELLITUS WITH HYPERGLYCEMIA, WITH LONG-TERM CURRENT USE OF INSULIN: ICD-10-CM

## 2020-08-07 DIAGNOSIS — Z79.4 UNCONTROLLED TYPE 2 DIABETES MELLITUS WITH HYPERGLYCEMIA, WITH LONG-TERM CURRENT USE OF INSULIN: ICD-10-CM

## 2020-08-07 LAB
ALBUMIN SERPL BCP-MCNC: 3.6 G/DL (ref 3.5–5.2)
ALP SERPL-CCNC: 60 U/L (ref 55–135)
ALT SERPL W/O P-5'-P-CCNC: 14 U/L (ref 10–44)
ANION GAP SERPL CALC-SCNC: 6 MMOL/L (ref 8–16)
AST SERPL-CCNC: 9 U/L (ref 10–40)
BILIRUB SERPL-MCNC: 2.7 MG/DL (ref 0.1–1)
BUN SERPL-MCNC: 18 MG/DL (ref 8–23)
CALCIUM SERPL-MCNC: 9.8 MG/DL (ref 8.7–10.5)
CHLORIDE SERPL-SCNC: 112 MMOL/L (ref 95–110)
CO2 SERPL-SCNC: 25 MMOL/L (ref 23–29)
CREAT SERPL-MCNC: 1.1 MG/DL (ref 0.5–1.4)
EST. GFR  (AFRICAN AMERICAN): >60 ML/MIN/1.73 M^2
EST. GFR  (NON AFRICAN AMERICAN): >60 ML/MIN/1.73 M^2
ESTIMATED AVG GLUCOSE: 186 MG/DL (ref 68–131)
GLUCOSE SERPL-MCNC: 170 MG/DL (ref 70–110)
HBA1C MFR BLD HPLC: 8.1 % (ref 4–5.6)
POTASSIUM SERPL-SCNC: 4.3 MMOL/L (ref 3.5–5.1)
PROT SERPL-MCNC: 6.6 G/DL (ref 6–8.4)
SODIUM SERPL-SCNC: 143 MMOL/L (ref 136–145)

## 2020-08-07 PROCEDURE — 83036 HEMOGLOBIN GLYCOSYLATED A1C: CPT

## 2020-08-07 PROCEDURE — 80053 COMPREHEN METABOLIC PANEL: CPT

## 2020-08-07 PROCEDURE — 36415 COLL VENOUS BLD VENIPUNCTURE: CPT | Mod: PO

## 2020-08-10 ENCOUNTER — OFFICE VISIT (OUTPATIENT)
Dept: FAMILY MEDICINE | Facility: CLINIC | Age: 70
End: 2020-08-10
Payer: COMMERCIAL

## 2020-08-10 VITALS
BODY MASS INDEX: 30.76 KG/M2 | WEIGHT: 207.69 LBS | HEART RATE: 80 BPM | DIASTOLIC BLOOD PRESSURE: 66 MMHG | TEMPERATURE: 97 F | SYSTOLIC BLOOD PRESSURE: 108 MMHG | OXYGEN SATURATION: 97 % | HEIGHT: 69 IN

## 2020-08-10 DIAGNOSIS — E78.5 HYPERLIPIDEMIA LDL GOAL <100: ICD-10-CM

## 2020-08-10 DIAGNOSIS — I15.2 HYPERTENSION ASSOCIATED WITH DIABETES: ICD-10-CM

## 2020-08-10 DIAGNOSIS — Z76.89 ENCOUNTER TO ESTABLISH CARE WITH NEW DOCTOR: ICD-10-CM

## 2020-08-10 DIAGNOSIS — Z00.00 ROUTINE CHECK-UP: ICD-10-CM

## 2020-08-10 DIAGNOSIS — N52.1 ERECTILE DYSFUNCTION ASSOCIATED WITH TYPE 2 DIABETES MELLITUS: ICD-10-CM

## 2020-08-10 DIAGNOSIS — Z23 NEED FOR VACCINATION AGAINST STREPTOCOCCUS PNEUMONIAE: ICD-10-CM

## 2020-08-10 DIAGNOSIS — E11.40 NEUROPATHY DUE TO TYPE 2 DIABETES MELLITUS: ICD-10-CM

## 2020-08-10 DIAGNOSIS — D69.6 THROMBOCYTOPENIA: Chronic | ICD-10-CM

## 2020-08-10 DIAGNOSIS — I10 ESSENTIAL HYPERTENSION: ICD-10-CM

## 2020-08-10 DIAGNOSIS — E11.69 ERECTILE DYSFUNCTION ASSOCIATED WITH TYPE 2 DIABETES MELLITUS: ICD-10-CM

## 2020-08-10 DIAGNOSIS — Z79.899 ENCOUNTER FOR LONG-TERM CURRENT USE OF MEDICATION: ICD-10-CM

## 2020-08-10 DIAGNOSIS — Z79.4 UNCONTROLLED TYPE 2 DIABETES MELLITUS WITH HYPERGLYCEMIA, WITH LONG-TERM CURRENT USE OF INSULIN: ICD-10-CM

## 2020-08-10 DIAGNOSIS — E11.59 HYPERTENSION ASSOCIATED WITH DIABETES: ICD-10-CM

## 2020-08-10 DIAGNOSIS — Z00.00 VISIT FOR WELL MAN HEALTH CHECK: Primary | ICD-10-CM

## 2020-08-10 DIAGNOSIS — Z12.5 SCREENING PSA (PROSTATE SPECIFIC ANTIGEN): ICD-10-CM

## 2020-08-10 DIAGNOSIS — E11.65 UNCONTROLLED TYPE 2 DIABETES MELLITUS WITH HYPERGLYCEMIA, WITH LONG-TERM CURRENT USE OF INSULIN: ICD-10-CM

## 2020-08-10 DIAGNOSIS — B35.3 TINEA PEDIS OF BOTH FEET: ICD-10-CM

## 2020-08-10 PROCEDURE — 3052F PR MOST RECENT HEMOGLOBIN A1C LEVEL 8.0 - < 9.0%: ICD-10-PCS | Mod: CPTII,S$GLB,, | Performed by: FAMILY MEDICINE

## 2020-08-10 PROCEDURE — 99397 PER PM REEVAL EST PAT 65+ YR: CPT | Mod: 25,S$GLB,, | Performed by: FAMILY MEDICINE

## 2020-08-10 PROCEDURE — 99999 PR PBB SHADOW E&M-EST. PATIENT-LVL IV: ICD-10-PCS | Mod: PBBFAC,,, | Performed by: FAMILY MEDICINE

## 2020-08-10 PROCEDURE — 1126F AMNT PAIN NOTED NONE PRSNT: CPT | Mod: S$GLB,,, | Performed by: FAMILY MEDICINE

## 2020-08-10 PROCEDURE — 90670 PNEUMOCOCCAL CONJUGATE VACCINE 13-VALENT LESS THAN 5YO & GREATER THAN: ICD-10-PCS | Mod: S$GLB,,, | Performed by: FAMILY MEDICINE

## 2020-08-10 PROCEDURE — 3078F PR MOST RECENT DIASTOLIC BLOOD PRESSURE < 80 MM HG: ICD-10-PCS | Mod: CPTII,S$GLB,, | Performed by: FAMILY MEDICINE

## 2020-08-10 PROCEDURE — 1101F PR PT FALLS ASSESS DOC 0-1 FALLS W/OUT INJ PAST YR: ICD-10-PCS | Mod: CPTII,S$GLB,, | Performed by: FAMILY MEDICINE

## 2020-08-10 PROCEDURE — 99397 PR PREVENTIVE VISIT,EST,65 & OVER: ICD-10-PCS | Mod: 25,S$GLB,, | Performed by: FAMILY MEDICINE

## 2020-08-10 PROCEDURE — 90471 PNEUMOCOCCAL CONJUGATE VACCINE 13-VALENT LESS THAN 5YO & GREATER THAN: ICD-10-PCS | Mod: S$GLB,,, | Performed by: FAMILY MEDICINE

## 2020-08-10 PROCEDURE — 1126F PR PAIN SEVERITY QUANTIFIED, NO PAIN PRESENT: ICD-10-PCS | Mod: S$GLB,,, | Performed by: FAMILY MEDICINE

## 2020-08-10 PROCEDURE — 3078F DIAST BP <80 MM HG: CPT | Mod: CPTII,S$GLB,, | Performed by: FAMILY MEDICINE

## 2020-08-10 PROCEDURE — 3008F PR BODY MASS INDEX (BMI) DOCUMENTED: ICD-10-PCS | Mod: CPTII,S$GLB,, | Performed by: FAMILY MEDICINE

## 2020-08-10 PROCEDURE — 3074F PR MOST RECENT SYSTOLIC BLOOD PRESSURE < 130 MM HG: ICD-10-PCS | Mod: CPTII,S$GLB,, | Performed by: FAMILY MEDICINE

## 2020-08-10 PROCEDURE — 90471 IMMUNIZATION ADMIN: CPT | Mod: S$GLB,,, | Performed by: FAMILY MEDICINE

## 2020-08-10 PROCEDURE — 3008F BODY MASS INDEX DOCD: CPT | Mod: CPTII,S$GLB,, | Performed by: FAMILY MEDICINE

## 2020-08-10 PROCEDURE — 1159F PR MEDICATION LIST DOCUMENTED IN MEDICAL RECORD: ICD-10-PCS | Mod: S$GLB,,, | Performed by: FAMILY MEDICINE

## 2020-08-10 PROCEDURE — 99999 PR PBB SHADOW E&M-EST. PATIENT-LVL IV: CPT | Mod: PBBFAC,,, | Performed by: FAMILY MEDICINE

## 2020-08-10 PROCEDURE — 90670 PCV13 VACCINE IM: CPT | Mod: S$GLB,,, | Performed by: FAMILY MEDICINE

## 2020-08-10 PROCEDURE — 3074F SYST BP LT 130 MM HG: CPT | Mod: CPTII,S$GLB,, | Performed by: FAMILY MEDICINE

## 2020-08-10 PROCEDURE — 1159F MED LIST DOCD IN RCRD: CPT | Mod: S$GLB,,, | Performed by: FAMILY MEDICINE

## 2020-08-10 PROCEDURE — 3052F HG A1C>EQUAL 8.0%<EQUAL 9.0%: CPT | Mod: CPTII,S$GLB,, | Performed by: FAMILY MEDICINE

## 2020-08-10 PROCEDURE — 1101F PT FALLS ASSESS-DOCD LE1/YR: CPT | Mod: CPTII,S$GLB,, | Performed by: FAMILY MEDICINE

## 2020-08-10 RX ORDER — INSULIN GLARGINE 300 [IU]/ML
INJECTION, SOLUTION SUBCUTANEOUS
Qty: 12 SYRINGE | Refills: 0 | Status: SHIPPED | OUTPATIENT
Start: 2020-08-10 | End: 2020-10-22 | Stop reason: SDUPTHER

## 2020-08-10 RX ORDER — INSULIN ASPART 100 [IU]/ML
INJECTION, SOLUTION INTRAVENOUS; SUBCUTANEOUS
Qty: 78 ML | Refills: 0 | Status: SHIPPED | OUTPATIENT
Start: 2020-08-10 | End: 2021-07-05 | Stop reason: SDUPTHER

## 2020-08-10 RX ORDER — METOPROLOL TARTRATE 50 MG/1
50 TABLET ORAL 2 TIMES DAILY
Qty: 180 TABLET | Refills: 0 | Status: SHIPPED | OUTPATIENT
Start: 2020-08-10 | End: 2020-11-13 | Stop reason: SDUPTHER

## 2020-08-10 RX ORDER — CLOTRIMAZOLE AND BETAMETHASONE DIPROPIONATE 10; .64 MG/G; MG/G
CREAM TOPICAL 2 TIMES DAILY
Qty: 45 G | Refills: 0 | Status: SHIPPED | OUTPATIENT
Start: 2020-08-10 | End: 2021-02-18

## 2020-08-10 RX ORDER — METFORMIN HYDROCHLORIDE 1000 MG/1
1000 TABLET ORAL 2 TIMES DAILY WITH MEALS
Qty: 180 TABLET | Refills: 0 | Status: SHIPPED | OUTPATIENT
Start: 2020-08-10 | End: 2020-10-25 | Stop reason: SDUPTHER

## 2020-08-10 RX ORDER — ATORVASTATIN CALCIUM 40 MG/1
40 TABLET, FILM COATED ORAL DAILY
Qty: 90 TABLET | Refills: 0 | Status: SHIPPED | OUTPATIENT
Start: 2020-08-10 | End: 2020-11-13 | Stop reason: SDUPTHER

## 2020-08-10 NOTE — PATIENT INSTRUCTIONS
Change metoprolol 100 mg 1/2 pill Bid to 50 mg BID  Continue insulin regimen for now  Diet changes  Discussed possibilities with patient  If a1c >goal at next visit, refer back to endocrine  Continue ramipril  Continue atorvastatin  F/u in 3 months, labs prior    Diabetes Management Status    Statin: Taking  ACE/ARB: Taking    Screening or Prevention Patient's value Goal Complete/Controlled?   HgA1C Testing and Control   Lab Results   Component Value Date    HGBA1C 8.1 (H) 08/07/2020      Annually/Less than 8% No   Lipid profile : 05/29/2020 Annually Yes   LDL control Lab Results   Component Value Date    LDLCALC 90.0 05/29/2020    Annually/Less than 100 mg/dl  Yes   Nephropathy screening Lab Results   Component Value Date    LABMICR 23.0 03/23/2019     Lab Results   Component Value Date    PROTEINUA NEG 09/09/2011    Annually No   Blood pressure BP Readings from Last 1 Encounters:   08/10/20 108/66    Less than 140/90 Yes   Dilated retinal exam : 10/01/2019 Annually Yes   Foot exam   : 08/10/2020 Annually Yes

## 2020-08-10 NOTE — PROGRESS NOTES
"Subjective:       Patient ID: Patria Nieto is a 70 y.o. male.    Chief Complaint: Establish Care    Patria Nieto is a 70 y.o. male who presents today to establish care.     Diet: eating "anything and everything." Skips lunch frequently.   Exercise: lifts weights, about once a week. Walking at work.     Labs: reviewed and ordered    DM: taking toujeo 55 Units. Taking 20-24 SA with every meal.   SVT: doesn't want to see EP    C-scope: UTD    Sees eye care center. Seen October 2019.       PMHx: reviewed in EMR and updated  Meds: reviewed in EMR and updated  Shx: reviewed in EMR and updated  FMHx: no family history of colon cancer, breast cancer, ovarian cancer  Social: lives with his wife. No kids at home. One son, lives 6 doors away. No pets at home. No smokers at home. He works as an .       Review of Systems   Constitutional: Negative for chills and fever.   Respiratory: Negative for shortness of breath and wheezing.    Gastrointestinal: Negative for diarrhea, nausea and vomiting.   Endocrine: Positive for polyuria.   Neurological: Negative for dizziness, light-headedness and headaches.         Health Maintenance Due   Topic Date Due    Shingles Vaccine (1 of 2) 07/25/2001    PROSTATE-SPECIFIC ANTIGEN  04/20/2018     Immunization History   Administered Date(s) Administered    Influenza - High Dose - PF (65 years and older) 11/16/2017    Pneumococcal Conjugate - 13 Valent 08/10/2020    Pneumococcal Polysaccharide - 23 Valent 01/15/2010    Tdap 08/05/2013         Objective:     Vitals:    08/10/20 0949   BP: 108/66   Pulse: 80   Temp: 97.1 °F (36.2 °C)        Physical Exam  Vitals signs and nursing note reviewed.   Constitutional:       Appearance: He is well-developed.   HENT:      Head: Normocephalic and atraumatic.   Eyes:      Conjunctiva/sclera: Conjunctivae normal.   Neck:      Musculoskeletal: Normal range of motion and neck supple.   Cardiovascular:      Rate and Rhythm: Normal rate and regular " rhythm.   Pulmonary:      Effort: Pulmonary effort is normal.      Breath sounds: Normal breath sounds.   Musculoskeletal:      Right foot: No deformity.      Left foot: No deformity.   Feet:      Right foot:      Protective Sensation: 10 sites tested. 8 sites sensed.      Skin integrity: Dry skin present. No ulcer, blister, skin breakdown, erythema, warmth or callus.      Left foot:      Protective Sensation: 10 sites tested. 9 sites sensed.      Skin integrity: Dry skin present. No ulcer, blister, skin breakdown, erythema, warmth or callus.   Neurological:      Mental Status: He is alert and oriented to person, place, and time.   Psychiatric:         Behavior: Behavior normal.         Thought Content: Thought content normal.         Judgment: Judgment normal.         Assessment:       1. Visit for [facility] health check    2. Routine check-up    3. Encounter to establish care with new doctor    4. Essential hypertension    5. Hypertension associated with diabetes    6. Type 2 diabetes, uncontrolled, with neuropathy    7. Uncontrolled type 2 diabetes mellitus with hyperglycemia, with long-term current use of insulin    8. Erectile dysfunction associated with type 2 diabetes mellitus    9. Thrombocytopenia    10. Hyperlipidemia LDL goal <100    11. Encounter for long-term current use of medication    12. Screening PSA (prostate specific antigen)    13. Need for vaccination against Streptococcus pneumoniae    14. Neuropathy due to type 2 diabetes mellitus    15. Tinea pedis of both feet        Plan:       Change metoprolol 100 mg 1/2 pill Bid to 50 mg BID  Continue insulin regimen for now  Diet changes  Discussed possibilities with patient  If a1c >goal at next visit, refer back to endocrine  Continue ramipril  Continue atorvastatin  F/u in 3 months, labs prior.     Declined f/u with EP, discussed risks of this. For now continue BB    Urinary frequency, due to DM?        Visit for [facility] health check  -     TSH;  Future; Expected date: 08/10/2020  -     Vitamin B12; Future; Expected date: 08/10/2020  -     PSA, Screening; Future; Expected date: 08/10/2020  -     CBC auto differential; Future; Expected date: 08/10/2020  -     Comprehensive metabolic panel; Future; Expected date: 08/10/2020  -     Hemoglobin A1C; Future; Expected date: 08/10/2020  -     Lipid Panel; Future; Expected date: 08/10/2020  -     Microalbumin/creatinine urine ratio; Future; Expected date: 08/10/2020    Routine check-up    Encounter to establish care with new doctor    Essential hypertension  -     TSH; Future; Expected date: 08/10/2020  -     metoprolol tartrate (LOPRESSOR) 50 MG tablet; Take 1 tablet (50 mg total) by mouth 2 (two) times daily.  Dispense: 180 tablet; Refill: 0    Hypertension associated with diabetes  -     metoprolol tartrate (LOPRESSOR) 50 MG tablet; Take 1 tablet (50 mg total) by mouth 2 (two) times daily.  Dispense: 180 tablet; Refill: 0    Type 2 diabetes, uncontrolled, with neuropathy  -     Hemoglobin A1C; Future; Expected date: 08/10/2020  -     insulin aspart U-100 (NOVOLOG FLEXPEN U-100 INSULIN) 100 unit/mL (3 mL) InPn pen; INJECT 20 UNITS INTO THE SKIN WITH CORRECTION SCALE(MAX 84 UNITS PER DAY)  Dispense: 78 mL; Refill: 0  -     insulin glargine, TOUJEO, (TOUJEO) 300 unit/mL (1.5 mL) InPn pen; Inject 55 units daily. Adjust to max 70 units per MD instructions  Dispense: 12 Syringe; Refill: 0  -     metFORMIN (GLUCOPHAGE) 1000 MG tablet; Take 1 tablet (1,000 mg total) by mouth 2 (two) times daily with meals.  Dispense: 180 tablet; Refill: 0  -     Microalbumin/creatinine urine ratio; Future; Expected date: 08/10/2020    Uncontrolled type 2 diabetes mellitus with hyperglycemia, with long-term current use of insulin  -     Hemoglobin A1C; Future; Expected date: 08/10/2020  -     insulin aspart U-100 (NOVOLOG FLEXPEN U-100 INSULIN) 100 unit/mL (3 mL) InPn pen; INJECT 20 UNITS INTO THE SKIN WITH CORRECTION SCALE(MAX 84 UNITS  PER DAY)  Dispense: 78 mL; Refill: 0  -     insulin glargine, TOUJEO, (TOUJEO) 300 unit/mL (1.5 mL) InPn pen; Inject 55 units daily. Adjust to max 70 units per MD instructions  Dispense: 12 Syringe; Refill: 0  -     metFORMIN (GLUCOPHAGE) 1000 MG tablet; Take 1 tablet (1,000 mg total) by mouth 2 (two) times daily with meals.  Dispense: 180 tablet; Refill: 0  -     Microalbumin/creatinine urine ratio; Future; Expected date: 08/10/2020    Erectile dysfunction associated with type 2 diabetes mellitus    Thrombocytopenia  -     CBC auto differential; Future; Expected date: 08/10/2020  -     Comprehensive metabolic panel; Future; Expected date: 08/10/2020    Hyperlipidemia LDL goal <100  -     Lipid Panel; Future; Expected date: 08/10/2020  -     atorvastatin (LIPITOR) 40 MG tablet; Take 1 tablet (40 mg total) by mouth once daily.  Dispense: 90 tablet; Refill: 0    Encounter for long-term current use of medication  -     Vitamin B12; Future; Expected date: 08/10/2020    Screening PSA (prostate specific antigen)  -     PSA, Screening; Future; Expected date: 08/10/2020    Need for vaccination against Streptococcus pneumoniae  -     (In Office Administered) Pneumococcal Conjugate Vaccine (13 Valent) (IM)    Neuropathy due to type 2 diabetes mellitus    Tinea pedis of both feet  -     clotrimazole-betamethasone 1-0.05% (LOTRISONE) cream; Apply topically 2 (two) times daily.  Dispense: 45 g; Refill: 0

## 2020-09-13 DIAGNOSIS — Z79.4 TYPE 2 DIABETES MELLITUS WITH DIABETIC POLYNEUROPATHY, WITH LONG-TERM CURRENT USE OF INSULIN: ICD-10-CM

## 2020-09-13 DIAGNOSIS — E11.42 TYPE 2 DIABETES MELLITUS WITH DIABETIC POLYNEUROPATHY, WITH LONG-TERM CURRENT USE OF INSULIN: ICD-10-CM

## 2020-09-14 RX ORDER — RAMIPRIL 5 MG/1
5 CAPSULE ORAL DAILY
Qty: 90 CAPSULE | Refills: 2 | Status: SHIPPED | OUTPATIENT
Start: 2020-09-14 | End: 2020-11-13 | Stop reason: SDUPTHER

## 2020-09-17 ENCOUNTER — TELEPHONE (OUTPATIENT)
Dept: ELECTROPHYSIOLOGY | Facility: CLINIC | Age: 70
End: 2020-09-17

## 2020-09-17 NOTE — TELEPHONE ENCOUNTER
Attempted to call pt to schedule SVT ablation, no answer. Left voice message with number for pt to return call.

## 2020-10-16 DIAGNOSIS — E11.9 TYPE 2 DIABETES MELLITUS WITHOUT COMPLICATION, UNSPECIFIED WHETHER LONG TERM INSULIN USE: ICD-10-CM

## 2020-10-19 ENCOUNTER — OFFICE VISIT (OUTPATIENT)
Dept: ORTHOPEDICS | Facility: CLINIC | Age: 70
End: 2020-10-19
Payer: COMMERCIAL

## 2020-10-19 ENCOUNTER — HOSPITAL ENCOUNTER (OUTPATIENT)
Dept: RADIOLOGY | Facility: HOSPITAL | Age: 70
Discharge: HOME | End: 2020-10-19
Attending: ORTHOPAEDIC SURGERY
Payer: COMMERCIAL

## 2020-10-19 VITALS — BODY MASS INDEX: 30.76 KG/M2 | WEIGHT: 207.69 LBS | HEIGHT: 69 IN

## 2020-10-19 DIAGNOSIS — M25.532 LEFT WRIST PAIN: ICD-10-CM

## 2020-10-19 DIAGNOSIS — M67.432 GANGLION CYST OF DORSUM OF LEFT WRIST: ICD-10-CM

## 2020-10-19 DIAGNOSIS — M25.532 LEFT WRIST PAIN: Primary | ICD-10-CM

## 2020-10-19 PROCEDURE — 1159F PR MEDICATION LIST DOCUMENTED IN MEDICAL RECORD: ICD-10-PCS | Mod: S$GLB,,, | Performed by: ORTHOPAEDIC SURGERY

## 2020-10-19 PROCEDURE — 99999 PR PBB SHADOW E&M-EST. PATIENT-LVL III: ICD-10-PCS | Mod: PBBFAC,,, | Performed by: ORTHOPAEDIC SURGERY

## 2020-10-19 PROCEDURE — 1101F PT FALLS ASSESS-DOCD LE1/YR: CPT | Mod: CPTII,S$GLB,, | Performed by: ORTHOPAEDIC SURGERY

## 2020-10-19 PROCEDURE — 99999 PR PBB SHADOW E&M-EST. PATIENT-LVL III: CPT | Mod: PBBFAC,,, | Performed by: ORTHOPAEDIC SURGERY

## 2020-10-19 PROCEDURE — 73100 XR WRIST 2 VIEW LEFT: ICD-10-PCS | Mod: 26,LT,, | Performed by: RADIOLOGY

## 2020-10-19 PROCEDURE — 1159F MED LIST DOCD IN RCRD: CPT | Mod: S$GLB,,, | Performed by: ORTHOPAEDIC SURGERY

## 2020-10-19 PROCEDURE — 1125F AMNT PAIN NOTED PAIN PRSNT: CPT | Mod: S$GLB,,, | Performed by: ORTHOPAEDIC SURGERY

## 2020-10-19 PROCEDURE — 99213 PR OFFICE/OUTPT VISIT, EST, LEVL III, 20-29 MIN: ICD-10-PCS | Mod: S$GLB,,, | Performed by: ORTHOPAEDIC SURGERY

## 2020-10-19 PROCEDURE — 3008F PR BODY MASS INDEX (BMI) DOCUMENTED: ICD-10-PCS | Mod: CPTII,S$GLB,, | Performed by: ORTHOPAEDIC SURGERY

## 2020-10-19 PROCEDURE — 73100 X-RAY EXAM OF WRIST: CPT | Mod: TC,PN,LT

## 2020-10-19 PROCEDURE — 1125F PR PAIN SEVERITY QUANTIFIED, PAIN PRESENT: ICD-10-PCS | Mod: S$GLB,,, | Performed by: ORTHOPAEDIC SURGERY

## 2020-10-19 PROCEDURE — 3008F BODY MASS INDEX DOCD: CPT | Mod: CPTII,S$GLB,, | Performed by: ORTHOPAEDIC SURGERY

## 2020-10-19 PROCEDURE — 73100 X-RAY EXAM OF WRIST: CPT | Mod: 26,LT,, | Performed by: RADIOLOGY

## 2020-10-19 PROCEDURE — 99213 OFFICE O/P EST LOW 20 MIN: CPT | Mod: S$GLB,,, | Performed by: ORTHOPAEDIC SURGERY

## 2020-10-19 PROCEDURE — 1101F PR PT FALLS ASSESS DOC 0-1 FALLS W/OUT INJ PAST YR: ICD-10-PCS | Mod: CPTII,S$GLB,, | Performed by: ORTHOPAEDIC SURGERY

## 2020-10-19 NOTE — PROGRESS NOTES
"Subjective:      Patient ID: Patria Nieto is a 70 y.o. male.  Chief Complaint: Cyst (left wrist) and Wrist Pain (left )      HPI  Patria Nieto is a  70 y.o. male presenting today for follow up of left wrist pain and swelling.  He reports that he is still having symptoms in left wrist related to a ganglion cyst  Previously we had scheduled him for surgery but he got better so we held off surgery  He my like to consider surgery after the holidays.    Review of patient's allergies indicates:  No Known Allergies      Current Outpatient Medications   Medication Sig Dispense Refill    aspirin 81 MG Chew Take 81 mg by mouth once daily.      clotrimazole-betamethasone 1-0.05% (LOTRISONE) cream Apply topically 2 (two) times daily. 45 g 0    fexofenadine (ALLEGRA) 180 MG tablet Take 180 mg by mouth daily as needed.       insulin aspart U-100 (NOVOLOG FLEXPEN U-100 INSULIN) 100 unit/mL (3 mL) InPn pen INJECT 20 UNITS INTO THE SKIN WITH CORRECTION SCALE(MAX 84 UNITS PER DAY) 78 mL 0    insulin glargine, TOUJEO, (TOUJEO) 300 unit/mL (1.5 mL) InPn pen Inject 55 units daily. Adjust to max 70 units per MD instructions 12 Syringe 0    metFORMIN (GLUCOPHAGE) 1000 MG tablet Take 1 tablet (1,000 mg total) by mouth 2 (two) times daily with meals. 180 tablet 0    metoprolol tartrate (LOPRESSOR) 50 MG tablet Take 1 tablet (50 mg total) by mouth 2 (two) times daily. 180 tablet 0    mupirocin (BACTROBAN) 2 % ointment Apply topically 3 (three) times daily. 15 g 0    pen needle, diabetic (BD ULTRA-FINE MINI PEN NEEDLE) 31 gauge x 3/16" Ndle Uses 5 times a day. 200 each 6    ramipriL (ALTACE) 5 MG capsule Take 1 capsule (5 mg total) by mouth once daily. 90 capsule 2    ALPRAZolam (XANAX) 0.25 MG tablet Take 1-2 tablets as needed for panic attack symptoms. Max 2 per week (Patient not taking: Reported on 10/19/2020) 8 tablet 0    atorvastatin (LIPITOR) 40 MG tablet Take 1 tablet (40 mg total) by mouth once daily. (Patient not taking: " "Reported on 10/19/2020) 90 tablet 0     No current facility-administered medications for this visit.        Past Medical History:   Diagnosis Date    Diabetes mellitus type II     Hx MRSA infection     Hypertension     MRSA infection 2002    Supraventricular tachycardia        Past Surgical History:   Procedure Laterality Date    none         OBJECTIVE:   PHYSICAL EXAM:  Height: 5' 9" (175.3 cm) Weight: 94.2 kg (207 lb 10.8 oz)  Vitals:    10/19/20 0815   Weight: 94.2 kg (207 lb 10.8 oz)   Height: 5' 9" (1.753 m)   PainSc:   4   PainLoc: Wrist     Ortho/SPM Exam  Examination left hand wrist there is some swelling on the volar radial side of the wrist but not really a discrete cyst  Mild tenderness  Range of motion wrist slightly decreased  Mild tenderness dorsally  Sensation intact all digits    RADIOGRAPHS:  AP and lateral x-ray left wrist demonstrates some irregularity at the radioscaphoid articulation consistent with some degenerative changes  Comments: I have personally reviewed the imaging and I agree with the above radiologist's report.    ASSESSMENT/PLAN:     IMPRESSION:  1.  Ganglion cyst left wrist intermittent.  2.  Underlying radioscaphoid arthritis left wrist    PLAN:  I have given him wrist brace for part-time use  Advil or Motrin by mouth  Consider surgery if this cyst enlarges    FOLLOW UP:  As needed    Disclaimer: This note has been generated using voice-recognition software. There may be typographical errors that have been missed during proof-reading.    "

## 2020-10-22 DIAGNOSIS — E11.65 UNCONTROLLED TYPE 2 DIABETES MELLITUS WITH HYPERGLYCEMIA, WITH LONG-TERM CURRENT USE OF INSULIN: ICD-10-CM

## 2020-10-22 DIAGNOSIS — Z79.4 UNCONTROLLED TYPE 2 DIABETES MELLITUS WITH HYPERGLYCEMIA, WITH LONG-TERM CURRENT USE OF INSULIN: ICD-10-CM

## 2020-10-22 RX ORDER — INSULIN GLARGINE 300 [IU]/ML
INJECTION, SOLUTION SUBCUTANEOUS
Qty: 12 SYRINGE | Refills: 0 | Status: SHIPPED | OUTPATIENT
Start: 2020-10-22 | End: 2021-01-06 | Stop reason: SDUPTHER

## 2020-11-12 NOTE — PROGRESS NOTES
Subjective:       Patient ID: Patria Nieto is a 70 y.o. male.    Chief Complaint: Diabetes    Lokesh is a 70 y.o. male who presents today for f/u    Was supposed to get labs prior to f/u, not done. Was out of town. Was late for this apt.     DM: taking toujeo 55 Units. Taking 20-24 SA with every meal. Moved toujeo to nightly, that seems to help more. Reports sugars have been better. Was 109 this am. Average seems to be around 140s. Also taking metformin 1000 mg BID  SVT: doesn't want to see EP again. Procedure was cancelled due to covid. Reports HR has been normal. Had one episode where it was slightly higher  DLD: on atorvastatin 40 mg  HTN: taking ramipril 5 mg and metoprolol 50 mg BID. BP stable.     Has been more stressed recently. Was recently in [place] for work. previous PCP gave xanax, he never started this.     Review of Systems   Cardiovascular: Negative for chest pain and palpitations.   Gastrointestinal: Negative for diarrhea, nausea and vomiting.   Skin: Negative for rash.   Neurological: Negative for dizziness, facial asymmetry and headaches.   Psychiatric/Behavioral: Negative for hallucinations, self-injury, sleep disturbance and suicidal ideas. The patient is nervous/anxious. The patient is not hyperactive.          Objective:     Vitals:    11/13/20 0816   BP: 128/80   Pulse: 80   Temp: 97.7 °F (36.5 °C)        Physical Exam  Vitals signs and nursing note reviewed.   Constitutional:       Appearance: He is obese.   HENT:      Head: Normocephalic and atraumatic.   Cardiovascular:      Rate and Rhythm: Normal rate and regular rhythm.   Pulmonary:      Effort: Pulmonary effort is normal.      Breath sounds: Normal breath sounds.   Abdominal:      Palpations: Abdomen is soft.      Tenderness: There is no abdominal tenderness.   Skin:     Findings: No rash.   Neurological:      General: No focal deficit present.      Mental Status: He is alert.   Psychiatric:         Mood and Affect: Mood normal.          Behavior: Behavior normal.         Thought Content: Thought content normal.         Judgment: Judgment normal.         Assessment:       1. Type 2 diabetes, uncontrolled, with neuropathy    2. Uncontrolled type 2 diabetes mellitus with hyperglycemia, with long-term current use of insulin    3. Obesity (BMI 30-39.9)    4. Essential hypertension    5. Hyperlipidemia LDL goal <100    6. Hypertension associated with diabetes    7. Other hyperlipidemia    8. Type 2 diabetes mellitus with diabetic polyneuropathy, with long-term current use of insulin    9. Situational anxiety        Plan:       Continue toujeo  Continue novolog  If a1c >8, refer to endocrine  Continue metformin 1000 mg BID    Continue atorvastatin  Continue metoprolol 50 mg BID  Continue ramipril    Labs pending    Consider EP referral again. Currently declined.     Started zoloft.       Type 2 diabetes, uncontrolled, with neuropathy  -     metFORMIN (GLUCOPHAGE) 1000 MG tablet; Take 1 tablet (1,000 mg total) by mouth 2 (two) times daily with meals.  Dispense: 180 tablet; Refill: 0    Uncontrolled type 2 diabetes mellitus with hyperglycemia, with long-term current use of insulin  -     metFORMIN (GLUCOPHAGE) 1000 MG tablet; Take 1 tablet (1,000 mg total) by mouth 2 (two) times daily with meals.  Dispense: 180 tablet; Refill: 0    Obesity (BMI 30-39.9)    Essential hypertension  -     metoprolol tartrate (LOPRESSOR) 50 MG tablet; Take 1 tablet (50 mg total) by mouth 2 (two) times daily.  Dispense: 180 tablet; Refill: 0    Hyperlipidemia LDL goal <100  -     atorvastatin (LIPITOR) 40 MG tablet; Take 1 tablet (40 mg total) by mouth once daily.  Dispense: 90 tablet; Refill: 0    Hypertension associated with diabetes  -     metoprolol tartrate (LOPRESSOR) 50 MG tablet; Take 1 tablet (50 mg total) by mouth 2 (two) times daily.  Dispense: 180 tablet; Refill: 0    Other hyperlipidemia  -     atorvastatin (LIPITOR) 40 MG tablet; Take 1 tablet (40 mg total) by  mouth once daily.  Dispense: 90 tablet; Refill: 0    Type 2 diabetes mellitus with diabetic polyneuropathy, with long-term current use of insulin  -     ramipriL (ALTACE) 5 MG capsule; Take 1 capsule (5 mg total) by mouth once daily.  Dispense: 90 capsule; Refill: 2    Situational anxiety  -     sertraline (ZOLOFT) 25 MG tablet; Take 1 tablet (25 mg total) by mouth once daily.  Dispense: 90 tablet; Refill: 1        Warning signs discussed, patient to call with any further issues or worsening of symptoms.

## 2020-11-13 ENCOUNTER — LAB VISIT (OUTPATIENT)
Dept: LAB | Facility: HOSPITAL | Age: 70
End: 2020-11-13
Attending: FAMILY MEDICINE
Payer: COMMERCIAL

## 2020-11-13 ENCOUNTER — OFFICE VISIT (OUTPATIENT)
Dept: FAMILY MEDICINE | Facility: CLINIC | Age: 70
End: 2020-11-13
Payer: COMMERCIAL

## 2020-11-13 VITALS
OXYGEN SATURATION: 97 % | DIASTOLIC BLOOD PRESSURE: 80 MMHG | SYSTOLIC BLOOD PRESSURE: 128 MMHG | HEIGHT: 69 IN | WEIGHT: 212.31 LBS | TEMPERATURE: 98 F | BODY MASS INDEX: 31.44 KG/M2 | HEART RATE: 80 BPM

## 2020-11-13 DIAGNOSIS — E11.42 TYPE 2 DIABETES MELLITUS WITH DIABETIC POLYNEUROPATHY, WITH LONG-TERM CURRENT USE OF INSULIN: ICD-10-CM

## 2020-11-13 DIAGNOSIS — E11.65 UNCONTROLLED TYPE 2 DIABETES MELLITUS WITH HYPERGLYCEMIA, WITH LONG-TERM CURRENT USE OF INSULIN: ICD-10-CM

## 2020-11-13 DIAGNOSIS — E78.5 HYPERLIPIDEMIA LDL GOAL <100: ICD-10-CM

## 2020-11-13 DIAGNOSIS — I10 ESSENTIAL HYPERTENSION: ICD-10-CM

## 2020-11-13 DIAGNOSIS — I15.2 HYPERTENSION ASSOCIATED WITH DIABETES: ICD-10-CM

## 2020-11-13 DIAGNOSIS — E78.49 OTHER HYPERLIPIDEMIA: ICD-10-CM

## 2020-11-13 DIAGNOSIS — Z79.4 UNCONTROLLED TYPE 2 DIABETES MELLITUS WITH HYPERGLYCEMIA, WITH LONG-TERM CURRENT USE OF INSULIN: ICD-10-CM

## 2020-11-13 DIAGNOSIS — Z00.00 VISIT FOR WELL MAN HEALTH CHECK: ICD-10-CM

## 2020-11-13 DIAGNOSIS — F41.8 SITUATIONAL ANXIETY: ICD-10-CM

## 2020-11-13 DIAGNOSIS — E66.9 OBESITY (BMI 30-39.9): ICD-10-CM

## 2020-11-13 DIAGNOSIS — E11.59 HYPERTENSION ASSOCIATED WITH DIABETES: ICD-10-CM

## 2020-11-13 DIAGNOSIS — Z79.4 TYPE 2 DIABETES MELLITUS WITH DIABETIC POLYNEUROPATHY, WITH LONG-TERM CURRENT USE OF INSULIN: ICD-10-CM

## 2020-11-13 LAB
ALBUMIN/CREAT UR: 10.7 UG/MG (ref 0–30)
CREAT UR-MCNC: 75 MG/DL (ref 23–375)
MICROALBUMIN UR DL<=1MG/L-MCNC: 8 UG/ML

## 2020-11-13 PROCEDURE — 1125F PR PAIN SEVERITY QUANTIFIED, PAIN PRESENT: ICD-10-PCS | Mod: S$GLB,,, | Performed by: FAMILY MEDICINE

## 2020-11-13 PROCEDURE — 1159F MED LIST DOCD IN RCRD: CPT | Mod: S$GLB,,, | Performed by: FAMILY MEDICINE

## 2020-11-13 PROCEDURE — 3074F PR MOST RECENT SYSTOLIC BLOOD PRESSURE < 130 MM HG: ICD-10-PCS | Mod: CPTII,S$GLB,, | Performed by: FAMILY MEDICINE

## 2020-11-13 PROCEDURE — 3288F FALL RISK ASSESSMENT DOCD: CPT | Mod: CPTII,S$GLB,, | Performed by: FAMILY MEDICINE

## 2020-11-13 PROCEDURE — 1125F AMNT PAIN NOTED PAIN PRSNT: CPT | Mod: S$GLB,,, | Performed by: FAMILY MEDICINE

## 2020-11-13 PROCEDURE — 3288F PR FALLS RISK ASSESSMENT DOCUMENTED: ICD-10-PCS | Mod: CPTII,S$GLB,, | Performed by: FAMILY MEDICINE

## 2020-11-13 PROCEDURE — 3052F PR MOST RECENT HEMOGLOBIN A1C LEVEL 8.0 - < 9.0%: ICD-10-PCS | Mod: CPTII,S$GLB,, | Performed by: FAMILY MEDICINE

## 2020-11-13 PROCEDURE — 3008F BODY MASS INDEX DOCD: CPT | Mod: CPTII,S$GLB,, | Performed by: FAMILY MEDICINE

## 2020-11-13 PROCEDURE — 1159F PR MEDICATION LIST DOCUMENTED IN MEDICAL RECORD: ICD-10-PCS | Mod: S$GLB,,, | Performed by: FAMILY MEDICINE

## 2020-11-13 PROCEDURE — 3079F DIAST BP 80-89 MM HG: CPT | Mod: CPTII,S$GLB,, | Performed by: FAMILY MEDICINE

## 2020-11-13 PROCEDURE — 99214 OFFICE O/P EST MOD 30 MIN: CPT | Mod: S$GLB,,, | Performed by: FAMILY MEDICINE

## 2020-11-13 PROCEDURE — 82043 UR ALBUMIN QUANTITATIVE: CPT

## 2020-11-13 PROCEDURE — 3074F SYST BP LT 130 MM HG: CPT | Mod: CPTII,S$GLB,, | Performed by: FAMILY MEDICINE

## 2020-11-13 PROCEDURE — 1101F PR PT FALLS ASSESS DOC 0-1 FALLS W/OUT INJ PAST YR: ICD-10-PCS | Mod: CPTII,S$GLB,, | Performed by: FAMILY MEDICINE

## 2020-11-13 PROCEDURE — 3008F PR BODY MASS INDEX (BMI) DOCUMENTED: ICD-10-PCS | Mod: CPTII,S$GLB,, | Performed by: FAMILY MEDICINE

## 2020-11-13 PROCEDURE — 99999 PR PBB SHADOW E&M-EST. PATIENT-LVL III: CPT | Mod: PBBFAC,,, | Performed by: FAMILY MEDICINE

## 2020-11-13 PROCEDURE — 3079F PR MOST RECENT DIASTOLIC BLOOD PRESSURE 80-89 MM HG: ICD-10-PCS | Mod: CPTII,S$GLB,, | Performed by: FAMILY MEDICINE

## 2020-11-13 PROCEDURE — 99999 PR PBB SHADOW E&M-EST. PATIENT-LVL III: ICD-10-PCS | Mod: PBBFAC,,, | Performed by: FAMILY MEDICINE

## 2020-11-13 PROCEDURE — 1101F PT FALLS ASSESS-DOCD LE1/YR: CPT | Mod: CPTII,S$GLB,, | Performed by: FAMILY MEDICINE

## 2020-11-13 PROCEDURE — 3052F HG A1C>EQUAL 8.0%<EQUAL 9.0%: CPT | Mod: CPTII,S$GLB,, | Performed by: FAMILY MEDICINE

## 2020-11-13 PROCEDURE — 99214 PR OFFICE/OUTPT VISIT, EST, LEVL IV, 30-39 MIN: ICD-10-PCS | Mod: S$GLB,,, | Performed by: FAMILY MEDICINE

## 2020-11-13 RX ORDER — ATORVASTATIN CALCIUM 40 MG/1
40 TABLET, FILM COATED ORAL DAILY
Qty: 90 TABLET | Refills: 0 | Status: SHIPPED | OUTPATIENT
Start: 2020-11-13 | End: 2021-02-18 | Stop reason: SDUPTHER

## 2020-11-13 RX ORDER — SERTRALINE HYDROCHLORIDE 25 MG/1
25 TABLET, FILM COATED ORAL DAILY
Qty: 90 TABLET | Refills: 1 | Status: SHIPPED | OUTPATIENT
Start: 2020-11-13 | End: 2021-02-18

## 2020-11-13 RX ORDER — RAMIPRIL 5 MG/1
5 CAPSULE ORAL DAILY
Qty: 90 CAPSULE | Refills: 2 | Status: SHIPPED | OUTPATIENT
Start: 2020-11-13 | End: 2021-02-18 | Stop reason: SDUPTHER

## 2020-11-13 RX ORDER — METOPROLOL TARTRATE 50 MG/1
50 TABLET ORAL 2 TIMES DAILY
Qty: 180 TABLET | Refills: 0 | Status: SHIPPED | OUTPATIENT
Start: 2020-11-13 | End: 2021-02-18 | Stop reason: SDUPTHER

## 2020-11-13 RX ORDER — METFORMIN HYDROCHLORIDE 1000 MG/1
1000 TABLET ORAL 2 TIMES DAILY WITH MEALS
Qty: 180 TABLET | Refills: 0 | Status: SHIPPED | OUTPATIENT
Start: 2020-11-13 | End: 2021-05-18 | Stop reason: SDUPTHER

## 2020-11-13 NOTE — PATIENT INSTRUCTIONS
Continue toujeo  Continue novolog  If a1c >8, refer to endocrine  Continue metformin 1000 mg BID    Continue atorvastatin  Continue metoprolol 50 mg BID  Continue ramipril    Labs pending    Consider EP referral again. Currently declined.         Diabetes Management Status    Statin: Taking  ACE/ARB: Taking    Screening or Prevention Patient's value Goal Complete/Controlled?   HgA1C Testing and Control   Lab Results   Component Value Date    HGBA1C 8.1 (H) 08/07/2020      Annually/Less than 8% No   Lipid profile : 05/29/2020 Annually Yes   LDL control Lab Results   Component Value Date    LDLCALC 90.0 05/29/2020    Annually/Less than 100 mg/dl  Yes   Nephropathy screening Lab Results   Component Value Date    LABMICR 23.0 03/23/2019     Lab Results   Component Value Date    PROTEINUA NEG 09/09/2011    Annually No   Blood pressure BP Readings from Last 1 Encounters:   11/13/20 128/80    Less than 140/90 Yes   Dilated retinal exam : 10/01/2019 Annually Yes   Foot exam   : 08/10/2020 Annually Yes        Post op check  Vs ok, no sob  No c/o of pain  No evidence of hematoma  Bloody LIANET output  Cont supportive care

## 2020-11-15 ENCOUNTER — TELEPHONE (OUTPATIENT)
Dept: FAMILY MEDICINE | Facility: CLINIC | Age: 70
End: 2020-11-15

## 2020-11-15 DIAGNOSIS — E11.65 UNCONTROLLED TYPE 2 DIABETES MELLITUS WITH HYPERGLYCEMIA, WITH LONG-TERM CURRENT USE OF INSULIN: ICD-10-CM

## 2020-11-15 DIAGNOSIS — Z79.4 UNCONTROLLED TYPE 2 DIABETES MELLITUS WITH HYPERGLYCEMIA, WITH LONG-TERM CURRENT USE OF INSULIN: ICD-10-CM

## 2020-11-19 ENCOUNTER — PATIENT OUTREACH (OUTPATIENT)
Dept: ADMINISTRATIVE | Facility: OTHER | Age: 70
End: 2020-11-19

## 2020-11-19 NOTE — LETTER
AUTHORIZATION FOR RELEASE OF   CONFIDENTIAL INFORMATION    Dear Israel Tan MD,    We are seeing Patria Nieto, date of birth 1950, in the clinic at [facility]. Charles Hanna DO is the patient's PCP. Patria Nieto has an outstanding lab/procedure at the time we reviewed his chart. In order to help keep his health information updated, he has authorized us to request the following medical record(s):        (  )  MAMMOGRAM                                      (  )  COLONOSCOPY      (  )  PAP SMEAR                                          (  )  OUTSIDE LAB RESULTS     (  )  DEXA SCAN                                          ( x)  EYE EXAM            (  )  FOOT EXAM                                          (  )  ENTIRE RECORD     (  )  OUTSIDE IMMUNIZATIONS                 (  )  _______________         Please fax records to [facility], Jayesh Madrecha, DO, [phone]     If you have any questions, please contact Maryann Davis at [phone].           Patient Name: Patria Nieto  : 1950  Patient Phone #: [phone]

## 2020-11-19 NOTE — PROGRESS NOTES
Care Everywhere:   Immunization: updated  Health Maintenance: updated  Media Review: review for outside eye exam report   Legacy Review:   Order placed:   Upcoming appts:  efax sent to Dr. Tan office to possibly obtain eye exam report

## 2020-11-20 ENCOUNTER — OFFICE VISIT (OUTPATIENT)
Dept: ENDOCRINOLOGY | Facility: CLINIC | Age: 70
End: 2020-11-20
Payer: COMMERCIAL

## 2020-11-20 VITALS
SYSTOLIC BLOOD PRESSURE: 119 MMHG | BODY MASS INDEX: 31.44 KG/M2 | HEART RATE: 93 BPM | DIASTOLIC BLOOD PRESSURE: 79 MMHG | WEIGHT: 212.31 LBS | HEIGHT: 69 IN

## 2020-11-20 DIAGNOSIS — E78.49 OTHER HYPERLIPIDEMIA: ICD-10-CM

## 2020-11-20 DIAGNOSIS — E11.65 UNCONTROLLED TYPE 2 DIABETES MELLITUS WITH HYPERGLYCEMIA, WITH LONG-TERM CURRENT USE OF INSULIN: ICD-10-CM

## 2020-11-20 DIAGNOSIS — I10 ESSENTIAL HYPERTENSION: ICD-10-CM

## 2020-11-20 DIAGNOSIS — Z79.4 UNCONTROLLED TYPE 2 DIABETES MELLITUS WITH HYPERGLYCEMIA, WITH LONG-TERM CURRENT USE OF INSULIN: ICD-10-CM

## 2020-11-20 PROCEDURE — 99244 OFF/OP CNSLTJ NEW/EST MOD 40: CPT | Mod: S$GLB,,, | Performed by: GENERAL ACUTE CARE HOSPITAL

## 2020-11-20 PROCEDURE — 99999 PR PBB SHADOW E&M-EST. PATIENT-LVL IV: CPT | Mod: PBBFAC,,, | Performed by: GENERAL ACUTE CARE HOSPITAL

## 2020-11-20 PROCEDURE — 99244 PR OFFICE CONSULTATION,LEVEL IV: ICD-10-PCS | Mod: S$GLB,,, | Performed by: GENERAL ACUTE CARE HOSPITAL

## 2020-11-20 PROCEDURE — 99999 PR PBB SHADOW E&M-EST. PATIENT-LVL IV: ICD-10-PCS | Mod: PBBFAC,,, | Performed by: GENERAL ACUTE CARE HOSPITAL

## 2020-11-20 RX ORDER — DULAGLUTIDE 0.75 MG/.5ML
0.75 INJECTION, SOLUTION SUBCUTANEOUS
Qty: 4 PEN | Refills: 3 | Status: SHIPPED | OUTPATIENT
Start: 2020-11-20 | End: 2020-12-20

## 2020-11-20 NOTE — PROGRESS NOTES
Subjective:      Patient ID: Patria Nieto is a 70 y.o. male.    Chief Complaint:  Diabetes      History of Present Illness    70 year old male with Hx of DM2, HTN, HLD, SVT's and situational anxiety, ganglion cyst left wrist (follows with hand surgery) was referred to endocrinology for management of DM type 2    with regards to the diabetes:  The patient was initially diagnosed with Type 2 diabetes mellitus:  2003/2004.    Current symptoms/problems include none and have been unchanged. Symptoms have been present for 0 day.    Known diabetic complications: none  Cardiovascular risk factors: diabetes mellitus, dyslipidemia, hypertension and male gender    Current diabetic medications include:   1. Toujeo (Glargine) 55 units SQ at night  2. Novolog 20-24 units SQ before meals  3. Metformin 1000 mg PO BID    Other medications tried:  1. Trulicity 1.5mg has not taken for 6-8 months (No Hx of pancreatitis or thyroid cancer)    Last visit with diabetes education: A year ago    Current diet: well balanced, eats 2-3 meals a day  Breakfast: 2 pieces of granado and a piece bread with butter with one cup of coffee with a creamer  Lunch: Some ribs and team cabbage, for dessert sugar free deserts  Dinner: Left over ribs  Snacks: None  Drinks: Drinks ETOH occasionally, almond milk, diet drinks    Current exercise: running/ jogging and walking x 2 days per week     Current monitoring regimen: home blood tests - >4 times daily  Pre-breakfast: 140 am today  Pre-lunch: yesterday 104  Pre-dinner: Did not check last night, usually around 130-160  Bedtime: Does not check  Home blood sugar records: fasting range: 140 today in am  Any episodes of hypoglycemia? no      BP Readings from Last 3 Encounters:   11/20/20 119/79   11/13/20 128/80   08/10/20 108/66       Wt Readings from Last 10 Encounters:   11/20/20 96.3 kg (212 lb 4.9 oz)   11/13/20 96.3 kg (212 lb 4.9 oz)   10/19/20 94.2 kg (207 lb 10.8 oz)   08/10/20 94.2 kg (207 lb 10.8 oz)    07/20/20 92.1 kg (203 lb)   06/23/20 92.2 kg (203 lb 4.2 oz)   06/22/20 93.4 kg (206 lb)   06/10/20 93.6 kg (206 lb 5.6 oz)   03/14/20 90.3 kg (199 lb)   02/24/20 90.3 kg (199 lb)       Diabetes Management Status    Statin: Taking  ACE/ARB: Taking    Screening or Prevention Patient's value Goal Complete/Controlled?   HgA1C Testing and Control   Lab Results   Component Value Date    HGBA1C 8.2 (H) 11/13/2020      Annually/Less than 8% No   Lipid profile : 11/13/2020 Annually Yes   LDL control Lab Results   Component Value Date    LDLCALC 42.8 (L) 11/13/2020    Annually/Less than 100 mg/dl  Yes   Nephropathy screening Lab Results   Component Value Date    LABMICR 8.0 11/13/2020     Lab Results   Component Value Date    PROTEINUA NEG 09/09/2011    Annually Yes   Blood pressure BP Readings from Last 1 Encounters:   11/20/20 119/79    Less than 140/90 Yes   Dilated retinal exam : 10/01/2019 Annually Yes   Foot exam   : 08/10/2020 Annually Yes      Lab Results   Component Value Date    MICALBCREAT 10.7 11/13/2020    MICALBCREAT 25.8 03/23/2019    MICALBCREAT 174.3 (H) 07/11/2017            Low dose ASA?: Yes       Glucagon emergency kit?: No       Medical alert tag?: No    Lab Results   Component Value Date    HGBA1C 8.2 (H) 11/13/2020    HGBA1C 8.1 (H) 08/07/2020    HGBA1C 7.6 (H) 05/29/2020    HGBA1C 7.6 (H) 02/10/2020    HGBA1C 7.7 (H) 11/27/2019    HGBA1C 8.1 (H) 08/09/2019    HGBA1C 7.1 (H) 03/23/2019    HGBA1C 7.9 (H) 08/23/2018    HGBA1C 8.0 (H) 06/25/2018    HGBA1C 8.3 (H) 05/25/2018       Diabetes Management Status    Statin: Taking  ACE/ARB: Taking    Screening or Prevention Patient's value Goal Complete/Controlled?   HgA1C Testing and Control   Lab Results   Component Value Date    HGBA1C 8.2 (H) 11/13/2020      Annually/Less than 8% No   Lipid profile : 11/13/2020 Annually Yes   LDL control Lab Results   Component Value Date    LDLCALC 42.8 (L) 11/13/2020    Annually/Less than 100 mg/dl  Yes   Nephropathy  "screening Lab Results   Component Value Date    LABMICR 8.0 11/13/2020     Lab Results   Component Value Date    PROTEINUA NEG 09/09/2011    Annually Yes   Blood pressure BP Readings from Last 1 Encounters:   11/20/20 119/79    Less than 140/90 Yes   Dilated retinal exam : 10/01/2019 Annually Yes   Foot exam   : 08/10/2020 Annually Yes     Review of Systems   Constitutional: Negative for activity change and unexpected weight change.   HENT: Negative for sore throat and voice change.    Eyes: Negative for visual disturbance.   Respiratory: Negative for shortness of breath.    Cardiovascular: Negative for chest pain.   Gastrointestinal: Negative for abdominal pain, constipation, diarrhea, nausea and vomiting.   Genitourinary: Negative for urgency.   Musculoskeletal: Negative for arthralgias.   Skin: Negative for wound.   Neurological: Negative for headaches.   Psychiatric/Behavioral: Negative for confusion and sleep disturbance.       Social and family history reviewed  Current medications and allergies reviewed    Objective:   /79   Pulse 93   Ht 5' 9" (1.753 m)   Wt 96.3 kg (212 lb 4.9 oz)   BMI 31.35 kg/m²   Physical Exam  Vitals signs and nursing note reviewed.   Constitutional:       General: He is not in acute distress.     Appearance: He is well-developed.   HENT:      Head: Normocephalic and atraumatic.      Right Ear: External ear normal.      Left Ear: External ear normal.      Nose: Nose normal.   Eyes:      General: No scleral icterus.     Conjunctiva/sclera: Conjunctivae normal.   Neck:      Musculoskeletal: Normal range of motion and neck supple. No neck rigidity.      Thyroid: No thyromegaly.      Trachea: No tracheal deviation.      Comments: No thyroid enlargement noted on exam  Cardiovascular:      Rate and Rhythm: Normal rate.      Heart sounds: Normal heart sounds. No murmur.   Pulmonary:      Effort: Pulmonary effort is normal.      Breath sounds: Normal breath sounds.   Abdominal:      " Palpations: Abdomen is soft. There is no mass.      Tenderness: There is no abdominal tenderness.      Comments: Insulin injection sites examined on abdomen, clean, no redness, no nodule/masses     Musculoskeletal: Normal range of motion.   Lymphadenopathy:      Cervical: No cervical adenopathy.   Skin:     General: Skin is warm.      Findings: No rash.   Neurological:      Mental Status: He is alert and oriented to person, place, and time.      Sensory: No sensory deficit.      Coordination: Coordination normal.   Psychiatric:         Judgment: Judgment normal.     Protective Sensation (w/ 10 gram monofilament):  Right: Intact  Left: Intact    Visual Inspection:  Normal -  Bilateral    Pedal Pulses:   Right: Present  Left: Present    Posterior tibialis:   Right:Present  Left: Present    BP Readings from Last 1 Encounters:   11/20/20 119/79      Wt Readings from Last 1 Encounters:   11/20/20 0837 96.3 kg (212 lb 4.9 oz)     Body mass index is 31.35 kg/m².    Lab Review:   Lab Results   Component Value Date    HGBA1C 8.2 (H) 11/13/2020     Lab Results   Component Value Date    CHOL 129 11/13/2020    HDL 33 (L) 11/13/2020    LDLCALC 42.8 (L) 11/13/2020    TRIG 266 (H) 11/13/2020    CHOLHDL 25.6 11/13/2020     Lab Results   Component Value Date     11/13/2020    K 4.1 11/13/2020     (H) 11/13/2020    CO2 21 (L) 11/13/2020     11/13/2020    BUN 20 11/13/2020    CREATININE 1.1 11/13/2020    CALCIUM 9.5 11/13/2020    PROT 6.6 11/13/2020    ALBUMIN 4.0 11/13/2020    BILITOT 1.8 (H) 11/13/2020    ALKPHOS 59 11/13/2020    AST 21 11/13/2020    ALT 40 11/13/2020    ANIONGAP 9 11/13/2020    ESTGFRAFRICA >60.0 11/13/2020    EGFRNONAA >60.0 11/13/2020    TSH 2.215 11/13/2020       All pertinent labs reviewed    Assessment and Plan     Uncontrolled type 2 diabetes mellitus with hyperglycemia, with long-term current use of insulin  Reviewed goals of therapy are to get the best control we can without  hypoglycemia    Medication changes:   Start:   Trulicity 1.5mg has not taken for 6-8 months (No Hx of pancreatitis or thyroid cancer). Will start 0.75mg weekly for the next four weeks and will increase back to 1.5mg weekly on the next visit.    Continue:    Toujeo (Glargine) 55 units SQ at night  Novolog 20-24 units SQ before meals  Metformin 1000 mg PO BID     Reviewed patient's current insulin regimen. Clarified proper insulin dose and timing in relation to meals, etc. Insulin injection sites and proper rotation instructed.      Advised frequent self blood glucose monitoring. Patient encouraged to document glucose results and bring them to every clinic visit. Professional CGM ordered    Hypoglycemia precautions discussed. Instructed on precautions before driving.      Close adherence to lifestyle changes recommended.        Eyes: , next appointment due next week  Feet: Foot exam performed today - no loss of protective sensation. Given podiatry referral  Kidneys: Urine microalbumin/creatinine is up to date, continue taking ramipril 5 mg PO daily  HbA1c: 8.2 Ordered for 3 months  Lipids: LDL 42.8 at goal continue atorvastatin 40 mg PO daily  ASA: taking 81 mg PO daily    Lab Results   Component Value Date    HGBA1C 8.2 (H) 11/13/2020    HGBA1C 8.1 (H) 08/07/2020    HGBA1C 7.6 (H) 05/29/2020       Essential hypertension  Controlled   Managed by PCP  Continue Ramipril 5 mg PO daily and metoprolol 50 mg PO BID      Hyperlipidemia  LDL 42.8 at goal   Continue atorvastatin 40 mg PO daily

## 2020-11-20 NOTE — ASSESSMENT & PLAN NOTE
Reviewed goals of therapy are to get the best control we can without hypoglycemia    Medication changes:   Start:   Trulicity 1.5mg has not taken for 6-8 months (No Hx of pancreatitis or thyroid cancer). Will start 0.75mg weekly for the next four weeks and will increase back to 1.5mg weekly on the next visit.    Continue:    Toujeo (Glargine) 55 units SQ at night  Novolog 20-24 units SQ before meals  Metformin 1000 mg PO BID     Reviewed patient's current insulin regimen. Clarified proper insulin dose and timing in relation to meals, etc. Insulin injection sites and proper rotation instructed.      Advised frequent self blood glucose monitoring. Patient encouraged to document glucose results and bring them to every clinic visit. Professional CGM ordered    Hypoglycemia precautions discussed. Instructed on precautions before driving.      Close adherence to lifestyle changes recommended.        Eyes: , next appointment due next week  Feet: Foot exam performed today - no loss of protective sensation. Given podiatry referral  Kidneys: Urine microalbumin/creatinine is up to date, continue taking ramipril 5 mg PO daily  HbA1c: 8.2 Ordered for 3 months  Lipids: LDL 42.8 at goal continue atorvastatin 40 mg PO daily  ASA: taking 81 mg PO daily    Lab Results   Component Value Date    HGBA1C 8.2 (H) 11/13/2020    HGBA1C 8.1 (H) 08/07/2020    HGBA1C 7.6 (H) 05/29/2020

## 2020-11-20 NOTE — PATIENT INSTRUCTIONS
RTC in in 4-6 weeks  Will re-start Trulicity 0.75 mg and increase to 1.5 mg 4 weeks later  Will prescribe professional CGM

## 2020-11-21 NOTE — PROGRESS NOTES
I have reviewed and concur with Dr. Polanco's history, physical, assessment, and plan.  I have personally interviewed and examined the patient.      Valeria Collins MD

## 2020-11-24 ENCOUNTER — TELEPHONE (OUTPATIENT)
Dept: ENDOCRINOLOGY | Facility: CLINIC | Age: 70
End: 2020-11-24

## 2020-12-02 ENCOUNTER — OFFICE VISIT (OUTPATIENT)
Dept: PODIATRY | Facility: CLINIC | Age: 70
End: 2020-12-02
Payer: COMMERCIAL

## 2020-12-02 VITALS
DIASTOLIC BLOOD PRESSURE: 87 MMHG | SYSTOLIC BLOOD PRESSURE: 136 MMHG | WEIGHT: 212.31 LBS | BODY MASS INDEX: 31.44 KG/M2 | HEART RATE: 77 BPM | HEIGHT: 69 IN

## 2020-12-02 DIAGNOSIS — E11.9 ENCOUNTER FOR DIABETIC FOOT EXAM: Primary | ICD-10-CM

## 2020-12-02 DIAGNOSIS — Z79.4 UNCONTROLLED TYPE 2 DIABETES MELLITUS WITH HYPERGLYCEMIA, WITH LONG-TERM CURRENT USE OF INSULIN: ICD-10-CM

## 2020-12-02 DIAGNOSIS — B35.1 ONYCHOMYCOSIS DUE TO DERMATOPHYTE: ICD-10-CM

## 2020-12-02 DIAGNOSIS — E11.65 UNCONTROLLED TYPE 2 DIABETES MELLITUS WITH HYPERGLYCEMIA, WITH LONG-TERM CURRENT USE OF INSULIN: ICD-10-CM

## 2020-12-02 DIAGNOSIS — E11.42 DIABETIC POLYNEUROPATHY ASSOCIATED WITH TYPE 2 DIABETES MELLITUS: ICD-10-CM

## 2020-12-02 PROCEDURE — 3052F HG A1C>EQUAL 8.0%<EQUAL 9.0%: CPT | Mod: CPTII,S$GLB,, | Performed by: STUDENT IN AN ORGANIZED HEALTH CARE EDUCATION/TRAINING PROGRAM

## 2020-12-02 PROCEDURE — 3052F PR MOST RECENT HEMOGLOBIN A1C LEVEL 8.0 - < 9.0%: ICD-10-PCS | Mod: CPTII,S$GLB,, | Performed by: STUDENT IN AN ORGANIZED HEALTH CARE EDUCATION/TRAINING PROGRAM

## 2020-12-02 PROCEDURE — 3288F PR FALLS RISK ASSESSMENT DOCUMENTED: ICD-10-PCS | Mod: CPTII,S$GLB,, | Performed by: STUDENT IN AN ORGANIZED HEALTH CARE EDUCATION/TRAINING PROGRAM

## 2020-12-02 PROCEDURE — 3079F DIAST BP 80-89 MM HG: CPT | Mod: CPTII,S$GLB,, | Performed by: STUDENT IN AN ORGANIZED HEALTH CARE EDUCATION/TRAINING PROGRAM

## 2020-12-02 PROCEDURE — 99999 PR PBB SHADOW E&M-EST. PATIENT-LVL IV: ICD-10-PCS | Mod: PBBFAC,,, | Performed by: STUDENT IN AN ORGANIZED HEALTH CARE EDUCATION/TRAINING PROGRAM

## 2020-12-02 PROCEDURE — 99203 OFFICE O/P NEW LOW 30 MIN: CPT | Mod: S$GLB,,, | Performed by: STUDENT IN AN ORGANIZED HEALTH CARE EDUCATION/TRAINING PROGRAM

## 2020-12-02 PROCEDURE — 1126F PR PAIN SEVERITY QUANTIFIED, NO PAIN PRESENT: ICD-10-PCS | Mod: S$GLB,,, | Performed by: STUDENT IN AN ORGANIZED HEALTH CARE EDUCATION/TRAINING PROGRAM

## 2020-12-02 PROCEDURE — 3288F FALL RISK ASSESSMENT DOCD: CPT | Mod: CPTII,S$GLB,, | Performed by: STUDENT IN AN ORGANIZED HEALTH CARE EDUCATION/TRAINING PROGRAM

## 2020-12-02 PROCEDURE — 1101F PR PT FALLS ASSESS DOC 0-1 FALLS W/OUT INJ PAST YR: ICD-10-PCS | Mod: CPTII,S$GLB,, | Performed by: STUDENT IN AN ORGANIZED HEALTH CARE EDUCATION/TRAINING PROGRAM

## 2020-12-02 PROCEDURE — 3075F PR MOST RECENT SYSTOLIC BLOOD PRESS GE 130-139MM HG: ICD-10-PCS | Mod: CPTII,S$GLB,, | Performed by: STUDENT IN AN ORGANIZED HEALTH CARE EDUCATION/TRAINING PROGRAM

## 2020-12-02 PROCEDURE — 1101F PT FALLS ASSESS-DOCD LE1/YR: CPT | Mod: CPTII,S$GLB,, | Performed by: STUDENT IN AN ORGANIZED HEALTH CARE EDUCATION/TRAINING PROGRAM

## 2020-12-02 PROCEDURE — 3008F BODY MASS INDEX DOCD: CPT | Mod: CPTII,S$GLB,, | Performed by: STUDENT IN AN ORGANIZED HEALTH CARE EDUCATION/TRAINING PROGRAM

## 2020-12-02 PROCEDURE — 3075F SYST BP GE 130 - 139MM HG: CPT | Mod: CPTII,S$GLB,, | Performed by: STUDENT IN AN ORGANIZED HEALTH CARE EDUCATION/TRAINING PROGRAM

## 2020-12-02 PROCEDURE — 3008F PR BODY MASS INDEX (BMI) DOCUMENTED: ICD-10-PCS | Mod: CPTII,S$GLB,, | Performed by: STUDENT IN AN ORGANIZED HEALTH CARE EDUCATION/TRAINING PROGRAM

## 2020-12-02 PROCEDURE — 99203 PR OFFICE/OUTPT VISIT, NEW, LEVL III, 30-44 MIN: ICD-10-PCS | Mod: S$GLB,,, | Performed by: STUDENT IN AN ORGANIZED HEALTH CARE EDUCATION/TRAINING PROGRAM

## 2020-12-02 PROCEDURE — 99999 PR PBB SHADOW E&M-EST. PATIENT-LVL IV: CPT | Mod: PBBFAC,,, | Performed by: STUDENT IN AN ORGANIZED HEALTH CARE EDUCATION/TRAINING PROGRAM

## 2020-12-02 PROCEDURE — 1159F MED LIST DOCD IN RCRD: CPT | Mod: S$GLB,,, | Performed by: STUDENT IN AN ORGANIZED HEALTH CARE EDUCATION/TRAINING PROGRAM

## 2020-12-02 PROCEDURE — 3079F PR MOST RECENT DIASTOLIC BLOOD PRESSURE 80-89 MM HG: ICD-10-PCS | Mod: CPTII,S$GLB,, | Performed by: STUDENT IN AN ORGANIZED HEALTH CARE EDUCATION/TRAINING PROGRAM

## 2020-12-02 PROCEDURE — 1159F PR MEDICATION LIST DOCUMENTED IN MEDICAL RECORD: ICD-10-PCS | Mod: S$GLB,,, | Performed by: STUDENT IN AN ORGANIZED HEALTH CARE EDUCATION/TRAINING PROGRAM

## 2020-12-02 PROCEDURE — 1126F AMNT PAIN NOTED NONE PRSNT: CPT | Mod: S$GLB,,, | Performed by: STUDENT IN AN ORGANIZED HEALTH CARE EDUCATION/TRAINING PROGRAM

## 2020-12-02 NOTE — PROGRESS NOTES
Subjective:      Patient ID: Patria Nieto is a 70 y.o. male.    Chief Complaint: Diabetes Mellitus (11/13/2020 office visit with PCP-NELL Polanco ) and Diabetic Foot Exam    Patria is a 70 y.o. male who presents to the clinic for evaluation and treatment of high risk feet. Patria has a past medical history of Diabetes mellitus type II, MRSA infection, Hypertension, MRSA infection (2002), and Supraventricular tachycardia. The patient's chief complaint is here for a diabetic foot exam. Patient denies tingling/burning to toes and states he trims his own toenails at home without problems. This patient has documented high risk feet requiring routine maintenance secondary to diabetes mellitis and those secondary complications of diabetes, as mentioned..    PCP: Charles Hanna DO    Date Last Seen by PCP: 11/13/20    Current shoe gear:  Tennis shoes    Hemoglobin A1C   Date Value Ref Range Status   11/13/2020 8.2 (H) 4.0 - 5.6 % Final     Comment:     ADA Screening Guidelines:  5.7-6.4%  Consistent with prediabetes  >or=6.5%  Consistent with diabetes  High levels of fetal hemoglobin interfere with the HbA1C  assay. Heterozygous hemoglobin variants (HbS, HgC, etc)do  not significantly interfere with this assay.   However, presence of multiple variants may affect accuracy.     08/07/2020 8.1 (H) 4.0 - 5.6 % Final     Comment:     ADA Screening Guidelines:  5.7-6.4%  Consistent with prediabetes  >or=6.5%  Consistent with diabetes  High levels of fetal hemoglobin interfere with the HbA1C  assay. Heterozygous hemoglobin variants (HbS, HgC, etc)do  not significantly interfere with this assay.   However, presence of multiple variants may affect accuracy.     05/29/2020 7.6 (H) 4.0 - 5.6 % Final     Comment:     ADA Screening Guidelines:  5.7-6.4%  Consistent with prediabetes  >or=6.5%  Consistent with diabetes  High levels of fetal hemoglobin interfere with the HbA1C  assay. Heterozygous hemoglobin variants (HbS, HgC, etc)do  not  significantly interfere with this assay.   However, presence of multiple variants may affect accuracy.         Review of Systems   Constitution: Negative for chills, decreased appetite, diaphoresis and fever.   HENT: Negative for congestion and hearing loss.    Cardiovascular: Negative for chest pain, claudication, leg swelling and syncope.   Respiratory: Negative for cough and shortness of breath.    Skin: Positive for dry skin and nail changes. Negative for color change, flushing, itching, poor wound healing, rash, suspicious lesions and unusual hair distribution.   Musculoskeletal: Negative for back pain, joint pain, joint swelling and myalgias.   Gastrointestinal: Negative for nausea and vomiting.   Neurological: Positive for numbness. Negative for loss of balance, paresthesias and sensory change.   Psychiatric/Behavioral: Negative for altered mental status. The patient is not nervous/anxious.            Objective:      Physical Exam  Constitutional:       General: He is not in acute distress.     Appearance: He is well-developed. He is not diaphoretic.   Cardiovascular:      Pulses:           Dorsalis pedis pulses are 2+ on the right side and 2+ on the left side.        Posterior tibial pulses are 1+ on the right side and 1+ on the left side.      Comments: Dorsalis pedis and posterior tibial pulses are within normal limits. Skin temperature is within normal limits. Toes are cool to touch and feet are warm proximally. Hair growth is within normal limits. Skin is normotrophic and without hyperpigmentation. No edema noted. No varicosities or spider veins noted, dolly.       Musculoskeletal:         General: No tenderness.      Comments: Adequate joint range of motion without pain, limitation, nor crepitation to bilateral feet and ankle joints. Muscle strength is 5/5 in all groups bilaterally.       Feet:      Right foot:      Protective Sensation: 10 sites tested. 8 sites sensed.      Skin integrity: Dry skin  present. No blister, erythema or warmth.      Left foot:      Protective Sensation: 10 sites tested. 8 sites sensed.      Skin integrity: Dry skin present. No blister, erythema or warmth.   Lymphadenopathy:      Comments: Negative lymphadenopathy bilateral popliteal fossa and tarsal tunnel.    Skin:     General: Skin is warm and dry.      Coloration: Skin is not pale.      Findings: No abrasion, bruising, burn, erythema, laceration, petechiae or rash.      Nails: There is no clubbing.        Comments: Skin is warm and dry, bilaterally, no acute SOI noted, bilaterally, appears stable.     Nails are well trimmed and thickened by 2-4 mm's, dystrophic, and are darkened in coloration with subungual fungal debris.     No open wounds, macerations or hyperkeratotic lesions, bilaterally            Neurological:      Mental Status: He is alert and oriented to person, place, and time.      Sensory: Sensory deficit present.      Motor: No abnormal muscle tone.      Comments: Debord-Behzad 5.07 monofilamant testing is intact. Vibratory sensation is diminished dolly   Psychiatric:         Behavior: Behavior normal.         Thought Content: Thought content normal.         Judgment: Judgment normal.               Assessment:       Encounter Diagnoses   Name Primary?    Uncontrolled type 2 diabetes mellitus with hyperglycemia, with long-term current use of insulin     Diabetic polyneuropathy associated with type 2 diabetes mellitus     Onychomycosis due to dermatophyte     Encounter for diabetic foot exam Yes         Plan:       Patria was seen today for diabetes mellitus and diabetic foot exam.    Diagnoses and all orders for this visit:    Encounter for diabetic foot exam    Uncontrolled type 2 diabetes mellitus with hyperglycemia, with long-term current use of insulin  -     Ambulatory referral/consult to Podiatry    Diabetic polyneuropathy associated with type 2 diabetes mellitus    Onychomycosis due to dermatophyte      I  counseled the patient on his conditions, their implications and medical management.    Shoe inspection. Diabetic Foot Education. Patient reminded of the importance of good nutrition and blood sugar control to help prevent podiatric complications of diabetes. Patient instructed on proper foot hygeine. We discussed wearing proper shoe gear, daily foot inspections, never walking without protective shoe gear, never putting sharp instruments to feet, routine podiatric nail visits      Discussed general foot care:  Wear comfortable, proper fitting shoes. Wash feet daily. Dry well. After drying, apply moisturizer to feet (no lotion to webspaces). Inspect feet daily for skin breaks, blisters, swelling, or redness. Wear cotton socks (preferably white)  Change socks every day. Do NOT walk barefoot. Do NOT use heating pads or warm/hot water soaks. I discussed with the  patient  signs and symptoms of infection including redness, drainage, purulence, odor, pain, elevated BS, streaking, fever, chills, etc . Patient is to seek medical attention (ER or urgent care) if these symptoms occur      RTC in 6 months to 1 year, sooner PRN

## 2020-12-02 NOTE — LETTER
December 2, 2020      Ibis Polanco MD  [address]  [place] [address]           [facility]  [address], [address]  [place] [address]  Phone: [phone]  Fax: [phone]          Patient: Patria Nieto   MR Number: [id number]   YOB: 1950   Date of Visit: 12/2/2020       Dear Dr. Ibis Polanco:    Thank you for referring Patria Nieto to me for evaluation. Attached you will find relevant portions of my assessment and plan of care.    If you have questions, please do not hesitate to call me. I look forward to following Patria Nieto along with you.    Sincerely,    Valeria Obrien DPM    Enclosure  CC:  No Recipients    If you would like to receive this communication electronically, please contact [email or web address] or [phone] to request more information on [employer] access.    For providers and/or their staff who would like to refer a patient to [facility], please contact us through our one-stop-shop provider referral line, [facility], at [phone].    If you feel you have received this communication in error or would no longer like to receive these types of communications, please e-mail [email or web address]

## 2020-12-12 ENCOUNTER — OFFICE VISIT (OUTPATIENT)
Dept: URGENT CARE | Facility: CLINIC | Age: 70
End: 2020-12-12
Payer: COMMERCIAL

## 2020-12-12 VITALS
TEMPERATURE: 99 F | HEART RATE: 72 BPM | SYSTOLIC BLOOD PRESSURE: 123 MMHG | WEIGHT: 205 LBS | DIASTOLIC BLOOD PRESSURE: 85 MMHG | RESPIRATION RATE: 18 BRPM | OXYGEN SATURATION: 97 % | BODY MASS INDEX: 30.36 KG/M2 | HEIGHT: 69 IN
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
## 2020-12-12 PROCEDURE — 3008F BODY MASS INDEX DOCD: CPT | Mod: CPTII,S$GLB,, | Performed by: NURSE PRACTITIONER

## 2020-12-12 PROCEDURE — 99214 OFFICE O/P EST MOD 30 MIN: CPT | Mod: S$GLB,,, | Performed by: NURSE PRACTITIONER

## 2020-12-12 PROCEDURE — 3008F PR BODY MASS INDEX (BMI) DOCUMENTED: ICD-10-PCS | Mod: CPTII,S$GLB,, | Performed by: NURSE PRACTITIONER

## 2020-12-12 PROCEDURE — 99214 PR OFFICE/OUTPT VISIT, EST, LEVL IV, 30-39 MIN: ICD-10-PCS | Mod: S$GLB,,, | Performed by: NURSE PRACTITIONER

## 2020-12-12 RX ORDER — DOXYCYCLINE 100 MG/1
100 CAPSULE ORAL 2 TIMES DAILY
Qty: 14 CAPSULE | Refills: 0 | Status: SHIPPED | OUTPATIENT
Start: 2020-12-12 | End: 2020-12-19

## 2020-12-12 RX ORDER — MUPIROCIN 20 MG/G
OINTMENT TOPICAL 3 TIMES DAILY
Qty: 30 G | Refills: 0 | Status: SHIPPED | OUTPATIENT
Start: 2020-12-12 | End: 2021-07-30 | Stop reason: SDUPTHER

## 2020-12-12 NOTE — PROGRESS NOTES
"Subjective:       Patient ID: Patria Nieto is a 70 y.o. male.    Vitals:  height is 5' 9" (1.753 m) and weight is 93 kg (205 lb). His temperature is 98.7 °F (37.1 °C). His blood pressure is 123/85 and his pulse is 72. His respiration is 18 and oxygen saturation is 97%.     Chief Complaint: Insect Bite    This is a 70 y.o. male with history of situational anxiety, upper airway cough syndrome, HLD, hypertension, SVT, thrombocytopenia, type 2 diabetes, and MRSA history who presents today with a chief complaint of right lower leg insect bite last week, patient reports  last night he poked the"pimple" with sterile needle and some pus came out, patient reports he cleaned it afterwards, denies any pain or tenderness, denies any swelling, denies any extremity weakness or numbness or tingling, patient denies fever, body aches or chills, denies cough, wheezing or shortness of breath, denies nausea, vomiting, diarrhea or abdominal pain, denies chest pain or dizziness positional lightheadedness, denies sore throat or trouble swallowing, denies loss of taste or smell, or any other symptoms  Patient reports he does have history of MRSA and used to get Bactrim and requesting Bactrim, detailed education provided about Bactrim side effects, upon chart review patient was prescribed doxycycline by primary in May, patient showed me the bottle of doxycycline which  still had some medication left in the bottle, upon further questioning patient reported he only took the medication for 3 days and stopped as his boil got better          Insect Bite  This is a new problem. The current episode started 1 to 4 weeks ago. The problem occurs constantly. The problem has been unchanged. Associated symptoms include a rash. Pertinent negatives include no arthralgias, chills, coughing, fever, joint swelling or sore throat. Nothing aggravates the symptoms. He has tried nothing for the symptoms. The treatment provided no relief.       Constitution: " Negative for chills and fever.   HENT: Negative for facial swelling and sore throat.    Neck: Negative for painful lymph nodes.   Eyes: Negative for eye itching and eyelid swelling.   Respiratory: Negative for cough.    Musculoskeletal: Negative for joint pain and joint swelling.   Skin: Positive for rash. Negative for color change, pale, wound, abrasion, laceration, lesion, skin thickening/induration, puncture wound, erythema, abscess, avulsion and hives.   Allergic/Immunologic: Negative for environmental allergies, immunocompromised state and hives.   Hematologic/Lymphatic: Negative for swollen lymph nodes.       Objective:      Physical Exam   Constitutional: He is oriented to person, place, and time. He appears well-developed.   HENT:   Head: Normocephalic and atraumatic. Head is without abrasion, without contusion and without laceration.   Ears:   Right Ear: External ear normal.   Left Ear: External ear normal.   Nose: Nose normal.   Mouth/Throat: Oropharynx is clear and moist and mucous membranes are normal.   Eyes: Pupils are equal, round, and reactive to light. Conjunctivae, EOM and lids are normal.   Neck: Trachea normal, full passive range of motion without pain and phonation normal. Neck supple.   Cardiovascular: Normal rate, regular rhythm and normal heart sounds.   Pulmonary/Chest: Effort normal and breath sounds normal. No stridor. No respiratory distress.   Musculoskeletal: Normal range of motion.      Right lower leg: Normal. He exhibits no tenderness, no bony tenderness, no swelling, no deformity and no laceration. No edema.        Legs:    Neurological: He is alert and oriented to person, place, and time.   Skin: Skin is warm, dry, intact and no rash. Capillary refill takes less than 2 seconds. not right lower legabrasion, burn, bruising, erythema and ecchymosisPsychiatric: His speech is normal and behavior is normal. Judgment and thought content normal.   Nursing note and vitals reviewed.           Patient in no acute distress.  Vitals reassuring.  Detailed education provided about wound care and refrain from I&D at home..  Discussed with patient in depth regarding antibiotic completion and compliance.  I strongly recommended patient further evaluation if symptom worsens or does not improve.  Discussed results/diagnosis/plan in depth with patient in clinic. Strict precautions given to patient to monitor for worsening signs and symptoms. Advised to follow up with primary.All questions answered. Strict ER precautions given. If your symptoms worsens of fail to improve you should go to the Emergency Room. Discharge and follow-up instructions given verbally/printed. Patient voiced understanding and in agreement with current treatment plan.      Please be advised this text was dictated with [employer] software and may contain errors due to translation.      Assessment:       1. Bug bite with infection, initial encounter        Plan:         Bug bite with infection, initial encounter  -     doxycycline (MONODOX) 100 MG capsule; Take 1 capsule (100 mg total) by mouth 2 (two) times daily. for 7 days  Dispense: 14 capsule; Refill: 0  -     mupirocin (BACTROBAN) 2 % ointment; Apply topically 3 (three) times daily.  Dispense: 30 g; Refill: 0      Patient Instructions                                                       Bug Bites with Localized Reaction    If your condition worsens or fails to improve we recommend that you receive another evaluation at the ER immediately or contact your PCP to discuss your concerns or return here. You must understand that you've received an urgent care treatment only and that you may be released before all your medical problems are known or treated. You the patient will arrange for follouwp care as instructed.   Avoid picking or manipulating the wound. Clean the wound twice a day and put the antibiotic ointment on it.   You should seek ER treatment if fever, increase pain, swelling or  other signs of increasing infection.   Notify your PCP of a localized reaction to bug bites.   If you were prescribed antibiotics, please take them to completion    If your condition worsens or fails to improve we recommend that you receive another evaluation at the ER immediately or contact your PCP to discuss your concerns or return here. You must understand that you've received an urgent care treatment only and that you may be released before all your medical problems are known or treated. You the patient will arrange for followup care as instructed.    If you were prescribed antibiotics, please take them to completion.  If you were prescribed a narcotic medication, do not drive or operate heavy equipment or machinery while taking these medications.  Please follow up with your primary care doctor or specialist as needed.  If you  smoke, please stop smoking.    Insect Bite  Insects most often bite to protect themselves or their nests. Certain bugs, like fleas and mosquitoes, bite to feed. In some cases, the actual bite causes no pain. An itchy red welt or swelling may develop at the site of the bite. Most insect bites do not cause illness. And the itching and swelling most often go away without treatment. However, an infection can develop if the bite is scratched and the skin broken. Rarely, a person may have an allergic reaction to an insect bite.  If a stinger is visible at the bite spot, remove it as quickly as possible, as this can decrease the amount of venom that gets into your body. Scrape it out with a dull edge, such as the edge of a credit card. Try not to squeeze it. Do not try to dig it out, as you may damage the skin and also increase the chance of infection.     To help reduce swelling and itching, apply a cold pack or ice in a zip-top plastic bag wrapped in a thin towel.   Home care  · Your healthcare provider may prescribe over-the-counter medicines to help relieve itching and swelling. Use each  medicine according to the directions on the package. If the bite becomes infected, you will need an antibiotic. This may be in pill form taken by mouth or as an ointment or cream put directly on the skin. Be sure to use them exactly as prescribed.  · Bite symptoms usually go away on their own within a week or two.  · To help prevent infection, avoid scratching or picking at the bite.  · To help relieve itching and swelling, apply ice in a zip-top plastic bag wrapped in a thin towel to the bites. Do this for up to 10 minutes at a time. Avoid hot showers or baths as these tend to make itching worse.  · An over-the-counter anti-itch medicine such as calamine lotion or an antihistamine cream may be helpful.  · If you suspect you have insects in your home, talk to a licensed pest-control professional. He or she can inspect your home and tell you how to get rid of bugs safely.  Follow-up care  Follow up with your healthcare provider, or as advised.  Call 911  Call 911 if any of these occur:  · Trouble breathing or swallowing  · Wheezing  · Feeling like your throat is closing up  · Fainting, loss of consciousness  · Swelling around the face or mouth  When to seek medical advice  Call your healthcare provider right away if any of these occur:  · Fever of 100.4°F (38°C) or higher, or as directed by your healthcare provider  · Signs of infection, such as increased swelling and pain, warmth, red streaks, or drainage from the skin  · Signs of allergic reaction, such as hives, a spreading rash, or throat itching  Date Last Reviewed: 10/1/2016  © 1141-2157 [email or web address]. [address] [place] [address], [place] [address]. All rights reserved. This information is not intended as a substitute for professional medical care. Always follow your healthcare professional's instructions.

## 2020-12-12 NOTE — PATIENT INSTRUCTIONS
Bug Bites with Localized Reaction    If your condition worsens or fails to improve we recommend that you receive another evaluation at the ER immediately or contact your PCP to discuss your concerns or return here. You must understand that you've received an urgent care treatment only and that you may be released before all your medical problems are known or treated. You the patient will arrange for follouwp care as instructed.   Avoid picking or manipulating the wound. Clean the wound twice a day and put the antibiotic ointment on it.   You should seek ER treatment if fever, increase pain, swelling or other signs of increasing infection.   Notify your PCP of a localized reaction to bug bites.   If you were prescribed antibiotics, please take them to completion    If your condition worsens or fails to improve we recommend that you receive another evaluation at the ER immediately or contact your PCP to discuss your concerns or return here. You must understand that you've received an urgent care treatment only and that you may be released before all your medical problems are known or treated. You the patient will arrange for followup care as instructed.    If you were prescribed antibiotics, please take them to completion.  If you were prescribed a narcotic medication, do not drive or operate heavy equipment or machinery while taking these medications.  Please follow up with your primary care doctor or specialist as needed.  If you  smoke, please stop smoking.    Insect Bite  Insects most often bite to protect themselves or their nests. Certain bugs, like fleas and mosquitoes, bite to feed. In some cases, the actual bite causes no pain. An itchy red welt or swelling may develop at the site of the bite. Most insect bites do not cause illness. And the itching and swelling most often go away without treatment. However, an infection can develop if the bite is scratched and  the skin broken. Rarely, a person may have an allergic reaction to an insect bite.  If a stinger is visible at the bite spot, remove it as quickly as possible, as this can decrease the amount of venom that gets into your body. Scrape it out with a dull edge, such as the edge of a credit card. Try not to squeeze it. Do not try to dig it out, as you may damage the skin and also increase the chance of infection.     To help reduce swelling and itching, apply a cold pack or ice in a zip-top plastic bag wrapped in a thin towel.   Home care  · Your healthcare provider may prescribe over-the-counter medicines to help relieve itching and swelling. Use each medicine according to the directions on the package. If the bite becomes infected, you will need an antibiotic. This may be in pill form taken by mouth or as an ointment or cream put directly on the skin. Be sure to use them exactly as prescribed.  · Bite symptoms usually go away on their own within a week or two.  · To help prevent infection, avoid scratching or picking at the bite.  · To help relieve itching and swelling, apply ice in a zip-top plastic bag wrapped in a thin towel to the bites. Do this for up to 10 minutes at a time. Avoid hot showers or baths as these tend to make itching worse.  · An over-the-counter anti-itch medicine such as calamine lotion or an antihistamine cream may be helpful.  · If you suspect you have insects in your home, talk to a licensed pest-control professional. He or she can inspect your home and tell you how to get rid of bugs safely.  Follow-up care  Follow up with your healthcare provider, or as advised.  Call 911  Call 911 if any of these occur:  · Trouble breathing or swallowing  · Wheezing  · Feeling like your throat is closing up  · Fainting, loss of consciousness  · Swelling around the face or mouth  When to seek medical advice  Call your healthcare provider right away if any of these occur:  · Fever of 100.4°F (38°C) or higher,  or as directed by your healthcare provider  · Signs of infection, such as increased swelling and pain, warmth, red streaks, or drainage from the skin  · Signs of allergic reaction, such as hives, a spreading rash, or throat itching  Date Last Reviewed: 10/1/2016  © 8792-0693 [employer]. [address] [place] [address]. All rights reserved. This information is not intended as a substitute for professional medical care. Always follow your healthcare professional's instructions.

## 2020-12-23 ENCOUNTER — PATIENT MESSAGE (OUTPATIENT)
Dept: FAMILY MEDICINE | Facility: CLINIC | Age: 70
End: 2020-12-23

## 2020-12-23 LAB
LEFT EYE DM RETINOPATHY: POSITIVE
RIGHT EYE DM RETINOPATHY: POSITIVE

## 2020-12-30 RX ORDER — DOXYCYCLINE HYCLATE 100 MG
100 TABLET ORAL 2 TIMES DAILY
Qty: 20 TABLET | Refills: 0 | Status: SHIPPED | OUTPATIENT
Start: 2020-12-30 | End: 2021-01-09

## 2021-01-05 ENCOUNTER — PATIENT MESSAGE (OUTPATIENT)
Dept: ADMINISTRATIVE | Facility: HOSPITAL | Age: 71
End: 2021-01-05

## 2021-02-04 ENCOUNTER — PATIENT OUTREACH (OUTPATIENT)
Dept: ADMINISTRATIVE | Facility: HOSPITAL | Age: 71
End: 2021-02-04

## 2021-02-04 DIAGNOSIS — E11.9 TYPE 2 DIABETES MELLITUS WITHOUT COMPLICATION, UNSPECIFIED WHETHER LONG TERM INSULIN USE: Primary | ICD-10-CM

## 2021-02-12 ENCOUNTER — PATIENT OUTREACH (OUTPATIENT)
Dept: ADMINISTRATIVE | Facility: HOSPITAL | Age: 71
End: 2021-02-12

## 2021-02-13 ENCOUNTER — LAB VISIT (OUTPATIENT)
Dept: LAB | Facility: HOSPITAL | Age: 71
End: 2021-02-13
Attending: FAMILY MEDICINE
Payer: COMMERCIAL

## 2021-02-13 DIAGNOSIS — E11.9 TYPE 2 DIABETES MELLITUS WITHOUT COMPLICATION, UNSPECIFIED WHETHER LONG TERM INSULIN USE: ICD-10-CM

## 2021-02-13 LAB
ESTIMATED AVG GLUCOSE: 194 MG/DL (ref 68–131)
HBA1C MFR BLD: 8.4 % (ref 4–5.6)

## 2021-02-13 PROCEDURE — 36415 COLL VENOUS BLD VENIPUNCTURE: CPT | Mod: PO

## 2021-02-13 PROCEDURE — 83036 HEMOGLOBIN GLYCOSYLATED A1C: CPT

## 2021-02-17 RX ORDER — DULAGLUTIDE 0.75 MG/.5ML
INJECTION, SOLUTION SUBCUTANEOUS
COMMUNITY
Start: 2021-01-20 | End: 2021-02-18

## 2021-02-18 ENCOUNTER — TELEPHONE (OUTPATIENT)
Dept: ENDOCRINOLOGY | Facility: CLINIC | Age: 71
End: 2021-02-18

## 2021-02-18 ENCOUNTER — OFFICE VISIT (OUTPATIENT)
Dept: FAMILY MEDICINE | Facility: CLINIC | Age: 71
End: 2021-02-18
Payer: COMMERCIAL

## 2021-02-18 VITALS
WEIGHT: 209.88 LBS | SYSTOLIC BLOOD PRESSURE: 136 MMHG | BODY MASS INDEX: 31.09 KG/M2 | HEIGHT: 69 IN | HEART RATE: 92 BPM | TEMPERATURE: 97 F | DIASTOLIC BLOOD PRESSURE: 82 MMHG | OXYGEN SATURATION: 99 %

## 2021-02-18 DIAGNOSIS — E11.59 HYPERTENSION ASSOCIATED WITH DIABETES: ICD-10-CM

## 2021-02-18 DIAGNOSIS — I10 ESSENTIAL HYPERTENSION: ICD-10-CM

## 2021-02-18 DIAGNOSIS — L03.818 CELLULITIS OF OTHER SPECIFIED SITE: ICD-10-CM

## 2021-02-18 DIAGNOSIS — E78.49 OTHER HYPERLIPIDEMIA: ICD-10-CM

## 2021-02-18 DIAGNOSIS — I15.2 HYPERTENSION ASSOCIATED WITH DIABETES: ICD-10-CM

## 2021-02-18 DIAGNOSIS — E11.65 UNCONTROLLED TYPE 2 DIABETES MELLITUS WITH HYPERGLYCEMIA, WITH LONG-TERM CURRENT USE OF INSULIN: ICD-10-CM

## 2021-02-18 DIAGNOSIS — E11.42 TYPE 2 DIABETES MELLITUS WITH DIABETIC POLYNEUROPATHY, WITH LONG-TERM CURRENT USE OF INSULIN: ICD-10-CM

## 2021-02-18 DIAGNOSIS — Z79.4 UNCONTROLLED TYPE 2 DIABETES MELLITUS WITH HYPERGLYCEMIA, WITH LONG-TERM CURRENT USE OF INSULIN: ICD-10-CM

## 2021-02-18 DIAGNOSIS — E66.9 OBESITY (BMI 30-39.9): ICD-10-CM

## 2021-02-18 DIAGNOSIS — E78.5 HYPERLIPIDEMIA LDL GOAL <100: ICD-10-CM

## 2021-02-18 DIAGNOSIS — Z79.4 TYPE 2 DIABETES MELLITUS WITH DIABETIC POLYNEUROPATHY, WITH LONG-TERM CURRENT USE OF INSULIN: ICD-10-CM

## 2021-02-18 PROCEDURE — 3008F BODY MASS INDEX DOCD: CPT | Mod: CPTII,S$GLB,, | Performed by: FAMILY MEDICINE

## 2021-02-18 PROCEDURE — 1101F PT FALLS ASSESS-DOCD LE1/YR: CPT | Mod: CPTII,S$GLB,, | Performed by: FAMILY MEDICINE

## 2021-02-18 PROCEDURE — 3079F PR MOST RECENT DIASTOLIC BLOOD PRESSURE 80-89 MM HG: ICD-10-PCS | Mod: CPTII,S$GLB,, | Performed by: FAMILY MEDICINE

## 2021-02-18 PROCEDURE — 99999 PR PBB SHADOW E&M-EST. PATIENT-LVL IV: ICD-10-PCS | Mod: PBBFAC,,, | Performed by: FAMILY MEDICINE

## 2021-02-18 PROCEDURE — 3288F FALL RISK ASSESSMENT DOCD: CPT | Mod: CPTII,S$GLB,, | Performed by: FAMILY MEDICINE

## 2021-02-18 PROCEDURE — 3288F PR FALLS RISK ASSESSMENT DOCUMENTED: ICD-10-PCS | Mod: CPTII,S$GLB,, | Performed by: FAMILY MEDICINE

## 2021-02-18 PROCEDURE — 1159F MED LIST DOCD IN RCRD: CPT | Mod: S$GLB,,, | Performed by: FAMILY MEDICINE

## 2021-02-18 PROCEDURE — 1101F PR PT FALLS ASSESS DOC 0-1 FALLS W/OUT INJ PAST YR: ICD-10-PCS | Mod: CPTII,S$GLB,, | Performed by: FAMILY MEDICINE

## 2021-02-18 PROCEDURE — 3052F HG A1C>EQUAL 8.0%<EQUAL 9.0%: CPT | Mod: CPTII,S$GLB,, | Performed by: FAMILY MEDICINE

## 2021-02-18 PROCEDURE — 99214 PR OFFICE/OUTPT VISIT, EST, LEVL IV, 30-39 MIN: ICD-10-PCS | Mod: S$GLB,,, | Performed by: FAMILY MEDICINE

## 2021-02-18 PROCEDURE — 1159F PR MEDICATION LIST DOCUMENTED IN MEDICAL RECORD: ICD-10-PCS | Mod: S$GLB,,, | Performed by: FAMILY MEDICINE

## 2021-02-18 PROCEDURE — 3079F DIAST BP 80-89 MM HG: CPT | Mod: CPTII,S$GLB,, | Performed by: FAMILY MEDICINE

## 2021-02-18 PROCEDURE — 3075F PR MOST RECENT SYSTOLIC BLOOD PRESS GE 130-139MM HG: ICD-10-PCS | Mod: CPTII,S$GLB,, | Performed by: FAMILY MEDICINE

## 2021-02-18 PROCEDURE — 99999 PR PBB SHADOW E&M-EST. PATIENT-LVL IV: CPT | Mod: PBBFAC,,, | Performed by: FAMILY MEDICINE

## 2021-02-18 PROCEDURE — 3075F SYST BP GE 130 - 139MM HG: CPT | Mod: CPTII,S$GLB,, | Performed by: FAMILY MEDICINE

## 2021-02-18 PROCEDURE — 1126F PR PAIN SEVERITY QUANTIFIED, NO PAIN PRESENT: ICD-10-PCS | Mod: S$GLB,,, | Performed by: FAMILY MEDICINE

## 2021-02-18 PROCEDURE — 1126F AMNT PAIN NOTED NONE PRSNT: CPT | Mod: S$GLB,,, | Performed by: FAMILY MEDICINE

## 2021-02-18 PROCEDURE — 3008F PR BODY MASS INDEX (BMI) DOCUMENTED: ICD-10-PCS | Mod: CPTII,S$GLB,, | Performed by: FAMILY MEDICINE

## 2021-02-18 PROCEDURE — 99214 OFFICE O/P EST MOD 30 MIN: CPT | Mod: S$GLB,,, | Performed by: FAMILY MEDICINE

## 2021-02-18 PROCEDURE — 3052F PR MOST RECENT HEMOGLOBIN A1C LEVEL 8.0 - < 9.0%: ICD-10-PCS | Mod: CPTII,S$GLB,, | Performed by: FAMILY MEDICINE

## 2021-02-18 RX ORDER — SULFAMETHOXAZOLE AND TRIMETHOPRIM 800; 160 MG/1; MG/1
1 TABLET ORAL 2 TIMES DAILY
Qty: 14 TABLET | Refills: 0 | Status: SHIPPED | OUTPATIENT
Start: 2021-02-18 | End: 2021-02-25

## 2021-02-18 RX ORDER — RAMIPRIL 5 MG/1
5 CAPSULE ORAL DAILY
Qty: 90 CAPSULE | Refills: 2 | Status: SHIPPED | OUTPATIENT
Start: 2021-02-18 | End: 2021-05-18 | Stop reason: SDUPTHER

## 2021-02-18 RX ORDER — ATORVASTATIN CALCIUM 40 MG/1
40 TABLET, FILM COATED ORAL DAILY
Qty: 90 TABLET | Refills: 0 | Status: SHIPPED | OUTPATIENT
Start: 2021-02-18 | End: 2021-05-18 | Stop reason: SDUPTHER

## 2021-02-18 RX ORDER — METOPROLOL TARTRATE 50 MG/1
50 TABLET ORAL 2 TIMES DAILY
Qty: 180 TABLET | Refills: 0 | Status: ON HOLD | OUTPATIENT
Start: 2021-02-18 | End: 2021-04-12 | Stop reason: HOSPADM

## 2021-02-18 RX ORDER — DULAGLUTIDE 1.5 MG/.5ML
1.5 INJECTION, SOLUTION SUBCUTANEOUS WEEKLY
Qty: 6 ML | Refills: 0 | Status: SHIPPED | OUTPATIENT
Start: 2021-02-18 | End: 2021-05-18

## 2021-02-18 RX ORDER — HYDROCORTISONE 25 MG/G
CREAM TOPICAL 2 TIMES DAILY
Qty: 28 G | Refills: 0 | Status: SHIPPED | OUTPATIENT
Start: 2021-02-18 | End: 2021-11-18

## 2021-02-18 RX ORDER — MINERAL OIL
180 ENEMA (ML) RECTAL DAILY
Qty: 90 TABLET | Refills: 1 | Status: SHIPPED | OUTPATIENT
Start: 2021-02-18 | End: 2021-11-18

## 2021-02-18 RX ORDER — INSULIN GLARGINE 300 [IU]/ML
INJECTION, SOLUTION SUBCUTANEOUS
Qty: 12 SYRINGE | Refills: 0 | Status: SHIPPED | OUTPATIENT
Start: 2021-02-18 | End: 2021-06-09 | Stop reason: SDUPTHER

## 2021-03-10 ENCOUNTER — TELEPHONE (OUTPATIENT)
Dept: ELECTROPHYSIOLOGY | Facility: CLINIC | Age: 71
End: 2021-03-10

## 2021-03-10 ENCOUNTER — OFFICE VISIT (OUTPATIENT)
Dept: ELECTROPHYSIOLOGY | Facility: CLINIC | Age: 71
End: 2021-03-10
Payer: COMMERCIAL

## 2021-03-10 VITALS
HEART RATE: 78 BPM | BODY MASS INDEX: 30.1 KG/M2 | SYSTOLIC BLOOD PRESSURE: 118 MMHG | HEIGHT: 69 IN | WEIGHT: 203.25 LBS | DIASTOLIC BLOOD PRESSURE: 74 MMHG

## 2021-03-10 DIAGNOSIS — E78.49 OTHER HYPERLIPIDEMIA: ICD-10-CM

## 2021-03-10 DIAGNOSIS — I47.10 SVT (SUPRAVENTRICULAR TACHYCARDIA): ICD-10-CM

## 2021-03-10 DIAGNOSIS — I10 ESSENTIAL HYPERTENSION: ICD-10-CM

## 2021-03-10 DIAGNOSIS — E78.5 HYPERLIPIDEMIA LDL GOAL <100: Primary | ICD-10-CM

## 2021-03-10 PROCEDURE — 1126F AMNT PAIN NOTED NONE PRSNT: CPT | Mod: S$GLB,,, | Performed by: INTERNAL MEDICINE

## 2021-03-10 PROCEDURE — 1126F PR PAIN SEVERITY QUANTIFIED, NO PAIN PRESENT: ICD-10-PCS | Mod: S$GLB,,, | Performed by: INTERNAL MEDICINE

## 2021-03-10 PROCEDURE — 99215 PR OFFICE/OUTPT VISIT, EST, LEVL V, 40-54 MIN: ICD-10-PCS | Mod: S$GLB,,, | Performed by: INTERNAL MEDICINE

## 2021-03-10 PROCEDURE — 99999 PR PBB SHADOW E&M-EST. PATIENT-LVL III: ICD-10-PCS | Mod: PBBFAC,,, | Performed by: INTERNAL MEDICINE

## 2021-03-10 PROCEDURE — 3008F PR BODY MASS INDEX (BMI) DOCUMENTED: ICD-10-PCS | Mod: CPTII,S$GLB,, | Performed by: INTERNAL MEDICINE

## 2021-03-10 PROCEDURE — 1159F MED LIST DOCD IN RCRD: CPT | Mod: S$GLB,,, | Performed by: INTERNAL MEDICINE

## 2021-03-10 PROCEDURE — 1159F PR MEDICATION LIST DOCUMENTED IN MEDICAL RECORD: ICD-10-PCS | Mod: S$GLB,,, | Performed by: INTERNAL MEDICINE

## 2021-03-10 PROCEDURE — 3008F BODY MASS INDEX DOCD: CPT | Mod: CPTII,S$GLB,, | Performed by: INTERNAL MEDICINE

## 2021-03-10 PROCEDURE — 3288F PR FALLS RISK ASSESSMENT DOCUMENTED: ICD-10-PCS | Mod: CPTII,S$GLB,, | Performed by: INTERNAL MEDICINE

## 2021-03-10 PROCEDURE — 3288F FALL RISK ASSESSMENT DOCD: CPT | Mod: CPTII,S$GLB,, | Performed by: INTERNAL MEDICINE

## 2021-03-10 PROCEDURE — 3074F PR MOST RECENT SYSTOLIC BLOOD PRESSURE < 130 MM HG: ICD-10-PCS | Mod: CPTII,S$GLB,, | Performed by: INTERNAL MEDICINE

## 2021-03-10 PROCEDURE — 3078F PR MOST RECENT DIASTOLIC BLOOD PRESSURE < 80 MM HG: ICD-10-PCS | Mod: CPTII,S$GLB,, | Performed by: INTERNAL MEDICINE

## 2021-03-10 PROCEDURE — 99215 OFFICE O/P EST HI 40 MIN: CPT | Mod: S$GLB,,, | Performed by: INTERNAL MEDICINE

## 2021-03-10 PROCEDURE — 1101F PT FALLS ASSESS-DOCD LE1/YR: CPT | Mod: CPTII,S$GLB,, | Performed by: INTERNAL MEDICINE

## 2021-03-10 PROCEDURE — 1101F PR PT FALLS ASSESS DOC 0-1 FALLS W/OUT INJ PAST YR: ICD-10-PCS | Mod: CPTII,S$GLB,, | Performed by: INTERNAL MEDICINE

## 2021-03-10 PROCEDURE — 99999 PR PBB SHADOW E&M-EST. PATIENT-LVL III: CPT | Mod: PBBFAC,,, | Performed by: INTERNAL MEDICINE

## 2021-03-10 PROCEDURE — 3074F SYST BP LT 130 MM HG: CPT | Mod: CPTII,S$GLB,, | Performed by: INTERNAL MEDICINE

## 2021-03-10 PROCEDURE — 3078F DIAST BP <80 MM HG: CPT | Mod: CPTII,S$GLB,, | Performed by: INTERNAL MEDICINE

## 2021-03-10 RX ORDER — SERTRALINE HYDROCHLORIDE 25 MG/1
25 TABLET, FILM COATED ORAL DAILY
COMMUNITY
End: 2021-05-10 | Stop reason: SDUPTHER

## 2021-03-23 ENCOUNTER — PATIENT MESSAGE (OUTPATIENT)
Dept: ELECTROPHYSIOLOGY | Facility: CLINIC | Age: 71
End: 2021-03-23

## 2021-03-23 DIAGNOSIS — I47.10 PSVT (PAROXYSMAL SUPRAVENTRICULAR TACHYCARDIA): Primary | ICD-10-CM

## 2021-04-09 ENCOUNTER — TELEPHONE (OUTPATIENT)
Dept: ELECTROPHYSIOLOGY | Facility: CLINIC | Age: 71
End: 2021-04-09

## 2021-04-09 ENCOUNTER — LAB VISIT (OUTPATIENT)
Dept: LAB | Facility: HOSPITAL | Age: 71
End: 2021-04-09
Attending: INTERNAL MEDICINE
Payer: COMMERCIAL

## 2021-04-09 ENCOUNTER — LAB VISIT (OUTPATIENT)
Dept: FAMILY MEDICINE | Facility: CLINIC | Age: 71
End: 2021-04-09
Payer: COMMERCIAL

## 2021-04-09 DIAGNOSIS — Z01.818 PRE-OP TESTING: ICD-10-CM

## 2021-04-09 DIAGNOSIS — I47.10 PSVT (PAROXYSMAL SUPRAVENTRICULAR TACHYCARDIA): ICD-10-CM

## 2021-04-09 LAB
ANION GAP SERPL CALC-SCNC: 12 MMOL/L (ref 8–16)
APTT BLDCRRT: 24.6 SEC (ref 21–32)
BASOPHILS # BLD AUTO: 0.02 K/UL (ref 0–0.2)
BASOPHILS NFR BLD: 0.3 % (ref 0–1.9)
BUN SERPL-MCNC: 12 MG/DL (ref 8–23)
CALCIUM SERPL-MCNC: 9.1 MG/DL (ref 8.7–10.5)
CHLORIDE SERPL-SCNC: 111 MMOL/L (ref 95–110)
CO2 SERPL-SCNC: 19 MMOL/L (ref 23–29)
CREAT SERPL-MCNC: 1 MG/DL (ref 0.5–1.4)
DIFFERENTIAL METHOD: ABNORMAL
EOSINOPHIL # BLD AUTO: 0.3 K/UL (ref 0–0.5)
EOSINOPHIL NFR BLD: 4.7 % (ref 0–8)
ERYTHROCYTE [DISTWIDTH] IN BLOOD BY AUTOMATED COUNT: 13.3 % (ref 11.5–14.5)
EST. GFR  (AFRICAN AMERICAN): >60 ML/MIN/1.73 M^2
EST. GFR  (NON AFRICAN AMERICAN): >60 ML/MIN/1.73 M^2
GLUCOSE SERPL-MCNC: 218 MG/DL (ref 70–110)
HCT VFR BLD AUTO: 43.7 % (ref 40–54)
HGB BLD-MCNC: 15.1 G/DL (ref 14–18)
IMM GRANULOCYTES # BLD AUTO: 0.02 K/UL (ref 0–0.04)
IMM GRANULOCYTES NFR BLD AUTO: 0.3 % (ref 0–0.5)
INR PPP: 1 (ref 0.8–1.2)
LYMPHOCYTES # BLD AUTO: 1.5 K/UL (ref 1–4.8)
LYMPHOCYTES NFR BLD: 21.7 % (ref 18–48)
MCH RBC QN AUTO: 32.8 PG (ref 27–31)
MCHC RBC AUTO-ENTMCNC: 34.6 G/DL (ref 32–36)
MCV RBC AUTO: 95 FL (ref 82–98)
MONOCYTES # BLD AUTO: 0.6 K/UL (ref 0.3–1)
MONOCYTES NFR BLD: 8.2 % (ref 4–15)
NEUTROPHILS # BLD AUTO: 4.4 K/UL (ref 1.8–7.7)
NEUTROPHILS NFR BLD: 64.8 % (ref 38–73)
NRBC BLD-RTO: 0 /100 WBC
PLATELET # BLD AUTO: 141 K/UL (ref 150–450)
PMV BLD AUTO: 12.7 FL (ref 9.2–12.9)
POTASSIUM SERPL-SCNC: 4.4 MMOL/L (ref 3.5–5.1)
PROTHROMBIN TIME: 10.6 SEC (ref 9–12.5)
RBC # BLD AUTO: 4.61 M/UL (ref 4.6–6.2)
SODIUM SERPL-SCNC: 142 MMOL/L (ref 136–145)
WBC # BLD AUTO: 6.82 K/UL (ref 3.9–12.7)

## 2021-04-09 PROCEDURE — 36415 COLL VENOUS BLD VENIPUNCTURE: CPT | Performed by: INTERNAL MEDICINE

## 2021-04-09 PROCEDURE — 85730 THROMBOPLASTIN TIME PARTIAL: CPT | Performed by: INTERNAL MEDICINE

## 2021-04-09 PROCEDURE — 80048 BASIC METABOLIC PNL TOTAL CA: CPT | Performed by: INTERNAL MEDICINE

## 2021-04-09 PROCEDURE — 85610 PROTHROMBIN TIME: CPT | Performed by: INTERNAL MEDICINE

## 2021-04-09 PROCEDURE — U0003 INFECTIOUS AGENT DETECTION BY NUCLEIC ACID (DNA OR RNA); SEVERE ACUTE RESPIRATORY SYNDROME CORONAVIRUS 2 (SARS-COV-2) (CORONAVIRUS DISEASE [COVID-19]), AMPLIFIED PROBE TECHNIQUE, MAKING USE OF HIGH THROUGHPUT TECHNOLOGIES AS DESCRIBED BY CMS-2020-01-R: HCPCS | Performed by: INTERNAL MEDICINE

## 2021-04-09 PROCEDURE — U0005 INFEC AGEN DETEC AMPLI PROBE: HCPCS | Performed by: INTERNAL MEDICINE

## 2021-04-09 PROCEDURE — 85025 COMPLETE CBC W/AUTO DIFF WBC: CPT | Performed by: INTERNAL MEDICINE

## 2021-04-10 LAB — SARS-COV-2 RNA RESP QL NAA+PROBE: NOT DETECTED

## 2021-04-12 ENCOUNTER — ANESTHESIA (OUTPATIENT)
Dept: MEDSURG UNIT | Facility: HOSPITAL | Age: 71
End: 2021-04-12
Payer: COMMERCIAL

## 2021-04-12 ENCOUNTER — ANESTHESIA EVENT (OUTPATIENT)
Dept: MEDSURG UNIT | Facility: HOSPITAL | Age: 71
End: 2021-04-12
Payer: COMMERCIAL

## 2021-04-12 ENCOUNTER — HOSPITAL ENCOUNTER (OUTPATIENT)
Facility: HOSPITAL | Age: 71
Discharge: HOME | End: 2021-04-12
Attending: INTERNAL MEDICINE | Admitting: INTERNAL MEDICINE
Payer: COMMERCIAL

## 2021-04-12 VITALS
HEIGHT: 69 IN | TEMPERATURE: 98 F | BODY MASS INDEX: 29.77 KG/M2 | SYSTOLIC BLOOD PRESSURE: 138 MMHG | OXYGEN SATURATION: 97 % | WEIGHT: 201 LBS | HEART RATE: 95 BPM | RESPIRATION RATE: 17 BRPM | DIASTOLIC BLOOD PRESSURE: 68 MMHG

## 2021-04-12 DIAGNOSIS — I47.10 SVT (SUPRAVENTRICULAR TACHYCARDIA): ICD-10-CM

## 2021-04-12 DIAGNOSIS — I47.10 PSVT (PAROXYSMAL SUPRAVENTRICULAR TACHYCARDIA): ICD-10-CM

## 2021-04-12 DIAGNOSIS — I49.9 ARRHYTHMIA: ICD-10-CM

## 2021-04-12 LAB
POCT GLUCOSE: 123 MG/DL (ref 70–110)
POCT GLUCOSE: 126 MG/DL (ref 70–110)

## 2021-04-12 PROCEDURE — C1894 INTRO/SHEATH, NON-LASER: HCPCS | Performed by: INTERNAL MEDICINE

## 2021-04-12 PROCEDURE — 99220 PR INITIAL OBSERVATION CARE,LEVL III: CPT | Mod: ,,, | Performed by: INTERNAL MEDICINE

## 2021-04-12 PROCEDURE — D9220A PRA ANESTHESIA: ICD-10-PCS | Mod: ,,, | Performed by: NURSE ANESTHETIST, CERTIFIED REGISTERED

## 2021-04-12 PROCEDURE — 93621: ICD-10-PCS | Mod: 26,,, | Performed by: INTERNAL MEDICINE

## 2021-04-12 PROCEDURE — 93613 INTRACARDIAC EPHYS 3D MAPG: CPT | Performed by: INTERNAL MEDICINE

## 2021-04-12 PROCEDURE — 82962 GLUCOSE BLOOD TEST: CPT | Performed by: INTERNAL MEDICINE

## 2021-04-12 PROCEDURE — 93005 ELECTROCARDIOGRAM TRACING: CPT

## 2021-04-12 PROCEDURE — 27201037 HC PRESSURE MONITORING SET UP

## 2021-04-12 PROCEDURE — C2630 CATH, EP, COOL-TIP: HCPCS | Performed by: INTERNAL MEDICINE

## 2021-04-12 PROCEDURE — 93653 PR ELECTROPHYS EVAL, COMPREHEN, W/SUPRAVENT TACHYCARD TRMT: ICD-10-PCS | Mod: ,,, | Performed by: INTERNAL MEDICINE

## 2021-04-12 PROCEDURE — 37000008 HC ANESTHESIA 1ST 15 MINUTES: Performed by: INTERNAL MEDICINE

## 2021-04-12 PROCEDURE — 93613 PR INTRACARD ELECTROPHYS 3-DIMENS MAPPING: ICD-10-PCS | Mod: ,,, | Performed by: INTERNAL MEDICINE

## 2021-04-12 PROCEDURE — 93623 PRGRMD STIMJ&PACG IV RX NFS: CPT | Mod: 26,,, | Performed by: INTERNAL MEDICINE

## 2021-04-12 PROCEDURE — 27201423 OPTIME MED/SURG SUP & DEVICES STERILE SUPPLY: Performed by: INTERNAL MEDICINE

## 2021-04-12 PROCEDURE — C1730 CATH, EP, 19 OR FEW ELECT: HCPCS | Performed by: INTERNAL MEDICINE

## 2021-04-12 PROCEDURE — D9220A PRA ANESTHESIA: ICD-10-PCS | Mod: ,,, | Performed by: ANESTHESIOLOGY

## 2021-04-12 PROCEDURE — 93623 PRGRMD STIMJ&PACG IV RX NFS: CPT | Performed by: INTERNAL MEDICINE

## 2021-04-12 PROCEDURE — 93621 COMP EP EVL L PAC&REC C SINS: CPT

## 2021-04-12 PROCEDURE — 25000003 PHARM REV CODE 250: Performed by: NURSE ANESTHETIST, CERTIFIED REGISTERED

## 2021-04-12 PROCEDURE — 63600175 PHARM REV CODE 636 W HCPCS: Performed by: NURSE ANESTHETIST, CERTIFIED REGISTERED

## 2021-04-12 PROCEDURE — 25000003 PHARM REV CODE 250: Performed by: INTERNAL MEDICINE

## 2021-04-12 PROCEDURE — 93010 ELECTROCARDIOGRAM REPORT: CPT | Mod: 76,,, | Performed by: INTERNAL MEDICINE

## 2021-04-12 PROCEDURE — 93010 ELECTROCARDIOGRAM REPORT: CPT | Mod: ,,, | Performed by: INTERNAL MEDICINE

## 2021-04-12 PROCEDURE — D9220A PRA ANESTHESIA: Mod: ,,, | Performed by: ANESTHESIOLOGY

## 2021-04-12 PROCEDURE — D9220A PRA ANESTHESIA: Mod: ,,, | Performed by: NURSE ANESTHETIST, CERTIFIED REGISTERED

## 2021-04-12 PROCEDURE — 93623 PR STIM/PACING HEART POST IV DRUG INFU: ICD-10-PCS | Mod: 26,,, | Performed by: INTERNAL MEDICINE

## 2021-04-12 PROCEDURE — C1887 CATHETER, GUIDING: HCPCS | Performed by: INTERNAL MEDICINE

## 2021-04-12 PROCEDURE — 93010 EKG 12-LEAD: ICD-10-PCS | Mod: 76,,, | Performed by: INTERNAL MEDICINE

## 2021-04-12 PROCEDURE — 93613 INTRACARDIAC EPHYS 3D MAPG: CPT | Mod: ,,, | Performed by: INTERNAL MEDICINE

## 2021-04-12 PROCEDURE — 93653 COMPRE EP EVAL TX SVT: CPT | Performed by: INTERNAL MEDICINE

## 2021-04-12 PROCEDURE — 93621 COMP EP EVL L PAC&REC C SINS: CPT | Mod: 26,,, | Performed by: INTERNAL MEDICINE

## 2021-04-12 PROCEDURE — 93005 ELECTROCARDIOGRAM TRACING: CPT | Mod: 59

## 2021-04-12 PROCEDURE — 93653 COMPRE EP EVAL TX SVT: CPT | Mod: ,,, | Performed by: INTERNAL MEDICINE

## 2021-04-12 PROCEDURE — 99220 PR INITIAL OBSERVATION CARE,LEVL III: ICD-10-PCS | Mod: ,,, | Performed by: INTERNAL MEDICINE

## 2021-04-12 PROCEDURE — 37000009 HC ANESTHESIA EA ADD 15 MINS: Performed by: INTERNAL MEDICINE

## 2021-04-12 PROCEDURE — C1766 INTRO/SHEATH,STRBLE,NON-PEEL: HCPCS | Performed by: INTERNAL MEDICINE

## 2021-04-12 RX ORDER — SODIUM CHLORIDE 0.9 % (FLUSH) 0.9 %
10 SYRINGE (ML) INJECTION
Status: DISCONTINUED | OUTPATIENT
Start: 2021-04-12 | End: 2021-04-12 | Stop reason: HOSPADM

## 2021-04-12 RX ORDER — PROTAMINE SULFATE 10 MG/ML
INJECTION, SOLUTION INTRAVENOUS
Status: DISCONTINUED | OUTPATIENT
Start: 2021-04-12 | End: 2021-04-12

## 2021-04-12 RX ORDER — KETAMINE HCL IN 0.9 % NACL 50 MG/5 ML
SYRINGE (ML) INTRAVENOUS
Status: DISCONTINUED | OUTPATIENT
Start: 2021-04-12 | End: 2021-04-12

## 2021-04-12 RX ORDER — HEPARIN SODIUM 10000 [USP'U]/100ML
INJECTION, SOLUTION INTRAVENOUS CONTINUOUS PRN
Status: DISCONTINUED | OUTPATIENT
Start: 2021-04-12 | End: 2021-04-12

## 2021-04-12 RX ORDER — METOPROLOL TARTRATE 25 MG/1
25 TABLET, FILM COATED ORAL 2 TIMES DAILY
Qty: 60 TABLET | Refills: 11 | Status: SHIPPED | OUTPATIENT
Start: 2021-04-12 | End: 2021-05-10 | Stop reason: SDUPTHER

## 2021-04-12 RX ORDER — LIDOCAINE HYDROCHLORIDE 20 MG/ML
INJECTION, SOLUTION INFILTRATION; PERINEURAL
Status: DISCONTINUED | OUTPATIENT
Start: 2021-04-12 | End: 2021-04-12 | Stop reason: HOSPADM

## 2021-04-12 RX ORDER — PHENYLEPHRINE HYDROCHLORIDE 10 MG/ML
INJECTION INTRAVENOUS
Status: DISCONTINUED | OUTPATIENT
Start: 2021-04-12 | End: 2021-04-12

## 2021-04-12 RX ORDER — FENTANYL CITRATE 50 UG/ML
25 INJECTION, SOLUTION INTRAMUSCULAR; INTRAVENOUS EVERY 5 MIN PRN
Status: DISCONTINUED | OUTPATIENT
Start: 2021-04-12 | End: 2021-04-12 | Stop reason: HOSPADM

## 2021-04-12 RX ORDER — FENTANYL CITRATE 50 UG/ML
INJECTION, SOLUTION INTRAMUSCULAR; INTRAVENOUS
Status: DISCONTINUED | OUTPATIENT
Start: 2021-04-12 | End: 2021-04-12

## 2021-04-12 RX ORDER — LIDOCAINE HYDROCHLORIDE 20 MG/ML
INJECTION INTRAVENOUS
Status: DISCONTINUED | OUTPATIENT
Start: 2021-04-12 | End: 2021-04-12

## 2021-04-12 RX ORDER — HEPARIN SODIUM 1000 [USP'U]/ML
INJECTION, SOLUTION INTRAVENOUS; SUBCUTANEOUS
Status: DISCONTINUED | OUTPATIENT
Start: 2021-04-12 | End: 2021-04-12

## 2021-04-12 RX ORDER — ACETAMINOPHEN 325 MG/1
650 TABLET ORAL EVERY 4 HOURS PRN
Status: DISCONTINUED | OUTPATIENT
Start: 2021-04-12 | End: 2021-04-12 | Stop reason: HOSPADM

## 2021-04-12 RX ORDER — PROPOFOL 10 MG/ML
VIAL (ML) INTRAVENOUS CONTINUOUS PRN
Status: DISCONTINUED | OUTPATIENT
Start: 2021-04-12 | End: 2021-04-12

## 2021-04-12 RX ORDER — PROPOFOL 10 MG/ML
VIAL (ML) INTRAVENOUS
Status: DISCONTINUED | OUTPATIENT
Start: 2021-04-12 | End: 2021-04-12

## 2021-04-12 RX ORDER — HEPARIN SOD,PORCINE/0.9 % NACL 1000/500ML
INTRAVENOUS SOLUTION INTRAVENOUS
Status: DISCONTINUED | OUTPATIENT
Start: 2021-04-12 | End: 2021-04-12 | Stop reason: HOSPADM

## 2021-04-12 RX ADMIN — LIDOCAINE HYDROCHLORIDE 60 MG: 20 INJECTION, SOLUTION INTRAVENOUS at 07:04

## 2021-04-12 RX ADMIN — FENTANYL CITRATE 25 MCG: 50 INJECTION INTRAMUSCULAR; INTRAVENOUS at 11:04

## 2021-04-12 RX ADMIN — Medication 10 MG: at 09:04

## 2021-04-12 RX ADMIN — HEPARIN SODIUM 3000 UNITS: 1000 INJECTION, SOLUTION INTRAVENOUS; SUBCUTANEOUS at 09:04

## 2021-04-12 RX ADMIN — HEPARIN SODIUM 18000 UNITS: 1000 INJECTION, SOLUTION INTRAVENOUS; SUBCUTANEOUS at 08:04

## 2021-04-12 RX ADMIN — ISOPROTERENOL HYDROCHLORIDE 1 MCG/MIN: 0.2 INJECTION, SOLUTION INTRAMUSCULAR; INTRAVENOUS at 08:04

## 2021-04-12 RX ADMIN — FENTANYL CITRATE 25 MCG: 50 INJECTION INTRAMUSCULAR; INTRAVENOUS at 09:04

## 2021-04-12 RX ADMIN — FENTANYL CITRATE 25 MCG: 50 INJECTION INTRAMUSCULAR; INTRAVENOUS at 08:04

## 2021-04-12 RX ADMIN — SODIUM CHLORIDE: 0.9 INJECTION, SOLUTION INTRAVENOUS at 07:04

## 2021-04-12 RX ADMIN — PROTAMINE SULFATE 90 MG: 10 INJECTION, SOLUTION INTRAVENOUS at 10:04

## 2021-04-12 RX ADMIN — HEPARIN SODIUM AND DEXTROSE 12 UNITS/KG/HR: 10000; 5 INJECTION INTRAVENOUS at 08:04

## 2021-04-12 RX ADMIN — ACETAMINOPHEN 650 MG: 325 TABLET ORAL at 01:04

## 2021-04-12 RX ADMIN — PHENYLEPHRINE HYDROCHLORIDE 100 MCG: 10 INJECTION, SOLUTION INTRAMUSCULAR; INTRAVENOUS; SUBCUTANEOUS at 10:04

## 2021-04-12 RX ADMIN — Medication 10 MG: at 10:04

## 2021-04-12 RX ADMIN — PROPOFOL 50 MCG/KG/MIN: 10 INJECTION, EMULSION INTRAVENOUS at 07:04

## 2021-04-12 RX ADMIN — PROPOFOL 20 MG: 10 INJECTION, EMULSION INTRAVENOUS at 09:04

## 2021-04-12 RX ADMIN — PROPOFOL 30 MG: 10 INJECTION, EMULSION INTRAVENOUS at 07:04

## 2021-04-13 LAB
POC ACTIVATED CLOTTING TIME K: 109 SEC (ref 74–137)
POC ACTIVATED CLOTTING TIME K: 323 SEC (ref 74–137)
POC ACTIVATED CLOTTING TIME K: 334 SEC (ref 74–137)
POC ACTIVATED CLOTTING TIME K: 345 SEC (ref 74–137)
POC ACTIVATED CLOTTING TIME K: 367 SEC (ref 74–137)
SAMPLE: ABNORMAL
SAMPLE: NORMAL

## 2021-04-15 ENCOUNTER — PATIENT MESSAGE (OUTPATIENT)
Dept: ELECTROPHYSIOLOGY | Facility: CLINIC | Age: 71
End: 2021-04-15

## 2021-05-04 ENCOUNTER — PATIENT OUTREACH (OUTPATIENT)
Dept: ADMINISTRATIVE | Facility: HOSPITAL | Age: 71
End: 2021-05-04

## 2021-05-09 ENCOUNTER — PATIENT MESSAGE (OUTPATIENT)
Dept: FAMILY MEDICINE | Facility: CLINIC | Age: 71
End: 2021-05-09

## 2021-05-10 RX ORDER — METOPROLOL TARTRATE 25 MG/1
25 TABLET, FILM COATED ORAL 2 TIMES DAILY
Qty: 60 TABLET | Refills: 0 | Status: SHIPPED | OUTPATIENT
Start: 2021-05-10 | End: 2021-06-29 | Stop reason: SDUPTHER

## 2021-05-10 RX ORDER — SERTRALINE HYDROCHLORIDE 25 MG/1
25 TABLET, FILM COATED ORAL DAILY
Qty: 30 TABLET | Refills: 0 | Status: SHIPPED | OUTPATIENT
Start: 2021-05-10 | End: 2021-05-18 | Stop reason: SDUPTHER

## 2021-05-14 ENCOUNTER — LAB VISIT (OUTPATIENT)
Dept: LAB | Facility: HOSPITAL | Age: 71
End: 2021-05-14
Attending: GENERAL ACUTE CARE HOSPITAL
Payer: COMMERCIAL

## 2021-05-14 DIAGNOSIS — Z79.4 UNCONTROLLED TYPE 2 DIABETES MELLITUS WITH HYPERGLYCEMIA, WITH LONG-TERM CURRENT USE OF INSULIN: ICD-10-CM

## 2021-05-14 DIAGNOSIS — E11.65 UNCONTROLLED TYPE 2 DIABETES MELLITUS WITH HYPERGLYCEMIA, WITH LONG-TERM CURRENT USE OF INSULIN: ICD-10-CM

## 2021-05-14 PROCEDURE — 83036 HEMOGLOBIN GLYCOSYLATED A1C: CPT | Performed by: GENERAL ACUTE CARE HOSPITAL

## 2021-05-14 PROCEDURE — 36415 COLL VENOUS BLD VENIPUNCTURE: CPT | Mod: PO | Performed by: GENERAL ACUTE CARE HOSPITAL

## 2021-05-15 LAB
ESTIMATED AVG GLUCOSE: 192 MG/DL (ref 68–131)
HBA1C MFR BLD: 8.3 % (ref 4–5.6)

## 2021-05-18 ENCOUNTER — OFFICE VISIT (OUTPATIENT)
Dept: FAMILY MEDICINE | Facility: CLINIC | Age: 71
End: 2021-05-18
Payer: COMMERCIAL

## 2021-05-18 ENCOUNTER — PATIENT MESSAGE (OUTPATIENT)
Dept: FAMILY MEDICINE | Facility: CLINIC | Age: 71
End: 2021-05-18

## 2021-05-18 VITALS
BODY MASS INDEX: 30.72 KG/M2 | SYSTOLIC BLOOD PRESSURE: 108 MMHG | OXYGEN SATURATION: 97 % | DIASTOLIC BLOOD PRESSURE: 68 MMHG | HEART RATE: 76 BPM | WEIGHT: 207.44 LBS | HEIGHT: 69 IN

## 2021-05-18 DIAGNOSIS — I10 ESSENTIAL HYPERTENSION: ICD-10-CM

## 2021-05-18 DIAGNOSIS — E78.5 HYPERLIPIDEMIA LDL GOAL <100: ICD-10-CM

## 2021-05-18 DIAGNOSIS — E78.49 OTHER HYPERLIPIDEMIA: ICD-10-CM

## 2021-05-18 DIAGNOSIS — Z79.4 UNCONTROLLED TYPE 2 DIABETES MELLITUS WITH HYPERGLYCEMIA, WITH LONG-TERM CURRENT USE OF INSULIN: Primary | ICD-10-CM

## 2021-05-18 DIAGNOSIS — E11.40 NEUROPATHY DUE TO TYPE 2 DIABETES MELLITUS: ICD-10-CM

## 2021-05-18 DIAGNOSIS — Z12.11 COLON CANCER SCREENING: ICD-10-CM

## 2021-05-18 DIAGNOSIS — F41.8 SITUATIONAL ANXIETY: ICD-10-CM

## 2021-05-18 DIAGNOSIS — Z00.00 VISIT FOR WELL MAN HEALTH CHECK: ICD-10-CM

## 2021-05-18 DIAGNOSIS — E11.42 TYPE 2 DIABETES MELLITUS WITH DIABETIC POLYNEUROPATHY, WITH LONG-TERM CURRENT USE OF INSULIN: ICD-10-CM

## 2021-05-18 DIAGNOSIS — Z12.5 SCREENING PSA (PROSTATE SPECIFIC ANTIGEN): ICD-10-CM

## 2021-05-18 DIAGNOSIS — E11.65 UNCONTROLLED TYPE 2 DIABETES MELLITUS WITH HYPERGLYCEMIA, WITH LONG-TERM CURRENT USE OF INSULIN: Primary | ICD-10-CM

## 2021-05-18 DIAGNOSIS — Z79.4 TYPE 2 DIABETES MELLITUS WITH DIABETIC POLYNEUROPATHY, WITH LONG-TERM CURRENT USE OF INSULIN: ICD-10-CM

## 2021-05-18 PROCEDURE — 99999 PR PBB SHADOW E&M-EST. PATIENT-LVL IV: ICD-10-PCS | Mod: PBBFAC,,, | Performed by: FAMILY MEDICINE

## 2021-05-18 PROCEDURE — 1101F PT FALLS ASSESS-DOCD LE1/YR: CPT | Mod: CPTII,S$GLB,, | Performed by: FAMILY MEDICINE

## 2021-05-18 PROCEDURE — 3008F BODY MASS INDEX DOCD: CPT | Mod: CPTII,S$GLB,, | Performed by: FAMILY MEDICINE

## 2021-05-18 PROCEDURE — 1159F MED LIST DOCD IN RCRD: CPT | Mod: S$GLB,,, | Performed by: FAMILY MEDICINE

## 2021-05-18 PROCEDURE — 1126F AMNT PAIN NOTED NONE PRSNT: CPT | Mod: S$GLB,,, | Performed by: FAMILY MEDICINE

## 2021-05-18 PROCEDURE — 3052F PR MOST RECENT HEMOGLOBIN A1C LEVEL 8.0 - < 9.0%: ICD-10-PCS | Mod: CPTII,S$GLB,, | Performed by: FAMILY MEDICINE

## 2021-05-18 PROCEDURE — 1159F PR MEDICATION LIST DOCUMENTED IN MEDICAL RECORD: ICD-10-PCS | Mod: S$GLB,,, | Performed by: FAMILY MEDICINE

## 2021-05-18 PROCEDURE — 3288F FALL RISK ASSESSMENT DOCD: CPT | Mod: CPTII,S$GLB,, | Performed by: FAMILY MEDICINE

## 2021-05-18 PROCEDURE — 3008F PR BODY MASS INDEX (BMI) DOCUMENTED: ICD-10-PCS | Mod: CPTII,S$GLB,, | Performed by: FAMILY MEDICINE

## 2021-05-18 PROCEDURE — 99214 PR OFFICE/OUTPT VISIT, EST, LEVL IV, 30-39 MIN: ICD-10-PCS | Mod: S$GLB,,, | Performed by: FAMILY MEDICINE

## 2021-05-18 PROCEDURE — 1126F PR PAIN SEVERITY QUANTIFIED, NO PAIN PRESENT: ICD-10-PCS | Mod: S$GLB,,, | Performed by: FAMILY MEDICINE

## 2021-05-18 PROCEDURE — 1101F PR PT FALLS ASSESS DOC 0-1 FALLS W/OUT INJ PAST YR: ICD-10-PCS | Mod: CPTII,S$GLB,, | Performed by: FAMILY MEDICINE

## 2021-05-18 PROCEDURE — 3288F PR FALLS RISK ASSESSMENT DOCUMENTED: ICD-10-PCS | Mod: CPTII,S$GLB,, | Performed by: FAMILY MEDICINE

## 2021-05-18 PROCEDURE — 99999 PR PBB SHADOW E&M-EST. PATIENT-LVL IV: CPT | Mod: PBBFAC,,, | Performed by: FAMILY MEDICINE

## 2021-05-18 PROCEDURE — 3052F HG A1C>EQUAL 8.0%<EQUAL 9.0%: CPT | Mod: CPTII,S$GLB,, | Performed by: FAMILY MEDICINE

## 2021-05-18 PROCEDURE — 99214 OFFICE O/P EST MOD 30 MIN: CPT | Mod: S$GLB,,, | Performed by: FAMILY MEDICINE

## 2021-05-18 RX ORDER — METFORMIN HYDROCHLORIDE 1000 MG/1
1000 TABLET ORAL 2 TIMES DAILY WITH MEALS
Qty: 180 TABLET | Refills: 0 | Status: SHIPPED | OUTPATIENT
Start: 2021-05-18 | End: 2021-10-19 | Stop reason: SDUPTHER

## 2021-05-18 RX ORDER — DULAGLUTIDE 3 MG/.5ML
3 INJECTION, SOLUTION SUBCUTANEOUS WEEKLY
Qty: 12 PEN | Refills: 0 | Status: SHIPPED | OUTPATIENT
Start: 2021-05-18 | End: 2021-08-18

## 2021-05-18 RX ORDER — ATORVASTATIN CALCIUM 40 MG/1
40 TABLET, FILM COATED ORAL DAILY
Qty: 90 TABLET | Refills: 0 | Status: SHIPPED | OUTPATIENT
Start: 2021-05-18 | End: 2021-08-19

## 2021-05-18 RX ORDER — RAMIPRIL 5 MG/1
5 CAPSULE ORAL DAILY
Qty: 90 CAPSULE | Refills: 2 | Status: SHIPPED | OUTPATIENT
Start: 2021-05-18 | End: 2021-11-13 | Stop reason: SDUPTHER

## 2021-05-18 RX ORDER — SERTRALINE HYDROCHLORIDE 25 MG/1
25 TABLET, FILM COATED ORAL DAILY
Qty: 90 TABLET | Refills: 0 | Status: SHIPPED | OUTPATIENT
Start: 2021-05-18 | End: 2021-07-09

## 2021-05-27 ENCOUNTER — PATIENT MESSAGE (OUTPATIENT)
Dept: ELECTROPHYSIOLOGY | Facility: CLINIC | Age: 71
End: 2021-05-27

## 2021-05-27 ENCOUNTER — HOSPITAL ENCOUNTER (EMERGENCY)
Facility: HOSPITAL | Age: 71
Discharge: HOME | End: 2021-05-27
Attending: EMERGENCY MEDICINE
Payer: COMMERCIAL

## 2021-05-27 VITALS
DIASTOLIC BLOOD PRESSURE: 84 MMHG | OXYGEN SATURATION: 97 % | SYSTOLIC BLOOD PRESSURE: 109 MMHG | HEIGHT: 69 IN | BODY MASS INDEX: 29.77 KG/M2 | WEIGHT: 201 LBS | RESPIRATION RATE: 16 BRPM | TEMPERATURE: 98 F | HEART RATE: 126 BPM

## 2021-05-27 DIAGNOSIS — I47.10 PSVT (PAROXYSMAL SUPRAVENTRICULAR TACHYCARDIA): ICD-10-CM

## 2021-05-27 DIAGNOSIS — I49.8 OTHER SPECIFIED CARDIAC ARRHYTHMIAS: Primary | ICD-10-CM

## 2021-05-27 DIAGNOSIS — R00.0 SINUS TACHYCARDIA: Primary | ICD-10-CM

## 2021-05-27 LAB
ALBUMIN SERPL BCP-MCNC: 3.9 G/DL (ref 3.5–5.2)
ALP SERPL-CCNC: 65 U/L (ref 55–135)
ALT SERPL W/O P-5'-P-CCNC: 17 U/L (ref 10–44)
ANION GAP SERPL CALC-SCNC: 11 MMOL/L (ref 8–16)
AST SERPL-CCNC: 11 U/L (ref 10–40)
BASOPHILS # BLD AUTO: 0.02 K/UL (ref 0–0.2)
BASOPHILS NFR BLD: 0.3 % (ref 0–1.9)
BILIRUB SERPL-MCNC: 1.9 MG/DL (ref 0.1–1)
BNP SERPL-MCNC: 39 PG/ML (ref 0–99)
BUN SERPL-MCNC: 15 MG/DL (ref 8–23)
CALCIUM SERPL-MCNC: 10.2 MG/DL (ref 8.7–10.5)
CHLORIDE SERPL-SCNC: 111 MMOL/L (ref 95–110)
CO2 SERPL-SCNC: 22 MMOL/L (ref 23–29)
CREAT SERPL-MCNC: 1.1 MG/DL (ref 0.5–1.4)
DIFFERENTIAL METHOD: ABNORMAL
EOSINOPHIL # BLD AUTO: 0.3 K/UL (ref 0–0.5)
EOSINOPHIL NFR BLD: 4.4 % (ref 0–8)
ERYTHROCYTE [DISTWIDTH] IN BLOOD BY AUTOMATED COUNT: 13.3 % (ref 11.5–14.5)
EST. GFR  (AFRICAN AMERICAN): >60 ML/MIN/1.73 M^2
EST. GFR  (NON AFRICAN AMERICAN): >60 ML/MIN/1.73 M^2
GLUCOSE SERPL-MCNC: 168 MG/DL (ref 70–110)
HCT VFR BLD AUTO: 45.9 % (ref 40–54)
HGB BLD-MCNC: 15.8 G/DL (ref 14–18)
IMM GRANULOCYTES # BLD AUTO: 0.02 K/UL (ref 0–0.04)
IMM GRANULOCYTES NFR BLD AUTO: 0.3 % (ref 0–0.5)
LYMPHOCYTES # BLD AUTO: 1.6 K/UL (ref 1–4.8)
LYMPHOCYTES NFR BLD: 24.1 % (ref 18–48)
MCH RBC QN AUTO: 32.7 PG (ref 27–31)
MCHC RBC AUTO-ENTMCNC: 34.4 G/DL (ref 32–36)
MCV RBC AUTO: 95 FL (ref 82–98)
MONOCYTES # BLD AUTO: 0.6 K/UL (ref 0.3–1)
MONOCYTES NFR BLD: 8.8 % (ref 4–15)
NEUTROPHILS # BLD AUTO: 4.1 K/UL (ref 1.8–7.7)
NEUTROPHILS NFR BLD: 62.1 % (ref 38–73)
NRBC BLD-RTO: 0 /100 WBC
PLATELET # BLD AUTO: 138 K/UL (ref 150–450)
PMV BLD AUTO: 12.4 FL (ref 9.2–12.9)
POTASSIUM SERPL-SCNC: 4.1 MMOL/L (ref 3.5–5.1)
PROT SERPL-MCNC: 6.9 G/DL (ref 6–8.4)
RBC # BLD AUTO: 4.83 M/UL (ref 4.6–6.2)
SODIUM SERPL-SCNC: 144 MMOL/L (ref 136–145)
TROPONIN I SERPL DL<=0.01 NG/ML-MCNC: 0.01 NG/ML (ref 0–0.03)
WBC # BLD AUTO: 6.59 K/UL (ref 3.9–12.7)

## 2021-05-27 PROCEDURE — 93010 ELECTROCARDIOGRAM REPORT: CPT | Mod: ,,, | Performed by: INTERNAL MEDICINE

## 2021-05-27 PROCEDURE — 99284 EMERGENCY DEPT VISIT MOD MDM: CPT | Mod: 25

## 2021-05-27 PROCEDURE — 80053 COMPREHEN METABOLIC PANEL: CPT | Performed by: PHYSICIAN ASSISTANT

## 2021-05-27 PROCEDURE — 83880 ASSAY OF NATRIURETIC PEPTIDE: CPT | Performed by: PHYSICIAN ASSISTANT

## 2021-05-27 PROCEDURE — 99285 EMERGENCY DEPT VISIT HI MDM: CPT | Mod: ,,, | Performed by: EMERGENCY MEDICINE

## 2021-05-27 PROCEDURE — 93010 EKG 12-LEAD: ICD-10-PCS | Mod: ,,, | Performed by: INTERNAL MEDICINE

## 2021-05-27 PROCEDURE — 93005 ELECTROCARDIOGRAM TRACING: CPT

## 2021-05-27 PROCEDURE — 99285 PR EMERGENCY DEPT VISIT,LEVEL V: ICD-10-PCS | Mod: ,,, | Performed by: EMERGENCY MEDICINE

## 2021-05-27 PROCEDURE — 84484 ASSAY OF TROPONIN QUANT: CPT | Performed by: PHYSICIAN ASSISTANT

## 2021-05-27 PROCEDURE — 85025 COMPLETE CBC W/AUTO DIFF WBC: CPT | Performed by: PHYSICIAN ASSISTANT

## 2021-06-01 ENCOUNTER — TELEPHONE (OUTPATIENT)
Dept: ELECTROPHYSIOLOGY | Facility: CLINIC | Age: 71
End: 2021-06-01

## 2021-06-01 ENCOUNTER — PATIENT MESSAGE (OUTPATIENT)
Dept: ELECTROPHYSIOLOGY | Facility: CLINIC | Age: 71
End: 2021-06-01

## 2021-06-02 ENCOUNTER — PATIENT MESSAGE (OUTPATIENT)
Dept: ELECTROPHYSIOLOGY | Facility: CLINIC | Age: 71
End: 2021-06-02

## 2021-06-04 ENCOUNTER — HOSPITAL ENCOUNTER (OUTPATIENT)
Dept: CARDIOLOGY | Facility: HOSPITAL | Age: 71
Discharge: HOME | End: 2021-06-04
Attending: INTERNAL MEDICINE
Payer: COMMERCIAL

## 2021-06-04 DIAGNOSIS — I49.8 OTHER SPECIFIED CARDIAC ARRHYTHMIAS: ICD-10-CM

## 2021-06-04 DIAGNOSIS — I47.10 PSVT (PAROXYSMAL SUPRAVENTRICULAR TACHYCARDIA): ICD-10-CM

## 2021-06-04 PROCEDURE — 93227 XTRNL ECG REC<48 HR R&I: CPT | Mod: ,,, | Performed by: INTERNAL MEDICINE

## 2021-06-04 PROCEDURE — 93227 HOLTER MONITOR - 48 HOUR (CUPID ONLY): ICD-10-PCS | Mod: ,,, | Performed by: INTERNAL MEDICINE

## 2021-06-04 PROCEDURE — 93226 XTRNL ECG REC<48 HR SCAN A/R: CPT

## 2021-06-21 LAB
OHS CV EVENT MONITOR DAY: 0
OHS CV HOLTER LENGTH DECIMAL HOURS: 48
OHS CV HOLTER LENGTH HOURS: 48
OHS CV HOLTER LENGTH MINUTES: 0

## 2021-06-29 ENCOUNTER — PATIENT MESSAGE (OUTPATIENT)
Dept: FAMILY MEDICINE | Facility: CLINIC | Age: 71
End: 2021-06-29

## 2021-06-29 RX ORDER — METOPROLOL TARTRATE 25 MG/1
25 TABLET, FILM COATED ORAL 2 TIMES DAILY
Qty: 180 TABLET | Refills: 0 | Status: SHIPPED | OUTPATIENT
Start: 2021-06-29 | End: 2021-07-09 | Stop reason: SDUPTHER

## 2021-07-06 ENCOUNTER — PATIENT OUTREACH (OUTPATIENT)
Dept: ADMINISTRATIVE | Facility: OTHER | Age: 71
End: 2021-07-06

## 2021-07-09 ENCOUNTER — HOSPITAL ENCOUNTER (OUTPATIENT)
Dept: CARDIOLOGY | Facility: CLINIC | Age: 71
Discharge: HOME | End: 2021-07-09
Payer: COMMERCIAL

## 2021-07-09 ENCOUNTER — OFFICE VISIT (OUTPATIENT)
Dept: ELECTROPHYSIOLOGY | Facility: CLINIC | Age: 71
End: 2021-07-09
Payer: COMMERCIAL

## 2021-07-09 VITALS
HEART RATE: 99 BPM | HEIGHT: 69 IN | DIASTOLIC BLOOD PRESSURE: 84 MMHG | SYSTOLIC BLOOD PRESSURE: 124 MMHG | BODY MASS INDEX: 30.7 KG/M2 | WEIGHT: 207.25 LBS

## 2021-07-09 DIAGNOSIS — I15.2 HYPERTENSION ASSOCIATED WITH DIABETES: ICD-10-CM

## 2021-07-09 DIAGNOSIS — E11.59 HYPERTENSION ASSOCIATED WITH DIABETES: ICD-10-CM

## 2021-07-09 DIAGNOSIS — I10 ESSENTIAL HYPERTENSION: Primary | ICD-10-CM

## 2021-07-09 DIAGNOSIS — E78.5 HYPERLIPIDEMIA LDL GOAL <100: ICD-10-CM

## 2021-07-09 DIAGNOSIS — E11.65 UNCONTROLLED TYPE 2 DIABETES MELLITUS WITH HYPERGLYCEMIA, WITH LONG-TERM CURRENT USE OF INSULIN: ICD-10-CM

## 2021-07-09 DIAGNOSIS — I47.10 PSVT (PAROXYSMAL SUPRAVENTRICULAR TACHYCARDIA): ICD-10-CM

## 2021-07-09 DIAGNOSIS — Z79.4 UNCONTROLLED TYPE 2 DIABETES MELLITUS WITH HYPERGLYCEMIA, WITH LONG-TERM CURRENT USE OF INSULIN: ICD-10-CM

## 2021-07-09 DIAGNOSIS — I47.10 SVT (SUPRAVENTRICULAR TACHYCARDIA): ICD-10-CM

## 2021-07-09 PROCEDURE — 3052F PR MOST RECENT HEMOGLOBIN A1C LEVEL 8.0 - < 9.0%: ICD-10-PCS | Mod: CPTII,S$GLB,, | Performed by: INTERNAL MEDICINE

## 2021-07-09 PROCEDURE — 3008F PR BODY MASS INDEX (BMI) DOCUMENTED: ICD-10-PCS | Mod: CPTII,S$GLB,, | Performed by: INTERNAL MEDICINE

## 2021-07-09 PROCEDURE — 3079F DIAST BP 80-89 MM HG: CPT | Mod: CPTII,S$GLB,, | Performed by: INTERNAL MEDICINE

## 2021-07-09 PROCEDURE — 1126F PR PAIN SEVERITY QUANTIFIED, NO PAIN PRESENT: ICD-10-PCS | Mod: S$GLB,,, | Performed by: INTERNAL MEDICINE

## 2021-07-09 PROCEDURE — 99999 PR PBB SHADOW E&M-EST. PATIENT-LVL III: CPT | Mod: PBBFAC,,, | Performed by: INTERNAL MEDICINE

## 2021-07-09 PROCEDURE — 3052F HG A1C>EQUAL 8.0%<EQUAL 9.0%: CPT | Mod: CPTII,S$GLB,, | Performed by: INTERNAL MEDICINE

## 2021-07-09 PROCEDURE — 3074F SYST BP LT 130 MM HG: CPT | Mod: CPTII,S$GLB,, | Performed by: INTERNAL MEDICINE

## 2021-07-09 PROCEDURE — 1101F PR PT FALLS ASSESS DOC 0-1 FALLS W/OUT INJ PAST YR: ICD-10-PCS | Mod: CPTII,S$GLB,, | Performed by: INTERNAL MEDICINE

## 2021-07-09 PROCEDURE — 93010 ELECTROCARDIOGRAM REPORT: CPT | Mod: S$GLB,,, | Performed by: INTERNAL MEDICINE

## 2021-07-09 PROCEDURE — 99214 OFFICE O/P EST MOD 30 MIN: CPT | Mod: S$GLB,,, | Performed by: INTERNAL MEDICINE

## 2021-07-09 PROCEDURE — 3288F FALL RISK ASSESSMENT DOCD: CPT | Mod: CPTII,S$GLB,, | Performed by: INTERNAL MEDICINE

## 2021-07-09 PROCEDURE — 3074F PR MOST RECENT SYSTOLIC BLOOD PRESSURE < 130 MM HG: ICD-10-PCS | Mod: CPTII,S$GLB,, | Performed by: INTERNAL MEDICINE

## 2021-07-09 PROCEDURE — 1159F MED LIST DOCD IN RCRD: CPT | Mod: S$GLB,,, | Performed by: INTERNAL MEDICINE

## 2021-07-09 PROCEDURE — 93005 RHYTHM STRIP: ICD-10-PCS | Mod: S$GLB,,, | Performed by: INTERNAL MEDICINE

## 2021-07-09 PROCEDURE — 93010 RHYTHM STRIP: ICD-10-PCS | Mod: S$GLB,,, | Performed by: INTERNAL MEDICINE

## 2021-07-09 PROCEDURE — 99214 PR OFFICE/OUTPT VISIT, EST, LEVL IV, 30-39 MIN: ICD-10-PCS | Mod: S$GLB,,, | Performed by: INTERNAL MEDICINE

## 2021-07-09 PROCEDURE — 1126F AMNT PAIN NOTED NONE PRSNT: CPT | Mod: S$GLB,,, | Performed by: INTERNAL MEDICINE

## 2021-07-09 PROCEDURE — 93005 ELECTROCARDIOGRAM TRACING: CPT | Mod: S$GLB,,, | Performed by: INTERNAL MEDICINE

## 2021-07-09 PROCEDURE — 99999 PR PBB SHADOW E&M-EST. PATIENT-LVL III: ICD-10-PCS | Mod: PBBFAC,,, | Performed by: INTERNAL MEDICINE

## 2021-07-09 PROCEDURE — 3288F PR FALLS RISK ASSESSMENT DOCUMENTED: ICD-10-PCS | Mod: CPTII,S$GLB,, | Performed by: INTERNAL MEDICINE

## 2021-07-09 PROCEDURE — 1101F PT FALLS ASSESS-DOCD LE1/YR: CPT | Mod: CPTII,S$GLB,, | Performed by: INTERNAL MEDICINE

## 2021-07-09 PROCEDURE — 3079F PR MOST RECENT DIASTOLIC BLOOD PRESSURE 80-89 MM HG: ICD-10-PCS | Mod: CPTII,S$GLB,, | Performed by: INTERNAL MEDICINE

## 2021-07-09 PROCEDURE — 1159F PR MEDICATION LIST DOCUMENTED IN MEDICAL RECORD: ICD-10-PCS | Mod: S$GLB,,, | Performed by: INTERNAL MEDICINE

## 2021-07-09 PROCEDURE — 3008F BODY MASS INDEX DOCD: CPT | Mod: CPTII,S$GLB,, | Performed by: INTERNAL MEDICINE

## 2021-07-09 RX ORDER — METOPROLOL TARTRATE 25 MG/1
TABLET, FILM COATED ORAL
Qty: 270 TABLET | Refills: 3 | Status: SHIPPED | OUTPATIENT
Start: 2021-07-09 | End: 2022-03-10 | Stop reason: SDUPTHER

## 2021-07-30 ENCOUNTER — PATIENT MESSAGE (OUTPATIENT)
Dept: FAMILY MEDICINE | Facility: CLINIC | Age: 71
End: 2021-07-30
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
## 2021-07-30 RX ORDER — MUPIROCIN 20 MG/G
OINTMENT TOPICAL 3 TIMES DAILY
Qty: 30 G | Refills: 0 | Status: SHIPPED | OUTPATIENT
Start: 2021-07-30 | End: 2022-08-23 | Stop reason: SDUPTHER

## 2021-08-04 ENCOUNTER — PATIENT MESSAGE (OUTPATIENT)
Dept: ADMINISTRATIVE | Facility: HOSPITAL | Age: 71
End: 2021-08-04

## 2021-08-04 ENCOUNTER — PATIENT OUTREACH (OUTPATIENT)
Dept: ADMINISTRATIVE | Facility: HOSPITAL | Age: 71
End: 2021-08-04

## 2021-08-18 ENCOUNTER — OFFICE VISIT (OUTPATIENT)
Dept: FAMILY MEDICINE | Facility: CLINIC | Age: 71
End: 2021-08-18
Payer: COMMERCIAL

## 2021-08-18 ENCOUNTER — LAB VISIT (OUTPATIENT)
Dept: LAB | Facility: HOSPITAL | Age: 71
End: 2021-08-18
Attending: FAMILY MEDICINE
Payer: COMMERCIAL

## 2021-08-18 VITALS
WEIGHT: 208.13 LBS | DIASTOLIC BLOOD PRESSURE: 82 MMHG | HEART RATE: 91 BPM | BODY MASS INDEX: 30.83 KG/M2 | OXYGEN SATURATION: 97 % | SYSTOLIC BLOOD PRESSURE: 122 MMHG | HEIGHT: 69 IN

## 2021-08-18 DIAGNOSIS — Z79.4 UNCONTROLLED TYPE 2 DIABETES MELLITUS WITH HYPERGLYCEMIA, WITH LONG-TERM CURRENT USE OF INSULIN: ICD-10-CM

## 2021-08-18 DIAGNOSIS — D69.6 THROMBOCYTOPENIA: Chronic | ICD-10-CM

## 2021-08-18 DIAGNOSIS — I10 ESSENTIAL HYPERTENSION: Primary | ICD-10-CM

## 2021-08-18 DIAGNOSIS — E11.65 UNCONTROLLED TYPE 2 DIABETES MELLITUS WITH HYPERGLYCEMIA, WITH LONG-TERM CURRENT USE OF INSULIN: ICD-10-CM

## 2021-08-18 DIAGNOSIS — I15.2 HYPERTENSION ASSOCIATED WITH DIABETES: ICD-10-CM

## 2021-08-18 DIAGNOSIS — E11.59 HYPERTENSION ASSOCIATED WITH DIABETES: ICD-10-CM

## 2021-08-18 DIAGNOSIS — F41.9 ANXIETY: ICD-10-CM

## 2021-08-18 DIAGNOSIS — I10 ESSENTIAL HYPERTENSION: ICD-10-CM

## 2021-08-18 DIAGNOSIS — I47.10 PSVT (PAROXYSMAL SUPRAVENTRICULAR TACHYCARDIA): ICD-10-CM

## 2021-08-18 DIAGNOSIS — E78.49 OTHER HYPERLIPIDEMIA: ICD-10-CM

## 2021-08-18 PROBLEM — R05.8 UPPER AIRWAY COUGH SYNDROME: Status: RESOLVED | Noted: 2017-09-06 | Resolved: 2021-08-18

## 2021-08-18 PROCEDURE — 1160F PR REVIEW ALL MEDS BY PRESCRIBER/CLIN PHARMACIST DOCUMENTED: ICD-10-PCS | Mod: CPTII,S$GLB,, | Performed by: FAMILY MEDICINE

## 2021-08-18 PROCEDURE — 3052F PR MOST RECENT HEMOGLOBIN A1C LEVEL 8.0 - < 9.0%: ICD-10-PCS | Mod: CPTII,S$GLB,, | Performed by: FAMILY MEDICINE

## 2021-08-18 PROCEDURE — 3074F PR MOST RECENT SYSTOLIC BLOOD PRESSURE < 130 MM HG: ICD-10-PCS | Mod: CPTII,S$GLB,, | Performed by: FAMILY MEDICINE

## 2021-08-18 PROCEDURE — 1126F AMNT PAIN NOTED NONE PRSNT: CPT | Mod: CPTII,S$GLB,, | Performed by: FAMILY MEDICINE

## 2021-08-18 PROCEDURE — 1101F PR PT FALLS ASSESS DOC 0-1 FALLS W/OUT INJ PAST YR: ICD-10-PCS | Mod: CPTII,S$GLB,, | Performed by: FAMILY MEDICINE

## 2021-08-18 PROCEDURE — 3288F PR FALLS RISK ASSESSMENT DOCUMENTED: ICD-10-PCS | Mod: CPTII,S$GLB,, | Performed by: FAMILY MEDICINE

## 2021-08-18 PROCEDURE — 3074F SYST BP LT 130 MM HG: CPT | Mod: CPTII,S$GLB,, | Performed by: FAMILY MEDICINE

## 2021-08-18 PROCEDURE — 36415 COLL VENOUS BLD VENIPUNCTURE: CPT | Mod: PO | Performed by: FAMILY MEDICINE

## 2021-08-18 PROCEDURE — 3052F HG A1C>EQUAL 8.0%<EQUAL 9.0%: CPT | Mod: CPTII,S$GLB,, | Performed by: FAMILY MEDICINE

## 2021-08-18 PROCEDURE — 3008F BODY MASS INDEX DOCD: CPT | Mod: CPTII,S$GLB,, | Performed by: FAMILY MEDICINE

## 2021-08-18 PROCEDURE — 1159F MED LIST DOCD IN RCRD: CPT | Mod: CPTII,S$GLB,, | Performed by: FAMILY MEDICINE

## 2021-08-18 PROCEDURE — 1126F PR PAIN SEVERITY QUANTIFIED, NO PAIN PRESENT: ICD-10-PCS | Mod: CPTII,S$GLB,, | Performed by: FAMILY MEDICINE

## 2021-08-18 PROCEDURE — 99214 OFFICE O/P EST MOD 30 MIN: CPT | Mod: S$GLB,,, | Performed by: FAMILY MEDICINE

## 2021-08-18 PROCEDURE — 3288F FALL RISK ASSESSMENT DOCD: CPT | Mod: CPTII,S$GLB,, | Performed by: FAMILY MEDICINE

## 2021-08-18 PROCEDURE — 3079F DIAST BP 80-89 MM HG: CPT | Mod: CPTII,S$GLB,, | Performed by: FAMILY MEDICINE

## 2021-08-18 PROCEDURE — 99999 PR PBB SHADOW E&M-EST. PATIENT-LVL IV: CPT | Mod: PBBFAC,,, | Performed by: FAMILY MEDICINE

## 2021-08-18 PROCEDURE — 3079F PR MOST RECENT DIASTOLIC BLOOD PRESSURE 80-89 MM HG: ICD-10-PCS | Mod: CPTII,S$GLB,, | Performed by: FAMILY MEDICINE

## 2021-08-18 PROCEDURE — 1101F PT FALLS ASSESS-DOCD LE1/YR: CPT | Mod: CPTII,S$GLB,, | Performed by: FAMILY MEDICINE

## 2021-08-18 PROCEDURE — 83036 HEMOGLOBIN GLYCOSYLATED A1C: CPT | Performed by: FAMILY MEDICINE

## 2021-08-18 PROCEDURE — 85025 COMPLETE CBC W/AUTO DIFF WBC: CPT | Performed by: FAMILY MEDICINE

## 2021-08-18 PROCEDURE — 80053 COMPREHEN METABOLIC PANEL: CPT | Performed by: FAMILY MEDICINE

## 2021-08-18 PROCEDURE — 3008F PR BODY MASS INDEX (BMI) DOCUMENTED: ICD-10-PCS | Mod: CPTII,S$GLB,, | Performed by: FAMILY MEDICINE

## 2021-08-18 PROCEDURE — 1159F PR MEDICATION LIST DOCUMENTED IN MEDICAL RECORD: ICD-10-PCS | Mod: CPTII,S$GLB,, | Performed by: FAMILY MEDICINE

## 2021-08-18 PROCEDURE — 1160F RVW MEDS BY RX/DR IN RCRD: CPT | Mod: CPTII,S$GLB,, | Performed by: FAMILY MEDICINE

## 2021-08-18 PROCEDURE — 82607 VITAMIN B-12: CPT | Performed by: FAMILY MEDICINE

## 2021-08-18 PROCEDURE — 99214 PR OFFICE/OUTPT VISIT, EST, LEVL IV, 30-39 MIN: ICD-10-PCS | Mod: S$GLB,,, | Performed by: FAMILY MEDICINE

## 2021-08-18 PROCEDURE — 99999 PR PBB SHADOW E&M-EST. PATIENT-LVL IV: ICD-10-PCS | Mod: PBBFAC,,, | Performed by: FAMILY MEDICINE

## 2021-08-18 RX ORDER — SERTRALINE HYDROCHLORIDE 25 MG/1
25 TABLET, FILM COATED ORAL DAILY
Qty: 30 TABLET | Refills: 0 | Status: SHIPPED | OUTPATIENT
Start: 2021-08-18 | End: 2021-11-18

## 2021-08-18 RX ORDER — DULAGLUTIDE 4.5 MG/.5ML
4.5 INJECTION, SOLUTION SUBCUTANEOUS
Qty: 12 PEN | Refills: 0 | Status: SHIPPED | OUTPATIENT
Start: 2021-08-18 | End: 2021-08-19 | Stop reason: SDUPTHER

## 2021-08-19 DIAGNOSIS — Z79.4 UNCONTROLLED TYPE 2 DIABETES MELLITUS WITH HYPERGLYCEMIA, WITH LONG-TERM CURRENT USE OF INSULIN: ICD-10-CM

## 2021-08-19 DIAGNOSIS — E78.5 HYPERLIPIDEMIA LDL GOAL <100: ICD-10-CM

## 2021-08-19 DIAGNOSIS — E11.40 NEUROPATHY DUE TO TYPE 2 DIABETES MELLITUS: ICD-10-CM

## 2021-08-19 DIAGNOSIS — E78.49 OTHER HYPERLIPIDEMIA: ICD-10-CM

## 2021-08-19 DIAGNOSIS — E11.65 UNCONTROLLED TYPE 2 DIABETES MELLITUS WITH HYPERGLYCEMIA, WITH LONG-TERM CURRENT USE OF INSULIN: ICD-10-CM

## 2021-08-19 LAB
ALBUMIN SERPL BCP-MCNC: 3.9 G/DL (ref 3.5–5.2)
ALP SERPL-CCNC: 60 U/L (ref 55–135)
ALT SERPL W/O P-5'-P-CCNC: 22 U/L (ref 10–44)
ANION GAP SERPL CALC-SCNC: 11 MMOL/L (ref 8–16)
AST SERPL-CCNC: 13 U/L (ref 10–40)
BASOPHILS # BLD AUTO: 0.02 K/UL (ref 0–0.2)
BASOPHILS NFR BLD: 0.3 % (ref 0–1.9)
BILIRUB SERPL-MCNC: 2.6 MG/DL (ref 0.1–1)
BUN SERPL-MCNC: 12 MG/DL (ref 8–23)
CALCIUM SERPL-MCNC: 10.2 MG/DL (ref 8.7–10.5)
CHLORIDE SERPL-SCNC: 112 MMOL/L (ref 95–110)
CO2 SERPL-SCNC: 20 MMOL/L (ref 23–29)
CREAT SERPL-MCNC: 1 MG/DL (ref 0.5–1.4)
DIFFERENTIAL METHOD: ABNORMAL
EOSINOPHIL # BLD AUTO: 0.3 K/UL (ref 0–0.5)
EOSINOPHIL NFR BLD: 5.4 % (ref 0–8)
ERYTHROCYTE [DISTWIDTH] IN BLOOD BY AUTOMATED COUNT: 13.5 % (ref 11.5–14.5)
EST. GFR  (AFRICAN AMERICAN): >60 ML/MIN/1.73 M^2
EST. GFR  (NON AFRICAN AMERICAN): >60 ML/MIN/1.73 M^2
ESTIMATED AVG GLUCOSE: 194 MG/DL (ref 68–131)
GLUCOSE SERPL-MCNC: 88 MG/DL (ref 70–110)
HBA1C MFR BLD: 8.4 % (ref 4–5.6)
HCT VFR BLD AUTO: 46.8 % (ref 40–54)
HGB BLD-MCNC: 15.7 G/DL (ref 14–18)
IMM GRANULOCYTES # BLD AUTO: 0.01 K/UL (ref 0–0.04)
IMM GRANULOCYTES NFR BLD AUTO: 0.2 % (ref 0–0.5)
LYMPHOCYTES # BLD AUTO: 1.6 K/UL (ref 1–4.8)
LYMPHOCYTES NFR BLD: 27.9 % (ref 18–48)
MCH RBC QN AUTO: 32.9 PG (ref 27–31)
MCHC RBC AUTO-ENTMCNC: 33.5 G/DL (ref 32–36)
MCV RBC AUTO: 98 FL (ref 82–98)
MONOCYTES # BLD AUTO: 0.4 K/UL (ref 0.3–1)
MONOCYTES NFR BLD: 7.3 % (ref 4–15)
NEUTROPHILS # BLD AUTO: 3.4 K/UL (ref 1.8–7.7)
NEUTROPHILS NFR BLD: 58.9 % (ref 38–73)
NRBC BLD-RTO: 0 /100 WBC
PLATELET # BLD AUTO: 143 K/UL (ref 150–450)
PMV BLD AUTO: 12.7 FL (ref 9.2–12.9)
POTASSIUM SERPL-SCNC: 3.9 MMOL/L (ref 3.5–5.1)
PROT SERPL-MCNC: 7 G/DL (ref 6–8.4)
RBC # BLD AUTO: 4.77 M/UL (ref 4.6–6.2)
SODIUM SERPL-SCNC: 143 MMOL/L (ref 136–145)
VIT B12 SERPL-MCNC: 325 PG/ML (ref 210–950)
WBC # BLD AUTO: 5.74 K/UL (ref 3.9–12.7)

## 2021-08-19 RX ORDER — ATORVASTATIN CALCIUM 40 MG/1
TABLET, FILM COATED ORAL
Qty: 90 TABLET | Refills: 0 | Status: SHIPPED | OUTPATIENT
Start: 2021-08-19 | End: 2021-11-13 | Stop reason: SDUPTHER

## 2021-08-19 RX ORDER — DULAGLUTIDE 4.5 MG/.5ML
4.5 INJECTION, SOLUTION SUBCUTANEOUS
Qty: 12 PEN | Refills: 0 | Status: SHIPPED | OUTPATIENT
Start: 2021-08-19 | End: 2021-10-19 | Stop reason: SDUPTHER

## 2021-08-19 RX ORDER — DULAGLUTIDE 3 MG/.5ML
3 INJECTION, SOLUTION SUBCUTANEOUS WEEKLY
OUTPATIENT
Start: 2021-08-19

## 2021-09-14 DIAGNOSIS — Z79.4 UNCONTROLLED TYPE 2 DIABETES MELLITUS WITH HYPERGLYCEMIA, WITH LONG-TERM CURRENT USE OF INSULIN: ICD-10-CM

## 2021-09-14 DIAGNOSIS — E11.65 UNCONTROLLED TYPE 2 DIABETES MELLITUS WITH HYPERGLYCEMIA, WITH LONG-TERM CURRENT USE OF INSULIN: ICD-10-CM

## 2021-09-14 RX ORDER — INSULIN GLARGINE 300 [IU]/ML
INJECTION, SOLUTION SUBCUTANEOUS
Qty: 4 PEN | Refills: 11 | Status: SHIPPED | OUTPATIENT
Start: 2021-09-14 | End: 2021-10-19 | Stop reason: SDUPTHER

## 2021-10-19 DIAGNOSIS — Z79.4 UNCONTROLLED TYPE 2 DIABETES MELLITUS WITH HYPERGLYCEMIA, WITH LONG-TERM CURRENT USE OF INSULIN: ICD-10-CM

## 2021-10-19 DIAGNOSIS — E11.65 UNCONTROLLED TYPE 2 DIABETES MELLITUS WITH HYPERGLYCEMIA, WITH LONG-TERM CURRENT USE OF INSULIN: ICD-10-CM

## 2021-10-19 RX ORDER — METFORMIN HYDROCHLORIDE 1000 MG/1
1000 TABLET ORAL 2 TIMES DAILY WITH MEALS
Qty: 180 TABLET | Refills: 0 | Status: SHIPPED | OUTPATIENT
Start: 2021-10-19 | End: 2022-05-10 | Stop reason: SDUPTHER

## 2021-10-19 RX ORDER — INSULIN GLARGINE 300 [IU]/ML
INJECTION, SOLUTION SUBCUTANEOUS
Qty: 4 PEN | Refills: 11 | OUTPATIENT
Start: 2021-10-19

## 2021-10-19 RX ORDER — DULAGLUTIDE 4.5 MG/.5ML
4.5 INJECTION, SOLUTION SUBCUTANEOUS
Qty: 12 PEN | Refills: 0 | Status: SHIPPED | OUTPATIENT
Start: 2021-10-19 | End: 2022-02-01 | Stop reason: SDUPTHER

## 2021-10-19 RX ORDER — INSULIN GLARGINE 300 [IU]/ML
INJECTION, SOLUTION SUBCUTANEOUS
Qty: 4 PEN | Refills: 0 | Status: SHIPPED | OUTPATIENT
Start: 2021-10-19 | End: 2022-02-21 | Stop reason: SDUPTHER

## 2021-11-04 ENCOUNTER — PATIENT OUTREACH (OUTPATIENT)
Dept: ADMINISTRATIVE | Facility: OTHER | Age: 71
End: 2021-11-04
Payer: COMMERCIAL

## 2021-11-08 ENCOUNTER — OFFICE VISIT (OUTPATIENT)
Dept: ORTHOPEDICS | Facility: CLINIC | Age: 71
End: 2021-11-08
Payer: COMMERCIAL

## 2021-11-08 VITALS — HEIGHT: 69 IN | BODY MASS INDEX: 30.83 KG/M2 | WEIGHT: 208.13 LBS

## 2021-11-08 DIAGNOSIS — M67.40 GANGLION CYST: ICD-10-CM

## 2021-11-08 PROBLEM — M67.432 GANGLION CYST OF DORSUM OF LEFT WRIST: Status: RESOLVED | Noted: 2020-06-22 | Resolved: 2021-11-08

## 2021-11-08 PROCEDURE — 99214 PR OFFICE/OUTPT VISIT, EST, LEVL IV, 30-39 MIN: ICD-10-PCS | Mod: S$GLB,,, | Performed by: ORTHOPAEDIC SURGERY

## 2021-11-08 PROCEDURE — 1101F PR PT FALLS ASSESS DOC 0-1 FALLS W/OUT INJ PAST YR: ICD-10-PCS | Mod: CPTII,S$GLB,, | Performed by: ORTHOPAEDIC SURGERY

## 2021-11-08 PROCEDURE — 3052F PR MOST RECENT HEMOGLOBIN A1C LEVEL 8.0 - < 9.0%: ICD-10-PCS | Mod: CPTII,S$GLB,, | Performed by: ORTHOPAEDIC SURGERY

## 2021-11-08 PROCEDURE — 1101F PT FALLS ASSESS-DOCD LE1/YR: CPT | Mod: CPTII,S$GLB,, | Performed by: ORTHOPAEDIC SURGERY

## 2021-11-08 PROCEDURE — 3288F FALL RISK ASSESSMENT DOCD: CPT | Mod: CPTII,S$GLB,, | Performed by: ORTHOPAEDIC SURGERY

## 2021-11-08 PROCEDURE — 3052F HG A1C>EQUAL 8.0%<EQUAL 9.0%: CPT | Mod: CPTII,S$GLB,, | Performed by: ORTHOPAEDIC SURGERY

## 2021-11-08 PROCEDURE — 3008F BODY MASS INDEX DOCD: CPT | Mod: CPTII,S$GLB,, | Performed by: ORTHOPAEDIC SURGERY

## 2021-11-08 PROCEDURE — 3008F PR BODY MASS INDEX (BMI) DOCUMENTED: ICD-10-PCS | Mod: CPTII,S$GLB,, | Performed by: ORTHOPAEDIC SURGERY

## 2021-11-08 PROCEDURE — 99999 PR PBB SHADOW E&M-EST. PATIENT-LVL IV: CPT | Mod: PBBFAC,,, | Performed by: ORTHOPAEDIC SURGERY

## 2021-11-08 PROCEDURE — 1125F PR PAIN SEVERITY QUANTIFIED, PAIN PRESENT: ICD-10-PCS | Mod: CPTII,S$GLB,, | Performed by: ORTHOPAEDIC SURGERY

## 2021-11-08 PROCEDURE — 4010F ACE/ARB THERAPY RXD/TAKEN: CPT | Mod: CPTII,S$GLB,, | Performed by: ORTHOPAEDIC SURGERY

## 2021-11-08 PROCEDURE — 1125F AMNT PAIN NOTED PAIN PRSNT: CPT | Mod: CPTII,S$GLB,, | Performed by: ORTHOPAEDIC SURGERY

## 2021-11-08 PROCEDURE — 3288F PR FALLS RISK ASSESSMENT DOCUMENTED: ICD-10-PCS | Mod: CPTII,S$GLB,, | Performed by: ORTHOPAEDIC SURGERY

## 2021-11-08 PROCEDURE — 4010F PR ACE/ARB THEARPY RXD/TAKEN: ICD-10-PCS | Mod: CPTII,S$GLB,, | Performed by: ORTHOPAEDIC SURGERY

## 2021-11-08 PROCEDURE — 99214 OFFICE O/P EST MOD 30 MIN: CPT | Mod: S$GLB,,, | Performed by: ORTHOPAEDIC SURGERY

## 2021-11-08 PROCEDURE — 1159F MED LIST DOCD IN RCRD: CPT | Mod: CPTII,S$GLB,, | Performed by: ORTHOPAEDIC SURGERY

## 2021-11-08 PROCEDURE — 99999 PR PBB SHADOW E&M-EST. PATIENT-LVL IV: ICD-10-PCS | Mod: PBBFAC,,, | Performed by: ORTHOPAEDIC SURGERY

## 2021-11-08 PROCEDURE — 1159F PR MEDICATION LIST DOCUMENTED IN MEDICAL RECORD: ICD-10-PCS | Mod: CPTII,S$GLB,, | Performed by: ORTHOPAEDIC SURGERY

## 2021-11-12 ENCOUNTER — LAB VISIT (OUTPATIENT)
Dept: LAB | Facility: HOSPITAL | Age: 71
End: 2021-11-12
Attending: FAMILY MEDICINE
Payer: COMMERCIAL

## 2021-11-12 DIAGNOSIS — E11.40 NEUROPATHY DUE TO TYPE 2 DIABETES MELLITUS: ICD-10-CM

## 2021-11-12 DIAGNOSIS — E11.65 UNCONTROLLED TYPE 2 DIABETES MELLITUS WITH HYPERGLYCEMIA, WITH LONG-TERM CURRENT USE OF INSULIN: ICD-10-CM

## 2021-11-12 DIAGNOSIS — Z12.5 SCREENING PSA (PROSTATE SPECIFIC ANTIGEN): ICD-10-CM

## 2021-11-12 DIAGNOSIS — I10 ESSENTIAL HYPERTENSION: ICD-10-CM

## 2021-11-12 DIAGNOSIS — Z79.4 UNCONTROLLED TYPE 2 DIABETES MELLITUS WITH HYPERGLYCEMIA, WITH LONG-TERM CURRENT USE OF INSULIN: ICD-10-CM

## 2021-11-12 DIAGNOSIS — Z00.00 VISIT FOR WELL MAN HEALTH CHECK: ICD-10-CM

## 2021-11-12 DIAGNOSIS — E78.49 OTHER HYPERLIPIDEMIA: ICD-10-CM

## 2021-11-12 DIAGNOSIS — E78.5 HYPERLIPIDEMIA LDL GOAL <100: ICD-10-CM

## 2021-11-12 DIAGNOSIS — Z79.4 TYPE 2 DIABETES MELLITUS WITH DIABETIC POLYNEUROPATHY, WITH LONG-TERM CURRENT USE OF INSULIN: ICD-10-CM

## 2021-11-12 DIAGNOSIS — E11.42 TYPE 2 DIABETES MELLITUS WITH DIABETIC POLYNEUROPATHY, WITH LONG-TERM CURRENT USE OF INSULIN: ICD-10-CM

## 2021-11-12 LAB
ALBUMIN SERPL BCP-MCNC: 4.1 G/DL (ref 3.5–5.2)
ALP SERPL-CCNC: 72 U/L (ref 55–135)
ALT SERPL W/O P-5'-P-CCNC: 26 U/L (ref 10–44)
ANION GAP SERPL CALC-SCNC: 9 MMOL/L (ref 8–16)
AST SERPL-CCNC: 15 U/L (ref 10–40)
BASOPHILS # BLD AUTO: 0.03 K/UL (ref 0–0.2)
BASOPHILS NFR BLD: 0.5 % (ref 0–1.9)
BILIRUB SERPL-MCNC: 2.7 MG/DL (ref 0.1–1)
BUN SERPL-MCNC: 13 MG/DL (ref 8–23)
CALCIUM SERPL-MCNC: 10.2 MG/DL (ref 8.7–10.5)
CHLORIDE SERPL-SCNC: 111 MMOL/L (ref 95–110)
CHOLEST SERPL-MCNC: 120 MG/DL (ref 120–199)
CHOLEST/HDLC SERPL: 2.9 {RATIO} (ref 2–5)
CO2 SERPL-SCNC: 26 MMOL/L (ref 23–29)
COMPLEXED PSA SERPL-MCNC: 0.42 NG/ML (ref 0–4)
CREAT SERPL-MCNC: 1.1 MG/DL (ref 0.5–1.4)
DIFFERENTIAL METHOD: ABNORMAL
EOSINOPHIL # BLD AUTO: 0.3 K/UL (ref 0–0.5)
EOSINOPHIL NFR BLD: 5 % (ref 0–8)
ERYTHROCYTE [DISTWIDTH] IN BLOOD BY AUTOMATED COUNT: 12.9 % (ref 11.5–14.5)
EST. GFR  (AFRICAN AMERICAN): >60 ML/MIN/1.73 M^2
EST. GFR  (NON AFRICAN AMERICAN): >60 ML/MIN/1.73 M^2
ESTIMATED AVG GLUCOSE: 206 MG/DL (ref 68–131)
GLUCOSE SERPL-MCNC: 50 MG/DL (ref 70–110)
HBA1C MFR BLD: 8.8 % (ref 4–5.6)
HCT VFR BLD AUTO: 46.6 % (ref 40–54)
HDLC SERPL-MCNC: 41 MG/DL (ref 40–75)
HDLC SERPL: 34.2 % (ref 20–50)
HGB BLD-MCNC: 15.9 G/DL (ref 14–18)
IMM GRANULOCYTES # BLD AUTO: 0.02 K/UL (ref 0–0.04)
IMM GRANULOCYTES NFR BLD AUTO: 0.3 % (ref 0–0.5)
LDLC SERPL CALC-MCNC: 62.2 MG/DL (ref 63–159)
LYMPHOCYTES # BLD AUTO: 1.3 K/UL (ref 1–4.8)
LYMPHOCYTES NFR BLD: 20.2 % (ref 18–48)
MCH RBC QN AUTO: 33.1 PG (ref 27–31)
MCHC RBC AUTO-ENTMCNC: 34.1 G/DL (ref 32–36)
MCV RBC AUTO: 97 FL (ref 82–98)
MONOCYTES # BLD AUTO: 0.6 K/UL (ref 0.3–1)
MONOCYTES NFR BLD: 8.3 % (ref 4–15)
NEUTROPHILS # BLD AUTO: 4.4 K/UL (ref 1.8–7.7)
NEUTROPHILS NFR BLD: 65.7 % (ref 38–73)
NONHDLC SERPL-MCNC: 79 MG/DL
NRBC BLD-RTO: 0 /100 WBC
PLATELET # BLD AUTO: 140 K/UL (ref 150–450)
PMV BLD AUTO: 12.6 FL (ref 9.2–12.9)
POTASSIUM SERPL-SCNC: 4.7 MMOL/L (ref 3.5–5.1)
PROT SERPL-MCNC: 6.7 G/DL (ref 6–8.4)
RBC # BLD AUTO: 4.81 M/UL (ref 4.6–6.2)
SODIUM SERPL-SCNC: 146 MMOL/L (ref 136–145)
TRIGL SERPL-MCNC: 84 MG/DL (ref 30–150)
TSH SERPL DL<=0.005 MIU/L-ACNC: 2.5 UIU/ML (ref 0.4–4)
WBC # BLD AUTO: 6.63 K/UL (ref 3.9–12.7)

## 2021-11-12 PROCEDURE — 85025 COMPLETE CBC W/AUTO DIFF WBC: CPT | Performed by: FAMILY MEDICINE

## 2021-11-12 PROCEDURE — 84443 ASSAY THYROID STIM HORMONE: CPT | Performed by: FAMILY MEDICINE

## 2021-11-12 PROCEDURE — 84153 ASSAY OF PSA TOTAL: CPT | Performed by: FAMILY MEDICINE

## 2021-11-12 PROCEDURE — 80061 LIPID PANEL: CPT | Performed by: FAMILY MEDICINE

## 2021-11-12 PROCEDURE — 36415 COLL VENOUS BLD VENIPUNCTURE: CPT | Mod: PO | Performed by: FAMILY MEDICINE

## 2021-11-12 PROCEDURE — 83036 HEMOGLOBIN GLYCOSYLATED A1C: CPT | Performed by: FAMILY MEDICINE

## 2021-11-12 PROCEDURE — 80053 COMPREHEN METABOLIC PANEL: CPT | Performed by: FAMILY MEDICINE

## 2021-11-13 DIAGNOSIS — E78.49 OTHER HYPERLIPIDEMIA: ICD-10-CM

## 2021-11-13 DIAGNOSIS — E78.5 HYPERLIPIDEMIA LDL GOAL <100: ICD-10-CM

## 2021-11-13 DIAGNOSIS — Z79.4 TYPE 2 DIABETES MELLITUS WITH DIABETIC POLYNEUROPATHY, WITH LONG-TERM CURRENT USE OF INSULIN: ICD-10-CM

## 2021-11-13 DIAGNOSIS — E11.42 TYPE 2 DIABETES MELLITUS WITH DIABETIC POLYNEUROPATHY, WITH LONG-TERM CURRENT USE OF INSULIN: ICD-10-CM

## 2021-11-14 RX ORDER — ATORVASTATIN CALCIUM 40 MG/1
40 TABLET, FILM COATED ORAL DAILY
Qty: 90 TABLET | Refills: 0 | Status: SHIPPED | OUTPATIENT
Start: 2021-11-14 | End: 2022-03-27 | Stop reason: SDUPTHER

## 2021-11-14 RX ORDER — RAMIPRIL 5 MG/1
5 CAPSULE ORAL DAILY
Qty: 90 CAPSULE | Refills: 0 | Status: SHIPPED | OUTPATIENT
Start: 2021-11-14 | End: 2022-03-27 | Stop reason: SDUPTHER

## 2021-11-15 ENCOUNTER — TELEPHONE (OUTPATIENT)
Dept: ADMINISTRATIVE | Facility: OTHER | Age: 71
End: 2021-11-15
Payer: COMMERCIAL

## 2021-11-18 ENCOUNTER — OFFICE VISIT (OUTPATIENT)
Dept: FAMILY MEDICINE | Facility: CLINIC | Age: 71
End: 2021-11-18
Payer: COMMERCIAL

## 2021-11-18 VITALS
BODY MASS INDEX: 30.36 KG/M2 | WEIGHT: 205 LBS | SYSTOLIC BLOOD PRESSURE: 130 MMHG | HEART RATE: 98 BPM | OXYGEN SATURATION: 98 % | HEIGHT: 69 IN | DIASTOLIC BLOOD PRESSURE: 78 MMHG

## 2021-11-18 DIAGNOSIS — Z79.4 UNCONTROLLED TYPE 2 DIABETES MELLITUS WITH HYPERGLYCEMIA, WITH LONG-TERM CURRENT USE OF INSULIN: ICD-10-CM

## 2021-11-18 DIAGNOSIS — Z23 NEED FOR INFLUENZA VACCINATION: ICD-10-CM

## 2021-11-18 DIAGNOSIS — R35.1 BENIGN PROSTATIC HYPERPLASIA WITH NOCTURIA: ICD-10-CM

## 2021-11-18 DIAGNOSIS — I10 ESSENTIAL HYPERTENSION: ICD-10-CM

## 2021-11-18 DIAGNOSIS — E11.65 UNCONTROLLED TYPE 2 DIABETES MELLITUS WITH HYPERGLYCEMIA, WITH LONG-TERM CURRENT USE OF INSULIN: ICD-10-CM

## 2021-11-18 DIAGNOSIS — I15.2 HYPERTENSION ASSOCIATED WITH DIABETES: ICD-10-CM

## 2021-11-18 DIAGNOSIS — E11.59 HYPERTENSION ASSOCIATED WITH DIABETES: ICD-10-CM

## 2021-11-18 DIAGNOSIS — Z12.11 COLON CANCER SCREENING: ICD-10-CM

## 2021-11-18 DIAGNOSIS — M54.50 LEFT-SIDED LOW BACK PAIN WITHOUT SCIATICA, UNSPECIFIED CHRONICITY: ICD-10-CM

## 2021-11-18 DIAGNOSIS — E78.49 OTHER HYPERLIPIDEMIA: ICD-10-CM

## 2021-11-18 DIAGNOSIS — I47.10 PSVT (PAROXYSMAL SUPRAVENTRICULAR TACHYCARDIA): ICD-10-CM

## 2021-11-18 DIAGNOSIS — Z00.00 VISIT FOR WELL MAN HEALTH CHECK: Primary | ICD-10-CM

## 2021-11-18 DIAGNOSIS — N40.1 BENIGN PROSTATIC HYPERPLASIA WITH NOCTURIA: ICD-10-CM

## 2021-11-18 PROCEDURE — 1126F PR PAIN SEVERITY QUANTIFIED, NO PAIN PRESENT: ICD-10-PCS | Mod: CPTII,S$GLB,, | Performed by: FAMILY MEDICINE

## 2021-11-18 PROCEDURE — 3288F PR FALLS RISK ASSESSMENT DOCUMENTED: ICD-10-PCS | Mod: CPTII,S$GLB,, | Performed by: FAMILY MEDICINE

## 2021-11-18 PROCEDURE — 1101F PT FALLS ASSESS-DOCD LE1/YR: CPT | Mod: CPTII,S$GLB,, | Performed by: FAMILY MEDICINE

## 2021-11-18 PROCEDURE — 90471 FLU VACCINE - QUADRIVALENT - ADJUVANTED: ICD-10-PCS | Mod: S$GLB,,, | Performed by: FAMILY MEDICINE

## 2021-11-18 PROCEDURE — 3288F FALL RISK ASSESSMENT DOCD: CPT | Mod: CPTII,S$GLB,, | Performed by: FAMILY MEDICINE

## 2021-11-18 PROCEDURE — 99397 PER PM REEVAL EST PAT 65+ YR: CPT | Mod: 25,S$GLB,, | Performed by: FAMILY MEDICINE

## 2021-11-18 PROCEDURE — 3078F DIAST BP <80 MM HG: CPT | Mod: CPTII,S$GLB,, | Performed by: FAMILY MEDICINE

## 2021-11-18 PROCEDURE — 1126F AMNT PAIN NOTED NONE PRSNT: CPT | Mod: CPTII,S$GLB,, | Performed by: FAMILY MEDICINE

## 2021-11-18 PROCEDURE — 1159F MED LIST DOCD IN RCRD: CPT | Mod: CPTII,S$GLB,, | Performed by: FAMILY MEDICINE

## 2021-11-18 PROCEDURE — 1160F RVW MEDS BY RX/DR IN RCRD: CPT | Mod: CPTII,S$GLB,, | Performed by: FAMILY MEDICINE

## 2021-11-18 PROCEDURE — 3075F SYST BP GE 130 - 139MM HG: CPT | Mod: CPTII,S$GLB,, | Performed by: FAMILY MEDICINE

## 2021-11-18 PROCEDURE — 1160F PR REVIEW ALL MEDS BY PRESCRIBER/CLIN PHARMACIST DOCUMENTED: ICD-10-PCS | Mod: CPTII,S$GLB,, | Performed by: FAMILY MEDICINE

## 2021-11-18 PROCEDURE — 3052F PR MOST RECENT HEMOGLOBIN A1C LEVEL 8.0 - < 9.0%: ICD-10-PCS | Mod: CPTII,S$GLB,, | Performed by: FAMILY MEDICINE

## 2021-11-18 PROCEDURE — 99397 PR PREVENTIVE VISIT,EST,65 & OVER: ICD-10-PCS | Mod: 25,S$GLB,, | Performed by: FAMILY MEDICINE

## 2021-11-18 PROCEDURE — 90694 VACC AIIV4 NO PRSRV 0.5ML IM: CPT | Mod: S$GLB,,, | Performed by: FAMILY MEDICINE

## 2021-11-18 PROCEDURE — 4010F PR ACE/ARB THEARPY RXD/TAKEN: ICD-10-PCS | Mod: CPTII,S$GLB,, | Performed by: FAMILY MEDICINE

## 2021-11-18 PROCEDURE — 1159F PR MEDICATION LIST DOCUMENTED IN MEDICAL RECORD: ICD-10-PCS | Mod: CPTII,S$GLB,, | Performed by: FAMILY MEDICINE

## 2021-11-18 PROCEDURE — 4010F ACE/ARB THERAPY RXD/TAKEN: CPT | Mod: CPTII,S$GLB,, | Performed by: FAMILY MEDICINE

## 2021-11-18 PROCEDURE — 3075F PR MOST RECENT SYSTOLIC BLOOD PRESS GE 130-139MM HG: ICD-10-PCS | Mod: CPTII,S$GLB,, | Performed by: FAMILY MEDICINE

## 2021-11-18 PROCEDURE — 90471 IMMUNIZATION ADMIN: CPT | Mod: S$GLB,,, | Performed by: FAMILY MEDICINE

## 2021-11-18 PROCEDURE — 3066F NEPHROPATHY DOC TX: CPT | Mod: CPTII,S$GLB,, | Performed by: FAMILY MEDICINE

## 2021-11-18 PROCEDURE — 99999 PR PBB SHADOW E&M-EST. PATIENT-LVL IV: CPT | Mod: PBBFAC,,, | Performed by: FAMILY MEDICINE

## 2021-11-18 PROCEDURE — 99999 PR PBB SHADOW E&M-EST. PATIENT-LVL IV: ICD-10-PCS | Mod: PBBFAC,,, | Performed by: FAMILY MEDICINE

## 2021-11-18 PROCEDURE — 3066F PR DOCUMENTATION OF TREATMENT FOR NEPHROPATHY: ICD-10-PCS | Mod: CPTII,S$GLB,, | Performed by: FAMILY MEDICINE

## 2021-11-18 PROCEDURE — 3061F NEG MICROALBUMINURIA REV: CPT | Mod: CPTII,S$GLB,, | Performed by: FAMILY MEDICINE

## 2021-11-18 PROCEDURE — 1101F PR PT FALLS ASSESS DOC 0-1 FALLS W/OUT INJ PAST YR: ICD-10-PCS | Mod: CPTII,S$GLB,, | Performed by: FAMILY MEDICINE

## 2021-11-18 PROCEDURE — 3008F BODY MASS INDEX DOCD: CPT | Mod: CPTII,S$GLB,, | Performed by: FAMILY MEDICINE

## 2021-11-18 PROCEDURE — 3008F PR BODY MASS INDEX (BMI) DOCUMENTED: ICD-10-PCS | Mod: CPTII,S$GLB,, | Performed by: FAMILY MEDICINE

## 2021-11-18 PROCEDURE — 3061F PR NEG MICROALBUMINURIA RESULT DOCUMENTED/REVIEW: ICD-10-PCS | Mod: CPTII,S$GLB,, | Performed by: FAMILY MEDICINE

## 2021-11-18 PROCEDURE — 3052F HG A1C>EQUAL 8.0%<EQUAL 9.0%: CPT | Mod: CPTII,S$GLB,, | Performed by: FAMILY MEDICINE

## 2021-11-18 PROCEDURE — 3078F PR MOST RECENT DIASTOLIC BLOOD PRESSURE < 80 MM HG: ICD-10-PCS | Mod: CPTII,S$GLB,, | Performed by: FAMILY MEDICINE

## 2021-11-18 PROCEDURE — 90694 FLU VACCINE - QUADRIVALENT - ADJUVANTED: ICD-10-PCS | Mod: S$GLB,,, | Performed by: FAMILY MEDICINE

## 2021-11-18 RX ORDER — TAMSULOSIN HYDROCHLORIDE 0.4 MG/1
0.4 CAPSULE ORAL DAILY
Qty: 90 CAPSULE | Refills: 1 | Status: SHIPPED | OUTPATIENT
Start: 2021-11-18 | End: 2022-08-23

## 2021-12-07 ENCOUNTER — TELEPHONE (OUTPATIENT)
Dept: ADMINISTRATIVE | Facility: OTHER | Age: 71
End: 2021-12-07
Payer: COMMERCIAL

## 2021-12-09 ENCOUNTER — PATIENT OUTREACH (OUTPATIENT)
Dept: ADMINISTRATIVE | Facility: OTHER | Age: 71
End: 2021-12-09
Payer: COMMERCIAL

## 2021-12-13 ENCOUNTER — OFFICE VISIT (OUTPATIENT)
Dept: ENDOCRINOLOGY | Facility: CLINIC | Age: 71
End: 2021-12-13
Payer: COMMERCIAL

## 2021-12-13 VITALS
DIASTOLIC BLOOD PRESSURE: 80 MMHG | SYSTOLIC BLOOD PRESSURE: 130 MMHG | BODY MASS INDEX: 30.04 KG/M2 | WEIGHT: 202.81 LBS | HEIGHT: 69 IN

## 2021-12-13 DIAGNOSIS — E11.40 NEUROPATHY DUE TO TYPE 2 DIABETES MELLITUS: ICD-10-CM

## 2021-12-13 DIAGNOSIS — Z79.4 UNCONTROLLED TYPE 2 DIABETES MELLITUS WITH HYPERGLYCEMIA, WITH LONG-TERM CURRENT USE OF INSULIN: Primary | ICD-10-CM

## 2021-12-13 DIAGNOSIS — E11.65 UNCONTROLLED TYPE 2 DIABETES MELLITUS WITH HYPERGLYCEMIA, WITH LONG-TERM CURRENT USE OF INSULIN: Primary | ICD-10-CM

## 2021-12-13 DIAGNOSIS — E78.49 OTHER HYPERLIPIDEMIA: ICD-10-CM

## 2021-12-13 DIAGNOSIS — I10 ESSENTIAL HYPERTENSION: ICD-10-CM

## 2021-12-13 PROCEDURE — 99214 OFFICE O/P EST MOD 30 MIN: CPT | Mod: S$GLB,,, | Performed by: GENERAL ACUTE CARE HOSPITAL

## 2021-12-13 PROCEDURE — 99999 PR PBB SHADOW E&M-EST. PATIENT-LVL V: CPT | Mod: PBBFAC,,, | Performed by: GENERAL ACUTE CARE HOSPITAL

## 2021-12-13 PROCEDURE — 99999 PR PBB SHADOW E&M-EST. PATIENT-LVL V: ICD-10-PCS | Mod: PBBFAC,,, | Performed by: GENERAL ACUTE CARE HOSPITAL

## 2021-12-13 PROCEDURE — 4010F PR ACE/ARB THEARPY RXD/TAKEN: ICD-10-PCS | Mod: CPTII,S$GLB,, | Performed by: GENERAL ACUTE CARE HOSPITAL

## 2021-12-13 PROCEDURE — 3066F PR DOCUMENTATION OF TREATMENT FOR NEPHROPATHY: ICD-10-PCS | Mod: CPTII,S$GLB,, | Performed by: GENERAL ACUTE CARE HOSPITAL

## 2021-12-13 PROCEDURE — 3061F PR NEG MICROALBUMINURIA RESULT DOCUMENTED/REVIEW: ICD-10-PCS | Mod: CPTII,S$GLB,, | Performed by: GENERAL ACUTE CARE HOSPITAL

## 2021-12-13 PROCEDURE — 3061F NEG MICROALBUMINURIA REV: CPT | Mod: CPTII,S$GLB,, | Performed by: GENERAL ACUTE CARE HOSPITAL

## 2021-12-13 PROCEDURE — 99214 PR OFFICE/OUTPT VISIT, EST, LEVL IV, 30-39 MIN: ICD-10-PCS | Mod: S$GLB,,, | Performed by: GENERAL ACUTE CARE HOSPITAL

## 2021-12-13 PROCEDURE — 3066F NEPHROPATHY DOC TX: CPT | Mod: CPTII,S$GLB,, | Performed by: GENERAL ACUTE CARE HOSPITAL

## 2021-12-13 PROCEDURE — 4010F ACE/ARB THERAPY RXD/TAKEN: CPT | Mod: CPTII,S$GLB,, | Performed by: GENERAL ACUTE CARE HOSPITAL

## 2021-12-13 RX ORDER — BLOOD-GLUCOSE SENSOR
EACH MISCELLANEOUS
Qty: 3 EACH | Status: SHIPPED | OUTPATIENT
Start: 2021-12-13 | End: 2022-07-11

## 2021-12-13 RX ORDER — IBUPROFEN 200 MG
16 TABLET ORAL
Qty: 50 TABLET | Refills: 12 | COMMUNITY
Start: 2021-12-13 | End: 2022-08-23

## 2021-12-13 RX ORDER — BLOOD-GLUCOSE TRANSMITTER
1 EACH MISCELLANEOUS
Qty: 3 EACH | Refills: 3 | Status: SHIPPED | OUTPATIENT
Start: 2021-12-13 | End: 2022-03-18 | Stop reason: SDUPTHER

## 2021-12-16 ENCOUNTER — PATIENT MESSAGE (OUTPATIENT)
Dept: SURGERY | Facility: HOSPITAL | Age: 71
End: 2021-12-16
Payer: COMMERCIAL

## 2021-12-17 ENCOUNTER — HOSPITAL ENCOUNTER (OUTPATIENT)
Facility: HOSPITAL | Age: 71
Discharge: HOME | End: 2021-12-17
Attending: ORTHOPAEDIC SURGERY | Admitting: ORTHOPAEDIC SURGERY
Payer: COMMERCIAL

## 2021-12-17 VITALS
HEART RATE: 97 BPM | TEMPERATURE: 99 F | BODY MASS INDEX: 29.92 KG/M2 | WEIGHT: 202 LBS | RESPIRATION RATE: 16 BRPM | DIASTOLIC BLOOD PRESSURE: 93 MMHG | HEIGHT: 69 IN | SYSTOLIC BLOOD PRESSURE: 158 MMHG | OXYGEN SATURATION: 97 %

## 2021-12-17 DIAGNOSIS — M67.40 GANGLION CYST: ICD-10-CM

## 2021-12-17 LAB — POCT GLUCOSE: 194 MG/DL (ref 70–110)

## 2021-12-17 PROCEDURE — 88304 TISSUE EXAM BY PATHOLOGIST: CPT | Mod: 26,,, | Performed by: PATHOLOGY

## 2021-12-17 PROCEDURE — 25111 PR EXCIS PRIMARY GANGLION WRIST: ICD-10-PCS | Mod: LT,,, | Performed by: ORTHOPAEDIC SURGERY

## 2021-12-17 PROCEDURE — 25111 REMOVE WRIST TENDON LESION: CPT | Mod: LT,,, | Performed by: ORTHOPAEDIC SURGERY

## 2021-12-17 PROCEDURE — 36000706: Performed by: ORTHOPAEDIC SURGERY

## 2021-12-17 PROCEDURE — 88304 PR  SURG PATH,LEVEL III: ICD-10-PCS | Mod: 26,,, | Performed by: PATHOLOGY

## 2021-12-17 PROCEDURE — 36000707: Performed by: ORTHOPAEDIC SURGERY

## 2021-12-17 PROCEDURE — 37000009 HC ANESTHESIA EA ADD 15 MINS: Performed by: ORTHOPAEDIC SURGERY

## 2021-12-17 PROCEDURE — 37000008 HC ANESTHESIA 1ST 15 MINUTES: Performed by: ORTHOPAEDIC SURGERY

## 2021-12-17 PROCEDURE — 25000003 PHARM REV CODE 250: Performed by: ORTHOPAEDIC SURGERY

## 2021-12-17 PROCEDURE — 88304 TISSUE EXAM BY PATHOLOGIST: CPT | Performed by: PATHOLOGY

## 2021-12-17 PROCEDURE — 71000015 HC POSTOP RECOV 1ST HR: Performed by: ORTHOPAEDIC SURGERY

## 2021-12-17 RX ORDER — ACETAMINOPHEN 325 MG/1
650 TABLET ORAL EVERY 4 HOURS PRN
Status: DISCONTINUED | OUTPATIENT
Start: 2021-12-17 | End: 2021-12-17 | Stop reason: HOSPADM

## 2021-12-17 RX ORDER — TRAMADOL HYDROCHLORIDE 50 MG/1
50 TABLET ORAL EVERY 4 HOURS PRN
Qty: 12 TABLET | Refills: 0 | Status: SHIPPED | OUTPATIENT
Start: 2021-12-17 | End: 2021-12-27

## 2021-12-17 RX ORDER — CEFAZOLIN SODIUM 2 G/50ML
2 SOLUTION INTRAVENOUS
Status: DISCONTINUED | OUTPATIENT
Start: 2021-12-17 | End: 2021-12-17 | Stop reason: HOSPADM

## 2021-12-17 RX ORDER — LIDOCAINE HYDROCHLORIDE 10 MG/ML
INJECTION, SOLUTION EPIDURAL; INFILTRATION; INTRACAUDAL; PERINEURAL
Status: DISCONTINUED | OUTPATIENT
Start: 2021-12-17 | End: 2021-12-17 | Stop reason: HOSPADM

## 2021-12-17 RX ORDER — ONDANSETRON 8 MG/1
8 TABLET, ORALLY DISINTEGRATING ORAL EVERY 8 HOURS PRN
Status: DISCONTINUED | OUTPATIENT
Start: 2021-12-17 | End: 2021-12-17 | Stop reason: HOSPADM

## 2021-12-17 RX ORDER — OXYCODONE HYDROCHLORIDE 5 MG/1
10 TABLET ORAL EVERY 4 HOURS PRN
Status: DISCONTINUED | OUTPATIENT
Start: 2021-12-17 | End: 2021-12-17 | Stop reason: HOSPADM

## 2021-12-17 RX ORDER — BUPIVACAINE HYDROCHLORIDE 5 MG/ML
INJECTION, SOLUTION EPIDURAL; INTRACAUDAL
Status: DISCONTINUED | OUTPATIENT
Start: 2021-12-17 | End: 2021-12-17 | Stop reason: HOSPADM

## 2021-12-17 RX ORDER — HYDROCODONE BITARTRATE AND ACETAMINOPHEN 5; 325 MG/1; MG/1
1 TABLET ORAL EVERY 4 HOURS PRN
Status: DISCONTINUED | OUTPATIENT
Start: 2021-12-17 | End: 2021-12-17 | Stop reason: HOSPADM

## 2021-12-22 LAB
FINAL PATHOLOGIC DIAGNOSIS: NORMAL
GROSS: NORMAL
Lab: NORMAL

## 2021-12-27 ENCOUNTER — PATIENT MESSAGE (OUTPATIENT)
Dept: ADMINISTRATIVE | Facility: HOSPITAL | Age: 71
End: 2021-12-27
Payer: COMMERCIAL

## 2021-12-28 ENCOUNTER — OFFICE VISIT (OUTPATIENT)
Dept: ORTHOPEDICS | Facility: CLINIC | Age: 71
End: 2021-12-28
Payer: COMMERCIAL

## 2021-12-28 VITALS — BODY MASS INDEX: 29.83 KG/M2 | WEIGHT: 202 LBS

## 2021-12-28 DIAGNOSIS — Z98.890 H/O EXCISION OF GANGLION CYST: Primary | ICD-10-CM

## 2021-12-28 PROCEDURE — 3008F BODY MASS INDEX DOCD: CPT | Mod: CPTII,S$GLB,, | Performed by: ORTHOPAEDIC SURGERY

## 2021-12-28 PROCEDURE — 1159F PR MEDICATION LIST DOCUMENTED IN MEDICAL RECORD: ICD-10-PCS | Mod: CPTII,S$GLB,, | Performed by: ORTHOPAEDIC SURGERY

## 2021-12-28 PROCEDURE — 1101F PR PT FALLS ASSESS DOC 0-1 FALLS W/OUT INJ PAST YR: ICD-10-PCS | Mod: CPTII,S$GLB,, | Performed by: ORTHOPAEDIC SURGERY

## 2021-12-28 PROCEDURE — 99999 PR PBB SHADOW E&M-EST. PATIENT-LVL III: CPT | Mod: PBBFAC,,, | Performed by: ORTHOPAEDIC SURGERY

## 2021-12-28 PROCEDURE — 1125F PR PAIN SEVERITY QUANTIFIED, PAIN PRESENT: ICD-10-PCS | Mod: CPTII,S$GLB,, | Performed by: ORTHOPAEDIC SURGERY

## 2021-12-28 PROCEDURE — 3008F PR BODY MASS INDEX (BMI) DOCUMENTED: ICD-10-PCS | Mod: CPTII,S$GLB,, | Performed by: ORTHOPAEDIC SURGERY

## 2021-12-28 PROCEDURE — 99024 POSTOP FOLLOW-UP VISIT: CPT | Mod: S$GLB,,, | Performed by: ORTHOPAEDIC SURGERY

## 2021-12-28 PROCEDURE — 3288F PR FALLS RISK ASSESSMENT DOCUMENTED: ICD-10-PCS | Mod: CPTII,S$GLB,, | Performed by: ORTHOPAEDIC SURGERY

## 2021-12-28 PROCEDURE — 3066F NEPHROPATHY DOC TX: CPT | Mod: CPTII,S$GLB,, | Performed by: ORTHOPAEDIC SURGERY

## 2021-12-28 PROCEDURE — 99024 PR POST-OP FOLLOW-UP VISIT: ICD-10-PCS | Mod: S$GLB,,, | Performed by: ORTHOPAEDIC SURGERY

## 2021-12-28 PROCEDURE — 1125F AMNT PAIN NOTED PAIN PRSNT: CPT | Mod: CPTII,S$GLB,, | Performed by: ORTHOPAEDIC SURGERY

## 2021-12-28 PROCEDURE — 3061F PR NEG MICROALBUMINURIA RESULT DOCUMENTED/REVIEW: ICD-10-PCS | Mod: CPTII,S$GLB,, | Performed by: ORTHOPAEDIC SURGERY

## 2021-12-28 PROCEDURE — 3061F NEG MICROALBUMINURIA REV: CPT | Mod: CPTII,S$GLB,, | Performed by: ORTHOPAEDIC SURGERY

## 2021-12-28 PROCEDURE — 4010F PR ACE/ARB THEARPY RXD/TAKEN: ICD-10-PCS | Mod: CPTII,S$GLB,, | Performed by: ORTHOPAEDIC SURGERY

## 2021-12-28 PROCEDURE — 3288F FALL RISK ASSESSMENT DOCD: CPT | Mod: CPTII,S$GLB,, | Performed by: ORTHOPAEDIC SURGERY

## 2021-12-28 PROCEDURE — 3052F HG A1C>EQUAL 8.0%<EQUAL 9.0%: CPT | Mod: CPTII,S$GLB,, | Performed by: ORTHOPAEDIC SURGERY

## 2021-12-28 PROCEDURE — 3066F PR DOCUMENTATION OF TREATMENT FOR NEPHROPATHY: ICD-10-PCS | Mod: CPTII,S$GLB,, | Performed by: ORTHOPAEDIC SURGERY

## 2021-12-28 PROCEDURE — 99999 PR PBB SHADOW E&M-EST. PATIENT-LVL III: ICD-10-PCS | Mod: PBBFAC,,, | Performed by: ORTHOPAEDIC SURGERY

## 2021-12-28 PROCEDURE — 1159F MED LIST DOCD IN RCRD: CPT | Mod: CPTII,S$GLB,, | Performed by: ORTHOPAEDIC SURGERY

## 2021-12-28 PROCEDURE — 4010F ACE/ARB THERAPY RXD/TAKEN: CPT | Mod: CPTII,S$GLB,, | Performed by: ORTHOPAEDIC SURGERY

## 2021-12-28 PROCEDURE — 1101F PT FALLS ASSESS-DOCD LE1/YR: CPT | Mod: CPTII,S$GLB,, | Performed by: ORTHOPAEDIC SURGERY

## 2021-12-28 PROCEDURE — 3052F PR MOST RECENT HEMOGLOBIN A1C LEVEL 8.0 - < 9.0%: ICD-10-PCS | Mod: CPTII,S$GLB,, | Performed by: ORTHOPAEDIC SURGERY

## 2021-12-28 RX ORDER — CEPHALEXIN 500 MG/1
500 CAPSULE ORAL 4 TIMES DAILY
Qty: 20 CAPSULE | Refills: 0 | Status: SHIPPED | OUTPATIENT
Start: 2021-12-28 | End: 2022-01-02

## 2022-01-01 ENCOUNTER — PATIENT MESSAGE (OUTPATIENT)
Dept: ENDOCRINOLOGY | Facility: CLINIC | Age: 72
End: 2022-01-01
Payer: COMMERCIAL

## 2022-01-03 DIAGNOSIS — Z79.4 UNCONTROLLED TYPE 2 DIABETES MELLITUS WITH HYPERGLYCEMIA, WITH LONG-TERM CURRENT USE OF INSULIN: ICD-10-CM

## 2022-01-03 DIAGNOSIS — E11.65 UNCONTROLLED TYPE 2 DIABETES MELLITUS WITH HYPERGLYCEMIA, WITH LONG-TERM CURRENT USE OF INSULIN: ICD-10-CM

## 2022-01-03 RX ORDER — INSULIN ASPART 100 [IU]/ML
INJECTION, SOLUTION INTRAVENOUS; SUBCUTANEOUS
Qty: 78 ML | Refills: 0 | Status: SHIPPED | OUTPATIENT
Start: 2022-01-03 | End: 2022-07-05 | Stop reason: SDUPTHER

## 2022-01-03 NOTE — TELEPHONE ENCOUNTER
No new care gaps identified.  Powered by [employer]. Reference number: [id number].   1/03/2022 8:09:17 AM CST

## 2022-01-10 NOTE — PROGRESS NOTES
Subjective:      Patient ID: Patria Nieto is a 71 y.o. male.    Chief Complaint: Post-op Evaluation (Left hand cyst removal ( Sx 12/17))      HPI: Patria Nieto is here for PO and wound check. He is 4 weeks s/p excision ganglion cyst of the left wrist. He was given Abx at his last visit for possible injection vs irritation. He reports compliance. Today, incision looks and feels great. He still has some dorsal wrist pain and stiffness with use but overall, he is back to using his hand regularly in the garden, etc. No complaints.     Past Medical History:   Diagnosis Date    Colon polyp 06/22/2007    Diabetes mellitus type II     Hx MRSA infection     Hypertension     MRSA infection 2002    Supraventricular tachycardia        Current Outpatient Medications:     aspirin 81 MG Chew, Take 81 mg by mouth once daily., Disp: , Rfl:     atorvastatin (LIPITOR) 40 MG tablet, Take 1 tablet (40 mg total) by mouth once daily., Disp: 90 tablet, Rfl: 0    blood-glucose sensor (DEXCOM G6 SENSOR) Nikki, Use 3 sensor every 30 days, Disp: 3 each, Rfl: prn    blood-glucose transmitter (DEXCOM G6 TRANSMITTER) Nikki, 1 Device by Misc.(Non-Drug; Combo Route) route every 3 (three) months., Disp: 3 each, Rfl: 3    dulaglutide (TRULICITY) 4.5 mg/0.5 mL pen injector, Inject 4.5 mg into the skin every 7 days., Disp: 12 pen, Rfl: 0    glucose 4 GM chewable tablet, Take 4 tablets (16 g total) by mouth as needed for Low blood sugar (If having symptoms of blurry vision, palpitations, confusion, shakiness.  Please check sugars and if sugar below 70 please take 4 tablets and re-check sugar everry 15 minutes until sugars are above 70 and symptoms resolve.)., Disp: 50 tablet, Rfl: 12    insulin aspart U-100 (NOVOLOG FLEXPEN U-100 INSULIN) 100 unit/mL (3 mL) InPn pen, INJECT 20 UNITS INTO THE SKIN WITH CORRECTION SCALE(MAX 84 UNITS PER DAY), Disp: 78 mL, Rfl: 0    insulin glargine, TOUJEO, (TOUJEO) 300 unit/mL (1.5 mL) InPn pen, Inject 55  "units daily. Adjust to max 70 units per MD instructions, Disp: 4 pen, Rfl: 0    metFORMIN (GLUCOPHAGE) 1000 MG tablet, Take 1 tablet (1,000 mg total) by mouth 2 (two) times daily with meals., Disp: 180 tablet, Rfl: 0    metoprolol tartrate (LOPRESSOR) 25 MG tablet, Take 1 tab (25 mg) by mouth each morning and 2 tabs (50 mg) by mouth each evening., Disp: 270 tablet, Rfl: 3    mupirocin (BACTROBAN) 2 % ointment, Apply topically 3 (three) times daily., Disp: 30 g, Rfl: 0    pen needle, diabetic (BD ULTRA-FINE MINI PEN NEEDLE) 31 gauge x 3/16" Ndle, Uses 5 times a day., Disp: 200 each, Rfl: 6    ramipriL (ALTACE) 5 MG capsule, Take 1 capsule (5 mg total) by mouth once daily., Disp: 90 capsule, Rfl: 0    tamsulosin (FLOMAX) 0.4 mg Cap, Take 1 capsule (0.4 mg total) by mouth once daily., Disp: 90 capsule, Rfl: 1  No current facility-administered medications for this visit.    Facility-Administered Medications Ordered in Other Visits:     sodium chloride 0.9% bolus 1,000 mL, 1,000 mL, Intravenous, Once, Lindsay Ruelas NP  Review of patient's allergies indicates:  No Known Allergies    Ht 5' 9" (1.753 m)   Wt 91.6 kg (202 lb)   BMI 29.83 kg/m²     Review of Systems   Constitutional: Negative for chills and fever.   Cardiovascular: Negative for chest pain and palpitations.   Respiratory: Negative for shortness of breath and wheezing.    Skin: Negative for poor wound healing and rash.   Musculoskeletal: Positive for arthritis and stiffness.   Gastrointestinal: Negative for nausea and vomiting.   Genitourinary: Negative for dysuria and hematuria.   Neurological: Negative for seizures and tremors.   Psychiatric/Behavioral: Negative for altered mental status.   Allergic/Immunologic: Negative for environmental allergies and persistent infections.         Objective:    Ortho Exam       Left UE: Skin well healed incision. Swelling minimal. TTP dorsal radiocarpal joint. ROM improved. Sensation intact. Tinels n/a. Pulses " presnt. Cap refill brisk.   GEN: Well developed, well nourished male. AAOX3. No acute distress.   Normocephalic, atraumatic.   [facility]  Breathing unlabored.  Mood and affect appropriate.     Assessment:     Imaging: No new        1. H/O excision of ganglion cyst          Plan:           Excellent PO recovery  Stiffness and pain remaining likely randa to underlying arthritis.   Continue exercises previously discussed, activity as tolerated  Try Topical NSAID as needed    Follow up if symptoms worsen or fail to improve.

## 2022-01-11 ENCOUNTER — OFFICE VISIT (OUTPATIENT)
Dept: ORTHOPEDICS | Facility: CLINIC | Age: 72
End: 2022-01-11
Payer: COMMERCIAL

## 2022-01-11 VITALS — HEIGHT: 69 IN | WEIGHT: 202 LBS | BODY MASS INDEX: 29.92 KG/M2

## 2022-01-11 DIAGNOSIS — Z98.890 H/O EXCISION OF GANGLION CYST: Primary | ICD-10-CM

## 2022-01-11 PROCEDURE — 1160F RVW MEDS BY RX/DR IN RCRD: CPT | Mod: CPTII,S$GLB,, | Performed by: ORTHOPAEDIC SURGERY

## 2022-01-11 PROCEDURE — 1101F PT FALLS ASSESS-DOCD LE1/YR: CPT | Mod: CPTII,S$GLB,, | Performed by: ORTHOPAEDIC SURGERY

## 2022-01-11 PROCEDURE — 99999 PR PBB SHADOW E&M-EST. PATIENT-LVL III: ICD-10-PCS | Mod: PBBFAC,,, | Performed by: ORTHOPAEDIC SURGERY

## 2022-01-11 PROCEDURE — 1101F PR PT FALLS ASSESS DOC 0-1 FALLS W/OUT INJ PAST YR: ICD-10-PCS | Mod: CPTII,S$GLB,, | Performed by: ORTHOPAEDIC SURGERY

## 2022-01-11 PROCEDURE — 99024 PR POST-OP FOLLOW-UP VISIT: ICD-10-PCS | Mod: S$GLB,,, | Performed by: ORTHOPAEDIC SURGERY

## 2022-01-11 PROCEDURE — 3288F PR FALLS RISK ASSESSMENT DOCUMENTED: ICD-10-PCS | Mod: CPTII,S$GLB,, | Performed by: ORTHOPAEDIC SURGERY

## 2022-01-11 PROCEDURE — 1125F AMNT PAIN NOTED PAIN PRSNT: CPT | Mod: CPTII,S$GLB,, | Performed by: ORTHOPAEDIC SURGERY

## 2022-01-11 PROCEDURE — 1125F PR PAIN SEVERITY QUANTIFIED, PAIN PRESENT: ICD-10-PCS | Mod: CPTII,S$GLB,, | Performed by: ORTHOPAEDIC SURGERY

## 2022-01-11 PROCEDURE — 1159F PR MEDICATION LIST DOCUMENTED IN MEDICAL RECORD: ICD-10-PCS | Mod: CPTII,S$GLB,, | Performed by: ORTHOPAEDIC SURGERY

## 2022-01-11 PROCEDURE — 3008F PR BODY MASS INDEX (BMI) DOCUMENTED: ICD-10-PCS | Mod: CPTII,S$GLB,, | Performed by: ORTHOPAEDIC SURGERY

## 2022-01-11 PROCEDURE — 99999 PR PBB SHADOW E&M-EST. PATIENT-LVL III: CPT | Mod: PBBFAC,,, | Performed by: ORTHOPAEDIC SURGERY

## 2022-01-11 PROCEDURE — 1160F PR REVIEW ALL MEDS BY PRESCRIBER/CLIN PHARMACIST DOCUMENTED: ICD-10-PCS | Mod: CPTII,S$GLB,, | Performed by: ORTHOPAEDIC SURGERY

## 2022-01-11 PROCEDURE — 99024 POSTOP FOLLOW-UP VISIT: CPT | Mod: S$GLB,,, | Performed by: ORTHOPAEDIC SURGERY

## 2022-01-11 PROCEDURE — 3288F FALL RISK ASSESSMENT DOCD: CPT | Mod: CPTII,S$GLB,, | Performed by: ORTHOPAEDIC SURGERY

## 2022-01-11 PROCEDURE — 1159F MED LIST DOCD IN RCRD: CPT | Mod: CPTII,S$GLB,, | Performed by: ORTHOPAEDIC SURGERY

## 2022-01-11 PROCEDURE — 3008F BODY MASS INDEX DOCD: CPT | Mod: CPTII,S$GLB,, | Performed by: ORTHOPAEDIC SURGERY

## 2022-02-01 ENCOUNTER — PATIENT MESSAGE (OUTPATIENT)
Dept: FAMILY MEDICINE | Facility: CLINIC | Age: 72
End: 2022-02-01
Payer: COMMERCIAL

## 2022-02-01 DIAGNOSIS — E11.65 UNCONTROLLED TYPE 2 DIABETES MELLITUS WITH HYPERGLYCEMIA, WITH LONG-TERM CURRENT USE OF INSULIN: ICD-10-CM

## 2022-02-01 DIAGNOSIS — Z79.4 UNCONTROLLED TYPE 2 DIABETES MELLITUS WITH HYPERGLYCEMIA, WITH LONG-TERM CURRENT USE OF INSULIN: ICD-10-CM

## 2022-02-01 RX ORDER — DULAGLUTIDE 4.5 MG/.5ML
4.5 INJECTION, SOLUTION SUBCUTANEOUS
Qty: 12 PEN | Refills: 3 | Status: SHIPPED | OUTPATIENT
Start: 2022-02-01 | End: 2022-05-10 | Stop reason: SDUPTHER

## 2022-02-01 RX ORDER — DULAGLUTIDE 4.5 MG/.5ML
4.5 INJECTION, SOLUTION SUBCUTANEOUS
Qty: 12 PEN | Refills: 0 | Status: CANCELLED | OUTPATIENT
Start: 2022-02-01 | End: 2023-02-01

## 2022-02-01 NOTE — TELEPHONE ENCOUNTER
No new care gaps identified.  Powered by [employer]. Reference number: [id number].   2/01/2022 2:08:50 PM CST

## 2022-02-02 ENCOUNTER — PATIENT MESSAGE (OUTPATIENT)
Dept: ADMINISTRATIVE | Facility: OTHER | Age: 72
End: 2022-02-02
Payer: COMMERCIAL

## 2022-02-20 ENCOUNTER — PATIENT MESSAGE (OUTPATIENT)
Dept: ENDOCRINOLOGY | Facility: CLINIC | Age: 72
End: 2022-02-20
Payer: COMMERCIAL

## 2022-02-21 DIAGNOSIS — Z79.4 UNCONTROLLED TYPE 2 DIABETES MELLITUS WITH HYPERGLYCEMIA, WITH LONG-TERM CURRENT USE OF INSULIN: ICD-10-CM

## 2022-02-21 DIAGNOSIS — E11.65 UNCONTROLLED TYPE 2 DIABETES MELLITUS WITH HYPERGLYCEMIA, WITH LONG-TERM CURRENT USE OF INSULIN: ICD-10-CM

## 2022-02-21 RX ORDER — INSULIN GLARGINE 300 [IU]/ML
INJECTION, SOLUTION SUBCUTANEOUS
Qty: 4 PEN | Refills: 0 | Status: SHIPPED | OUTPATIENT
Start: 2022-02-21 | End: 2022-07-11 | Stop reason: SDUPTHER

## 2022-03-08 ENCOUNTER — LAB VISIT (OUTPATIENT)
Dept: LAB | Facility: HOSPITAL | Age: 72
End: 2022-03-08
Attending: GENERAL ACUTE CARE HOSPITAL
Payer: COMMERCIAL

## 2022-03-08 DIAGNOSIS — E11.65 UNCONTROLLED TYPE 2 DIABETES MELLITUS WITH HYPERGLYCEMIA, WITH LONG-TERM CURRENT USE OF INSULIN: ICD-10-CM

## 2022-03-08 DIAGNOSIS — Z79.4 UNCONTROLLED TYPE 2 DIABETES MELLITUS WITH HYPERGLYCEMIA, WITH LONG-TERM CURRENT USE OF INSULIN: ICD-10-CM

## 2022-03-08 LAB
ALBUMIN/CREAT UR: 23.3 UG/MG (ref 0–30)
CREAT UR-MCNC: 86 MG/DL (ref 23–375)
MICROALBUMIN UR DL<=1MG/L-MCNC: 20 UG/ML

## 2022-03-08 PROCEDURE — 82570 ASSAY OF URINE CREATININE: CPT | Performed by: GENERAL ACUTE CARE HOSPITAL

## 2022-03-08 PROCEDURE — 82043 UR ALBUMIN QUANTITATIVE: CPT | Performed by: GENERAL ACUTE CARE HOSPITAL

## 2022-03-10 ENCOUNTER — PATIENT MESSAGE (OUTPATIENT)
Dept: FAMILY MEDICINE | Facility: CLINIC | Age: 72
End: 2022-03-10
Payer: COMMERCIAL

## 2022-03-10 RX ORDER — METOPROLOL TARTRATE 25 MG/1
TABLET, FILM COATED ORAL
Qty: 270 TABLET | Refills: 3 | Status: SHIPPED | OUTPATIENT
Start: 2022-03-10 | End: 2022-07-28

## 2022-03-16 ENCOUNTER — PATIENT OUTREACH (OUTPATIENT)
Dept: ADMINISTRATIVE | Facility: OTHER | Age: 72
End: 2022-03-16
Payer: COMMERCIAL

## 2022-03-16 NOTE — PROGRESS NOTES
Health Maintenance Due   Topic Date Due    Shingles Vaccine (1 of 2) Never done    Colorectal Cancer Screening  06/24/2021    Pneumococcal Vaccines (Age 65+) (2 of 2 - PPSV23) 08/10/2021    COVID-19 Vaccine (3 - Booster for Moderna series) 08/12/2021    Foot Exam  12/02/2021    Eye Exam  12/23/2021     Updates were requested from care everywhere.  Chart was reviewed for overdue Proactive [facility] Encounters ([facility]ORI) topics (CRS, Breast Cancer Screening, Eye exam)  Health Maintenance has been updated.  LINKS immunization registry triggered.  Immunizations were reconciled.

## 2022-03-18 ENCOUNTER — OFFICE VISIT (OUTPATIENT)
Dept: ENDOCRINOLOGY | Facility: CLINIC | Age: 72
End: 2022-03-18
Payer: COMMERCIAL

## 2022-03-18 VITALS
HEIGHT: 69 IN | WEIGHT: 200.94 LBS | SYSTOLIC BLOOD PRESSURE: 110 MMHG | BODY MASS INDEX: 29.76 KG/M2 | OXYGEN SATURATION: 96 % | DIASTOLIC BLOOD PRESSURE: 80 MMHG | HEART RATE: 102 BPM

## 2022-03-18 DIAGNOSIS — E11.65 UNCONTROLLED TYPE 2 DIABETES MELLITUS WITH HYPERGLYCEMIA, WITH LONG-TERM CURRENT USE OF INSULIN: Primary | ICD-10-CM

## 2022-03-18 DIAGNOSIS — I10 ESSENTIAL HYPERTENSION: ICD-10-CM

## 2022-03-18 DIAGNOSIS — Z79.4 UNCONTROLLED TYPE 2 DIABETES MELLITUS WITH HYPERGLYCEMIA, WITH LONG-TERM CURRENT USE OF INSULIN: Primary | ICD-10-CM

## 2022-03-18 DIAGNOSIS — E78.49 OTHER HYPERLIPIDEMIA: ICD-10-CM

## 2022-03-18 PROCEDURE — 3066F NEPHROPATHY DOC TX: CPT | Mod: CPTII,S$GLB,, | Performed by: GENERAL ACUTE CARE HOSPITAL

## 2022-03-18 PROCEDURE — 1126F AMNT PAIN NOTED NONE PRSNT: CPT | Mod: CPTII,S$GLB,, | Performed by: GENERAL ACUTE CARE HOSPITAL

## 2022-03-18 PROCEDURE — 3052F PR MOST RECENT HEMOGLOBIN A1C LEVEL 8.0 - < 9.0%: ICD-10-PCS | Mod: CPTII,S$GLB,, | Performed by: GENERAL ACUTE CARE HOSPITAL

## 2022-03-18 PROCEDURE — 3008F BODY MASS INDEX DOCD: CPT | Mod: CPTII,S$GLB,, | Performed by: GENERAL ACUTE CARE HOSPITAL

## 2022-03-18 PROCEDURE — 1159F MED LIST DOCD IN RCRD: CPT | Mod: CPTII,S$GLB,, | Performed by: GENERAL ACUTE CARE HOSPITAL

## 2022-03-18 PROCEDURE — 1126F PR PAIN SEVERITY QUANTIFIED, NO PAIN PRESENT: ICD-10-PCS | Mod: CPTII,S$GLB,, | Performed by: GENERAL ACUTE CARE HOSPITAL

## 2022-03-18 PROCEDURE — 3052F HG A1C>EQUAL 8.0%<EQUAL 9.0%: CPT | Mod: CPTII,S$GLB,, | Performed by: GENERAL ACUTE CARE HOSPITAL

## 2022-03-18 PROCEDURE — 3288F FALL RISK ASSESSMENT DOCD: CPT | Mod: CPTII,S$GLB,, | Performed by: GENERAL ACUTE CARE HOSPITAL

## 2022-03-18 PROCEDURE — 1159F PR MEDICATION LIST DOCUMENTED IN MEDICAL RECORD: ICD-10-PCS | Mod: CPTII,S$GLB,, | Performed by: GENERAL ACUTE CARE HOSPITAL

## 2022-03-18 PROCEDURE — 3061F PR NEG MICROALBUMINURIA RESULT DOCUMENTED/REVIEW: ICD-10-PCS | Mod: CPTII,S$GLB,, | Performed by: GENERAL ACUTE CARE HOSPITAL

## 2022-03-18 PROCEDURE — 99999 PR PBB SHADOW E&M-EST. PATIENT-LVL IV: CPT | Mod: PBBFAC,,, | Performed by: GENERAL ACUTE CARE HOSPITAL

## 2022-03-18 PROCEDURE — 3008F PR BODY MASS INDEX (BMI) DOCUMENTED: ICD-10-PCS | Mod: CPTII,S$GLB,, | Performed by: GENERAL ACUTE CARE HOSPITAL

## 2022-03-18 PROCEDURE — 1160F RVW MEDS BY RX/DR IN RCRD: CPT | Mod: CPTII,S$GLB,, | Performed by: GENERAL ACUTE CARE HOSPITAL

## 2022-03-18 PROCEDURE — 3079F DIAST BP 80-89 MM HG: CPT | Mod: CPTII,S$GLB,, | Performed by: GENERAL ACUTE CARE HOSPITAL

## 2022-03-18 PROCEDURE — 3074F SYST BP LT 130 MM HG: CPT | Mod: CPTII,S$GLB,, | Performed by: GENERAL ACUTE CARE HOSPITAL

## 2022-03-18 PROCEDURE — 99999 PR PBB SHADOW E&M-EST. PATIENT-LVL IV: ICD-10-PCS | Mod: PBBFAC,,, | Performed by: GENERAL ACUTE CARE HOSPITAL

## 2022-03-18 PROCEDURE — 1101F PT FALLS ASSESS-DOCD LE1/YR: CPT | Mod: CPTII,S$GLB,, | Performed by: GENERAL ACUTE CARE HOSPITAL

## 2022-03-18 PROCEDURE — 3074F PR MOST RECENT SYSTOLIC BLOOD PRESSURE < 130 MM HG: ICD-10-PCS | Mod: CPTII,S$GLB,, | Performed by: GENERAL ACUTE CARE HOSPITAL

## 2022-03-18 PROCEDURE — 99214 PR OFFICE/OUTPT VISIT, EST, LEVL IV, 30-39 MIN: ICD-10-PCS | Mod: S$GLB,,, | Performed by: GENERAL ACUTE CARE HOSPITAL

## 2022-03-18 PROCEDURE — 3079F PR MOST RECENT DIASTOLIC BLOOD PRESSURE 80-89 MM HG: ICD-10-PCS | Mod: CPTII,S$GLB,, | Performed by: GENERAL ACUTE CARE HOSPITAL

## 2022-03-18 PROCEDURE — 99214 OFFICE O/P EST MOD 30 MIN: CPT | Mod: S$GLB,,, | Performed by: GENERAL ACUTE CARE HOSPITAL

## 2022-03-18 PROCEDURE — 3066F PR DOCUMENTATION OF TREATMENT FOR NEPHROPATHY: ICD-10-PCS | Mod: CPTII,S$GLB,, | Performed by: GENERAL ACUTE CARE HOSPITAL

## 2022-03-18 PROCEDURE — 1101F PR PT FALLS ASSESS DOC 0-1 FALLS W/OUT INJ PAST YR: ICD-10-PCS | Mod: CPTII,S$GLB,, | Performed by: GENERAL ACUTE CARE HOSPITAL

## 2022-03-18 PROCEDURE — 3288F PR FALLS RISK ASSESSMENT DOCUMENTED: ICD-10-PCS | Mod: CPTII,S$GLB,, | Performed by: GENERAL ACUTE CARE HOSPITAL

## 2022-03-18 PROCEDURE — 3061F NEG MICROALBUMINURIA REV: CPT | Mod: CPTII,S$GLB,, | Performed by: GENERAL ACUTE CARE HOSPITAL

## 2022-03-18 PROCEDURE — 1160F PR REVIEW ALL MEDS BY PRESCRIBER/CLIN PHARMACIST DOCUMENTED: ICD-10-PCS | Mod: CPTII,S$GLB,, | Performed by: GENERAL ACUTE CARE HOSPITAL

## 2022-03-18 RX ORDER — IBUPROFEN 200 MG
16 TABLET ORAL
Qty: 50 TABLET | Refills: 12 | COMMUNITY
Start: 2022-03-18 | End: 2024-03-01 | Stop reason: SDUPTHER

## 2022-03-18 RX ORDER — BLOOD-GLUCOSE SENSOR
EACH MISCELLANEOUS
Qty: 3 EACH | Refills: 11 | Status: SHIPPED | OUTPATIENT
Start: 2022-03-18 | End: 2022-07-11

## 2022-03-18 RX ORDER — BLOOD-GLUCOSE TRANSMITTER
1 EACH MISCELLANEOUS
Qty: 1 EACH | Refills: 11 | Status: SHIPPED | OUTPATIENT
Start: 2022-03-18 | End: 2022-07-11

## 2022-03-18 NOTE — PATIENT INSTRUCTIONS
Due to your diabetes being slightly uncontrolled after taking courses of prednisone which likely caused the recent high blood sugars. I will recommend the following.     We will increase Toujeo to 50 units once a day.  (Long acting insulin)     We will increase the Novolog to  22/24/22 (10 - 15 minutes before your meals) If not eating do not use Novolog as it will result in a low blood sugar.     Continue with current doses of Metformin (twice a day) and Trulicity once a week     I will send a [employer] glucose sensor to the [employer] representative if you have not heard from them or me in 2 weeks please reach out.      150 grams of carbohydrates daily MAX,  (50 grams or less per meal)     Avoid sugary drinks (Sodas, Sweet Tea, Juices with added sugar, Soft drinks)     Check your sugars 3-4 times daily (Before meals and at bedtime)     Sugar goals before breakfast (70 - 130)  Sugars 2 hours after meals  (less than 180)     Keep sugar log to send for review in 2 weeks.      Try walking or doing some sort of physical activity at least 30 minutes 3 times a week to increase your sensitivity to insulin, improve sugar levels and hopefully this will help in trying to reduce the doses of your medications in the future.     If having low blood sugar < 70 please correct with 16 grams of carbohydrates or with 4 glucose tablets and re-check your sugars every 15 minutes until symptoms resolve and sugar is above 100.      We will check your A1C and labs prior to next visit.     Ophthalmology appointment once a year.     Diabetes education appointment once a year.     If any questions feel free to contact me.     Have a nice day.     Sincerely,       Kosta James MD   Endocrinology   3/18/2022 8:39 AM                  Eating the Right Number of Calories ([id number] Guidelines)    Calories are a measure of the energy you get from food. If you eat more calories than you use, you will gain weight. If you eat fewer calories than you use,  you will lose weight. Below are tables that give the number of calories needed each day. Look for your gender, age, and activity level. If you stick to this number, you should neither gain nor lose weight. Note that this is an estimated number of calories.* Your exact number may differ.    Women  Age in years Low activity level (calories/day) Moderate activity level (calories/day) High activity level (calories/day)   19 to 30 1,800-2,000 2,000-2,200 2,400   31 to 50 1,800 2,000 2,200   51 and older 1,600 1,800 2,000-2,200      Men  Age in years Low activity level  (calories/day) Moderate activity level (calories/day) High activity level (calories/day)   19 to 30 2,400-2,600 2,600-2,800 3,000   31 to 50 2,200-2,400 2,400-2,600 2,800-3,000   51 and older 2,000-2,200 2,200-2,400 2,400-2,800     Activity levels defined  Low. Only light physical activity such as that done during typical daily life.  Moderate. Light physical activity done during typical daily life AND physical activity equal to walking about 1.5 to 3 miles a day at 3 to 4 miles per hour.  High. Light physical activity done during typical daily life AND physical activity equal to walking more than 3 miles a day at 3 to 4 miles per hour.  *From Dietary Guidelines for Americans, 5450-3512, [place] Department of Health and Human Services.    Eat less fat  A gram of fat has almost 2.5 times the calories of a gram of protein or carbohydrates. Try to balance your food choices so that only 20% to 35% of your calories comes from total fat. This means an average of 2½ to 3½ grams of fat for each 100 calories you eat.    Eat more fiber  High-fiber foods are digested more slowly than low-fiber foods, so you feel full longer. Try to get at least 25 grams of fiber each day for a 2000 calorie diet. Foods high in fiber include:  Vegetables and fruits  Whole-grain or bran breads, pastas, and cereals  Legumes (beans) and peas  As you begin to eat more fiber, be sure to drink  plenty of water to keep your digestive system working smoothly.    Tips  Do's and don'ts include:   Dont skip meals. This often leads to overeating later on. Its best to spread your eating throughout the day.  Eat a variety of foods, not just a few favorites.  If you find yourself eating when youre not hungry, ask yourself why. Many of us eat when were bored, stressed, or just to be polite. Listen to your body. If youre not hungry, get busy doing something else instead of eating.  Eat slower, shooting for 20 to 30 minutes for each meal. It takes 20 minutes for your stomach to tell your brain that its full. Slow eaters tend to eat less and are still satisfied, while fast eaters may tend to be overeaters.   Pay attention to what you eat. Dont read or watch TV during your meal.     ---------------------------------------------------------------------------------------------------------------------------------------------------    Losing Weight for Heart Health  Excess weight is a major risk factor for heart disease. Losing weight has many benefits including lowering your blood pressure, improving your cholesterol level, and decreasing your risk for diseases such as diabetes and heart disease. It may help keep your arteries open so that your heart can get the oxygen-rich blood it needs. All in all, losing weight makes you healthier.       Exercise with a friend. When activity is fun, you're more likely to stick with it.     Calories and weight loss  Calories are the fuel your body burns for energy. You get the calories you need from the food you eat. For healthy weight loss, women should eat at least 1,200 calories a day, men at least 1,500.  When you eat more calories than you need, your body stores the extra calories as fat. One pound of fat equals 3,500 calories.  To lose weight, try to reduce your total calorie intake by 500 calories. To do this, eat 250 calories less each day. Add activity to burn the other  250 calories. Walking 2.5 miles burns about 250 calories. Other more intense activities can burn more calories in the time you spend doing them, such as swimming and running. It is important to understand that reducing calorie intake is much more effective at weight loss than is exercise.  Eat a variety of healthy foods to get the nutrients you need.    Tips for losing weight  Drink 8 to 10 glasses of water a day.  Dont skip meals. Instead, eat smaller portions.  Eat your meals earlier in the day.  Cut out sugary drinks such as soda and fruit juices.  Make your later meals lighter than your earlier meals.     What can exercise help?  Blood sugar. Regular exercise improves blood sugar control by helping your body use insulin.  Mental and emotional health. Physical activity relieves stress and helps you sleep better.  Heart health. With regular exercise, you can reduce your risk of heart disease and high blood pressure. You can also improve your cholesterol and triglyceride levels.  Weight. Exercise helps you lose fat, gain muscle, and control your weight.  Health of blood vessels and nerves. Activity helps lower blood sugar. This helps prevent damage to blood vessels and nerves that can cause problems with your brain, eyes, feet, and legs.  Finances. If you manage your blood sugar, you may spend less on medical care.    2 types of exercise  Two types of exercise help your body use blood sugar. Experts advise both types of exercise for people with diabetes:  Aerobic exercise. This is a rhythmic, repeated, continued movement of large muscle groups for at least 10 minutes at a time. You should do this about 30 minutes a day on most days of the week. Examples include walking, bicycling, jogging, swimming, water aerobics, and many sports.  Resistance exercise (strength training). This type of exercise uses muscles to move weight or work against resistance. You can do it with free weights, machines, resistance tubing, or  your own body weight. Adults with diabetes should aim for 2 to 3 sessions of resistance exercise each week. Its best to skip a day in between.    A goal to shoot for  Your main goal is to become more active. Even a little bit helps. Choose an activity that you like. Walking is one great form of exercise that everyone can do. Talk to your healthcare provider about any limits you may have before starting with an exercise program. Then aim for 150 minutes a week of physical activity. Dont let more than 2 days go by without exercise. When you are sitting for long periods of time, get up for short sessions of light activity every 30 minutes.    Getting activity into your day  Being more active doesnt have to be hard work. Try these to get more activity into your day:  Take the stairs instead of the elevator  Garden, do housework, and yard work  Choose a parking space farther from the store  Walk to talk to a co-worker instead of calling  Take a 10-minute walk around the block at lunch  Walk to a bus stop a little farther from your home or office  Walk the dog after dinner     ---------------------------------------------------------------------------------------------------------------------------------------------------    Reading Food Labels  Look for the Nutrition Facts label on packaged foods. Reading labels is a big step toward eating healthier. The tips below help you know what to look for.    Serving size. Read this closely because the package, jar, or can may contain more than 1 serving. This is how to measure 1 serving of the food in the package. If you eat more than 1 serving, you get more of everything on the label -- including fat, cholesterol, and calories.  Total fat. This tells you how many grams (g) of fat are in 1 serving. Fat is high in calories. A healthy goal is to have less than 25% of your daily calories come from fat.  Saturated fat. This tells you how much saturated fat is in 1 serving.  Saturated fat raises your cholesterol the most. Look for foods that have little or no saturated fat.  Trans fat. This tells you how much trans fat is in 1 serving. Even a small amount of trans fat can harm your health. Choose foods that have no trans fat.  Cholesterol. This tells you how much cholesterol is in 1 serving. For many years, it had been recommended to eat less than 300 milligrams (mg) of cholesterol a day. New guidelines have removed this limitation as cholesterol has been recently shown to not raise blood cholesterol levels as significantly as previously thought. However, many foods high in cholesterol are also high in saturated fat. It is recommended to limit saturated fat in your diet.  Calories from fat. This number tells you how many calories from fat are in 1 serving (there are 9 calories per gram of fat). Look for foods with few calories from fat.  % Daily value. The higher the number, the more 1 serving has of that nutrient. Look for foods that have low numbers for total fat, saturated fat, cholesterol, and sodium.  Sodium. This tells you how much sodium (salt) is in 1 serving. Choose foods with low numbers for sodium.  Dietary fiber. This number tells you how much fiber is in 1 serving. Foods that are high in fiber can help you feel full. They can also be good for your heart and digestion. The recommended daily amount of fiber is 25 grams for women and 38 grams for men. After age 50, your daily fiber needs drop to 21 grams for women and 30 grams for men.       ---------------------------------------------------------------------------------------------------------------------------------------------------    Understanding Carbohydrates, Fats, and Protein  Food is a source of fuel and nourishment for your body. Its also a source of pleasure. Having diabetes doesnt mean you have to eat special foods or give up desserts. Instead, your dietitian can show you how to plan meals to suit your body. To  start, learn how different foods affect blood sugar.    Carbohydrates  Carbohydrates are the main source of fuel for the body. Carbohydrates raise blood sugar. Many people think carbohydrates are only found in pasta or bread. But carbohydrates are actually in many kinds of foods:  Sugars occur naturally in foods such as fruit, milk, honey, and molasses. Sugars can also be added to many foods, from cereals and yogurt to candy and desserts. Sugars raise blood sugar.  Starches are found in bread, cereals, pasta, and dried beans. Theyre also found in corn, peas, potatoes, yam, acorn squash, and butternut squash. Starches also raise blood sugar.   Fiber is found in foods such as vegetables, fruits, beans, and whole grains. Unlike other carbs, fiber isnt digested or absorbed. So it doesnt raise blood sugar. In fact, fiber can help keep blood sugar from rising too fast. It also helps keep blood cholesterol at a healthy level.  Did you know?  Even though carbohydrates raise blood sugar, its best to have some in every meal. They are an important part of a healthy diet.     Fat  Fat is an energy source that can be stored until needed. Fat does not raise blood sugar. However, it can raise blood cholesterol, increasing the risk of heart disease. Fat is also high in calories, which can cause weight gain. Not all types of fat are the same.    More Healthy:  Monounsaturated fats are mostly found in vegetable oils, such as olive, canola, and peanut oils. They are also found in avocados and some nuts. Monounsaturated fats are healthy for your heart. Thats because they lower LDL (unhealthy) cholesterol.  Polyunsaturated fats are mostly found in vegetable oils, such as corn, safflower, and soybean oils. They are also found in some seeds, nuts, and fish. Polyunsaturated fats lower LDL (unhealthy) cholesterol. So, choosing them instead of saturated fats is healthy for your heart. Certain unsaturated fats can help lower  triglycerides.     Less Healthy:  Saturated fats are found in animal products, such as meat, poultry, whole milk, lard, and butter. Saturated fats raise LDL cholesterol and are not healthy for your heart.  Hydrogenated oils and trans fats are formed when vegetable oils are processed into solid fats. They are found in many processed foods. Hydrogenated oils and trans fats raise LDL cholesterol and lower HDL (healthy) cholesterol. They are not healthy for your heart.    Protein  Protein helps the body build and repair muscle and other tissue. Protein has little or no effect on blood sugar. However, many foods that contain protein also contain saturated fat. By choosing low-fat protein sources, you can get the benefits of protein without the extra fat:  Plant protein is found in dry beans and peas, nuts, and soy products, such as tofu and soymilk. These sources tend to be cholesterol-free and low in saturated fat.  Animal protein is found in fish, poultry, meat, cheese, milk, and eggs. These contain cholesterol and can be high in saturated fat. Aim for lean, lower-fat choices.    ---------------------------------------------------------------------------------------------------------------------------------------------------    Understanding Carbohydrates    A car needs the right type of fuel to run. And you need the right kind of food to function. To keep your energy level up, your body needs food that has carbohydrates. But carbohydrates raise blood sugar levels higher and faster than other kinds of food. Your dietitian will work with you to figure out the amount of carbohydrates you need.    Starches  Starches are found in grains, some vegetables, and beans. Grain products include bread, pasta, cereal, and tortillas. Starchy vegetables include potatoes, peas, corn, lima beans, yams, and squash. Kidney beans, meadows beans, black beans, garbanzo beans, and lentils also contain starches.    Sugars  Sugars are found  naturally in many foods. Or sugar can be added. Foods that contain natural sugar include fruits and fruit juices, dairy products, honey, and molasses. Added sugars are found in most desserts, processed foods, candy, regular soda, and fruit drinks. These are very helpful for treating low blood sugar, or hypoglycemia. They provide sugar quickly. Try to keep at least 15 to 20 grams of these simple sugars with you at all times. Eat this if you begin to have low blood sugar symptoms.    Fiber  Fiber comes from plant foods. Most fiber isnt digested by the body. Instead of raising blood sugar levels like other carbohydrates, it actually stops blood sugar from rising too fast. Fiber is found in fruits, vegetables, whole grains, beans, peas, and many nuts.    Carb counting  Keep track of the amount of carbohydrates you eat. This can help you keep the right balance of physical activity and medicine. The amount of carbohydrates needed will vary for each person. It depends on many things such as your health, the medicines you take, and how active you are. Your healthcare team will help you figure out the right amount of carbohydrates for you. You may start with around 45 to 60 grams of carbohydrate per meal, depending on your situation. Carb counting is a system that helps you keep track of the carbohydrates you eat at each meal.  Carbohydrates come from a variety of foods. These include grains, starchy vegetables, fruit, milk, beans, and snack foods. You can either count carbohydrate grams or carbohydrate servings. When you count carbohydrate servings, 1 carbohydrate serving = 15 grams of carbohydrates.  Here are some examples of foods containing about 15 grams of carbohydrates (1 serving of carbohydrates):  1/2 cup of canned or frozen fruit  A small piece of fresh fruit (4 ounces)  1 slice of bread  1/2 cup of oatmeal  1/3 cup of rice  4 to 6 crackers  1/2 English muffin  1/2 cup of black beans  1/4 of a large baked potato  (3 ounces)  2/3 cup of plain fat-free yogurt  1 cup of soup  1/2 cup of casserole  6 chicken nuggets  2-inch-square brownie or cake without frosting  2 small cookies  1/2 cup of ice cream or sherbet    Carb counting is easier when food labels are available. Look at the label to see how many grams of total carbohydrates the food contains. Then you can figure out how much you should eat.  Two very important lines to look at on the label are the serving size and the total carbohydrate amount. Here are some tips for using food labels to count your carbohydrate intake:  Check the serving size. The information on the label is based on that serving size. If you eat more than the listed serving size, you may have to double or triple the other information on the label.   Check the total grams of carbohydrates. Total carbohydrate from the label includes sugar, starch, and fiber. Be sure to use the total carbohydrate number and not sugar alone.  Know how many grams of carbohydrates you can have.  Be familiar with the matching portion sizes.  Compare labels. Compare the labels of different products, looking at serving sizes and total carbohydrates to find the products that work best for you.   Don't forget protein and fat. With all the focus on carb counting, it might be easy to forget protein and fat in your meals. Don't forget to include sources of protein and healthy fat to balance your meals.  Its also important to be consistent with the amount and time you eat when taking a fixed dose of diabetes medicine. Work with your healthcare provider or dietitian if you need additional help. He or she can help you keep track of your carbohydrate intake. He or she can also help you figure out how many grams of carbohydrates you should have.      ---------------------------------------------------------------------------------------------------------------------------------------------------    Diabetes: Learning About Serving and  Portion Sizes     A good rule of thumb: Devote half your plate to vegetables and green salad. Split the other half between protein and starchy carbohydrates. Fruit makes a good dessert.     Servings and portions. Whats the difference? These terms can be very confusing. But learning to measure serving sizes can help you figure out how many carbohydrates (carbs) and other foods you eat each day. They are also powerful tools for managing your weight.    Servings and portions  Many different words are used to describe amounts of food. If your health care provider uses a term youre not sure of, dont be afraid to ask. It helps to know the difference between servings and portions:  A serving size is a fixed size. Food producers use this term to describe their products. For example, the label on a cereal box could say that 1 cup of dry cereal = 1 serving.  A portion (also called a helping) is how much you eat or how much you put on your plate at a meal. For example, you might eat 2 cups of cereal at breakfast.    Using serving information  The portion you choose to eat (such as 2 cups of cereal) may be more than one serving as listed on the food label (such as 1 cup of cereal). Thats why it helps to measure or weigh the food you eat. Because the food label values are based on servings, youll need to know how many servings you eat at one sitting.     Ounces: 2 to 3 ounces is about the size of your palm.       1 Cup: 1 cup (or a medium-sized piece) is about the size of your fist.       1/2 Cup: 1/2 cup is about the size of your cupped hand.      One tablespoon is about the size of your thumb.  One teaspoon is about the size of the tip of your thumb.    Keeping track of serving sizes  When youre planning for a snack or a meal, keep servings in mind. If you dont have measuring cups or a scale handy, there are ways to eyeball serving sizes, such as comparing your food to the size of your hand (see pictures  "above).    Managing portion sizes  If your weight is a concern, reducing your portions can help. You can eat more than one serving of a food at once. But to keep from eating too much at one meal, learn how to manage your portions. A portion is the amount of each type of food on your plate. See the plate diagram for an example of balanced portions.    ---------------------------------------------------------------------------------------------------------------------------------------------------    Diabetes: Shopping for and Preparing Meals    Having diabetes doesnt mean you have to shop in a special aisle or look for special foods. But you will need to make choices. By comparing items and reading food labels, you can find the healthiest foods for you and your family.  Comparing items  When you shop, compare items to find the best ones for your needs. Keep these facts in mind:  No sugar added does not mean a product is sugar-free.  "Sugar-free" means less than 1/2 gram (g) of sugar per serving.  Fat free means less than 1/2 g of fat per serving. This does not necessarily mean the product is low in calories.  Low fat means 3 g fat or less per serving. Reduced fat or less fat means 25% less fat than the regular version. Some of this fat may be saturated or trans fat. And calories per serving may be similar to the regular version.    Making small changes  Dont try to change all of your eating habits at once. Here are some ideas to start with:  Try fat-free or low-fat cheese, milk, and yogurt. Also try leaner cuts of meat. This will help you cut down on saturated fat.  Try whole-grain breads, brown rice, and whole-wheat pasta.  Load up on fresh or frozen vegetables. If you buy canned, choose low-sodium vegetables.  Avoid processed foods as much as possible. They tend to be low in fiber and high in trans fats and sodium.  Try tofu, soymilk, or meat substitutes.?They can help you cut cholesterol and saturated " fat out of your diet.    Learning to read food labels  To find healthy foods that help you control blood sugar, learn how to read food labels. Look for the Nutrition Facts label on packaged foods. It will tell you how much carbohydrate, sugar, fat, and fiber is in each serving. Then, you can decide whether or not the food fits into your meal plan.    Using the food label  So, once you have the food label, what do you do with it? The food label helps in many ways. Use it to:  Compare items and decide which is the best for your health needs.  Track the number of carbohydrates in your portions.  Figure out how many servings of a food you can have and still stay within the number of carbohydrates for that meal.    Planning meals  For good blood sugar control, plan what and when youll eat. Start by creating a meal plan that includes all the food groups. Then, time your meals to help keep your blood sugar level steady. You may need to adjust your plan for special situations.    Eat from all the food groups  The basis of a healthy meal plan is variety (eating many different types of foods). Look for lean meats, fresh fruits and vegetables, whole grains, and low-fat or non-fat dairy products. Eating a wide variety of foods provides the nutrients your body needs. It can also keep you from getting bored with your meal plan.    Reduce liquid sugars  Extra calories from sodas, sports drinks, and fruit drinks make it hard to keep blood sugar in range. Cut as many liquid sugars from your meal plan as you can. This includes most fruit juices, which are often high in natural or added sugar. Instead, drink plenty of water and other sugar-free beverages.    Eat less fat  If you need to lose some weight, try to reduce the amount of fat in your diet. This can also help lower your cholesterol level to keep blood vessels healthier. Cut fat by using only small amounts of liquid oil for cooking. Read food labels carefully to avoid foods  with unhealthy trans fats.    Timing your meals  When it comes to blood sugar control, when you eat is as important as what you eat. You may need to eat several small meals spaced evenly throughout the day to stay in your target range. So dont skip breakfast or wait until late in the day to get most of your calories. Doing so can cause your blood sugar to rise too high or fall too low.    Cooking wisely  Broil, steam, bake, or grill meats and vegetables, instead of frying.  Instead of cream-based sauces or sugary glazes, flavor foods with vegetable purée, lemon or lime juice, or herb seasonings.  Remove skin from chicken and turkey before serving.  Look in cookbooks for easy, low-fat, low-sugar recipes. When making your usual recipes, cut sugar by 1/2 and fat by 1/3.      ---------------------------------------------------------------------------------------------------------------------------------------------------    Healthy Meals for Diabetes    Ask your healthcare team to help you make a meal plan that fits your needs. Your meal plan tells you when to eat your meals and snacks, what kinds of foods to eat, and how much of each food to eat. You dont have to give up all the foods you like. But you do need to follow some guidelines.  Choose healthy carbohydrates  Starches, sugars, and fiber are all types of carbohydrates. Fiber can help lower your cholesterol and triglycerides. Fiber is also healthy for your heart. You should have 20 to 35 grams of total fiber each day. Fiber-rich foods include:  Whole-grain breads and cereals  Bulgur wheat  Brown rice     Whole-wheat pasta  Fruits and vegetables  Dry beans, and peas   Keep track of the amount of carbohydrates you eat. This can help you keep the right balance of physical activity and medicine. The amount of carbohydrates needed will vary for each person. It depends on many things such as your health, the medicines you take, and how active you are. Your healthcare  team will help you figure out the right amount of carbohydrates for you. You may start with around 45 to 60 grams of carbohydrates per meal, depending on your situation.   Here are some examples of foods containing about 15 grams of carbohydrates (1 serving of carbohydrates):  1/2 cup of canned or frozen fruit  A small piece of fresh fruit (4 ounces)  1 slice of bread  1/2 cup of oatmeal  1/3 cup of rice  4 to 6 crackers  1/2 English muffin  1/2 cup of black beans  1/4 of a large baked potato (3 ounces)  2/3 cup of plain fat-free yogurt  1 cup of soup  1/2 cup of casserole  6 chicken nuggets  2-inch-square brownie or cake without frosting  2 small cookies  1/2 cup of ice cream or sherbet    Choose healthy protein foods  Eating protein that is low in fat can help you control your weight. It also helps keep your heart healthy. Low-fat protein foods include:  Fish  Plant proteins, such as dry beans and peas, nuts, and soy products like tofu and soymilk  Lean meat with all visible fat removed  Poultry with the skin removed  Low-fat or nonfat milk, cheese, and yogurt    Limit unhealthy fats and sugar  Saturated and trans fats are unhealthy for your heart. They raise LDL (bad) cholesterol. Fat is also high in calories, so it can make you gain weight. To cut down on unhealthy fats and sugar, limit these foods:  Butter or margarine  Palm and palm kernel oils and coconut oil  Cream  Cheese  Knowles  Lunch meats     Ice cream  Sweet bakery goods such as pies, muffins, and donuts  Jams and jellies  Candy bars  Regular sodas     How much to eat  The amount of food you eat affects your blood sugar. It also affects your weight. Your healthcare team will tell you how much of each type of food you should eat.  Use measuring cups and spoons and a food scale to measure serving sizes.  Learn what a correct serving size looks like on your plate. This will help when you are away from home and cant measure your servings.  Eat only the  number of servings given on your meal plan for each food. Dont take seconds.  Learn to read food labels. Be sure to look at serving size, total carbohydrates, fiber, calories, sugar, and saturated and trans fats. Look for healthier alternatives to foods that have added sugar.  Plan ahead for parties so you can still have a good time without going overboard with unhealthy food choices. Set a good example yourself by bringing a healthy dish to pot lucks.     Choose healthy snacks  When it comes to snacks, we usually think about foods with added sugar and fats. But there are many other options for healthier snack choices. Here are a few snack ideas to choose from:  Snacks with less than 5 grams of carbohydrates  1 piece of string cheese  3 celery sticks plus 1 tablespoon of peanut butter  5 cherry tomatoes plus 1 tablespoon of ranch dressing  1 hard-boiled egg  1/4 cup of fresh blueberries   5 baby carrots  1 cup of light popcorn  1/2 cup of sugar-free gelatin  15 almonds  Snacks with about 10 to 20 grams of carbohydrates  1/3 cup of hummus plus 1 cup of fresh cut nonstarchy vegetables (carrots, green peppers, broccoli, celery, or a combination)  1/2 cup of fresh or canned fruit plus 1/4 cup of cottage cheese  1/2 cup of tuna salad with 4 crackers  2 rice cakes and a tablespoon of peanut butter  1 small apple or orange  3 cups light popcorn  1/2 of a turkey sandwich (1 slice of whole-wheat bread, 2 ounces of turkey, and mustard)  Portion sizes are important to controlling your blood sugar and staying at a healthy weight. Stock up on healthy snack items so you always have them on hand.    When to eat  Your meal plan will likely include breakfast, lunch, dinner, and some snacks.  Try to eat your meals and snacks at about the same times each day.  Eat all your meals and snacks. Skipping a meal or snack can make your blood sugar drop too low. It can also cause you to eat too much at the next meal or snack. Then your blood  sugar could get too high.    ---------------------------------------------------------------------------------------------------------------------------------------------------    Eating Out When You Have Diabetes  Eating right is an important part of keeping your blood sugar in your target range. You just need to make healthy choices.    Be creative when eating out. Many places dont make you stick strictly to the menu. Instead of ordering a large entree, choose an appetizer or two and a bowl of soup. A mix of side orders can also make a good meal. Read the descriptions of other entrees and specials. If another entree comes with baby carrots, ask for a side order of them even if they dont come with your entree. Ask how food is prepared or if it can be made differently.  Tips for making the most of your meal out  Ask to have high-fat, high-calorie extras, such as French fries and potato chips, left off your plate so you wont be tempted.   Ask what substitutions are available. Instead of French fries, you may be able to have a side of salad with low-calorie dressing.  Order low-fat milk instead of cream for your coffee.  Ask for vegetables and main courses to be served without sauces, butter, margarine, or oil.  Be aware of portion size. You dont have to clean your plate. Take half your meal home in a doggie bag to eat the next day.  Look for heart-healthy or low-fat entrees that include whole grains, vegetables, or fruits.  Choose foods that are grilled, broiled, or steamed.  Avoid dishes that are described on the menu as fried, breaded, smothered, rich, or creamy.    Tips for restaurant meals  When you eat away from home try these tips:  Try to schedule your dining-out meal at your normal meal time. Make a reservation if possible, so you don't have to wait to eat. If you can't make a reservation, try to arrive at the restaurant at a less-busy time to cut down your wait time. Eat a small fruit or starch snack at  your regular mealtime if your restaurant meal is going to be later than usual.   Call ahead to see if the restaurant can meet your dietary needs if you've never been there before. Or you can go online to see the menu ahead of time.  Carry some crackers with you in case the restaurant needs you to wait until you can be served.  Ask how foods are prepared before you order.  Instead of fried, sautéed, or breaded foods, choose ones that are broiled, steamed, grilled, or baked.  Ask for sauces, gravies, and dressings on the side.  Only eat an amount that fits your meal plan. Remember: You can take home the leftovers.  Save dessert for special occasions. Then choose a small dessert or share one with a friend or family member.    Make healthy choices  Fast food  Garden salad with light dressing on the side  Baked potato with vegetables or herbs  Broiled, roasted, or grilled chicken sandwich  Sliced turkey or lean roast beef sandwich    Mexican  Chicken enchilada, without cheese or sour cream   Small burrito with whole beans and chicken  Whole beans (not refried) and rice  Chicken or fish fajitas    Steakhouse  Grilled or broiled lean cuts of beef  Baked potato with vegetables or herbs  Broiled or baked chicken. Dont eat the skin.  Steamed vegetables    Asian  Steamed dumplings or potstickers  Broiled, boiled, or steamed meats or fish  Sushi or sashimi  Steamed rice or boiled noodles. One serving is equal to 1/3 cup.      © 4360-9005 [employer]. [address] [place] [address], [place] [address]. All rights reserved. This information is not intended as a substitute for professional medical care. Always follow your healthcare professional's instructions.

## 2022-03-18 NOTE — PROGRESS NOTES
Subjective:      Patient ID: Patria iNeto is a 71 y.o. male.    Chief Complaint:  DM2, HTN, HLD; Follow up     History of Present Illness    71 year old male with Hx of DM2, HTN, HLD, SVT's and situational anxiety, ganglion cyst left wrist (follows with hand surgery) that presents to the endocrine clinic for follow up.  Pt today reports having URI for the past 2 months and recently having Prednisone course which resulted in significant hyperglycemia.  Pt reports that he has been off prednisone x 1 week and has noted some improvement of glucose but still in the the high 200s.  Denies polyuria, polydipsia, fatigue or recent hypoglycemias.        Interval history:     Pt was last seen by me on  12/2021 and at that time plan was to Increase Toujeo to 45 units daily , C/w Novolog 20 units TID + Low dose sliding scale, C/w Trulicity 4.5mg SC weekly, C/w Metformin 1g BID     with regards to the diabetes:  The patient was initially diagnosed with Type 2 diabetes mellitus:  2003/2004.    Current symptoms/problems include none     Known diabetic complications: none  Cardiovascular risk factors: diabetes mellitus, dyslipidemia, hypertension and male gender    Current diabetic medications include:   1. Toujeo (Glargine) 45 units SQ at night    2. Novolog 20units SQ before meals  (Some times forgets)  +  MDS    3. Metformin 1000 mg PO BID    4. Trulicity 4.5mg SC weekly       Last visit with diabetes education: A year ago    Current diet: well balanced, eats 2-3 meals a day  Breakfast: Whole bread with butter with one cup of coffee with a creamer  Lunch: Some ribs and team cabbage, for dessert sugar free deserts, pork chop   Dinner: Left over ribs  Snacks: None  Drinks: Drinks ETOH occasionally, almond milk, diet drinks    Current exercise: running/ jogging and walking x 2 days per week     Current monitoring regimen: home blood tests - >4 times daily  Pre-breakfast: AM  (130 - 300s) while on steroids.      Pre-lunch: Sometime  skips lunch (150 250)    Pre-dinner:(150 - 270)    Bedtime: (200 - 280)   CHECKING 4 TIMES A DAY   Any episodes of hypoglycemia?  DENIES       BP Readings from Last 3 Encounters:   12/17/21 (!) 158/93   12/13/21 130/80   11/18/21 130/78       Wt Readings from Last 10 Encounters:   01/11/22 91.6 kg (202 lb)   12/28/21 91.6 kg (202 lb)   12/17/21 91.6 kg (202 lb)   12/13/21 92 kg (202 lb 13.2 oz)   11/18/21 93 kg (205 lb 0.4 oz)   11/08/21 94.4 kg (208 lb 1.8 oz)   08/18/21 94.4 kg (208 lb 1.8 oz)   07/09/21 94 kg (207 lb 3.7 oz)   05/27/21 91.2 kg (201 lb)   05/18/21 94.1 kg (207 lb 7.3 oz)       Diabetes Management Status    Statin: Taking  ACE/ARB: Taking    Screening or Prevention Patient's value Goal Complete/Controlled?   HgA1C Testing and Control   Lab Results   Component Value Date    HGBA1C 8.9 (H) 03/08/2022      Annually/Less than 8% No   Lipid profile : 11/12/2021 Annually Yes   LDL control Lab Results   Component Value Date    LDLCALC 62.2 (L) 11/12/2021    Annually/Less than 100 mg/dl  Yes   Nephropathy screening Lab Results   Component Value Date    LABMICR 20.0 03/08/2022     Lab Results   Component Value Date    PROTEINUA NEG 09/09/2011    Annually Yes   Blood pressure BP Readings from Last 1 Encounters:   12/17/21 (!) 158/93    Less than 140/90 Yes   Dilated retinal exam : 12/23/2020 Annually Yes   Foot exam   : 12/02/2020 Annually Yes      Lab Results   Component Value Date    MICALBCREAT 23.3 03/08/2022    MICALBCREAT 16.9 11/12/2021    MICALBCREAT 10.7 11/13/2020            Low dose ASA?: Yes       Glucagon emergency kit?: No       Medical alert tag?: No    Lab Results   Component Value Date    HGBA1C 8.9 (H) 03/08/2022    HGBA1C 8.8 (H) 11/12/2021    HGBA1C 8.4 (H) 08/18/2021    HGBA1C 8.3 (H) 05/14/2021    HGBA1C 8.4 (H) 02/13/2021    HGBA1C 8.2 (H) 11/13/2020    HGBA1C 8.1 (H) 08/07/2020    HGBA1C 7.6 (H) 05/29/2020    HGBA1C 7.6 (H) 02/10/2020    HGBA1C 7.7 (H) 11/27/2019       Diabetes  Management Status    Statin: Taking  ACE/ARB: Taking    Screening or Prevention Patient's value Goal Complete/Controlled?   HgA1C Testing and Control   Lab Results   Component Value Date    HGBA1C 8.9 (H) 03/08/2022      Annually/Less than 8% No   Lipid profile : 11/12/2021 Annually Yes   LDL control Lab Results   Component Value Date    LDLCALC 62.2 (L) 11/12/2021    Annually/Less than 100 mg/dl  Yes   Nephropathy screening Lab Results   Component Value Date    LABMICR 20.0 03/08/2022     Lab Results   Component Value Date    PROTEINUA NEG 09/09/2011    Annually Yes   Blood pressure BP Readings from Last 1 Encounters:   12/17/21 (!) 158/93    Less than 140/90 Yes   Dilated retinal exam : 12/23/2020 Annually Yes   Foot exam   : 12/02/2020 Annually Yes     Review of Systems   Constitutional: Negative for activity change and unexpected weight change.   HENT: Negative for sore throat and voice change.    Eyes: Negative for visual disturbance.   Respiratory: Negative for shortness of breath.    Cardiovascular: Negative for chest pain.   Gastrointestinal: Negative for abdominal pain, constipation, diarrhea, nausea and vomiting.   Genitourinary: Negative for urgency.   Musculoskeletal: Negative for arthralgias.   Skin: Negative for wound.   Neurological: Negative for headaches.   Psychiatric/Behavioral: Negative for confusion and sleep disturbance.           Objective:   There were no vitals taken for this visit.  Physical Exam  Vitals and nursing note reviewed.   Constitutional:       General: He is not in acute distress.     Appearance: Normal appearance. He is well-developed. He is not ill-appearing.   HENT:      Head: Normocephalic and atraumatic.      Right Ear: External ear normal.      Left Ear: External ear normal.      Nose: Nose normal. No rhinorrhea.      Mouth/Throat:      Mouth: Mucous membranes are moist.   Eyes:      General: No scleral icterus.     Extraocular Movements: Extraocular movements intact.       Conjunctiva/sclera: Conjunctivae normal.      Pupils: Pupils are equal, round, and reactive to light.      Comments: No proptosis, No lid lag, No conjunctival erythema    Neck:      Thyroid: No thyromegaly.      Trachea: No tracheal deviation.      Comments: No thyroid enlargement noted on exam  Cardiovascular:      Rate and Rhythm: Normal rate and regular rhythm.      Pulses: Normal pulses.      Heart sounds: Normal heart sounds. No murmur heard.  Pulmonary:      Effort: Pulmonary effort is normal.      Breath sounds: Normal breath sounds.   Abdominal:      Palpations: Abdomen is soft. There is no mass.      Tenderness: There is no abdominal tenderness.      Hernia: No hernia is present.      Comments: Insulin injection sites examined on abdomen, clean, no redness, no nodule/masses     Musculoskeletal:         General: No swelling. Normal range of motion.      Cervical back: Normal range of motion and neck supple. No rigidity or tenderness.      Right lower leg: No edema.      Left lower leg: No edema.   Lymphadenopathy:      Cervical: No cervical adenopathy.   Skin:     General: Skin is warm.      Findings: No rash.   Neurological:      General: No focal deficit present.      Mental Status: He is alert and oriented to person, place, and time.      Sensory: No sensory deficit.      Coordination: Coordination normal.   Psychiatric:         Mood and Affect: Mood normal.         Judgment: Judgment normal.         BP Readings from Last 1 Encounters:   12/17/21 (!) 158/93      Wt Readings from Last 1 Encounters:   01/11/22 0735 91.6 kg (202 lb)     There is no height or weight on file to calculate BMI.    Lab Review:   Lab Results   Component Value Date    HGBA1C 8.9 (H) 03/08/2022     Lab Results   Component Value Date    CHOL 120 11/12/2021    HDL 41 11/12/2021    LDLCALC 62.2 (L) 11/12/2021    TRIG 84 11/12/2021    CHOLHDL 34.2 11/12/2021     Lab Results   Component Value Date     (H) 11/12/2021    K 4.7  11/12/2021     (H) 11/12/2021    CO2 26 11/12/2021    GLU 50 (L) 11/12/2021    BUN 13 11/12/2021    CREATININE 1.1 11/12/2021    CALCIUM 10.2 11/12/2021    PROT 6.7 11/12/2021    ALBUMIN 4.1 11/12/2021    BILITOT 2.7 (H) 11/12/2021    ALKPHOS 72 11/12/2021    AST 15 11/12/2021    ALT 26 11/12/2021    ANIONGAP 9 11/12/2021    ESTGFRAFRICA >60.0 11/12/2021    EGFRNONAA >60.0 11/12/2021    TSH 2.503 11/12/2021       Assessment and Plan       Uncontrolled type 2 diabetes mellitus with hyperglycemia, with long-term current use of insulin  Lab Results   Component Value Date    HGBA1C 8.9 (H) 03/08/2022      -FS log  Mainly at goal with some fasting hyperglycemias   -Diet, exercise, lifestyle modifications and A1c Goals discussed   -Hypoglycemia symptoms and plan of action discussed   -Low carb diet   -Seen DM Education?  Yes, Will give referral again since last visit was 1 yr ago     PLAN:    -Due to A1C being  above goal with is likely 2/2 steroids along with suboptimal insulin doses.  I will recommend the following.     -Increase Toujeo to 50 units daily   -Incrfease Novolog 22/24/22 units TID + Low dose sliding scale   -C/w Trulicity 4.5mg SC weekly    -C/w Metformin 1g BID   -Send Glucose log for review in 2 weeks   -DM education and Ophthalmology referrals given     Due to patient showing commitment w/ DM management, being on MDI , checking glucose 4 times a day and due to the COVID pandemic I believe he will benefit from DEXCOM continuous glucose monitor for better control of DM, hypoglycemia prevention and remote monitoring of glucose in between visit.      -A1c 1 week prior to next visit     Essential hypertension  BP controlled on current BP meds   On ACE for renal protection   Low Na diet     Other hyperlipidemia    LDL at goal On Statin   Low Fat diet

## 2022-05-04 ENCOUNTER — PATIENT MESSAGE (OUTPATIENT)
Dept: ADMINISTRATIVE | Facility: HOSPITAL | Age: 72
End: 2022-05-04
Payer: COMMERCIAL

## 2022-05-04 ENCOUNTER — PATIENT OUTREACH (OUTPATIENT)
Dept: ADMINISTRATIVE | Facility: HOSPITAL | Age: 72
End: 2022-05-04
Payer: COMMERCIAL

## 2022-05-04 NOTE — PROGRESS NOTES
2022 Care Everywhere updates requested and reviewed.  Immunizations reconciled. Media reports reviewed.  Duplicate HM overrides and  orders removed.  Overdue HM topic chart audit and/or requested.  Overdue lab testing linked to upcoming lab appointments if applies.Patient outreach regarding Health Maintenance- Appt sched    Requested efax Dr Tan - DM eye report    Health Maintenance Due   Topic Date Due    Shingles Vaccine (1 of 2) Never done    Colorectal Cancer Screening  2021    Pneumococcal Vaccines (Age 65+) (2 - PPSV23 or PCV20) 08/10/2021    COVID-19 Vaccine (3 - Booster for Moderna series) 2021    Foot Exam  2021    Eye Exam  2021

## 2022-05-04 NOTE — LETTER
AUTHORIZATION FOR RELEASE OF   CONFIDENTIAL INFORMATION    Dear Dr Israel Tan MD,    We are seeing Patria Nieto, date of birth 1950, in the clinic at [facility]. Charles Hanna DO is the patient's PCP. Patria Nieto has an outstanding lab/procedure at the time we reviewed his chart. In order to help keep his health information updated, he has authorized us to request the following medical record(s):                                                  (X)  DIABETIC EYE EXAM                    Please fax records to [facility], Jayesh Madrecha, DO,                 [facility]                                                                     Please Fax to [facility] at [phone].     Thank you for your help, Lora Juares [facility]. If I can be of any assistance you can call at [phone] x [phone]                   Patient Name: Patria Nieto  : 1950  Patient Phone #: [phone]

## 2022-05-10 DIAGNOSIS — Z79.4 UNCONTROLLED TYPE 2 DIABETES MELLITUS WITH HYPERGLYCEMIA, WITH LONG-TERM CURRENT USE OF INSULIN: ICD-10-CM

## 2022-05-10 DIAGNOSIS — E11.65 UNCONTROLLED TYPE 2 DIABETES MELLITUS WITH HYPERGLYCEMIA, WITH LONG-TERM CURRENT USE OF INSULIN: ICD-10-CM

## 2022-05-11 RX ORDER — DULAGLUTIDE 4.5 MG/.5ML
4.5 INJECTION, SOLUTION SUBCUTANEOUS
Qty: 12 PEN | Refills: 3 | Status: SHIPPED | OUTPATIENT
Start: 2022-05-11 | End: 2022-08-23 | Stop reason: SDUPTHER

## 2022-07-07 NOTE — PROGRESS NOTES
"Subjective:      Patient ID: Patria Nieto is a 71 y.o. male.    Chief Complaint:  Diabetes    History of Present Illness  Patria Nieto is here for follow up of DM.  Previously seen by Dr. Hung.  Last seen 3/18/2022.  This is their first visit with me.      With regards to diabetes:    Diagnosed: 2003  DE: 2018  FH: "yes"   Known complications:  DKA Denies   RN Denies  Eye Exam: > 1 year ago but has an upcoming appointment   PN Yes - "comes and goes"   Podiatry: 2020  Nephropathy Denies  CAD Denies  Denies history of pancreatitis & personal/family history of medullary thyroid cancer.     Current regimen:  Metformin 1000mg twice daily  Trulicity 4.5mg weekly  Toujeo 50units nightly or in the AM   Novolog 20 units plus correction scale  Add correction scale as needed.  Blood sugar 150 to 200 add 1 units  Blood sugar 201 to 250 add 2 units  Blood sugar 251 to 300 add 3 units  Blood sugar 301 to 350 add 4 units  Blood sugar greater than 350 add 5 units     Taking novolog with or without food.   Reports compliance.    Other medications tried:  NONE     Glucose Monitor:   3 times a day testing  Log reviewed: oral recall   Fasting: > 200-250  Before lunch: 120-150  Before dinner: not checking often   Before bed: 150-160    Hypoglycemia:  Yes - rare - takes Novolog and forgets to eat   Knows how to correct with 15 grams of carbs- juice, coke, or a peppermint.     Diet/Exercise:  Eats 2-3 meals a day.   Snacks : Denies  Drinks : coffee (1-2 cups in AM), OJ, water, diet soft drinks.   Exercise - tries to stay active - exercises twice a week.     Diabetes Management Status    Hemoglobin A1C   Date Value Ref Range Status   03/08/2022 8.9 (H) 4.0 - 5.6 % Final     Comment:     ADA Screening Guidelines:  5.7-6.4%  Consistent with prediabetes  >or=6.5%  Consistent with diabetes    High levels of fetal hemoglobin interfere with the HbA1C  assay. Heterozygous hemoglobin variants (HbS, HgC, etc)do  not significantly interfere with " this assay.   However, presence of multiple variants may affect accuracy.     11/12/2021 8.8 (H) 4.0 - 5.6 % Final     Comment:     ADA Screening Guidelines:  5.7-6.4%  Consistent with prediabetes  >or=6.5%  Consistent with diabetes    High levels of fetal hemoglobin interfere with the HbA1C  assay. Heterozygous hemoglobin variants (HbS, HgC, etc)do  not significantly interfere with this assay.   However, presence of multiple variants may affect accuracy.     08/18/2021 8.4 (H) 4.0 - 5.6 % Final     Comment:     ADA Screening Guidelines:  5.7-6.4%  Consistent with prediabetes  >or=6.5%  Consistent with diabetes    High levels of fetal hemoglobin interfere with the HbA1C  assay. Heterozygous hemoglobin variants (HbS, HgC, etc)do  not significantly interfere with this assay.   However, presence of multiple variants may affect accuracy.         Statin: Taking  ACE/ARB: Taking  Screening or Prevention Patient's value Goal Complete/Controlled?   HgA1C Testing and Control   Lab Results   Component Value Date    HGBA1C 8.9 (H) 03/08/2022      Annually/Less than 8% No   Lipid profile : 11/12/2021 Annually Yes   LDL control Lab Results   Component Value Date    LDLCALC 62.2 (L) 11/12/2021    Annually/Less than 100 mg/dl  Yes   Nephropathy screening Lab Results   Component Value Date    LABMICR 20.0 03/08/2022     Lab Results   Component Value Date    PROTEINUA NEG 09/09/2011    Annually Yes   Blood pressure BP Readings from Last 1 Encounters:   07/11/22 102/68    Less than 140/90 Yes   Dilated retinal exam : 12/23/2020 Annually Yes   Foot exam   : 12/02/2020 Annually No       Review of Systems   Constitutional: Negative for fatigue.   Eyes: Negative for visual disturbance.   Respiratory: Negative for shortness of breath.    Cardiovascular: Negative for chest pain.   Gastrointestinal: Negative for abdominal pain.   Musculoskeletal: Negative for arthralgias.   Skin: Negative for wound.   Neurological: Negative for headaches.  "      Objective:   Physical Exam  Vitals reviewed.   Cardiovascular:      Rate and Rhythm: Normal rate.      Comments: No edema present  Pulmonary:      Effort: Pulmonary effort is normal.   Abdominal:      Palpations: Abdomen is soft.       Injection sites are without edema or erythema. No lipo hypertropthy or atrophy.    Visit Vitals  /68   Pulse 84   Ht 5' 9" (1.753 m)   Wt 95.5 kg (210 lb 8.6 oz)   BMI 31.09 kg/m²       Body mass index is 31.09 kg/m².    Lab Review:   Lab Results   Component Value Date    HGBA1C 8.9 (H) 03/08/2022    HGBA1C 8.8 (H) 11/12/2021    HGBA1C 8.4 (H) 08/18/2021       Lab Results   Component Value Date    CHOL 120 11/12/2021    HDL 41 11/12/2021    LDLCALC 62.2 (L) 11/12/2021    TRIG 84 11/12/2021    CHOLHDL 34.2 11/12/2021     Lab Results   Component Value Date     (H) 11/12/2021    K 4.7 11/12/2021     (H) 11/12/2021    CO2 26 11/12/2021    GLU 50 (L) 11/12/2021    BUN 13 11/12/2021    CREATININE 1.1 11/12/2021    CALCIUM 10.2 11/12/2021    PROT 6.7 11/12/2021    ALBUMIN 4.1 11/12/2021    BILITOT 2.7 (H) 11/12/2021    ALKPHOS 72 11/12/2021    AST 15 11/12/2021    ALT 26 11/12/2021    ANIONGAP 9 11/12/2021    ESTGFRAFRICA >60.0 11/12/2021    EGFRNONAA >60.0 11/12/2021    TSH 2.503 11/12/2021     No results found for: [id number]  Assessment and Plan     1. Uncontrolled type 2 diabetes mellitus with hyperglycemia, with long-term current use of insulin  insulin glargine, TOUJEO, (TOUJEO) 300 unit/mL (1.5 mL) InPn pen    Hemoglobin A1C    Comprehensive Metabolic Panel    Lipid Panel    Microalbumin/Creatinine Ratio, Urine   2. Hypertension associated with diabetes     3. Hyperlipidemia LDL goal <100     4. Neuropathy due to type 2 diabetes mellitus     5. Type 2 diabetes, uncontrolled, with neuropathy  insulin glargine, TOUJEO, (TOUJEO) 300 unit/mL (1.5 mL) InPn pen       Uncontrolled type 2 diabetes mellitus with hyperglycemia, with long-term current use of insulin  -- " Labs prior to follow up.  -- A1c goal <7.5%.  -- Medications discussed:  MFM   GLP1-DPP4   OSORIO   SGLT2   Reviewed potential adverse effects of SGLT-2 inhibitors, including genital mycotic infections, slightly increased risk of UTI, hypersensitivity, hypotension, and hyperkalemia. Advised to maintain water intake of 8-10 cups per day. Advised we need to check chemistry panel at baseline and 2 weeks after starting. Discussed FDA warning reports of ketoacidosis associated with SGLT-2 inhibitors. Advised to seek immediate medical attention and stop the medication if symptoms such as difficulty breathing, nausea, vomiting, abdominal pain, confusion, and unusual fatigue/sleepiness. Discussed possible precipitating factors including major illness/reduced food and fluid intake (advised to stop under these circumstances), and reduced insulin dose. Discussed possible effects of increased fracture risk/decreased bone density. Discussed reports of increased risk of leg and foot amputations with canagliflozin and need to seek urgent care if developed new pain or tenderness, sores or ulcers, or infections in legs/feet.   Insulin   -- Reviewed logs/CGM:  Fasting hyperglycemia.  Instructed to send glucose logs in 14 days.  Reach out to me sooner for any glucose <70 or consistently >180.  -- Discussed proper timing of insulin administration.  -- Discussed professional CGM - agreeable only if he can do it unblinded. He is getting a new phone then will schedule.  -- Medication Changes:   CHANGE  Metformin 1000mg twice daily  Trulicity 4.5mg weekly  Toujeo 50units daily  Novolog 20 units plus correction scale BEFORE meals  Add correction scale as needed.  Blood sugar 150 to 200 add 1 units  Blood sugar 201 to 250 add 2 units  Blood sugar 251 to 300 add 3 units  Blood sugar 301 to 350 add 4 units  Blood sugar greater than 350 add 5 units  -- Reviewed goals of therapy are to get the best control we can without hypoglycemia.  -- Reviewed  patient's current insulin regimen. Clarified proper insulin dose and timing in relation to meals, etc. Insulin injection sites and proper rotation instructed.    -- Advised frequent self blood glucose monitoring.  Patient encouraged to document glucose results and bring them to every clinic visit.  -- Hypoglycemia precautions discussed. Instructed on precautions before driving.    -- Call for Bg repeatedly < 90 or > 180.   -- Close adherence to lifestyle changes recommended.   -- Periodic follow ups for eye evaluations, foot care and dental care suggested.    Hypertension associated with diabetes  -- On ACEI.  -- Controlled.  -- Blood pressure goals discussed with patient.    Hyperlipidemia LDL goal <100  -- Controlled.  -- On statin per ADA recommendations.    Neuropathy due to type 2 diabetes mellitus  -- Optimize glucose control.       Follow up in about 4 months (around 11/11/2022).

## 2022-07-11 ENCOUNTER — OFFICE VISIT (OUTPATIENT)
Dept: ENDOCRINOLOGY | Facility: CLINIC | Age: 72
End: 2022-07-11
Payer: COMMERCIAL

## 2022-07-11 VITALS
HEIGHT: 69 IN | BODY MASS INDEX: 31.19 KG/M2 | HEART RATE: 84 BPM | DIASTOLIC BLOOD PRESSURE: 68 MMHG | WEIGHT: 210.56 LBS | SYSTOLIC BLOOD PRESSURE: 102 MMHG

## 2022-07-11 DIAGNOSIS — I15.2 HYPERTENSION ASSOCIATED WITH DIABETES: ICD-10-CM

## 2022-07-11 DIAGNOSIS — E11.59 HYPERTENSION ASSOCIATED WITH DIABETES: ICD-10-CM

## 2022-07-11 DIAGNOSIS — Z79.4 UNCONTROLLED TYPE 2 DIABETES MELLITUS WITH HYPERGLYCEMIA, WITH LONG-TERM CURRENT USE OF INSULIN: Primary | ICD-10-CM

## 2022-07-11 DIAGNOSIS — E11.40 NEUROPATHY DUE TO TYPE 2 DIABETES MELLITUS: ICD-10-CM

## 2022-07-11 DIAGNOSIS — E11.65 UNCONTROLLED TYPE 2 DIABETES MELLITUS WITH HYPERGLYCEMIA, WITH LONG-TERM CURRENT USE OF INSULIN: Primary | ICD-10-CM

## 2022-07-11 DIAGNOSIS — E78.5 HYPERLIPIDEMIA LDL GOAL <100: ICD-10-CM

## 2022-07-11 PROCEDURE — 1159F MED LIST DOCD IN RCRD: CPT | Mod: CPTII,S$GLB,, | Performed by: NURSE PRACTITIONER

## 2022-07-11 PROCEDURE — 3066F PR DOCUMENTATION OF TREATMENT FOR NEPHROPATHY: ICD-10-PCS | Mod: CPTII,S$GLB,, | Performed by: NURSE PRACTITIONER

## 2022-07-11 PROCEDURE — 3066F NEPHROPATHY DOC TX: CPT | Mod: CPTII,S$GLB,, | Performed by: NURSE PRACTITIONER

## 2022-07-11 PROCEDURE — 99214 PR OFFICE/OUTPT VISIT, EST, LEVL IV, 30-39 MIN: ICD-10-PCS | Mod: S$GLB,,, | Performed by: NURSE PRACTITIONER

## 2022-07-11 PROCEDURE — 1126F AMNT PAIN NOTED NONE PRSNT: CPT | Mod: CPTII,S$GLB,, | Performed by: NURSE PRACTITIONER

## 2022-07-11 PROCEDURE — 99999 PR PBB SHADOW E&M-EST. PATIENT-LVL IV: ICD-10-PCS | Mod: PBBFAC,,, | Performed by: NURSE PRACTITIONER

## 2022-07-11 PROCEDURE — 99999 PR PBB SHADOW E&M-EST. PATIENT-LVL IV: CPT | Mod: PBBFAC,,, | Performed by: NURSE PRACTITIONER

## 2022-07-11 PROCEDURE — 4010F PR ACE/ARB THEARPY RXD/TAKEN: ICD-10-PCS | Mod: CPTII,S$GLB,, | Performed by: NURSE PRACTITIONER

## 2022-07-11 PROCEDURE — 3288F FALL RISK ASSESSMENT DOCD: CPT | Mod: CPTII,S$GLB,, | Performed by: NURSE PRACTITIONER

## 2022-07-11 PROCEDURE — 3008F PR BODY MASS INDEX (BMI) DOCUMENTED: ICD-10-PCS | Mod: CPTII,S$GLB,, | Performed by: NURSE PRACTITIONER

## 2022-07-11 PROCEDURE — 1126F PR PAIN SEVERITY QUANTIFIED, NO PAIN PRESENT: ICD-10-PCS | Mod: CPTII,S$GLB,, | Performed by: NURSE PRACTITIONER

## 2022-07-11 PROCEDURE — 3052F HG A1C>EQUAL 8.0%<EQUAL 9.0%: CPT | Mod: CPTII,S$GLB,, | Performed by: NURSE PRACTITIONER

## 2022-07-11 PROCEDURE — 3061F NEG MICROALBUMINURIA REV: CPT | Mod: CPTII,S$GLB,, | Performed by: NURSE PRACTITIONER

## 2022-07-11 PROCEDURE — 4010F ACE/ARB THERAPY RXD/TAKEN: CPT | Mod: CPTII,S$GLB,, | Performed by: NURSE PRACTITIONER

## 2022-07-11 PROCEDURE — 1159F PR MEDICATION LIST DOCUMENTED IN MEDICAL RECORD: ICD-10-PCS | Mod: CPTII,S$GLB,, | Performed by: NURSE PRACTITIONER

## 2022-07-11 PROCEDURE — 3052F PR MOST RECENT HEMOGLOBIN A1C LEVEL 8.0 - < 9.0%: ICD-10-PCS | Mod: CPTII,S$GLB,, | Performed by: NURSE PRACTITIONER

## 2022-07-11 PROCEDURE — 3078F PR MOST RECENT DIASTOLIC BLOOD PRESSURE < 80 MM HG: ICD-10-PCS | Mod: CPTII,S$GLB,, | Performed by: NURSE PRACTITIONER

## 2022-07-11 PROCEDURE — 1160F RVW MEDS BY RX/DR IN RCRD: CPT | Mod: CPTII,S$GLB,, | Performed by: NURSE PRACTITIONER

## 2022-07-11 PROCEDURE — 3061F PR NEG MICROALBUMINURIA RESULT DOCUMENTED/REVIEW: ICD-10-PCS | Mod: CPTII,S$GLB,, | Performed by: NURSE PRACTITIONER

## 2022-07-11 PROCEDURE — 1101F PR PT FALLS ASSESS DOC 0-1 FALLS W/OUT INJ PAST YR: ICD-10-PCS | Mod: CPTII,S$GLB,, | Performed by: NURSE PRACTITIONER

## 2022-07-11 PROCEDURE — 99214 OFFICE O/P EST MOD 30 MIN: CPT | Mod: S$GLB,,, | Performed by: NURSE PRACTITIONER

## 2022-07-11 PROCEDURE — 1160F PR REVIEW ALL MEDS BY PRESCRIBER/CLIN PHARMACIST DOCUMENTED: ICD-10-PCS | Mod: CPTII,S$GLB,, | Performed by: NURSE PRACTITIONER

## 2022-07-11 PROCEDURE — 3078F DIAST BP <80 MM HG: CPT | Mod: CPTII,S$GLB,, | Performed by: NURSE PRACTITIONER

## 2022-07-11 PROCEDURE — 3008F BODY MASS INDEX DOCD: CPT | Mod: CPTII,S$GLB,, | Performed by: NURSE PRACTITIONER

## 2022-07-11 PROCEDURE — 3288F PR FALLS RISK ASSESSMENT DOCUMENTED: ICD-10-PCS | Mod: CPTII,S$GLB,, | Performed by: NURSE PRACTITIONER

## 2022-07-11 PROCEDURE — 3074F SYST BP LT 130 MM HG: CPT | Mod: CPTII,S$GLB,, | Performed by: NURSE PRACTITIONER

## 2022-07-11 PROCEDURE — 3074F PR MOST RECENT SYSTOLIC BLOOD PRESSURE < 130 MM HG: ICD-10-PCS | Mod: CPTII,S$GLB,, | Performed by: NURSE PRACTITIONER

## 2022-07-11 PROCEDURE — 1101F PT FALLS ASSESS-DOCD LE1/YR: CPT | Mod: CPTII,S$GLB,, | Performed by: NURSE PRACTITIONER

## 2022-07-11 RX ORDER — INSULIN GLARGINE 300 [IU]/ML
50 INJECTION, SOLUTION SUBCUTANEOUS DAILY
Qty: 9 PEN | Refills: 3 | Status: SHIPPED | OUTPATIENT
Start: 2022-07-11 | End: 2022-11-14

## 2022-07-11 NOTE — ASSESSMENT & PLAN NOTE
-- Labs prior to follow up.  -- A1c goal <7.5%.  -- Medications discussed:  MFM   GLP1-DPP4   OSORIO   SGLT2   Reviewed potential adverse effects of SGLT-2 inhibitors, including genital mycotic infections, slightly increased risk of UTI, hypersensitivity, hypotension, and hyperkalemia. Advised to maintain water intake of 8-10 cups per day. Advised we need to check chemistry panel at baseline and 2 weeks after starting. Discussed FDA warning reports of ketoacidosis associated with SGLT-2 inhibitors. Advised to seek immediate medical attention and stop the medication if symptoms such as difficulty breathing, nausea, vomiting, abdominal pain, confusion, and unusual fatigue/sleepiness. Discussed possible precipitating factors including major illness/reduced food and fluid intake (advised to stop under these circumstances), and reduced insulin dose. Discussed possible effects of increased fracture risk/decreased bone density. Discussed reports of increased risk of leg and foot amputations with canagliflozin and need to seek urgent care if developed new pain or tenderness, sores or ulcers, or infections in legs/feet.   Insulin   -- Reviewed logs/CGM:  Fasting hyperglycemia.  Instructed to send glucose logs in 14 days.  Reach out to me sooner for any glucose <70 or consistently >180.  -- Discussed proper timing of insulin administration.  -- Discussed professional CGM - agreeable only if he can do it unblinded. He is getting a new phone then will schedule.  -- Medication Changes:   CHANGE  Metformin 1000mg twice daily  Trulicity 4.5mg weekly  Toujeo 50units daily  Novolog 20 units plus correction scale BEFORE meals  Add correction scale as needed.  Blood sugar 150 to 200 add 1 units  Blood sugar 201 to 250 add 2 units  Blood sugar 251 to 300 add 3 units  Blood sugar 301 to 350 add 4 units  Blood sugar greater than 350 add 5 units  -- Reviewed goals of therapy are to get the best control we can without hypoglycemia.  --  Reviewed patient's current insulin regimen. Clarified proper insulin dose and timing in relation to meals, etc. Insulin injection sites and proper rotation instructed.    -- Advised frequent self blood glucose monitoring.  Patient encouraged to document glucose results and bring them to every clinic visit.  -- Hypoglycemia precautions discussed. Instructed on precautions before driving.    -- Call for Bg repeatedly < 90 or > 180.   -- Close adherence to lifestyle changes recommended.   -- Periodic follow ups for eye evaluations, foot care and dental care suggested.

## 2022-07-15 DIAGNOSIS — I47.10 PSVT (PAROXYSMAL SUPRAVENTRICULAR TACHYCARDIA): Primary | ICD-10-CM

## 2022-07-19 ENCOUNTER — PATIENT MESSAGE (OUTPATIENT)
Dept: ADMINISTRATIVE | Facility: HOSPITAL | Age: 72
End: 2022-07-19
Payer: COMMERCIAL

## 2022-07-19 ENCOUNTER — PATIENT OUTREACH (OUTPATIENT)
Dept: ADMINISTRATIVE | Facility: HOSPITAL | Age: 72
End: 2022-07-19
Payer: COMMERCIAL

## 2022-07-19 DIAGNOSIS — Z12.11 ENCOUNTER FOR COLORECTAL CANCER SCREENING: Primary | ICD-10-CM

## 2022-07-19 DIAGNOSIS — Z12.12 ENCOUNTER FOR COLORECTAL CANCER SCREENING: Primary | ICD-10-CM

## 2022-07-19 NOTE — LETTER
AUTHORIZATION FOR RELEASE OF   CONFIDENTIAL INFORMATION    Dear Eye Care Providers,    We are seeing Patria Nieto, date of birth 1950, in the clinic at [facility]. Charles Hanna DO is the patient's PCP. Patria Nieto has an outstanding lab/procedure at the time we reviewed his chart. In order to help keep his health information updated, he has authorized us to request the following medical record(s):                                                  ( X)  DIABETIC EYE EXAM                    Please fax records to [facility], Jayesh Madrecha, DO,                [facility]                                                                     Please Fax to [facility] at [phone].     Thank you for your help, Lora Juares LPLINDA-CCC. If I can be of any assistance you can call at [phone]                       Patient Name: Patria Nieto  : 1950  Patient Phone #: [phone]

## 2022-07-19 NOTE — PROGRESS NOTES
07/19/2022 Gap report audit performed. Care Everywhere updates requested and reviewed  Overdue HM topic chart audit and/or requested. LINKS triggered and reconciled. Media reviewed Patient outreach regarding Health Maintenance- [facility]    Requested: Efax sent to Dr Tan    Health Maintenance Due   Topic Date Due    Shingles Vaccine (1 of 2) Never done    Colorectal Cancer Screening  06/24/2021    Pneumococcal Vaccines (Age 65+) (3 - PPSV23 or PCV20) 08/10/2021    COVID-19 Vaccine (3 - Booster for Moderna series) 08/12/2021    Foot Exam  12/02/2021    Eye Exam  12/23/2021    Hemoglobin A1c  06/08/2022

## 2022-07-21 ENCOUNTER — PATIENT OUTREACH (OUTPATIENT)
Dept: ADMINISTRATIVE | Facility: HOSPITAL | Age: 72
End: 2022-07-21
Payer: COMMERCIAL

## 2022-07-27 ENCOUNTER — TELEPHONE (OUTPATIENT)
Dept: ELECTROPHYSIOLOGY | Facility: CLINIC | Age: 72
End: 2022-07-27
Payer: COMMERCIAL

## 2022-07-28 ENCOUNTER — OFFICE VISIT (OUTPATIENT)
Dept: CARDIOLOGY | Facility: CLINIC | Age: 72
End: 2022-07-28
Payer: COMMERCIAL

## 2022-07-28 VITALS
SYSTOLIC BLOOD PRESSURE: 120 MMHG | HEIGHT: 69 IN | WEIGHT: 205 LBS | BODY MASS INDEX: 30.36 KG/M2 | HEART RATE: 91 BPM | DIASTOLIC BLOOD PRESSURE: 80 MMHG

## 2022-07-28 DIAGNOSIS — D69.6 THROMBOCYTOPENIA: Chronic | ICD-10-CM

## 2022-07-28 DIAGNOSIS — I10 ESSENTIAL HYPERTENSION: Primary | ICD-10-CM

## 2022-07-28 DIAGNOSIS — Z79.4 UNCONTROLLED TYPE 2 DIABETES MELLITUS WITH HYPERGLYCEMIA, WITH LONG-TERM CURRENT USE OF INSULIN: ICD-10-CM

## 2022-07-28 DIAGNOSIS — I47.10 PSVT (PAROXYSMAL SUPRAVENTRICULAR TACHYCARDIA): ICD-10-CM

## 2022-07-28 DIAGNOSIS — I15.2 HYPERTENSION ASSOCIATED WITH DIABETES: ICD-10-CM

## 2022-07-28 DIAGNOSIS — E78.5 HYPERLIPIDEMIA LDL GOAL <100: ICD-10-CM

## 2022-07-28 DIAGNOSIS — E11.59 HYPERTENSION ASSOCIATED WITH DIABETES: ICD-10-CM

## 2022-07-28 DIAGNOSIS — E11.65 UNCONTROLLED TYPE 2 DIABETES MELLITUS WITH HYPERGLYCEMIA, WITH LONG-TERM CURRENT USE OF INSULIN: ICD-10-CM

## 2022-07-28 PROCEDURE — 3061F PR NEG MICROALBUMINURIA RESULT DOCUMENTED/REVIEW: ICD-10-PCS | Mod: CPTII,S$GLB,, | Performed by: INTERNAL MEDICINE

## 2022-07-28 PROCEDURE — 1160F RVW MEDS BY RX/DR IN RCRD: CPT | Mod: CPTII,S$GLB,, | Performed by: INTERNAL MEDICINE

## 2022-07-28 PROCEDURE — 1126F AMNT PAIN NOTED NONE PRSNT: CPT | Mod: CPTII,S$GLB,, | Performed by: INTERNAL MEDICINE

## 2022-07-28 PROCEDURE — 93010 RHYTHM STRIP: ICD-10-PCS | Mod: S$GLB,,, | Performed by: INTERNAL MEDICINE

## 2022-07-28 PROCEDURE — 3074F SYST BP LT 130 MM HG: CPT | Mod: CPTII,S$GLB,, | Performed by: INTERNAL MEDICINE

## 2022-07-28 PROCEDURE — 3052F HG A1C>EQUAL 8.0%<EQUAL 9.0%: CPT | Mod: CPTII,S$GLB,, | Performed by: INTERNAL MEDICINE

## 2022-07-28 PROCEDURE — 3288F FALL RISK ASSESSMENT DOCD: CPT | Mod: CPTII,S$GLB,, | Performed by: INTERNAL MEDICINE

## 2022-07-28 PROCEDURE — 3061F NEG MICROALBUMINURIA REV: CPT | Mod: CPTII,S$GLB,, | Performed by: INTERNAL MEDICINE

## 2022-07-28 PROCEDURE — 3066F PR DOCUMENTATION OF TREATMENT FOR NEPHROPATHY: ICD-10-PCS | Mod: CPTII,S$GLB,, | Performed by: INTERNAL MEDICINE

## 2022-07-28 PROCEDURE — 3288F PR FALLS RISK ASSESSMENT DOCUMENTED: ICD-10-PCS | Mod: CPTII,S$GLB,, | Performed by: INTERNAL MEDICINE

## 2022-07-28 PROCEDURE — 3008F BODY MASS INDEX DOCD: CPT | Mod: CPTII,S$GLB,, | Performed by: INTERNAL MEDICINE

## 2022-07-28 PROCEDURE — 1126F PR PAIN SEVERITY QUANTIFIED, NO PAIN PRESENT: ICD-10-PCS | Mod: CPTII,S$GLB,, | Performed by: INTERNAL MEDICINE

## 2022-07-28 PROCEDURE — 1159F MED LIST DOCD IN RCRD: CPT | Mod: CPTII,S$GLB,, | Performed by: INTERNAL MEDICINE

## 2022-07-28 PROCEDURE — 1101F PT FALLS ASSESS-DOCD LE1/YR: CPT | Mod: CPTII,S$GLB,, | Performed by: INTERNAL MEDICINE

## 2022-07-28 PROCEDURE — 99214 OFFICE O/P EST MOD 30 MIN: CPT | Mod: S$GLB,,, | Performed by: INTERNAL MEDICINE

## 2022-07-28 PROCEDURE — 4010F ACE/ARB THERAPY RXD/TAKEN: CPT | Mod: CPTII,S$GLB,, | Performed by: INTERNAL MEDICINE

## 2022-07-28 PROCEDURE — 3052F PR MOST RECENT HEMOGLOBIN A1C LEVEL 8.0 - < 9.0%: ICD-10-PCS | Mod: CPTII,S$GLB,, | Performed by: INTERNAL MEDICINE

## 2022-07-28 PROCEDURE — 3079F PR MOST RECENT DIASTOLIC BLOOD PRESSURE 80-89 MM HG: ICD-10-PCS | Mod: CPTII,S$GLB,, | Performed by: INTERNAL MEDICINE

## 2022-07-28 PROCEDURE — 1159F PR MEDICATION LIST DOCUMENTED IN MEDICAL RECORD: ICD-10-PCS | Mod: CPTII,S$GLB,, | Performed by: INTERNAL MEDICINE

## 2022-07-28 PROCEDURE — 3008F PR BODY MASS INDEX (BMI) DOCUMENTED: ICD-10-PCS | Mod: CPTII,S$GLB,, | Performed by: INTERNAL MEDICINE

## 2022-07-28 PROCEDURE — 1101F PR PT FALLS ASSESS DOC 0-1 FALLS W/OUT INJ PAST YR: ICD-10-PCS | Mod: CPTII,S$GLB,, | Performed by: INTERNAL MEDICINE

## 2022-07-28 PROCEDURE — 93005 ELECTROCARDIOGRAM TRACING: CPT | Mod: S$GLB,,, | Performed by: INTERNAL MEDICINE

## 2022-07-28 PROCEDURE — 3066F NEPHROPATHY DOC TX: CPT | Mod: CPTII,S$GLB,, | Performed by: INTERNAL MEDICINE

## 2022-07-28 PROCEDURE — 4010F PR ACE/ARB THEARPY RXD/TAKEN: ICD-10-PCS | Mod: CPTII,S$GLB,, | Performed by: INTERNAL MEDICINE

## 2022-07-28 PROCEDURE — 99999 PR PBB SHADOW E&M-EST. PATIENT-LVL III: CPT | Mod: PBBFAC,,, | Performed by: INTERNAL MEDICINE

## 2022-07-28 PROCEDURE — 1160F PR REVIEW ALL MEDS BY PRESCRIBER/CLIN PHARMACIST DOCUMENTED: ICD-10-PCS | Mod: CPTII,S$GLB,, | Performed by: INTERNAL MEDICINE

## 2022-07-28 PROCEDURE — 3079F DIAST BP 80-89 MM HG: CPT | Mod: CPTII,S$GLB,, | Performed by: INTERNAL MEDICINE

## 2022-07-28 PROCEDURE — 99999 PR PBB SHADOW E&M-EST. PATIENT-LVL III: ICD-10-PCS | Mod: PBBFAC,,, | Performed by: INTERNAL MEDICINE

## 2022-07-28 PROCEDURE — 3074F PR MOST RECENT SYSTOLIC BLOOD PRESSURE < 130 MM HG: ICD-10-PCS | Mod: CPTII,S$GLB,, | Performed by: INTERNAL MEDICINE

## 2022-07-28 PROCEDURE — 99214 PR OFFICE/OUTPT VISIT, EST, LEVL IV, 30-39 MIN: ICD-10-PCS | Mod: S$GLB,,, | Performed by: INTERNAL MEDICINE

## 2022-07-28 PROCEDURE — 93005 RHYTHM STRIP: ICD-10-PCS | Mod: S$GLB,,, | Performed by: INTERNAL MEDICINE

## 2022-07-28 PROCEDURE — 93010 ELECTROCARDIOGRAM REPORT: CPT | Mod: S$GLB,,, | Performed by: INTERNAL MEDICINE

## 2022-07-28 RX ORDER — METOPROLOL SUCCINATE 50 MG/1
50 TABLET, EXTENDED RELEASE ORAL DAILY
Qty: 90 TABLET | Refills: 3 | Status: SHIPPED | OUTPATIENT
Start: 2022-07-28 | End: 2022-10-24 | Stop reason: SDUPTHER

## 2022-07-28 NOTE — PROGRESS NOTES
Subjective:    Patient ID:  Patria Nieto is a 72 y.o. male who presents for evaluation of PSVT    HPI   72 y.o. M security contractor at [employer]  DM on meds  HTN on meds  PSVT    In early 2020:  Noticed his HR episodically fast over the past month: 150-160 bpm.  Saw Dr Rojas in clinic; found in long-RP SVT at 153 bpm. Pt describes termination with carotid pressure.  Fast HR recurred when he went home. He self-applied carotid pressure; terminated again.  Since increasing BB, hasn't noticed any further HR increases.  Good exertion tolerance.  ECG 2/11: NCT at 153 bpm. Long RP SVT (vs, less likely, AFL with 2:1 conduction).    4/2021: inducible RA AT (7 o'clock on TVA); ablated, resulting in noninducibility.  Did well for a month, but then had recurrence.  Holter showed about 50% EAT burden. His BB was restarted, and since he's done well. No problems, no palpitations.    My interpretation of today's ECG is NSR. No preexcitation.    Review of Systems   Constitutional: Negative. Negative for malaise/fatigue.   HENT: Negative.  Negative for ear pain and tinnitus.    Eyes: Negative for blurred vision.   Cardiovascular: Negative for chest pain, dyspnea on exertion, near-syncope and syncope.   Respiratory: Negative.  Negative for shortness of breath.    Endocrine: Negative.  Negative for polyuria.   Hematologic/Lymphatic: Does not bruise/bleed easily.   Skin: Negative.  Negative for rash.   Musculoskeletal: Negative.  Negative for joint pain and muscle weakness.   Gastrointestinal: Negative.  Negative for abdominal pain and change in bowel habit.   Genitourinary: Negative for frequency.   Neurological: Negative.  Negative for dizziness and weakness.   Psychiatric/Behavioral: Negative.  Negative for depression. The patient is not nervous/anxious.    Allergic/Immunologic: Negative for environmental allergies.        Objective:    Physical Exam  Vitals and nursing note reviewed.   Constitutional:       Appearance: He is  well-developed.   HENT:      Head: Normocephalic and atraumatic.   Eyes:      General: Lids are normal. No scleral icterus.     Conjunctiva/sclera: Conjunctivae normal.   Neck:      Thyroid: No thyromegaly.      Vascular: No JVD.      Trachea: No tracheal deviation.   Cardiovascular:      Rate and Rhythm: Normal rate and regular rhythm.      Pulses:           Radial pulses are 2+ on the right side and 2+ on the left side.   Pulmonary:      Effort: Pulmonary effort is normal. No tachypnea, accessory muscle usage or respiratory distress.      Breath sounds: Normal breath sounds. No wheezing.   Abdominal:      General: Bowel sounds are normal. There is no distension.   Musculoskeletal:         General: Normal range of motion.      Cervical back: Normal range of motion.   Skin:     General: Skin is warm and dry.      Findings: No rash.   Neurological:      Mental Status: He is alert and oriented to person, place, and time.      Motor: No abnormal muscle tone.      Deep Tendon Reflexes: Reflexes are normal and symmetric.   Psychiatric:         Behavior: Behavior normal.           Assessment:       1. Essential hypertension    2. PSVT (paroxysmal supraventricular tachycardia)    3. Hyperlipidemia LDL goal <100    4. Hypertension associated with diabetes    5. BMI 30.0-30.9,adult    6. Type 2 diabetes, uncontrolled, with neuropathy    7. Uncontrolled type 2 diabetes mellitus with hyperglycemia, with long-term current use of insulin         Plan:       Successful ablation of focal AT at the TVA, with either recurrence at that site or the emergence of a second focus. In any case, this seems to be suppressed with the current regimen of BB, which is well tolerated by the patient.  Continue BB, though consolidated from metop BID to toprol QD.  f/u 1 year or earlier prn.

## 2022-08-02 LAB
LEFT EYE DM RETINOPATHY: NEGATIVE
RIGHT EYE DM RETINOPATHY: NEGATIVE

## 2022-08-04 ENCOUNTER — PATIENT OUTREACH (OUTPATIENT)
Dept: ADMINISTRATIVE | Facility: HOSPITAL | Age: 72
End: 2022-08-04
Payer: COMMERCIAL

## 2022-08-04 NOTE — PROGRESS NOTES
08/04/2022 Gap report audit performed. Care Everywhere updates requested and reviewed  Overdue HM topic chart audit and/or requested. LINKS triggered and reconciled. Media reviewed  HM Updated DM Eye Exam 2022     Health Maintenance Due   Topic Date Due    Shingles Vaccine (1 of 2) Never done    Colorectal Cancer Screening  06/24/2021    Pneumococcal Vaccines (Age 65+) (3 - PPSV23 or PCV20) 08/10/2021    COVID-19 Vaccine (3 - Booster for Moderna series) 08/12/2021    Foot Exam  12/02/2021

## 2022-08-09 ENCOUNTER — PATIENT OUTREACH (OUTPATIENT)
Dept: ADMINISTRATIVE | Facility: HOSPITAL | Age: 72
End: 2022-08-09
Payer: COMMERCIAL

## 2022-08-09 NOTE — PROGRESS NOTES
2022 Care Everywhere updates requested and reviewed.  Immunizations reconciled. Media reports reviewed.  Duplicate HM overrides and  orders removed.  Overdue HM topic chart audit and/or requested.  Overdue lab testing linked to upcoming lab appointments if applies. Portal outreached regarding Health maintenance;  [place], and [employer] reviewed   DIS reviewed     Health Maintenance Due   Topic Date Due    Shingles Vaccine (1 of 2) Never done    Colorectal Cancer Screening  2021    Pneumococcal Vaccines (Age 65+) (3 - PPSV23 or PCV20) 08/10/2021    COVID-19 Vaccine (3 - Booster for Moderna series) 2021    Foot Exam  2021

## 2022-08-23 ENCOUNTER — OFFICE VISIT (OUTPATIENT)
Dept: FAMILY MEDICINE | Facility: CLINIC | Age: 72
End: 2022-08-23
Payer: COMMERCIAL

## 2022-08-23 VITALS
TEMPERATURE: 99 F | WEIGHT: 209.19 LBS | HEIGHT: 69 IN | HEART RATE: 82 BPM | SYSTOLIC BLOOD PRESSURE: 118 MMHG | BODY MASS INDEX: 30.98 KG/M2 | DIASTOLIC BLOOD PRESSURE: 80 MMHG | OXYGEN SATURATION: 98 %

## 2022-08-23 DIAGNOSIS — Z79.4 TYPE 2 DIABETES MELLITUS WITH DIABETIC POLYNEUROPATHY, WITH LONG-TERM CURRENT USE OF INSULIN: ICD-10-CM

## 2022-08-23 DIAGNOSIS — I10 ESSENTIAL HYPERTENSION: ICD-10-CM

## 2022-08-23 DIAGNOSIS — R79.89 ABNORMAL CBC: ICD-10-CM

## 2022-08-23 DIAGNOSIS — Z12.11 COLON CANCER SCREENING: ICD-10-CM

## 2022-08-23 DIAGNOSIS — E78.5 HYPERLIPIDEMIA LDL GOAL <100: ICD-10-CM

## 2022-08-23 DIAGNOSIS — E78.49 OTHER HYPERLIPIDEMIA: ICD-10-CM

## 2022-08-23 DIAGNOSIS — Z79.4 UNCONTROLLED TYPE 2 DIABETES MELLITUS WITH HYPERGLYCEMIA, WITH LONG-TERM CURRENT USE OF INSULIN: ICD-10-CM

## 2022-08-23 DIAGNOSIS — E11.42 TYPE 2 DIABETES MELLITUS WITH DIABETIC POLYNEUROPATHY, WITH LONG-TERM CURRENT USE OF INSULIN: ICD-10-CM

## 2022-08-23 DIAGNOSIS — E53.8 B12 DEFICIENCY: ICD-10-CM

## 2022-08-23 DIAGNOSIS — E11.65 UNCONTROLLED TYPE 2 DIABETES MELLITUS WITH HYPERGLYCEMIA, WITH LONG-TERM CURRENT USE OF INSULIN: ICD-10-CM

## 2022-08-23 DIAGNOSIS — I47.10 PSVT (PAROXYSMAL SUPRAVENTRICULAR TACHYCARDIA): ICD-10-CM
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
## 2022-08-23 PROCEDURE — 99999 PR PBB SHADOW E&M-EST. PATIENT-LVL V: CPT | Mod: PBBFAC,,, | Performed by: FAMILY MEDICINE

## 2022-08-23 PROCEDURE — 99214 OFFICE O/P EST MOD 30 MIN: CPT | Mod: S$GLB,,, | Performed by: FAMILY MEDICINE

## 2022-08-23 PROCEDURE — 4010F ACE/ARB THERAPY RXD/TAKEN: CPT | Mod: CPTII,S$GLB,, | Performed by: FAMILY MEDICINE

## 2022-08-23 PROCEDURE — 1126F AMNT PAIN NOTED NONE PRSNT: CPT | Mod: CPTII,S$GLB,, | Performed by: FAMILY MEDICINE

## 2022-08-23 PROCEDURE — 3061F PR NEG MICROALBUMINURIA RESULT DOCUMENTED/REVIEW: ICD-10-PCS | Mod: CPTII,S$GLB,, | Performed by: FAMILY MEDICINE

## 2022-08-23 PROCEDURE — 1160F PR REVIEW ALL MEDS BY PRESCRIBER/CLIN PHARMACIST DOCUMENTED: ICD-10-PCS | Mod: CPTII,S$GLB,, | Performed by: FAMILY MEDICINE

## 2022-08-23 PROCEDURE — 3074F SYST BP LT 130 MM HG: CPT | Mod: CPTII,S$GLB,, | Performed by: FAMILY MEDICINE

## 2022-08-23 PROCEDURE — 3066F NEPHROPATHY DOC TX: CPT | Mod: CPTII,S$GLB,, | Performed by: FAMILY MEDICINE

## 2022-08-23 PROCEDURE — 3052F PR MOST RECENT HEMOGLOBIN A1C LEVEL 8.0 - < 9.0%: ICD-10-PCS | Mod: CPTII,S$GLB,, | Performed by: FAMILY MEDICINE

## 2022-08-23 PROCEDURE — 99214 PR OFFICE/OUTPT VISIT, EST, LEVL IV, 30-39 MIN: ICD-10-PCS | Mod: S$GLB,,, | Performed by: FAMILY MEDICINE

## 2022-08-23 PROCEDURE — 3079F PR MOST RECENT DIASTOLIC BLOOD PRESSURE 80-89 MM HG: ICD-10-PCS | Mod: CPTII,S$GLB,, | Performed by: FAMILY MEDICINE

## 2022-08-23 PROCEDURE — 99999 PR PBB SHADOW E&M-EST. PATIENT-LVL V: ICD-10-PCS | Mod: PBBFAC,,, | Performed by: FAMILY MEDICINE

## 2022-08-23 PROCEDURE — 3008F BODY MASS INDEX DOCD: CPT | Mod: CPTII,S$GLB,, | Performed by: FAMILY MEDICINE

## 2022-08-23 PROCEDURE — 3079F DIAST BP 80-89 MM HG: CPT | Mod: CPTII,S$GLB,, | Performed by: FAMILY MEDICINE

## 2022-08-23 PROCEDURE — 3066F PR DOCUMENTATION OF TREATMENT FOR NEPHROPATHY: ICD-10-PCS | Mod: CPTII,S$GLB,, | Performed by: FAMILY MEDICINE

## 2022-08-23 PROCEDURE — 3008F PR BODY MASS INDEX (BMI) DOCUMENTED: ICD-10-PCS | Mod: CPTII,S$GLB,, | Performed by: FAMILY MEDICINE

## 2022-08-23 PROCEDURE — 1159F PR MEDICATION LIST DOCUMENTED IN MEDICAL RECORD: ICD-10-PCS | Mod: CPTII,S$GLB,, | Performed by: FAMILY MEDICINE

## 2022-08-23 PROCEDURE — 1160F RVW MEDS BY RX/DR IN RCRD: CPT | Mod: CPTII,S$GLB,, | Performed by: FAMILY MEDICINE

## 2022-08-23 PROCEDURE — 4010F PR ACE/ARB THEARPY RXD/TAKEN: ICD-10-PCS | Mod: CPTII,S$GLB,, | Performed by: FAMILY MEDICINE

## 2022-08-23 PROCEDURE — 1159F MED LIST DOCD IN RCRD: CPT | Mod: CPTII,S$GLB,, | Performed by: FAMILY MEDICINE

## 2022-08-23 PROCEDURE — 3074F PR MOST RECENT SYSTOLIC BLOOD PRESSURE < 130 MM HG: ICD-10-PCS | Mod: CPTII,S$GLB,, | Performed by: FAMILY MEDICINE

## 2022-08-23 PROCEDURE — 3052F HG A1C>EQUAL 8.0%<EQUAL 9.0%: CPT | Mod: CPTII,S$GLB,, | Performed by: FAMILY MEDICINE

## 2022-08-23 PROCEDURE — 1126F PR PAIN SEVERITY QUANTIFIED, NO PAIN PRESENT: ICD-10-PCS | Mod: CPTII,S$GLB,, | Performed by: FAMILY MEDICINE

## 2022-08-23 PROCEDURE — 3061F NEG MICROALBUMINURIA REV: CPT | Mod: CPTII,S$GLB,, | Performed by: FAMILY MEDICINE

## 2022-08-23 RX ORDER — RAMIPRIL 5 MG/1
5 CAPSULE ORAL DAILY
Qty: 90 CAPSULE | Refills: 1 | Status: SHIPPED | OUTPATIENT
Start: 2022-08-23 | End: 2023-01-02 | Stop reason: SDUPTHER

## 2022-08-23 RX ORDER — ATORVASTATIN CALCIUM 40 MG/1
40 TABLET, FILM COATED ORAL NIGHTLY
Qty: 90 TABLET | Refills: 1 | Status: SHIPPED | OUTPATIENT
Start: 2022-08-23 | End: 2023-01-02 | Stop reason: SDUPTHER

## 2022-08-23 RX ORDER — LANOLIN ALCOHOL/MO/W.PET/CERES
1000 CREAM (GRAM) TOPICAL DAILY
Qty: 90 TABLET | Refills: 5 | Status: SHIPPED | OUTPATIENT
Start: 2022-08-23 | End: 2024-03-01 | Stop reason: SDUPTHER

## 2022-08-23 RX ORDER — MUPIROCIN 20 MG/G
OINTMENT TOPICAL 3 TIMES DAILY
Qty: 30 G | Refills: 0 | Status: SHIPPED | OUTPATIENT
Start: 2022-08-23 | End: 2023-02-23 | Stop reason: SDUPTHER

## 2022-08-23 NOTE — PATIENT INSTRUCTIONS
?Avoid tobacco  ?Be physically active  ?Maintain a healthy weight  ?Eat a diet rich in fruits, vegetables, and whole grains, and low in saturated/trans fat  ?Limit alcohol consumption  ?Protect against sexually transmitted infections  ?Avoid excess sun  RTC as directed during the visit, as needed or in 1 year for a physical with labs prior. Call one month prior to the physical to schedule apt and labs.     Due to the 21st Century Cures Act, Federal Law mandates that ALL results will be released immediately. This means that you will get your results on [employer] prior to me having a chance to review them. Please give our office 3 business days to review your results   I am not in clinic Tuesday Afternoon or Friday Afternoon and this will also delay your review.      Colonoscopy.     Labs with endocrine labs    F/u 6 months.

## 2022-08-23 NOTE — PROGRESS NOTES
Subjective:       Patient ID: Patria Nieto is a 72 y.o. male.    Chief Complaint: Diabetes    Lokesh is a 72 y.o. male who presents today for f/u  He was >20 minutes late to his apt today  Last two apts were cancelled.   Last seen almost a year ago. Was asked to f/u in 6 months at last apt, 11/18/2021.     Saw endocrine 1 month ago. I am not managing his diabetes  SVT: saw EP. Last seen 7/28/2022. BB changed to once daily.   DLD: on atorvastatin  HTN: taking ramipril 5 mg and metoprolol 50 mg Xr once daily. BP at goal.    Thrombocytopenia: stable. Refer <100. Due to aspirin? Low b12?  Low b12: recheck with endocrine labs. Not taking b12.     Review of Systems   Constitutional: Negative for chills and fever.   Respiratory: Negative for shortness of breath.    Cardiovascular: Negative for chest pain.   Gastrointestinal: Negative for nausea and vomiting.   Neurological: Negative for dizziness, light-headedness and headaches.             Lab Results   Component Value Date    HGBA1C 8.9 (H) 03/08/2022    HGBA1C 8.8 (H) 11/12/2021    HGBA1C 8.4 (H) 08/18/2021         Results for orders placed or performed in visit on 08/04/22    DIABETES EYE EXAM   Result Value Ref Range    Left Eye DM Retinopathy Negative     Right Eye DM Retinopathy Negative        Objective:     Vitals:    08/23/22 0906   BP: 118/80   Pulse: 82   Temp: 98.5 °F (36.9 °C)        Physical Exam  Constitutional:       General: He is not in acute distress.     Appearance: He is obese. He is not ill-appearing, toxic-appearing or diaphoretic.   HENT:      Head: Normocephalic and atraumatic.   Cardiovascular:      Rate and Rhythm: Normal rate and regular rhythm.   Pulmonary:      Effort: Pulmonary effort is normal.      Breath sounds: Normal breath sounds.   Neurological:      General: No focal deficit present.      Mental Status: He is alert.   Psychiatric:         Mood and Affect: Mood normal.         Behavior: Behavior normal.         Thought Content: Thought  content normal.         Judgment: Judgment normal.         Assessment:       1. Type 2 diabetes, uncontrolled, with neuropathy    2. Uncontrolled type 2 diabetes mellitus with hyperglycemia, with long-term current use of insulin    3. PSVT (paroxysmal supraventricular tachycardia)    4. Other hyperlipidemia    5. Essential hypertension    6. B12 deficiency    7. Colon cancer screening    8. Type 2 diabetes mellitus with diabetic polyneuropathy, with long-term current use of insulin    9. Hyperlipidemia LDL goal <100    10. Abnormal CBC    11. Bug bite with infection, initial encounter        Plan:         Further diabetes management per endocrine  I will no longer be changing diabetes meds or filling medications  This needs to come from his endocrinologist  Stressed importance of f/u and compliance with endocrinology apts and medications.   Uncontrolled diabetes can lead to CVA, MI, kidney failure, amongst other issues.     Continue rampipril  Metoprolol per EP at 50 mg XR     Continue atorvastatin 40 mg     You have low B12. Goal >400. Start daily OTC B12 supplementation at 1000 mcg     F/u 6 months for HTN    Colonoscopy ordered.     Type 2 diabetes, uncontrolled, with neuropathy    Uncontrolled type 2 diabetes mellitus with hyperglycemia, with long-term current use of insulin    PSVT (paroxysmal supraventricular tachycardia)    Other hyperlipidemia  -     CBC Auto Differential; Future; Expected date: 08/23/2022  -     TSH; Future; Expected date: 08/23/2022  -     atorvastatin (LIPITOR) 40 MG tablet; Take 1 tablet (40 mg total) by mouth every evening.  Dispense: 90 tablet; Refill: 1    Essential hypertension  -     CBC Auto Differential; Future; Expected date: 08/23/2022  -     TSH; Future; Expected date: 08/23/2022  -     ramipriL (ALTACE) 5 MG capsule; Take 1 capsule (5 mg total) by mouth once daily.  Dispense: 90 capsule; Refill: 1    B12 deficiency  -     Vitamin B12; Future; Expected date: 08/23/2022  -      cyanocobalamin (VITAMIN B-12) 1000 MCG tablet; Take 1 tablet (1,000 mcg total) by mouth once daily.  Dispense: 90 tablet; Refill: 5    Colon cancer screening  -     Cancel: Case Request Endoscopy: COLONOSCOPY  -     Case Request Endoscopy: COLONOSCOPY    Type 2 diabetes mellitus with diabetic polyneuropathy, with long-term current use of insulin  -     ramipriL (ALTACE) 5 MG capsule; Take 1 capsule (5 mg total) by mouth once daily.  Dispense: 90 capsule; Refill: 1    Hyperlipidemia LDL goal <100  -     atorvastatin (LIPITOR) 40 MG tablet; Take 1 tablet (40 mg total) by mouth every evening.  Dispense: 90 tablet; Refill: 1    Abnormal CBC  -     Iron and TIBC; Future; Expected date: 08/23/2022  -     Ferritin; Future; Expected date: 08/23/2022    Bug bite with infection, initial encounter  -     mupirocin (BACTROBAN) 2 % ointment; Apply topically 3 (three) times daily.  Dispense: 30 g; Refill: 0        Warning signs discussed, patient to call with any further issues or worsening of symptoms.

## 2022-08-24 ENCOUNTER — PATIENT OUTREACH (OUTPATIENT)
Dept: ADMINISTRATIVE | Facility: HOSPITAL | Age: 72
End: 2022-08-24
Payer: COMMERCIAL

## 2022-08-24 NOTE — PROGRESS NOTES
Health Maintenance Due   Topic Date Due    Shingles Vaccine (1 of 2) Never done    Colorectal Cancer Screening  06/24/2021    Pneumococcal Vaccines (Age 65+) (3 - PPSV23 or PCV20) 08/10/2021    COVID-19 Vaccine (3 - Booster for Moderna series) 08/12/2021    Foot Exam  12/02/2021

## 2022-10-14 ENCOUNTER — PATIENT OUTREACH (OUTPATIENT)
Dept: ADMINISTRATIVE | Facility: HOSPITAL | Age: 72
End: 2022-10-14
Payer: COMMERCIAL

## 2022-10-21 ENCOUNTER — CLINICAL SUPPORT (OUTPATIENT)
Dept: ENDOSCOPY | Facility: HOSPITAL | Age: 72
End: 2022-10-21
Attending: FAMILY MEDICINE
Payer: COMMERCIAL

## 2022-10-21 VITALS — HEIGHT: 69 IN | BODY MASS INDEX: 29.92 KG/M2 | WEIGHT: 202 LBS

## 2022-10-21 DIAGNOSIS — Z12.11 COLON CANCER SCREENING: ICD-10-CM

## 2022-10-21 NOTE — PLAN OF CARE
Called patient to discuss setting up his Colonoscopy. Reviewed medical history and his medications. Patient was trying to look at his calendar to pick out a date. Asked if he could call me right back so he can get into his phone. Agreed and waited for 10 min before trying to call him back again. No answer. New referral placed.

## 2022-11-05 ENCOUNTER — LAB VISIT (OUTPATIENT)
Dept: LAB | Facility: HOSPITAL | Age: 72
End: 2022-11-05
Attending: NURSE PRACTITIONER
Payer: COMMERCIAL

## 2022-11-05 DIAGNOSIS — E11.65 UNCONTROLLED TYPE 2 DIABETES MELLITUS WITH HYPERGLYCEMIA, WITH LONG-TERM CURRENT USE OF INSULIN: ICD-10-CM

## 2022-11-05 DIAGNOSIS — Z79.4 UNCONTROLLED TYPE 2 DIABETES MELLITUS WITH HYPERGLYCEMIA, WITH LONG-TERM CURRENT USE OF INSULIN: ICD-10-CM

## 2022-11-05 LAB
ALBUMIN/CREAT UR: 10 UG/MG (ref 0–30)
CREAT UR-MCNC: 60 MG/DL (ref 23–375)
MICROALBUMIN UR DL<=1MG/L-MCNC: 6 UG/ML

## 2022-11-05 PROCEDURE — 82043 UR ALBUMIN QUANTITATIVE: CPT | Performed by: NURSE PRACTITIONER

## 2022-11-05 PROCEDURE — 82570 ASSAY OF URINE CREATININE: CPT | Performed by: NURSE PRACTITIONER

## 2022-11-08 NOTE — PROGRESS NOTES
"Subjective:      Patient ID: Patria Nieto is a 72 y.o. male.    Chief Complaint:  No chief complaint on file.    History of Present Illness  Patria Nieto is here for follow up of DM.  Previously seen by me 7/2022.    With regards to diabetes:    Diagnosed: 2003  DE: 2018  FH: "yes"   Known complications:  DKA Denies   RN Denies  Eye Exam: > 1 year ago but has an upcoming appointment   PN Yes - "comes and goes"   Podiatry: 2020  Nephropathy Denies  CAD Denies  Denies history of pancreatitis & personal/family history of medullary thyroid cancer.     Current regimen:  Metformin 1000mg twice daily  Trulicity 4.5mg weekly  Toujeo 50units nightly or daily   Novolog 20 units plus correction scale BEFORE meals  Add correction scale as needed.  Blood sugar 150 to 200 add 1 units  Blood sugar 201 to 250 add 2 units  Blood sugar 251 to 300 add 3 units  Blood sugar 301 to 350 add 4 units  Blood sugar greater than 350 add 5 units     Taking novolog with food - if he forgets and glucose is high he will then administer the Novolog.   Reports compliance.    Other medications tried:  NONE     Glucose Monitor:   4-5 times a day testing  Log reviewed: oral recall   Fasting: > 200-250  Before lunch: 120-150  Before dinner: 150s  Before bed: 150-160    Hypoglycemia:  Denies.   Knows how to correct with 15 grams of carbs- juice, coke, or a peppermint.     Diet/Exercise:  Eats 2-3 meals a day.   Snacks : Denies  Drinks : coffee (1-2 cups in AM), OJ, water, diet soft drinks.   Exercise - tries to stay active - exercises twice a week.     Diabetes Management Status    Hemoglobin A1C   Date Value Ref Range Status   11/05/2022 9.5 (H) 4.0 - 5.6 % Final     Comment:     ADA Screening Guidelines:  5.7-6.4%  Consistent with prediabetes  >or=6.5%  Consistent with diabetes    High levels of fetal hemoglobin interfere with the HbA1C  assay. Heterozygous hemoglobin variants (HbS, HgC, etc)do  not significantly interfere with this assay.   However, " "presence of multiple variants may affect accuracy.     03/08/2022 8.9 (H) 4.0 - 5.6 % Final     Comment:     ADA Screening Guidelines:  5.7-6.4%  Consistent with prediabetes  >or=6.5%  Consistent with diabetes    High levels of fetal hemoglobin interfere with the HbA1C  assay. Heterozygous hemoglobin variants (HbS, HgC, etc)do  not significantly interfere with this assay.   However, presence of multiple variants may affect accuracy.     11/12/2021 8.8 (H) 4.0 - 5.6 % Final     Comment:     ADA Screening Guidelines:  5.7-6.4%  Consistent with prediabetes  >or=6.5%  Consistent with diabetes    High levels of fetal hemoglobin interfere with the HbA1C  assay. Heterozygous hemoglobin variants (HbS, HgC, etc)do  not significantly interfere with this assay.   However, presence of multiple variants may affect accuracy.         Statin: Taking  ACE/ARB: Taking  Screening or Prevention Patient's value Goal Complete/Controlled?   HgA1C Testing and Control   Lab Results   Component Value Date    HGBA1C 9.5 (H) 11/05/2022      Annually/Less than 8% No   Lipid profile : 11/05/2022 Annually Yes   LDL control Lab Results   Component Value Date    LDLCALC 62.4 (L) 11/05/2022    Annually/Less than 100 mg/dl  Yes   Nephropathy screening Lab Results   Component Value Date    LABMICR 6.0 11/05/2022     Lab Results   Component Value Date    PROTEINUA NEG 09/09/2011    Annually Yes   Blood pressure BP Readings from Last 1 Encounters:   11/14/22 108/74    Less than 140/90 Yes   Dilated retinal exam : 08/02/2022 Annually Yes   Foot exam   : 12/02/2020 Annually No     Reports history of "abscess" on buttocks or abdomen. Has seen a provider in the past. He is self-treating.   Concern for yeast infections as well.    Review of Systems   Constitutional:  Negative for fatigue.   Eyes:  Negative for visual disturbance.   Respiratory:  Negative for shortness of breath.    Cardiovascular:  Negative for chest pain.   Gastrointestinal:  Negative for " "abdominal pain.   Musculoskeletal:  Negative for arthralgias.   Skin:  Negative for wound.   Neurological:  Negative for headaches.     Objective:   Physical Exam  Vitals reviewed.   Cardiovascular:      Rate and Rhythm: Normal rate.      Comments: No edema present  Pulmonary:      Effort: Pulmonary effort is normal.   Abdominal:      Palpations: Abdomen is soft.     Injection sites are without edema or erythema. No lipo hypertropthy or atrophy.    Visit Vitals  /74   Pulse 101   Ht 5' 9" (1.753 m)   Wt 93.1 kg (205 lb 2.2 oz)   SpO2 98%   BMI 30.29 kg/m²         Body mass index is 30.29 kg/m².    Lab Review:   Lab Results   Component Value Date    HGBA1C 9.5 (H) 11/05/2022    HGBA1C 8.9 (H) 03/08/2022    HGBA1C 8.8 (H) 11/12/2021       Lab Results   Component Value Date    CHOL 125 11/05/2022    HDL 38 (L) 11/05/2022    LDLCALC 62.4 (L) 11/05/2022    TRIG 123 11/05/2022    CHOLHDL 30.4 11/05/2022     Lab Results   Component Value Date     11/05/2022    K 4.9 11/05/2022     (H) 11/05/2022    CO2 23 11/05/2022     (H) 11/05/2022    BUN 17 11/05/2022    CREATININE 1.2 11/05/2022    CALCIUM 10.4 11/05/2022    PROT 6.7 11/05/2022    ALBUMIN 3.9 11/05/2022    BILITOT 2.0 (H) 11/05/2022    ALKPHOS 61 11/05/2022    AST 15 11/05/2022    ALT 22 11/05/2022    ANIONGAP 10 11/05/2022    ESTGFRAFRICA >60.0 11/12/2021    EGFRNONAA >60.0 11/12/2021    TSH 2.901 11/05/2022     No results found for: [id number]  Assessment and Plan     1. Uncontrolled type 2 diabetes mellitus with hyperglycemia, with long-term current use of insulin  tirzepatide (MOUNJARO) 5 mg/0.5 mL Gamal    insulin glargine, TOUJEO, (TOUJEO) 300 unit/mL (1.5 mL) InPn pen    blood-glucose sensor (DEXCOM G6 SENSOR) Nikki    blood-glucose transmitter (DEXCOM G6 TRANSMITTER) Nikki    Hemoglobin A1C    Comprehensive Metabolic Panel      2. Hypertension associated with diabetes        3. Hyperlipidemia LDL goal <100        4. Type 2 diabetes " mellitus with hyperglycemia, with long-term current use of insulin  insulin glargine, TOUJEO, (TOUJEO) 300 unit/mL (1.5 mL) InPn pen      5. Neuropathy due to type 2 diabetes mellitus            Uncontrolled type 2 diabetes mellitus with hyperglycemia, with long-term current use of insulin  -- Labs prior to follow up.  -- A1c goal <7.5%.  -- Medications discussed:  MFM   GLP1-DPP4   OSORIO   SGLT2   Reviewed potential adverse effects of SGLT-2 inhibitors, including genital mycotic infections, slightly increased risk of UTI, hypersensitivity, hypotension, and hyperkalemia. Advised to maintain water intake of 8-10 cups per day. Advised we need to check chemistry panel at baseline and 2 weeks after starting. Discussed FDA warning reports of ketoacidosis associated with SGLT-2 inhibitors. Advised to seek immediate medical attention and stop the medication if symptoms such as difficulty breathing, nausea, vomiting, abdominal pain, confusion, and unusual fatigue/sleepiness. Discussed possible precipitating factors including major illness/reduced food and fluid intake (advised to stop under these circumstances), and reduced insulin dose. Discussed possible effects of increased fracture risk/decreased bone density. Discussed reports of increased risk of leg and foot amputations with canagliflozin and need to seek urgent care if developed new pain or tenderness, sores or ulcers, or infections in legs/feet.   Insulin   -- Reviewed logs/CGM:  Fasting hyperglycemia.  Instructed to send glucose logs in 14 days.  Reach out to me sooner for any glucose <70 or consistently >180.  -- Discussed proper timing of insulin administration.  -- Discussed personal CGM - submit Dexcom.  -- Medication Changes:   CHANGE  Metformin 1000mg twice daily  Trulicity 4.5mg weekly -- Mounjaro 5mg weekly  Toujeo 56units daily  Novolog 20 units plus correction scale BEFORE meals  Add correction scale as needed.  Blood sugar 150 to 200 add 1 units  Blood  sugar 201 to 250 add 2 units  Blood sugar 251 to 300 add 3 units  Blood sugar 301 to 350 add 4 units  Blood sugar greater than 350 add 5 units    Discussed SGLT2i - im concerned about the skin infections so will hold off.    -- Reviewed goals of therapy are to get the best control we can without hypoglycemia.  -- Reviewed patient's current insulin regimen. Clarified proper insulin dose and timing in relation to meals, etc. Insulin injection sites and proper rotation instructed.    -- Advised frequent self blood glucose monitoring.  Patient encouraged to document glucose results and bring them to every clinic visit.  -- Hypoglycemia precautions discussed. Instructed on precautions before driving.    -- Call for Bg repeatedly < 90 or > 180.   -- Close adherence to lifestyle changes recommended.   -- Periodic follow ups for eye evaluations, foot care and dental care suggested.    Hypertension associated with diabetes  -- On ACEI.  -- Controlled.  -- Blood pressure goals discussed with patient.    Hyperlipidemia LDL goal <100  -- Controlled.  -- On statin per ADA recommendations.    Neuropathy due to type 2 diabetes mellitus  -- Optimize glucose control.       Follow up in about 3 months (around 2/14/2023).

## 2022-11-14 ENCOUNTER — OFFICE VISIT (OUTPATIENT)
Dept: ENDOCRINOLOGY | Facility: CLINIC | Age: 72
End: 2022-11-14
Payer: COMMERCIAL

## 2022-11-14 VITALS
OXYGEN SATURATION: 98 % | DIASTOLIC BLOOD PRESSURE: 74 MMHG | SYSTOLIC BLOOD PRESSURE: 108 MMHG | HEART RATE: 101 BPM | WEIGHT: 205.13 LBS | BODY MASS INDEX: 30.38 KG/M2 | HEIGHT: 69 IN

## 2022-11-14 DIAGNOSIS — E11.59 HYPERTENSION ASSOCIATED WITH DIABETES: ICD-10-CM

## 2022-11-14 DIAGNOSIS — E11.40 NEUROPATHY DUE TO TYPE 2 DIABETES MELLITUS: ICD-10-CM

## 2022-11-14 DIAGNOSIS — E11.65 UNCONTROLLED TYPE 2 DIABETES MELLITUS WITH HYPERGLYCEMIA, WITH LONG-TERM CURRENT USE OF INSULIN: Primary | ICD-10-CM

## 2022-11-14 DIAGNOSIS — I15.2 HYPERTENSION ASSOCIATED WITH DIABETES: ICD-10-CM

## 2022-11-14 DIAGNOSIS — Z79.4 TYPE 2 DIABETES MELLITUS WITH HYPERGLYCEMIA, WITH LONG-TERM CURRENT USE OF INSULIN: ICD-10-CM

## 2022-11-14 DIAGNOSIS — Z79.4 UNCONTROLLED TYPE 2 DIABETES MELLITUS WITH HYPERGLYCEMIA, WITH LONG-TERM CURRENT USE OF INSULIN: Primary | ICD-10-CM

## 2022-11-14 DIAGNOSIS — E11.65 TYPE 2 DIABETES MELLITUS WITH HYPERGLYCEMIA, WITH LONG-TERM CURRENT USE OF INSULIN: ICD-10-CM

## 2022-11-14 DIAGNOSIS — E78.5 HYPERLIPIDEMIA LDL GOAL <100: ICD-10-CM

## 2022-11-14 PROCEDURE — 1101F PR PT FALLS ASSESS DOC 0-1 FALLS W/OUT INJ PAST YR: ICD-10-PCS | Mod: CPTII,S$GLB,, | Performed by: NURSE PRACTITIONER

## 2022-11-14 PROCEDURE — 1126F PR PAIN SEVERITY QUANTIFIED, NO PAIN PRESENT: ICD-10-PCS | Mod: CPTII,S$GLB,, | Performed by: NURSE PRACTITIONER

## 2022-11-14 PROCEDURE — 1126F AMNT PAIN NOTED NONE PRSNT: CPT | Mod: CPTII,S$GLB,, | Performed by: NURSE PRACTITIONER

## 2022-11-14 PROCEDURE — 99999 PR PBB SHADOW E&M-EST. PATIENT-LVL IV: ICD-10-PCS | Mod: PBBFAC,,, | Performed by: NURSE PRACTITIONER

## 2022-11-14 PROCEDURE — 99214 PR OFFICE/OUTPT VISIT, EST, LEVL IV, 30-39 MIN: ICD-10-PCS | Mod: S$GLB,,, | Performed by: NURSE PRACTITIONER

## 2022-11-14 PROCEDURE — 3066F PR DOCUMENTATION OF TREATMENT FOR NEPHROPATHY: ICD-10-PCS | Mod: CPTII,S$GLB,, | Performed by: NURSE PRACTITIONER

## 2022-11-14 PROCEDURE — 3046F HEMOGLOBIN A1C LEVEL >9.0%: CPT | Mod: CPTII,S$GLB,, | Performed by: NURSE PRACTITIONER

## 2022-11-14 PROCEDURE — 3074F SYST BP LT 130 MM HG: CPT | Mod: CPTII,S$GLB,, | Performed by: NURSE PRACTITIONER

## 2022-11-14 PROCEDURE — 3288F PR FALLS RISK ASSESSMENT DOCUMENTED: ICD-10-PCS | Mod: CPTII,S$GLB,, | Performed by: NURSE PRACTITIONER

## 2022-11-14 PROCEDURE — 3046F PR MOST RECENT HEMOGLOBIN A1C LEVEL > 9.0%: ICD-10-PCS | Mod: CPTII,S$GLB,, | Performed by: NURSE PRACTITIONER

## 2022-11-14 PROCEDURE — 3066F NEPHROPATHY DOC TX: CPT | Mod: CPTII,S$GLB,, | Performed by: NURSE PRACTITIONER

## 2022-11-14 PROCEDURE — 99214 OFFICE O/P EST MOD 30 MIN: CPT | Mod: S$GLB,,, | Performed by: NURSE PRACTITIONER

## 2022-11-14 PROCEDURE — 1160F PR REVIEW ALL MEDS BY PRESCRIBER/CLIN PHARMACIST DOCUMENTED: ICD-10-PCS | Mod: CPTII,S$GLB,, | Performed by: NURSE PRACTITIONER

## 2022-11-14 PROCEDURE — 3078F DIAST BP <80 MM HG: CPT | Mod: CPTII,S$GLB,, | Performed by: NURSE PRACTITIONER

## 2022-11-14 PROCEDURE — 3078F PR MOST RECENT DIASTOLIC BLOOD PRESSURE < 80 MM HG: ICD-10-PCS | Mod: CPTII,S$GLB,, | Performed by: NURSE PRACTITIONER

## 2022-11-14 PROCEDURE — 1159F PR MEDICATION LIST DOCUMENTED IN MEDICAL RECORD: ICD-10-PCS | Mod: CPTII,S$GLB,, | Performed by: NURSE PRACTITIONER

## 2022-11-14 PROCEDURE — 3008F BODY MASS INDEX DOCD: CPT | Mod: CPTII,S$GLB,, | Performed by: NURSE PRACTITIONER

## 2022-11-14 PROCEDURE — 4010F PR ACE/ARB THEARPY RXD/TAKEN: ICD-10-PCS | Mod: CPTII,S$GLB,, | Performed by: NURSE PRACTITIONER

## 2022-11-14 PROCEDURE — 99999 PR PBB SHADOW E&M-EST. PATIENT-LVL IV: CPT | Mod: PBBFAC,,, | Performed by: NURSE PRACTITIONER

## 2022-11-14 PROCEDURE — 3061F PR NEG MICROALBUMINURIA RESULT DOCUMENTED/REVIEW: ICD-10-PCS | Mod: CPTII,S$GLB,, | Performed by: NURSE PRACTITIONER

## 2022-11-14 PROCEDURE — 4010F ACE/ARB THERAPY RXD/TAKEN: CPT | Mod: CPTII,S$GLB,, | Performed by: NURSE PRACTITIONER

## 2022-11-14 PROCEDURE — 3061F NEG MICROALBUMINURIA REV: CPT | Mod: CPTII,S$GLB,, | Performed by: NURSE PRACTITIONER

## 2022-11-14 PROCEDURE — 3074F PR MOST RECENT SYSTOLIC BLOOD PRESSURE < 130 MM HG: ICD-10-PCS | Mod: CPTII,S$GLB,, | Performed by: NURSE PRACTITIONER

## 2022-11-14 PROCEDURE — 1159F MED LIST DOCD IN RCRD: CPT | Mod: CPTII,S$GLB,, | Performed by: NURSE PRACTITIONER

## 2022-11-14 PROCEDURE — 1160F RVW MEDS BY RX/DR IN RCRD: CPT | Mod: CPTII,S$GLB,, | Performed by: NURSE PRACTITIONER

## 2022-11-14 PROCEDURE — 3288F FALL RISK ASSESSMENT DOCD: CPT | Mod: CPTII,S$GLB,, | Performed by: NURSE PRACTITIONER

## 2022-11-14 PROCEDURE — 1101F PT FALLS ASSESS-DOCD LE1/YR: CPT | Mod: CPTII,S$GLB,, | Performed by: NURSE PRACTITIONER

## 2022-11-14 PROCEDURE — 3008F PR BODY MASS INDEX (BMI) DOCUMENTED: ICD-10-PCS | Mod: CPTII,S$GLB,, | Performed by: NURSE PRACTITIONER

## 2022-11-14 RX ORDER — BLOOD-GLUCOSE SENSOR
3 EACH MISCELLANEOUS CONTINUOUS PRN
Qty: 3 EACH | Status: SHIPPED | OUTPATIENT
Start: 2022-11-14 | End: 2023-11-14

## 2022-11-14 RX ORDER — INSULIN GLARGINE 300 [IU]/ML
56 INJECTION, SOLUTION SUBCUTANEOUS DAILY
Qty: 9 PEN | Refills: 3 | Status: SHIPPED | OUTPATIENT
Start: 2022-11-14 | End: 2023-02-07

## 2022-11-14 RX ORDER — BLOOD-GLUCOSE TRANSMITTER
1 EACH MISCELLANEOUS CONTINUOUS PRN
Qty: 1 EACH | Status: SHIPPED | OUTPATIENT
Start: 2022-11-14 | End: 2023-11-14

## 2022-11-14 RX ORDER — TIRZEPATIDE 5 MG/.5ML
5 INJECTION, SOLUTION SUBCUTANEOUS
Qty: 4 PEN | Refills: 0 | Status: SHIPPED | OUTPATIENT
Start: 2022-11-14 | End: 2023-01-23 | Stop reason: SDUPTHER

## 2022-11-14 NOTE — ASSESSMENT & PLAN NOTE
-- Labs prior to follow up.  -- A1c goal <7.5%.  -- Medications discussed:  MFM   GLP1-DPP4   OSORIO   SGLT2   Reviewed potential adverse effects of SGLT-2 inhibitors, including genital mycotic infections, slightly increased risk of UTI, hypersensitivity, hypotension, and hyperkalemia. Advised to maintain water intake of 8-10 cups per day. Advised we need to check chemistry panel at baseline and 2 weeks after starting. Discussed FDA warning reports of ketoacidosis associated with SGLT-2 inhibitors. Advised to seek immediate medical attention and stop the medication if symptoms such as difficulty breathing, nausea, vomiting, abdominal pain, confusion, and unusual fatigue/sleepiness. Discussed possible precipitating factors including major illness/reduced food and fluid intake (advised to stop under these circumstances), and reduced insulin dose. Discussed possible effects of increased fracture risk/decreased bone density. Discussed reports of increased risk of leg and foot amputations with canagliflozin and need to seek urgent care if developed new pain or tenderness, sores or ulcers, or infections in legs/feet.   Insulin   -- Reviewed logs/CGM:  Fasting hyperglycemia.  Instructed to send glucose logs in 14 days.  Reach out to me sooner for any glucose <70 or consistently >180.  -- Discussed proper timing of insulin administration.  -- Discussed personal CGM - submit Dexcom.  -- Medication Changes:   CHANGE  Metformin 1000mg twice daily  Trulicity 4.5mg weekly -- Mounjaro 5mg weekly  Toujeo 56units daily  Novolog 20 units plus correction scale BEFORE meals  Add correction scale as needed.  Blood sugar 150 to 200 add 1 units  Blood sugar 201 to 250 add 2 units  Blood sugar 251 to 300 add 3 units  Blood sugar 301 to 350 add 4 units  Blood sugar greater than 350 add 5 units    Discussed SGLT2i - im concerned about the skin infections so will hold off.    -- Reviewed goals of therapy are to get the best control we can  without hypoglycemia.  -- Reviewed patient's current insulin regimen. Clarified proper insulin dose and timing in relation to meals, etc. Insulin injection sites and proper rotation instructed.    -- Advised frequent self blood glucose monitoring.  Patient encouraged to document glucose results and bring them to every clinic visit.  -- Hypoglycemia precautions discussed. Instructed on precautions before driving.    -- Call for Bg repeatedly < 90 or > 180.   -- Close adherence to lifestyle changes recommended.   -- Periodic follow ups for eye evaluations, foot care and dental care suggested.

## 2022-11-15 ENCOUNTER — TELEPHONE (OUTPATIENT)
Dept: PHARMACY | Facility: CLINIC | Age: 72
End: 2022-11-15
Payer: COMMERCIAL

## 2022-11-21 ENCOUNTER — PATIENT MESSAGE (OUTPATIENT)
Dept: ENDOCRINOLOGY | Facility: CLINIC | Age: 72
End: 2022-11-21
Payer: COMMERCIAL

## 2022-11-29 ENCOUNTER — CLINICAL SUPPORT (OUTPATIENT)
Dept: ENDOSCOPY | Facility: HOSPITAL | Age: 72
End: 2022-11-29
Attending: FAMILY MEDICINE
Payer: COMMERCIAL

## 2022-11-29 DIAGNOSIS — Z12.11 COLON CANCER SCREENING: ICD-10-CM

## 2022-11-29 RX ORDER — POLYETHYLENE GLYCOL 3350, SODIUM SULFATE ANHYDROUS, SODIUM BICARBONATE, SODIUM CHLORIDE, POTASSIUM CHLORIDE 236; 22.74; 6.74; 5.86; 2.97 G/4L; G/4L; G/4L; G/4L; G/4L
4 POWDER, FOR SOLUTION ORAL ONCE
Qty: 4000 ML | Refills: 0 | Status: SHIPPED | OUTPATIENT
Start: 2022-11-29 | End: 2022-11-29

## 2022-12-16 ENCOUNTER — TELEPHONE (OUTPATIENT)
Dept: ENDOSCOPY | Facility: HOSPITAL | Age: 72
End: 2022-12-16
Payer: COMMERCIAL

## 2022-12-16 NOTE — TELEPHONE ENCOUNTER
Patient is calling asking for a nurse to call him because he know nothing about the date he is schedule for his procedure 12/19/2022 patient said when spoke with nurse he thought she said they have nothing for December and he was schedule for January.

## 2022-12-19 ENCOUNTER — HOSPITAL ENCOUNTER (OUTPATIENT)
Facility: HOSPITAL | Age: 72
Discharge: HOME | End: 2022-12-19
Attending: INTERNAL MEDICINE | Admitting: INTERNAL MEDICINE
Payer: COMMERCIAL

## 2022-12-19 VITALS
HEART RATE: 93 BPM | BODY MASS INDEX: 30.92 KG/M2 | SYSTOLIC BLOOD PRESSURE: 137 MMHG | DIASTOLIC BLOOD PRESSURE: 85 MMHG | WEIGHT: 197 LBS | TEMPERATURE: 98 F | HEIGHT: 67 IN | OXYGEN SATURATION: 97 % | RESPIRATION RATE: 16 BRPM

## 2022-12-19 DIAGNOSIS — Z12.11 COLON CANCER SCREENING: Primary | ICD-10-CM

## 2022-12-19 LAB — POCT GLUCOSE: 133 MG/DL (ref 70–110)

## 2022-12-19 PROCEDURE — 45385 COLONOSCOPY W/LESION REMOVAL: CPT | Mod: 33,,, | Performed by: STUDENT IN AN ORGANIZED HEALTH CARE EDUCATION/TRAINING PROGRAM

## 2022-12-19 PROCEDURE — 45380 PR COLONOSCOPY,BIOPSY: ICD-10-PCS | Mod: 59,33,, | Performed by: STUDENT IN AN ORGANIZED HEALTH CARE EDUCATION/TRAINING PROGRAM

## 2022-12-19 PROCEDURE — 45385 PR COLONOSCOPY,REMV LESN,SNARE: ICD-10-PCS | Mod: 33,,, | Performed by: STUDENT IN AN ORGANIZED HEALTH CARE EDUCATION/TRAINING PROGRAM

## 2022-12-19 PROCEDURE — 88305 TISSUE EXAM BY PATHOLOGIST: ICD-10-PCS | Mod: 26,,, | Performed by: PATHOLOGY

## 2022-12-19 PROCEDURE — 45385 COLONOSCOPY W/LESION REMOVAL: CPT | Mod: PT | Performed by: STUDENT IN AN ORGANIZED HEALTH CARE EDUCATION/TRAINING PROGRAM

## 2022-12-19 PROCEDURE — 25000003 PHARM REV CODE 250: Performed by: INTERNAL MEDICINE

## 2022-12-19 PROCEDURE — 45380 COLONOSCOPY AND BIOPSY: CPT | Mod: 59,33,, | Performed by: STUDENT IN AN ORGANIZED HEALTH CARE EDUCATION/TRAINING PROGRAM

## 2022-12-19 PROCEDURE — 27201012 HC FORCEPS, HOT/COLD, DISP: Performed by: STUDENT IN AN ORGANIZED HEALTH CARE EDUCATION/TRAINING PROGRAM

## 2022-12-19 PROCEDURE — 27201089 HC SNARE, DISP (ANY): Performed by: STUDENT IN AN ORGANIZED HEALTH CARE EDUCATION/TRAINING PROGRAM

## 2022-12-19 PROCEDURE — 88305 TISSUE EXAM BY PATHOLOGIST: CPT | Mod: 26,,, | Performed by: PATHOLOGY

## 2022-12-19 PROCEDURE — 45380 COLONOSCOPY AND BIOPSY: CPT | Mod: 59,PT | Performed by: STUDENT IN AN ORGANIZED HEALTH CARE EDUCATION/TRAINING PROGRAM

## 2022-12-19 PROCEDURE — 88305 TISSUE EXAM BY PATHOLOGIST: CPT | Performed by: PATHOLOGY

## 2022-12-19 RX ORDER — SODIUM CHLORIDE 9 MG/ML
INJECTION, SOLUTION INTRAVENOUS CONTINUOUS
Status: DISCONTINUED | OUTPATIENT
Start: 2022-12-19 | End: 2022-12-19 | Stop reason: HOSPADM

## 2022-12-19 RX ADMIN — SODIUM CHLORIDE: 9 INJECTION, SOLUTION INTRAVENOUS at 03:12

## 2022-12-19 NOTE — H&P
Short Stay Endoscopy History and Physical    PCP - Charles Hanna DO  Referring Physician - Charles Hanna DO  [address] [facility]  [place] [id number]    Procedure - Colonoscopy  ASA - per anesthesia  Mallampati - per anesthesia  History of Anesthesia problems - no  Family history Anesthesia problems -  no   Plan of anesthesia - General    HPI  72 y.o. male  Reason for procedure:   Encounter for colorectal cancer screening [Z12.11, Z12.12]        ROS:  Constitutional: No fevers, chills, No weight loss  CV: No chest pain  Pulm: No cough, No shortness of breath  GI: see HPI    Medical History:  has a past medical history of Colon polyp (06/22/2007), Diabetes mellitus type II, MRSA infection, Hypertension, MRSA infection (2002), and Supraventricular tachycardia.    Surgical History:  has a past surgical history that includes none; Cardiac catheterization; Ablation (N/A, 4/12/2021); and Excision of ganglion of wrist (Left, 12/17/2021).    Family History: family history includes Diabetes in his father and sister; Heart disease in his father and mother..    Social History:  reports that he has never smoked. He has never used smokeless tobacco. He reports current alcohol use of about 2.0 standard drinks per week. He reports that he does not use drugs.    Review of patient's allergies indicates:  No Known Allergies    Medications:   No medications prior to admission.       Physical Exam:    Vital Signs: There were no vitals filed for this visit.    General Appearance: Well appearing in no acute distress  Abdomen: Soft, non tender, non distended with normal bowel sounds, no masses    Labs:  Lab Results   Component Value Date    WBC 7.91 11/05/2022    HGB 15.7 11/05/2022    HCT 46.9 11/05/2022     (L) 11/05/2022    CHOL 125 11/05/2022    TRIG 123 11/05/2022    HDL 38 (L) 11/05/2022    ALT 22 11/05/2022    AST 15 11/05/2022     11/05/2022    K 4.9 11/05/2022     (H) 11/05/2022    CREATININE 1.2  11/05/2022    BUN 17 11/05/2022    CO2 23 11/05/2022    TSH 2.901 11/05/2022    PSA 0.42 11/12/2021    INR 1.0 04/09/2021    HGBA1C 9.5 (H) 11/05/2022       I have explained the risks and benefits of this endoscopic procedure to the patient including but not limited to bleeding, inflammation, infection, perforation, and death.      Jimmie Macias MD

## 2022-12-19 NOTE — PR[employer]ATION PATIENT INSTRUCTIONS
Discharge Summary/Instructions after an Endoscopic Procedure  Patient Name: Patria Nieto  Patient MRN: [id number]  Patient YOB: 1950 Monday, December 19, 2022  Jimmie Macias MD  Dear patient,  As a result of recent federal legislation (The Federal Cures Act), you may   receive lab or pathology results from your procedure in your [facility]   account before your physician is able to contact you. Your physician or   their representative will relay the results to you with their   recommendations at their soonest availability.  Thank you,  RESTRICTIONS:  During your procedure today, you received medications for sedation.  These   medications may affect your judgment, balance and coordination.  Therefore,   for 24 hours, you have the following restrictions:   - DO NOT drive a car, operate machinery, make legal/financial decisions,   sign important papers or drink alcohol.    ACTIVITY:  Today: no heavy lifting, straining or running due to procedural   sedation/anesthesia.  The following day: return to full activity including work.  DIET:  Eat and drink normally unless instructed otherwise.     TREATMENT FOR COMMON SIDE EFFECTS:  - Mild abdominal pain, nausea, belching, bloating or excessive gas:  rest,   eat lightly and use a heating pad.  - Sore Throat: treat with throat lozenges and/or gargle with warm salt   water.  - Because air was used during the procedure, expelling large amounts of air   from your rectum or belching is normal.  - If a bowel prep was taken, you may not have a bowel movement for 1-3 days.    This is normal.  SYMPTOMS TO WATCH FOR AND REPORT TO YOUR PHYSICIAN:  1. Abdominal pain or bloating, other than gas cramps.  2. Chest pain.  3. Back pain.  4. Signs of infection such as: chills or fever occurring within 24 hours   after the procedure.  5. Rectal bleeding, which would show as bright red, maroon, or black stools.   (A tablespoon of blood from the rectum is not serious, especially  if   hemorrhoids are present.)  6. Vomiting.  7. Weakness or dizziness.  GO DIRECTLY TO THE NEAREST EMERGENCY ROOM IF YOU HAVE ANY OF THE FOLLOWING:      Difficulty breathing              Chills and/or fever over 101 F   Persistent vomiting and/or vomiting blood   Severe abdominal pain   Severe chest pain   Black, tarry stools   Bleeding- more than one tablespoon   Any other symptom or condition that you feel may need urgent attention  Your doctor recommends these additional instructions:  If any biopsies were taken, your doctors clinic will contact you in 1 to 2   weeks with any results.  - Repeat colonoscopy in 2 years for surveillance.   - Discharge patient to home.   - Await pathology results.   - The findings and recommendations were discussed with the patient.  For questions, problems or results please call your physician - Jimmie Macias MD at Work:  [phone].  [facility] [place], EMERGENCY ROOM PHONE NUMBER: [phone]  IF A COMPLICATION OR EMERGENCY SITUATION ARISES AND YOU ARE UNABLE TO REACH   YOUR PHYSICIAN - GO DIRECTLY TO THE EMERGENCY ROOM.  Jimmie Macias MD  12/19/2022 4:11:16 PM  This report has been verified and signed electronically.  Dear patient,  As a result of recent federal legislation (The Federal Cures Act), you may   receive lab or pathology results from your procedure in your [facility]   account before your physician is able to contact you. Your physician or   their representative will relay the results to you with their   recommendations at their soonest availability.  Thank you,  PROVATION

## 2023-01-02 LAB
FINAL PATHOLOGIC DIAGNOSIS: NORMAL
GROSS: NORMAL
Lab: NORMAL

## 2023-01-31 ENCOUNTER — LAB VISIT (OUTPATIENT)
Dept: LAB | Facility: HOSPITAL | Age: 73
End: 2023-01-31
Attending: NURSE PRACTITIONER
Payer: COMMERCIAL

## 2023-01-31 DIAGNOSIS — Z79.4 UNCONTROLLED TYPE 2 DIABETES MELLITUS WITH HYPERGLYCEMIA, WITH LONG-TERM CURRENT USE OF INSULIN: ICD-10-CM

## 2023-01-31 DIAGNOSIS — E11.65 UNCONTROLLED TYPE 2 DIABETES MELLITUS WITH HYPERGLYCEMIA, WITH LONG-TERM CURRENT USE OF INSULIN: ICD-10-CM

## 2023-01-31 LAB
ALBUMIN SERPL BCP-MCNC: 4.2 G/DL (ref 3.5–5.2)
ALP SERPL-CCNC: 65 U/L (ref 55–135)
ALT SERPL W/O P-5'-P-CCNC: 32 U/L (ref 10–44)
ANION GAP SERPL CALC-SCNC: 10 MMOL/L (ref 8–16)
AST SERPL-CCNC: 18 U/L (ref 10–40)
BILIRUB SERPL-MCNC: 2.3 MG/DL (ref 0.1–1)
BUN SERPL-MCNC: 13 MG/DL (ref 8–23)
CALCIUM SERPL-MCNC: 10.3 MG/DL (ref 8.7–10.5)
CHLORIDE SERPL-SCNC: 109 MMOL/L (ref 95–110)
CO2 SERPL-SCNC: 24 MMOL/L (ref 23–29)
CREAT SERPL-MCNC: 1.1 MG/DL (ref 0.5–1.4)
EST. GFR  (NO RACE VARIABLE): >60 ML/MIN/1.73 M^2
ESTIMATED AVG GLUCOSE: 197 MG/DL (ref 68–131)
GLUCOSE SERPL-MCNC: 102 MG/DL (ref 70–110)
HBA1C MFR BLD: 8.5 % (ref 4–5.6)
POTASSIUM SERPL-SCNC: 4.1 MMOL/L (ref 3.5–5.1)
PROT SERPL-MCNC: 7.2 G/DL (ref 6–8.4)
SODIUM SERPL-SCNC: 143 MMOL/L (ref 136–145)

## 2023-01-31 PROCEDURE — 80053 COMPREHEN METABOLIC PANEL: CPT | Performed by: NURSE PRACTITIONER

## 2023-01-31 PROCEDURE — 83036 HEMOGLOBIN GLYCOSYLATED A1C: CPT | Performed by: NURSE PRACTITIONER

## 2023-01-31 PROCEDURE — 36415 COLL VENOUS BLD VENIPUNCTURE: CPT | Mod: PO | Performed by: NURSE PRACTITIONER

## 2023-02-01 NOTE — PROGRESS NOTES
"Subjective:      Patient ID: Patria Nieto is a 72 y.o. male.    Chief Complaint:  No chief complaint on file.    History of Present Illness  Patria Nieto is here for follow up of DM.  Previously seen by me 11/2022.    Illness, injury, steroids: Yes - had COVID since LOV (end of 2022)    With regards to diabetes:    Diagnosed: 2003  DE: 2018  FH: "yes"   Known complications:  DKA Denies   RN Denies  Eye Exam: > 1 year ago but has an upcoming appointment   PN Yes - "comes and goes"   Podiatry: 2020  Nephropathy Denies  CAD Denies  Denies history of pancreatitis & personal/family history of medullary thyroid cancer.     Current regimen:  Metformin 1000mg twice daily  Mounjaro 5mg weekly - Monday (started 1 month ago)   Toujeo 55 units daily  Novolog 20 units plus correction scale three times a day - taking AM dose without food skips B  Add correction scale if needed.  Blood sugar 150 to 200 add 2 units  Blood sugar 201 to 250 add 4 units  Blood sugar 251 to 300 add 6 units  Blood sugar 301 to 350 add 8 units  Blood sugar greater than 350 add 10 units    Discussed SGLT2i - im concerned about the skin infections so will hold off.     Reports compliance.  Also giving extra in between for hyperglycemia.     Other medications tried:  NONE     Glucose Monitor:   4-5 times a day testing  Log reviewed: oral recall   Fasting: > 200-300  Before lunch: 160-200  Before dinner: 160-200  Before bed: 150-160    Hypoglycemia:  Yes - took Novolog and didn't eat.    Knows how to correct with 15 grams of carbs- juice, coke, or a peppermint.     Diet/Exercise:  Eats 2-3 meals a day.   Snacks : Denies  Drinks : coffee (1 cup in AM with half and half), OJ, water, diet soft drinks.   Exercise - tries to stay active - exercises twice a week.     Diabetes Management Status    Hemoglobin A1C   Date Value Ref Range Status   01/31/2023 8.5 (H) 4.0 - 5.6 % Final     Comment:     ADA Screening Guidelines:  5.7-6.4%  Consistent with " "prediabetes  >or=6.5%  Consistent with diabetes    High levels of fetal hemoglobin interfere with the HbA1C  assay. Heterozygous hemoglobin variants (HbS, HgC, etc)do  not significantly interfere with this assay.   However, presence of multiple variants may affect accuracy.     11/05/2022 9.5 (H) 4.0 - 5.6 % Final     Comment:     ADA Screening Guidelines:  5.7-6.4%  Consistent with prediabetes  >or=6.5%  Consistent with diabetes    High levels of fetal hemoglobin interfere with the HbA1C  assay. Heterozygous hemoglobin variants (HbS, HgC, etc)do  not significantly interfere with this assay.   However, presence of multiple variants may affect accuracy.     03/08/2022 8.9 (H) 4.0 - 5.6 % Final     Comment:     ADA Screening Guidelines:  5.7-6.4%  Consistent with prediabetes  >or=6.5%  Consistent with diabetes    High levels of fetal hemoglobin interfere with the HbA1C  assay. Heterozygous hemoglobin variants (HbS, HgC, etc)do  not significantly interfere with this assay.   However, presence of multiple variants may affect accuracy.         Statin: Taking  ACE/ARB: Taking  Screening or Prevention Patient's value Goal Complete/Controlled?   HgA1C Testing and Control   Lab Results   Component Value Date    HGBA1C 8.5 (H) 01/31/2023      Annually/Less than 8% No   Lipid profile : 11/05/2022 Annually Yes   LDL control Lab Results   Component Value Date    LDLCALC 62.4 (L) 11/05/2022    Annually/Less than 100 mg/dl  Yes   Nephropathy screening Lab Results   Component Value Date    LABMICR 6.0 11/05/2022     Lab Results   Component Value Date    PROTEINUA NEG 09/09/2011    Annually Yes   Blood pressure BP Readings from Last 1 Encounters:   02/07/23 127/85    Less than 140/90 Yes   Dilated retinal exam : 08/02/2022 Annually Yes   Foot exam   : 12/02/2020 Annually No     Reports history of "abscess" on buttocks or abdomen. Has seen a provider in the past. He is self-treating.   Concern for yeast infections as well.    Review " "of Systems   Constitutional:  Negative for fatigue.   Eyes:  Negative for visual disturbance.   Respiratory:  Negative for shortness of breath.    Cardiovascular:  Negative for chest pain.   Gastrointestinal:  Negative for abdominal pain.   Musculoskeletal:  Negative for arthralgias.   Skin:  Negative for wound.   Neurological:  Negative for headaches.     Objective:   Physical Exam  Vitals reviewed.   Cardiovascular:      Rate and Rhythm: Normal rate.      Comments: No edema present  Pulmonary:      Effort: Pulmonary effort is normal.   Abdominal:      Palpations: Abdomen is soft.     Injection sites are without edema or erythema. No lipo hypertropthy or atrophy.    Visit Vitals  /85   Pulse 84   Ht 5' 7" (1.702 m)   Wt 91 kg (200 lb 11.7 oz)   SpO2 97%   BMI 31.44 kg/m²           Body mass index is 31.44 kg/m².    Lab Review:   Lab Results   Component Value Date    HGBA1C 8.5 (H) 01/31/2023    HGBA1C 9.5 (H) 11/05/2022    HGBA1C 8.9 (H) 03/08/2022       Lab Results   Component Value Date    CHOL 125 11/05/2022    HDL 38 (L) 11/05/2022    LDLCALC 62.4 (L) 11/05/2022    TRIG 123 11/05/2022    CHOLHDL 30.4 11/05/2022     Lab Results   Component Value Date     01/31/2023    K 4.1 01/31/2023     01/31/2023    CO2 24 01/31/2023     01/31/2023    BUN 13 01/31/2023    CREATININE 1.1 01/31/2023    CALCIUM 10.3 01/31/2023    PROT 7.2 01/31/2023    ALBUMIN 4.2 01/31/2023    BILITOT 2.3 (H) 01/31/2023    ALKPHOS 65 01/31/2023    AST 18 01/31/2023    ALT 32 01/31/2023    ANIONGAP 10 01/31/2023    ESTGFRAFRICA >60.0 11/12/2021    EGFRNONAA >60.0 11/12/2021    TSH 2.901 11/05/2022     No results found for: [id number]  Assessment and Plan     1. Uncontrolled type 2 diabetes mellitus with hyperglycemia, with long-term current use of insulin  Ambulatory referral/consult to Diabetes Education    tirzepatide (MOUNJARO) 7.5 mg/0.5 mL Gamal    insulin glargine, TOUJEO, (TOUJEO) 300 unit/mL (1.5 mL) InPn pen    " Hemoglobin A1C    Comprehensive Metabolic Panel      2. Essential hypertension        3. Hyperlipidemia LDL goal <100        4. Type 2 diabetes mellitus with hyperglycemia, with long-term current use of insulin  insulin glargine, TOUJEO, (TOUJEO) 300 unit/mL (1.5 mL) InPn pen          Uncontrolled type 2 diabetes mellitus with hyperglycemia, with long-term current use of insulin  -- Labs prior to follow up.  -- A1c goal <7.5%.  -- Medications discussed:  MFM   GLP1-DPP4   OSORIO   SGLT2   Reviewed potential adverse effects of SGLT-2 inhibitors, including genital mycotic infections, slightly increased risk of UTI, hypersensitivity, hypotension, and hyperkalemia. Advised to maintain water intake of 8-10 cups per day. Advised we need to check chemistry panel at baseline and 2 weeks after starting. Discussed FDA warning reports of ketoacidosis associated with SGLT-2 inhibitors. Advised to seek immediate medical attention and stop the medication if symptoms such as difficulty breathing, nausea, vomiting, abdominal pain, confusion, and unusual fatigue/sleepiness. Discussed possible precipitating factors including major illness/reduced food and fluid intake (advised to stop under these circumstances), and reduced insulin dose. Discussed possible effects of increased fracture risk/decreased bone density. Discussed reports of increased risk of leg and foot amputations with canagliflozin and need to seek urgent care if developed new pain or tenderness, sores or ulcers, or infections in legs/feet.   Insulin   -- Reviewed logs/CGM:  Fasting hyperglycemia.  Instructed to send glucose logs in 14 days.  Reach out to me sooner for any glucose <70 or consistently >180.  -- Discussed proper timing of insulin administration.  -- DE for Dexcom training.   -- Medication Changes:   Metformin 1000mg twice daily  Mounjaro 5mg weekly - Monday (3 more pens) THEN increase to Mounjaro 7.5mg weekly   Toujeo 64 units daily  Novolog 20 units plus  correction scale before meals only - give 1/2 dose if only having AM coffee  Blood sugar 150 to 200 add 2 units  Blood sugar 201 to 250 add 4 units  Blood sugar 251 to 300 add 6 units  Blood sugar 301 to 350 add 8 units  Blood sugar greater than 350 add 10 units    Discussed SGLT2i - im concerned about the skin infections so will hold off.        -- Reviewed goals of therapy are to get the best control we can without hypoglycemia.  -- Reviewed patient's current insulin regimen. Clarified proper insulin dose and timing in relation to meals, etc. Insulin injection sites and proper rotation instructed.    -- Advised frequent self blood glucose monitoring.  Patient encouraged to document glucose results and bring them to every clinic visit.  -- Hypoglycemia precautions discussed. Instructed on precautions before driving.    -- Call for Bg repeatedly < 90 or > 180.   -- Close adherence to lifestyle changes recommended.   -- Periodic follow ups for eye evaluations, foot care and dental care suggested.    Essential hypertension  -- On ACEI.  -- Controlled.  -- Blood pressure goals discussed with patient.    Hyperlipidemia LDL goal <100  -- Controlled.  -- On statin per ADA recommendations.        Follow up in about 4 months (around 6/7/2023).

## 2023-02-02 ENCOUNTER — OFFICE VISIT (OUTPATIENT)
Dept: ORTHOPEDICS | Facility: CLINIC | Age: 73
End: 2023-02-02
Payer: COMMERCIAL

## 2023-02-02 VITALS — WEIGHT: 197 LBS | BODY MASS INDEX: 30.92 KG/M2 | HEIGHT: 67 IN

## 2023-02-02 DIAGNOSIS — M65.332 TRIGGER MIDDLE FINGER OF LEFT HAND: ICD-10-CM

## 2023-02-02 PROCEDURE — 3008F BODY MASS INDEX DOCD: CPT | Mod: CPTII,S$GLB,, | Performed by: ORTHOPAEDIC SURGERY

## 2023-02-02 PROCEDURE — 4010F ACE/ARB THERAPY RXD/TAKEN: CPT | Mod: CPTII,S$GLB,, | Performed by: ORTHOPAEDIC SURGERY

## 2023-02-02 PROCEDURE — 1125F AMNT PAIN NOTED PAIN PRSNT: CPT | Mod: CPTII,S$GLB,, | Performed by: ORTHOPAEDIC SURGERY

## 2023-02-02 PROCEDURE — 4010F PR ACE/ARB THEARPY RXD/TAKEN: ICD-10-PCS | Mod: CPTII,S$GLB,, | Performed by: ORTHOPAEDIC SURGERY

## 2023-02-02 PROCEDURE — 1125F PR PAIN SEVERITY QUANTIFIED, PAIN PRESENT: ICD-10-PCS | Mod: CPTII,S$GLB,, | Performed by: ORTHOPAEDIC SURGERY

## 2023-02-02 PROCEDURE — 99213 OFFICE O/P EST LOW 20 MIN: CPT | Mod: 25,S$GLB,, | Performed by: ORTHOPAEDIC SURGERY

## 2023-02-02 PROCEDURE — 1101F PT FALLS ASSESS-DOCD LE1/YR: CPT | Mod: CPTII,S$GLB,, | Performed by: ORTHOPAEDIC SURGERY

## 2023-02-02 PROCEDURE — 20550 PR INJECT TENDON SHEATH/LIGAMENT: ICD-10-PCS | Mod: LT,S$GLB,, | Performed by: ORTHOPAEDIC SURGERY

## 2023-02-02 PROCEDURE — 1159F MED LIST DOCD IN RCRD: CPT | Mod: CPTII,S$GLB,, | Performed by: ORTHOPAEDIC SURGERY

## 2023-02-02 PROCEDURE — 3052F HG A1C>EQUAL 8.0%<EQUAL 9.0%: CPT | Mod: CPTII,S$GLB,, | Performed by: ORTHOPAEDIC SURGERY

## 2023-02-02 PROCEDURE — 3288F PR FALLS RISK ASSESSMENT DOCUMENTED: ICD-10-PCS | Mod: CPTII,S$GLB,, | Performed by: ORTHOPAEDIC SURGERY

## 2023-02-02 PROCEDURE — 99999 PR PBB SHADOW E&M-EST. PATIENT-LVL III: ICD-10-PCS | Mod: PBBFAC,,, | Performed by: ORTHOPAEDIC SURGERY

## 2023-02-02 PROCEDURE — 1101F PR PT FALLS ASSESS DOC 0-1 FALLS W/OUT INJ PAST YR: ICD-10-PCS | Mod: CPTII,S$GLB,, | Performed by: ORTHOPAEDIC SURGERY

## 2023-02-02 PROCEDURE — 20550 NJX 1 TENDON SHEATH/LIGAMENT: CPT | Mod: LT,S$GLB,, | Performed by: ORTHOPAEDIC SURGERY

## 2023-02-02 PROCEDURE — 1159F PR MEDICATION LIST DOCUMENTED IN MEDICAL RECORD: ICD-10-PCS | Mod: CPTII,S$GLB,, | Performed by: ORTHOPAEDIC SURGERY

## 2023-02-02 PROCEDURE — 3288F FALL RISK ASSESSMENT DOCD: CPT | Mod: CPTII,S$GLB,, | Performed by: ORTHOPAEDIC SURGERY

## 2023-02-02 PROCEDURE — 99999 PR PBB SHADOW E&M-EST. PATIENT-LVL III: CPT | Mod: PBBFAC,,, | Performed by: ORTHOPAEDIC SURGERY

## 2023-02-02 PROCEDURE — 3008F PR BODY MASS INDEX (BMI) DOCUMENTED: ICD-10-PCS | Mod: CPTII,S$GLB,, | Performed by: ORTHOPAEDIC SURGERY

## 2023-02-02 PROCEDURE — 99213 PR OFFICE/OUTPT VISIT, EST, LEVL III, 20-29 MIN: ICD-10-PCS | Mod: 25,S$GLB,, | Performed by: ORTHOPAEDIC SURGERY

## 2023-02-02 PROCEDURE — 3052F PR MOST RECENT HEMOGLOBIN A1C LEVEL 8.0 - < 9.0%: ICD-10-PCS | Mod: CPTII,S$GLB,, | Performed by: ORTHOPAEDIC SURGERY

## 2023-02-02 RX ORDER — TRIAMCINOLONE ACETONIDE 40 MG/ML
20 INJECTION, SUSPENSION INTRA-ARTICULAR; INTRAMUSCULAR
Status: COMPLETED | OUTPATIENT
Start: 2023-02-02 | End: 2023-02-02

## 2023-02-02 RX ADMIN — TRIAMCINOLONE ACETONIDE 20 MG: 40 INJECTION, SUSPENSION INTRA-ARTICULAR; INTRAMUSCULAR at 08:02

## 2023-02-02 NOTE — PROGRESS NOTES
Subjective:      Patient ID: Patria Nieto is a 72 y.o. male.  Chief Complaint: Consult of the Left Hand      HPI  Patria Nieto is a  72 y.o. male presenting today for follow up of painful locking of the left middle finger.  He reports that he is having ongoing symptoms in the left hand   No numbness or tingling is reported.    Review of patient's allergies indicates:  No Known Allergies      Current Outpatient Medications   Medication Sig Dispense Refill    aspirin 81 MG Chew Take 81 mg by mouth once daily.      atorvastatin (LIPITOR) 40 MG tablet Take 1 tablet (40 mg total) by mouth every evening. 90 tablet 0    blood-glucose sensor (DEXCOM G6 SENSOR) Nikki 3 each by Misc.(Non-Drug; Combo Route) route continuous prn. Change every 10 days. 3 each PRN    blood-glucose transmitter (DEXCOM G6 TRANSMITTER) Nikki 1 each by Misc.(Non-Drug; Combo Route) route continuous prn. 1 each PRN    cyanocobalamin (VITAMIN B-12) 1000 MCG tablet Take 1 tablet (1,000 mcg total) by mouth once daily. 90 tablet 5    glucose 4 GM chewable tablet Take 4 tablets (16 g total) by mouth as needed for Low blood sugar (If having symptoms of blurry vision, palpitations, confusion, shakiness.  Please check sugars and if sugar below 70 please take 4 tablets and re-check sugar everry 15 minutes until sugars are above 70 and symptoms resolve.). 50 tablet 12    insulin aspart U-100 (NOVOLOG FLEXPEN U-100 INSULIN) 100 unit/mL (3 mL) InPn pen Inject 20 Units into the skin 3 (three) times daily with meals. WITH CORRECTION SCALE (MAX 90 UNITS PER DAY) 90 mL 1    insulin glargine, TOUJEO, (TOUJEO) 300 unit/mL (1.5 mL) InPn pen Inject 56 Units into the skin once daily. Adjust to max 70 units per MD instructions 9 pen 3    metFORMIN (GLUCOPHAGE) 1000 MG tablet Take 1 tablet (1,000 mg total) by mouth 2 (two) times daily with meals. 180 tablet 0    metoprolol succinate (TOPROL-XL) 50 MG 24 hr tablet Take 1 tablet (50 mg total) by mouth once daily. 90 tablet 2     "mupirocin (BACTROBAN) 2 % ointment Apply topically 3 (three) times daily. 30 g 0    pen needle, diabetic (BD ULTRA-FINE MINI PEN NEEDLE) 31 gauge x 3/16" Ndle Uses 5 times a day. 200 each 6    ramipriL (ALTACE) 5 MG capsule Take 1 capsule (5 mg total) by mouth once daily. 90 capsule 0    tirzepatide (MOUNJARO) 5 mg/0.5 mL PnIj Inject 5 mg into the skin every 7 days. 4 pen 0     No current facility-administered medications for this visit.     Facility-Administered Medications Ordered in Other Visits   Medication Dose Route Frequency Provider Last Rate Last Admin    sodium chloride 0.9% bolus 1,000 mL  1,000 mL Intravenous Once Lindsay Ruelas NP           Past Medical History:   Diagnosis Date    Colon polyp 06/22/2007    Diabetes mellitus type II     Hx MRSA infection     Hypertension     MRSA infection 2002    Supraventricular tachycardia        Past Surgical History:   Procedure Laterality Date    ABLATION N/A 4/12/2021    Procedure: Ablation;  Surgeon: Geovanni Chowdary MD;  Location: [facility] EP LAB;  Service: Cardiology;  Laterality: N/A;  SVT, RFA, LISA, anes, DM, 3prep    CARDIAC CATHETERIZATION      COLONOSCOPY N/A 12/19/2022    Procedure: COLONOSCOPY;  Surgeon: Jimmie Macias MD;  Location: [facility] ENDO ([address] [place] [address]);  Service: Endoscopy;  Laterality: N/A;  PATIENT REQUEST MAIN [place] and NO SEDATION  Dr. Macias pt but cannot put name on the case  instructions via portal - sm    EXCISION OF GANGLION OF WRIST Left 12/17/2021    Procedure: EXCISION, GANGLION CYST, WRIST;  Surgeon: Micheal Scanlon Jr., MD;  Location: [facility] OR;  Service: Orthopedics;  Laterality: Left;    none         OBJECTIVE:   PHYSICAL EXAM:  Height: 5' 7" (170.2 cm) Weight: 89.4 kg (197 lb)  Vitals:    02/02/23 0835   Weight: 89.4 kg (197 lb)   Height: 5' 7" (1.702 m)   PainSc:   5     Ortho/SPM Exam  Examination left hand there is tenderness flexor tendon sheath left middle finger mild triggering  strength decreased   Sensation intact Tinel " sign negative       RADIOGRAPHS:  None  Comments: I have personally reviewed the imaging and I agree with the above radiologist's report.    ASSESSMENT/PLAN:     IMPRESSION:  Triggering left middle finger    PLAN:  Explained the nature of the problem to the patient   Discussed options including injection versus surgery   Patient would like injection  After pause for time-out identified the left middle finger injected flexor tendon sheath combination Kenalog 20 mg 0.5 cc xylocaine sterile technique  Tolerated the procedure well without complication       FOLLOW UP:  4-6 weeks or as needed    Disclaimer: This note has been generated using voice-recognition software. There may be typographical errors that have been missed during proof-reading.

## 2023-02-07 ENCOUNTER — OFFICE VISIT (OUTPATIENT)
Dept: ENDOCRINOLOGY | Facility: CLINIC | Age: 73
End: 2023-02-07
Attending: INTERNAL MEDICINE
Payer: COMMERCIAL

## 2023-02-07 VITALS
HEIGHT: 67 IN | BODY MASS INDEX: 31.51 KG/M2 | OXYGEN SATURATION: 97 % | HEART RATE: 84 BPM | DIASTOLIC BLOOD PRESSURE: 85 MMHG | SYSTOLIC BLOOD PRESSURE: 127 MMHG | WEIGHT: 200.75 LBS

## 2023-02-07 DIAGNOSIS — Z79.4 TYPE 2 DIABETES MELLITUS WITH HYPERGLYCEMIA, WITH LONG-TERM CURRENT USE OF INSULIN: ICD-10-CM

## 2023-02-07 DIAGNOSIS — E11.65 TYPE 2 DIABETES MELLITUS WITH HYPERGLYCEMIA, WITH LONG-TERM CURRENT USE OF INSULIN: ICD-10-CM

## 2023-02-07 DIAGNOSIS — Z79.4 UNCONTROLLED TYPE 2 DIABETES MELLITUS WITH HYPERGLYCEMIA, WITH LONG-TERM CURRENT USE OF INSULIN: Primary | ICD-10-CM

## 2023-02-07 DIAGNOSIS — E78.5 HYPERLIPIDEMIA LDL GOAL <100: ICD-10-CM

## 2023-02-07 DIAGNOSIS — I10 ESSENTIAL HYPERTENSION: ICD-10-CM

## 2023-02-07 DIAGNOSIS — E11.65 UNCONTROLLED TYPE 2 DIABETES MELLITUS WITH HYPERGLYCEMIA, WITH LONG-TERM CURRENT USE OF INSULIN: Primary | ICD-10-CM

## 2023-02-07 PROCEDURE — 3074F PR MOST RECENT SYSTOLIC BLOOD PRESSURE < 130 MM HG: ICD-10-PCS | Mod: CPTII,S$GLB,, | Performed by: NURSE PRACTITIONER

## 2023-02-07 PROCEDURE — 1101F PT FALLS ASSESS-DOCD LE1/YR: CPT | Mod: CPTII,S$GLB,, | Performed by: NURSE PRACTITIONER

## 2023-02-07 PROCEDURE — 3074F SYST BP LT 130 MM HG: CPT | Mod: CPTII,S$GLB,, | Performed by: NURSE PRACTITIONER

## 2023-02-07 PROCEDURE — 1159F MED LIST DOCD IN RCRD: CPT | Mod: CPTII,S$GLB,, | Performed by: NURSE PRACTITIONER

## 2023-02-07 PROCEDURE — 99999 PR PBB SHADOW E&M-EST. PATIENT-LVL V: CPT | Mod: PBBFAC,,, | Performed by: NURSE PRACTITIONER

## 2023-02-07 PROCEDURE — 1159F PR MEDICATION LIST DOCUMENTED IN MEDICAL RECORD: ICD-10-PCS | Mod: CPTII,S$GLB,, | Performed by: NURSE PRACTITIONER

## 2023-02-07 PROCEDURE — 3008F PR BODY MASS INDEX (BMI) DOCUMENTED: ICD-10-PCS | Mod: CPTII,S$GLB,, | Performed by: NURSE PRACTITIONER

## 2023-02-07 PROCEDURE — 4010F ACE/ARB THERAPY RXD/TAKEN: CPT | Mod: CPTII,S$GLB,, | Performed by: NURSE PRACTITIONER

## 2023-02-07 PROCEDURE — 99214 OFFICE O/P EST MOD 30 MIN: CPT | Mod: S$GLB,,, | Performed by: NURSE PRACTITIONER

## 2023-02-07 PROCEDURE — 99214 PR OFFICE/OUTPT VISIT, EST, LEVL IV, 30-39 MIN: ICD-10-PCS | Mod: S$GLB,,, | Performed by: NURSE PRACTITIONER

## 2023-02-07 PROCEDURE — 1126F AMNT PAIN NOTED NONE PRSNT: CPT | Mod: CPTII,S$GLB,, | Performed by: NURSE PRACTITIONER

## 2023-02-07 PROCEDURE — 3288F PR FALLS RISK ASSESSMENT DOCUMENTED: ICD-10-PCS | Mod: CPTII,S$GLB,, | Performed by: NURSE PRACTITIONER

## 2023-02-07 PROCEDURE — 4010F PR ACE/ARB THEARPY RXD/TAKEN: ICD-10-PCS | Mod: CPTII,S$GLB,, | Performed by: NURSE PRACTITIONER

## 2023-02-07 PROCEDURE — 3052F PR MOST RECENT HEMOGLOBIN A1C LEVEL 8.0 - < 9.0%: ICD-10-PCS | Mod: CPTII,S$GLB,, | Performed by: NURSE PRACTITIONER

## 2023-02-07 PROCEDURE — 3079F PR MOST RECENT DIASTOLIC BLOOD PRESSURE 80-89 MM HG: ICD-10-PCS | Mod: CPTII,S$GLB,, | Performed by: NURSE PRACTITIONER

## 2023-02-07 PROCEDURE — 3052F HG A1C>EQUAL 8.0%<EQUAL 9.0%: CPT | Mod: CPTII,S$GLB,, | Performed by: NURSE PRACTITIONER

## 2023-02-07 PROCEDURE — 3008F BODY MASS INDEX DOCD: CPT | Mod: CPTII,S$GLB,, | Performed by: NURSE PRACTITIONER

## 2023-02-07 PROCEDURE — 1126F PR PAIN SEVERITY QUANTIFIED, NO PAIN PRESENT: ICD-10-PCS | Mod: CPTII,S$GLB,, | Performed by: NURSE PRACTITIONER

## 2023-02-07 PROCEDURE — 1160F PR REVIEW ALL MEDS BY PRESCRIBER/CLIN PHARMACIST DOCUMENTED: ICD-10-PCS | Mod: CPTII,S$GLB,, | Performed by: NURSE PRACTITIONER

## 2023-02-07 PROCEDURE — 99999 PR PBB SHADOW E&M-EST. PATIENT-LVL V: ICD-10-PCS | Mod: PBBFAC,,, | Performed by: NURSE PRACTITIONER

## 2023-02-07 PROCEDURE — 3288F FALL RISK ASSESSMENT DOCD: CPT | Mod: CPTII,S$GLB,, | Performed by: NURSE PRACTITIONER

## 2023-02-07 PROCEDURE — 3079F DIAST BP 80-89 MM HG: CPT | Mod: CPTII,S$GLB,, | Performed by: NURSE PRACTITIONER

## 2023-02-07 PROCEDURE — 1101F PR PT FALLS ASSESS DOC 0-1 FALLS W/OUT INJ PAST YR: ICD-10-PCS | Mod: CPTII,S$GLB,, | Performed by: NURSE PRACTITIONER

## 2023-02-07 PROCEDURE — 1160F RVW MEDS BY RX/DR IN RCRD: CPT | Mod: CPTII,S$GLB,, | Performed by: NURSE PRACTITIONER

## 2023-02-07 RX ORDER — TIRZEPATIDE 7.5 MG/.5ML
7.5 INJECTION, SOLUTION SUBCUTANEOUS
Qty: 4 PEN | Refills: 0 | Status: SHIPPED | OUTPATIENT
Start: 2023-02-07 | End: 2023-03-19 | Stop reason: SDUPTHER

## 2023-02-07 RX ORDER — INSULIN GLARGINE 300 [IU]/ML
64 INJECTION, SOLUTION SUBCUTANEOUS DAILY
Qty: 9 PEN | Refills: 3 | Status: SHIPPED | OUTPATIENT
Start: 2023-02-07 | End: 2023-06-07

## 2023-02-07 NOTE — ASSESSMENT & PLAN NOTE
-- Labs prior to follow up.  -- A1c goal <7.5%.  -- Medications discussed:  MFM   GLP1-DPP4   OSORIO   SGLT2   Reviewed potential adverse effects of SGLT-2 inhibitors, including genital mycotic infections, slightly increased risk of UTI, hypersensitivity, hypotension, and hyperkalemia. Advised to maintain water intake of 8-10 cups per day. Advised we need to check chemistry panel at baseline and 2 weeks after starting. Discussed FDA warning reports of ketoacidosis associated with SGLT-2 inhibitors. Advised to seek immediate medical attention and stop the medication if symptoms such as difficulty breathing, nausea, vomiting, abdominal pain, confusion, and unusual fatigue/sleepiness. Discussed possible precipitating factors including major illness/reduced food and fluid intake (advised to stop under these circumstances), and reduced insulin dose. Discussed possible effects of increased fracture risk/decreased bone density. Discussed reports of increased risk of leg and foot amputations with canagliflozin and need to seek urgent care if developed new pain or tenderness, sores or ulcers, or infections in legs/feet.   Insulin   -- Reviewed logs/CGM:  Fasting hyperglycemia.  Instructed to send glucose logs in 14 days.  Reach out to me sooner for any glucose <70 or consistently >180.  -- Discussed proper timing of insulin administration.  -- DE for Dexcom training.   -- Medication Changes:   Metformin 1000mg twice daily  Mounjaro 5mg weekly - Monday (3 more pens) THEN increase to Mounjaro 7.5mg weekly   Toujeo 64 units daily  Novolog 20 units plus correction scale before meals only - give 1/2 dose if only having AM coffee  Blood sugar 150 to 200 add 2 units  Blood sugar 201 to 250 add 4 units  Blood sugar 251 to 300 add 6 units  Blood sugar 301 to 350 add 8 units  Blood sugar greater than 350 add 10 units    Discussed SGLT2i - im concerned about the skin infections so will hold off.        -- Reviewed goals of therapy are to  get the best control we can without hypoglycemia.  -- Reviewed patient's current insulin regimen. Clarified proper insulin dose and timing in relation to meals, etc. Insulin injection sites and proper rotation instructed.    -- Advised frequent self blood glucose monitoring.  Patient encouraged to document glucose results and bring them to every clinic visit.  -- Hypoglycemia precautions discussed. Instructed on precautions before driving.    -- Call for Bg repeatedly < 90 or > 180.   -- Close adherence to lifestyle changes recommended.   -- Periodic follow ups for eye evaluations, foot care and dental care suggested.

## 2023-02-15 ENCOUNTER — CLINICAL SUPPORT (OUTPATIENT)
Dept: DIABETES | Facility: CLINIC | Age: 73
End: 2023-02-15
Payer: COMMERCIAL

## 2023-02-15 DIAGNOSIS — Z79.4 UNCONTROLLED TYPE 2 DIABETES MELLITUS WITH HYPERGLYCEMIA, WITH LONG-TERM CURRENT USE OF INSULIN: ICD-10-CM

## 2023-02-15 DIAGNOSIS — E11.65 UNCONTROLLED TYPE 2 DIABETES MELLITUS WITH HYPERGLYCEMIA, WITH LONG-TERM CURRENT USE OF INSULIN: ICD-10-CM

## 2023-02-15 PROCEDURE — G0108 PR DIAB MANAGE TRN  PER INDIV: ICD-10-PCS | Mod: S$GLB,,, | Performed by: INTERNAL MEDICINE

## 2023-02-15 PROCEDURE — G0108 DIAB MANAGE TRN  PER INDIV: HCPCS | Mod: S$GLB,,, | Performed by: INTERNAL MEDICINE

## 2023-02-15 PROCEDURE — 99999 PR PBB SHADOW E&M-EST. PATIENT-LVL I: ICD-10-PCS | Mod: PBBFAC,,,

## 2023-02-15 PROCEDURE — 99999 PR PBB SHADOW E&M-EST. PATIENT-LVL I: CPT | Mod: PBBFAC,,,

## 2023-02-16 NOTE — PROGRESS NOTES
Diabetes Care Specialist Progress Note  Author: Susan Bishop RD, CDE  Date: 2/16/2023    Program Intake  Reason for Diabetes Program Visit:: Intervention  Type of Intervention:: Individual  Individual: Device Training  Device Training: Personal CGM (Dexcom G6 training)    Current diabetes risk level:: moderate      Lab Results   Component Value Date    HGBA1C 8.5 (H) 01/31/2023         Clinical  Problem Review  Reviewed Problem List with Patient: yes  Active comorbidities affecting diabetes self-care.: no  Reviewed health maintenance: yes    Clinical Assessment  Current Diabetes Treatment: Oral Medication, Insulin, Injectable  Metformin  Mounjaro  Toujeo  Novolog    Have you ever experienced hypoglycemia (low blood sugar)?: yes  In the last month, how often have you experienced low blood sugar?:  (minimal recently)  Are you able to tell when your blood sugar is low?: Yes  How do you treat hypoglycemia (low blood sugar)?: 5-6 pieces of hard candy  Have you ever experienced hyperglycemia (high blood sugar)?: yes  In the last month, how often have you experienced high blood sugar?: once a day    SMBG 4 times daily        Medication Information  How many days a week do you miss your medications?: Never  Medication adherence impacting ability to self-manage diabetes?: No    Labs  Lab Compliance Barriers: No        Nutritional Status  Current nutritional status an area of need that is impacting patient's ability to self-manage diabetes?: No        Additional Social History  Support  Does anyone support you with your diabetes care?:  (pt is primary caregiver)  Who takes you to your medical appointments?: self  Does the current support meet the patient's needs?: Yes  Is Support an area impacting ability to self-manage diabetes?: No    Access to Mass Media & Technology  Does the patient have access to any of the following devices or technologies?: Smart phone, Internet Access  Media or technology needs impacting ability to  "self-manage diabetes?: No    Cognitive/Behavioral Health  Alert and Oriented: Yes  Difficulty Thinking: No  Requires Prompting: No  Requires assistance for routine expression?: No  Cognitive or behavioral barriers impacting ability to self-manage diabetes?: No    Culture/Yazdanism  Culture or Baptist beliefs that may impact ability to access healthcare: No    Communication  Language preference: English  Hearing Problems: No  Vision Problems: No  Communication needs impacting ability to self-manage diabetes?: No    Health Literacy  Preferred Learning Method: Face to Face, Demonstration, Hands On  How often do you need to have someone help you read instructions, pamphlets, or written material from your doctor or pharmacy?: Never  Health literacy needs impacting ability to self-manage diabetes?: No                   Diabetes Self-Management Care Plan:    Today's Diabetes Self-Management Care Plan was developed with Patria's input. Patria has agreed to work toward the following goal(s) to improve his/her overall diabetes control.      Care Plan: Diabetes Management   Updates made since 1/17/2023 12:00 AM        Problem: Blood Glucose Self-Monitoring         Long-Range Goal: Patient will use CGM sensor values and arrows to predict and treat hypo- and hyper- glycemia.    Start Date: 2/16/2023   Expected End Date: 2/29/2024   Priority: High   Barriers: Knowledge deficit   Note:    DEXCOM G6 CGM TRAINING     Patient referred to clinic today for Dexcom G6 continuous glucose sensor system training.  Patient arrived with  fully charged and Dexcom G6 mobile pamela downloaded to phone. Education was provided using "Quick Start Guide" per Dexcom protocol.     Pt will be using his/her SMART PHONE as the primary .  Overview:  5min glucose reading updates, trending arrows, BG graph screens, battery life indicator, Blue Tooth Symbol.  Menus: trend Graph, start sensor, enter BG, events, Alerts, Settings, Shutdown, Stop " Sensor   Settings:                          * Urgent Low: 55 mg/dL                          * Urgent Low Soon: on                          * Low alert: 70 mg/dl repeat 15 min                          * High alert: 250 mg/dl repeat 30 min                          * Rise rate: off                          * Fall Rate: off                          * Signal Loss: on repeat 20 min                          * No Reading: on repeat 20 min                          * Always sound: on                          * Alert Schedule:  (instructed on how to set up alert schedule if desired)     Reviewed additional setting options with patient, including Graph Height and Transmitter ID. Transmitter was paired with .    Reviewed where to find sensor insertion time and sensor expiration date.   Discussed no need to calibrate sensor during the entirety of the 10 day wear. Discussed that pt can calibrate sensor if desired, but at that time she will need to continue calibrating every 12 hours for sensor to remain accurate. Reviewed appropriate calibration techniques.  Reviewed sensor site selection. Site selected and prepped using aseptic technique Inserted to abdomen. Transmitter placed in pod and secured.  Practiced sensor pod/transmitter removal from site, and removal of transmitter from sensor pod.  Patient able to demonstrate without difficulty.  Encouraged to review manual prior to starting another sensor.   Reviewed problem solving aspects of sensor transmission/ variables that can disrupt RF transmission.  range 20 feet, but the first 3 hrs keep within 3 feet of transmitter.  Pt instructed on lag time of interstitial fluid from CBG and was advised to tx hypoglycemia and dose insulin based on SMBG values.  Dexcom technical support contact number given and examples of when to contact them discussed. Pt will download software at home for Dexcom download.   Patient advised to always check glucose with glucometer  should readings do not match symptoms felt (ex. Hypo or hyperglycemia).          UNABLE TO SET UP CLARITY PAMELA TODAY  He does not know his apple ID password to install new apps (had downloaded G6 pamela prior to this visit)    He will find this password before next appt.            Task: Reviewed the importance of self-monitoring blood glucose and keeping logs. Completed 2/16/2023        Task: Instructed on how to self-monitor blood glucose using Dexcom G6 CGM Completed 2/16/2023        Task: Provided patient with blood glucose logs, reviewed appropriate timing and frequency to SMBG, education on parameters on when to notify provider and advised patient to bring logs to all appts with PCP/Endocrinologist/Diabetes Care Specialist. Completed 2/16/2023                Follow Up Plan     F/u next week for log review        Today's care plan and follow up schedule was discussed with patient.  Patria verbalized understanding of the care plan, goals, and agrees to follow up plan.        The patient was encouraged to communicate with his/her health care provider/physician and care team regarding his/her condition(s) and treatment.  I provided the patient with my contact information today and encouraged to contact me via phone or [facility]'s Patient Portal as needed.         Length of Visit   Total Time: 60 Minutes

## 2023-02-23 ENCOUNTER — OFFICE VISIT (OUTPATIENT)
Dept: FAMILY MEDICINE | Facility: CLINIC | Age: 73
End: 2023-02-23
Payer: COMMERCIAL

## 2023-02-23 VITALS
BODY MASS INDEX: 32.35 KG/M2 | HEART RATE: 85 BPM | OXYGEN SATURATION: 97 % | SYSTOLIC BLOOD PRESSURE: 118 MMHG | DIASTOLIC BLOOD PRESSURE: 76 MMHG | WEIGHT: 206.13 LBS | HEIGHT: 67 IN

## 2023-02-23 DIAGNOSIS — Z79.4 TYPE 2 DIABETES MELLITUS WITH DIABETIC POLYNEUROPATHY, WITH LONG-TERM CURRENT USE OF INSULIN: ICD-10-CM

## 2023-02-23 DIAGNOSIS — Z00.00 VISIT FOR WELL MAN HEALTH CHECK: ICD-10-CM

## 2023-02-23 DIAGNOSIS — E78.5 HYPERLIPIDEMIA LDL GOAL <100: ICD-10-CM

## 2023-02-23 DIAGNOSIS — L03.90 CELLULITIS, UNSPECIFIED CELLULITIS SITE: ICD-10-CM

## 2023-02-23 DIAGNOSIS — Z23 NEED FOR VACCINATION AGAINST STREPTOCOCCUS PNEUMONIAE: ICD-10-CM

## 2023-02-23 DIAGNOSIS — E55.9 VITAMIN D DEFICIENCY: ICD-10-CM

## 2023-02-23 DIAGNOSIS — E11.65 UNCONTROLLED TYPE 2 DIABETES MELLITUS WITH HYPERGLYCEMIA, WITH LONG-TERM CURRENT USE OF INSULIN: Primary | ICD-10-CM

## 2023-02-23 DIAGNOSIS — I47.10 PSVT (PAROXYSMAL SUPRAVENTRICULAR TACHYCARDIA): ICD-10-CM

## 2023-02-23 DIAGNOSIS — E53.8 B12 DEFICIENCY: ICD-10-CM

## 2023-02-23 DIAGNOSIS — Z79.4 UNCONTROLLED TYPE 2 DIABETES MELLITUS WITH HYPERGLYCEMIA, WITH LONG-TERM CURRENT USE OF INSULIN: Primary | ICD-10-CM

## 2023-02-23 DIAGNOSIS — E11.42 TYPE 2 DIABETES MELLITUS WITH DIABETIC POLYNEUROPATHY, WITH LONG-TERM CURRENT USE OF INSULIN: ICD-10-CM

## 2023-02-23 DIAGNOSIS — I10 ESSENTIAL HYPERTENSION: ICD-10-CM

## 2023-02-23 DIAGNOSIS — D69.6 THROMBOCYTOPENIA: ICD-10-CM

## 2023-02-23 DIAGNOSIS — E78.49 OTHER HYPERLIPIDEMIA: ICD-10-CM

## 2023-02-23 PROCEDURE — 99999 PR PBB SHADOW E&M-EST. PATIENT-LVL V: ICD-10-PCS | Mod: PBBFAC,,, | Performed by: FAMILY MEDICINE

## 2023-02-23 PROCEDURE — 3078F DIAST BP <80 MM HG: CPT | Mod: CPTII,S$GLB,, | Performed by: FAMILY MEDICINE

## 2023-02-23 PROCEDURE — 99214 PR OFFICE/OUTPT VISIT, EST, LEVL IV, 30-39 MIN: ICD-10-PCS | Mod: 25,S$GLB,, | Performed by: FAMILY MEDICINE

## 2023-02-23 PROCEDURE — 3074F PR MOST RECENT SYSTOLIC BLOOD PRESSURE < 130 MM HG: ICD-10-PCS | Mod: CPTII,S$GLB,, | Performed by: FAMILY MEDICINE

## 2023-02-23 PROCEDURE — 1160F RVW MEDS BY RX/DR IN RCRD: CPT | Mod: CPTII,S$GLB,, | Performed by: FAMILY MEDICINE

## 2023-02-23 PROCEDURE — 1101F PR PT FALLS ASSESS DOC 0-1 FALLS W/OUT INJ PAST YR: ICD-10-PCS | Mod: CPTII,S$GLB,, | Performed by: FAMILY MEDICINE

## 2023-02-23 PROCEDURE — 1126F AMNT PAIN NOTED NONE PRSNT: CPT | Mod: CPTII,S$GLB,, | Performed by: FAMILY MEDICINE

## 2023-02-23 PROCEDURE — 1126F PR PAIN SEVERITY QUANTIFIED, NO PAIN PRESENT: ICD-10-PCS | Mod: CPTII,S$GLB,, | Performed by: FAMILY MEDICINE

## 2023-02-23 PROCEDURE — 90677 PNEUMOCOCCAL CONJUGATE VACCINE 20-VALENT: ICD-10-PCS | Mod: S$GLB,,, | Performed by: FAMILY MEDICINE

## 2023-02-23 PROCEDURE — 90677 PCV20 VACCINE IM: CPT | Mod: S$GLB,,, | Performed by: FAMILY MEDICINE

## 2023-02-23 PROCEDURE — 99999 PR PBB SHADOW E&M-EST. PATIENT-LVL V: CPT | Mod: PBBFAC,,, | Performed by: FAMILY MEDICINE

## 2023-02-23 PROCEDURE — 3288F FALL RISK ASSESSMENT DOCD: CPT | Mod: CPTII,S$GLB,, | Performed by: FAMILY MEDICINE

## 2023-02-23 PROCEDURE — 3288F PR FALLS RISK ASSESSMENT DOCUMENTED: ICD-10-PCS | Mod: CPTII,S$GLB,, | Performed by: FAMILY MEDICINE

## 2023-02-23 PROCEDURE — 4010F ACE/ARB THERAPY RXD/TAKEN: CPT | Mod: CPTII,S$GLB,, | Performed by: FAMILY MEDICINE

## 2023-02-23 PROCEDURE — 3052F HG A1C>EQUAL 8.0%<EQUAL 9.0%: CPT | Mod: CPTII,S$GLB,, | Performed by: FAMILY MEDICINE

## 2023-02-23 PROCEDURE — 99214 OFFICE O/P EST MOD 30 MIN: CPT | Mod: 25,S$GLB,, | Performed by: FAMILY MEDICINE

## 2023-02-23 PROCEDURE — 90471 IMMUNIZATION ADMIN: CPT | Mod: S$GLB,,, | Performed by: FAMILY MEDICINE

## 2023-02-23 PROCEDURE — 3008F PR BODY MASS INDEX (BMI) DOCUMENTED: ICD-10-PCS | Mod: CPTII,S$GLB,, | Performed by: FAMILY MEDICINE

## 2023-02-23 PROCEDURE — 1101F PT FALLS ASSESS-DOCD LE1/YR: CPT | Mod: CPTII,S$GLB,, | Performed by: FAMILY MEDICINE

## 2023-02-23 PROCEDURE — 1160F PR REVIEW ALL MEDS BY PRESCRIBER/CLIN PHARMACIST DOCUMENTED: ICD-10-PCS | Mod: CPTII,S$GLB,, | Performed by: FAMILY MEDICINE

## 2023-02-23 PROCEDURE — 1159F MED LIST DOCD IN RCRD: CPT | Mod: CPTII,S$GLB,, | Performed by: FAMILY MEDICINE

## 2023-02-23 PROCEDURE — 90471 PNEUMOCOCCAL CONJUGATE VACCINE 20-VALENT: ICD-10-PCS | Mod: S$GLB,,, | Performed by: FAMILY MEDICINE

## 2023-02-23 PROCEDURE — 3008F BODY MASS INDEX DOCD: CPT | Mod: CPTII,S$GLB,, | Performed by: FAMILY MEDICINE

## 2023-02-23 PROCEDURE — 4010F PR ACE/ARB THEARPY RXD/TAKEN: ICD-10-PCS | Mod: CPTII,S$GLB,, | Performed by: FAMILY MEDICINE

## 2023-02-23 PROCEDURE — 1159F PR MEDICATION LIST DOCUMENTED IN MEDICAL RECORD: ICD-10-PCS | Mod: CPTII,S$GLB,, | Performed by: FAMILY MEDICINE

## 2023-02-23 PROCEDURE — 3074F SYST BP LT 130 MM HG: CPT | Mod: CPTII,S$GLB,, | Performed by: FAMILY MEDICINE

## 2023-02-23 PROCEDURE — 3052F PR MOST RECENT HEMOGLOBIN A1C LEVEL 8.0 - < 9.0%: ICD-10-PCS | Mod: CPTII,S$GLB,, | Performed by: FAMILY MEDICINE

## 2023-02-23 PROCEDURE — 3078F PR MOST RECENT DIASTOLIC BLOOD PRESSURE < 80 MM HG: ICD-10-PCS | Mod: CPTII,S$GLB,, | Performed by: FAMILY MEDICINE

## 2023-02-23 RX ORDER — ATORVASTATIN CALCIUM 40 MG/1
40 TABLET, FILM COATED ORAL NIGHTLY
Qty: 90 TABLET | Refills: 3 | Status: SHIPPED | OUTPATIENT
Start: 2023-02-23 | End: 2023-03-27 | Stop reason: SDUPTHER

## 2023-02-23 RX ORDER — RAMIPRIL 2.5 MG/1
2.5 CAPSULE ORAL DAILY
Qty: 90 CAPSULE | Refills: 1 | Status: SHIPPED | OUTPATIENT
Start: 2023-02-23 | End: 2023-07-08 | Stop reason: SDUPTHER

## 2023-02-23 RX ORDER — MUPIROCIN 20 MG/G
OINTMENT TOPICAL 3 TIMES DAILY
Qty: 30 G | Refills: 11 | Status: SHIPPED | OUTPATIENT
Start: 2023-02-23 | End: 2024-03-01 | Stop reason: SDUPTHER

## 2023-02-23 NOTE — PATIENT INSTRUCTIONS
Further diabetes management per endocrine    Continue ramipril. But at 2.5 mg  Metoprolol per EP at 50 mg XR     Continue atorvastatin 40 mg    You have low B12. Goal >400. Start daily OTC B12 supplementation at 1000 mcg     Has recurrent skin infections. Last culture, >10 years ago  If this occurs again, contact me, and we can get wound culture    F/u 6 months. Labs prior.

## 2023-02-23 NOTE — PROGRESS NOTES
"Subjective:       Patient ID: Patria Nieto is a 72 y.o. male.    Chief Complaint: Follow-up    Lokesh is a 72 y.o. male who presents today with     DM: managed by endocrine. Has dexcom. Currently, sugar Is 119. Last 12 hours had a range from 90 to 212. Thinks mounjaro is helping. Taking 64 units of toujeo. Still taking novolog 20-24 with meals. He reports feeling better. Not as tired.     Nocturia is improving.     SVT: saw EP. Last seen 7/28/2022. BB changed to once daily.   DLD: on atorvastatin  HTN: taking ramipril 5 mg and metoprolol 50 mg Xr once daily. BP well controlled. He wants to try to decreasing meds.   Thrombocytopenia: stable. Refer <100. Due to aspirin?    Weight is down a few pounds.     He reports when he gets "over stressed" he has a "boil" and it can be in various locations. His prior PCP's was giving him bactrim for flares. He reports that he kept a 7 days supply, but I can't find a record in the EMR for this. No lesions currently.     Review of Systems   Constitutional:  Negative for chills and fever.   Respiratory:  Negative for shortness of breath.    Cardiovascular:  Negative for chest pain.   Gastrointestinal:  Negative for nausea and vomiting.   Neurological:  Negative for dizziness, light-headedness and headaches.           Results for orders placed or performed in visit on 01/31/23   Hemoglobin A1C   Result Value Ref Range    Hemoglobin A1C 8.5 (H) 4.0 - 5.6 %    Estimated Avg Glucose 197 (H) 68 - 131 mg/dL   Comprehensive Metabolic Panel   Result Value Ref Range    Sodium 143 136 - 145 mmol/L    Potassium 4.1 3.5 - 5.1 mmol/L    Chloride 109 95 - 110 mmol/L    CO2 24 23 - 29 mmol/L    Glucose 102 70 - 110 mg/dL    BUN 13 8 - 23 mg/dL    Creatinine 1.1 0.5 - 1.4 mg/dL    Calcium 10.3 8.7 - 10.5 mg/dL    Total Protein 7.2 6.0 - 8.4 g/dL    Albumin 4.2 3.5 - 5.2 g/dL    Total Bilirubin 2.3 (H) 0.1 - 1.0 mg/dL    Alkaline Phosphatase 65 55 - 135 U/L    AST 18 10 - 40 U/L    ALT 32 10 - 44 U/L " "   Anion Gap 10 8 - 16 mmol/L    eGFR >60.0 >60 mL/min/1.73 m^2       Objective:     Vitals:    02/23/23 0945   BP: 118/76   BP Location: Left arm   Patient Position: Sitting   BP Method: Large (Manual)   Pulse: 85   SpO2: 97%   Weight: 93.5 kg (206 lb 2.1 oz)   Height: 5' 7" (1.702 m)        Physical Exam  Constitutional:       General: He is not in acute distress.     Appearance: He is obese. He is not ill-appearing, toxic-appearing or diaphoretic.   Cardiovascular:      Rate and Rhythm: Normal rate and regular rhythm.   Pulmonary:      Effort: Pulmonary effort is normal.      Breath sounds: Normal breath sounds.   Abdominal:      Palpations: Abdomen is soft.      Tenderness: There is no abdominal tenderness.   Musculoskeletal:         General: No swelling.   Skin:     Findings: No rash.      Comments: No rash noted on exam today   Neurological:      General: No focal deficit present.      Mental Status: He is alert.   Psychiatric:         Mood and Affect: Mood normal.         Behavior: Behavior normal.         Thought Content: Thought content normal.         Judgment: Judgment normal.       Assessment:       1. Uncontrolled type 2 diabetes mellitus with hyperglycemia, with long-term current use of insulin    2. Thrombocytopenia    3. PSVT (paroxysmal supraventricular tachycardia)    4. B12 deficiency    5. BMI 32.0-32.9,adult    6. Essential hypertension    7. Type 2 diabetes mellitus with diabetic polyneuropathy, with long-term current use of insulin    8. Other hyperlipidemia    9. Hyperlipidemia LDL goal <100    10. Vitamin D deficiency    11. Cellulitis, unspecified cellulitis site    12. Need for vaccination against Streptococcus pneumoniae    13. Visit for well [facility] health check        Plan:       Further diabetes management per endocrine    Continue ramipril. But at 2.5 mg  Metoprolol per EP at 50 mg XR     Continue atorvastatin 40 mg    You have low B12. Goal >400. Start daily OTC B12 supplementation at " 1000 mcg     Has recurrent skin infections. Last culture, >10 years ago  If this occurs again, contact me, and we can get wound/abscess culture    F/u 6 months. Labs prior. Annual.     Uncontrolled type 2 diabetes mellitus with hyperglycemia, with long-term current use of insulin    Thrombocytopenia  -     CBC Auto Differential; Future; Expected date: 02/23/2023  -     Comprehensive Metabolic Panel; Future; Expected date: 02/23/2023    PSVT (paroxysmal supraventricular tachycardia)    B12 deficiency  -     Vitamin B12; Future; Expected date: 02/23/2023    BMI 32.0-32.9,adult    Essential hypertension  -     TSH; Future; Expected date: 02/23/2023  -     Cancel: Hemoglobin A1C; Future; Expected date: 02/23/2023  -     ramipriL (ALTACE) 2.5 MG capsule; Take 1 capsule (2.5 mg total) by mouth once daily.  Dispense: 90 capsule; Refill: 1    Type 2 diabetes mellitus with diabetic polyneuropathy, with long-term current use of insulin  -     Cancel: Hemoglobin A1C; Future; Expected date: 02/23/2023  -     ramipriL (ALTACE) 2.5 MG capsule; Take 1 capsule (2.5 mg total) by mouth once daily.  Dispense: 90 capsule; Refill: 1    Other hyperlipidemia  -     atorvastatin (LIPITOR) 40 MG tablet; Take 1 tablet (40 mg total) by mouth every evening.  Dispense: 90 tablet; Refill: 3  -     Lipid Panel; Future; Expected date: 02/23/2023    Hyperlipidemia LDL goal <100  -     atorvastatin (LIPITOR) 40 MG tablet; Take 1 tablet (40 mg total) by mouth every evening.  Dispense: 90 tablet; Refill: 3  -     Lipid Panel; Future; Expected date: 02/23/2023    Vitamin D deficiency  -     Vitamin D; Future; Expected date: 02/23/2023    Cellulitis, unspecified cellulitis site  -     mupirocin (BACTROBAN) 2 % ointment; Apply topically 3 (three) times daily.  Dispense: 30 g; Refill: 11    Need for vaccination against Streptococcus pneumoniae  -     (In Office Administered) Pneumococcal Conjugate Vaccine (20 Valent) (IM)    Visit for well man health  check  -     CBC Auto Differential; Future; Expected date: 02/23/2023  -     Comprehensive Metabolic Panel; Future; Expected date: 02/23/2023  -     Lipid Panel; Future; Expected date: 02/23/2023  -     TSH; Future; Expected date: 02/23/2023  -     Vitamin B12; Future; Expected date: 02/23/2023  -     Vitamin D; Future; Expected date: 02/23/2023            Warning signs discussed, patient to call with any further issues or worsening of symptoms. Above note may have utilized dictation, please excuse any transcription errors.

## 2023-02-26 ENCOUNTER — PATIENT MESSAGE (OUTPATIENT)
Dept: ENDOCRINOLOGY | Facility: CLINIC | Age: 73
End: 2023-02-26
Payer: COMMERCIAL

## 2023-02-27 DIAGNOSIS — E11.00 TYPE II DIABETES MELLITUS WITH HYPEROSMOLARITY, UNCONTROLLED: ICD-10-CM

## 2023-02-27 DIAGNOSIS — Z79.4 UNCONTROLLED TYPE 2 DIABETES MELLITUS WITH HYPERGLYCEMIA, WITH LONG-TERM CURRENT USE OF INSULIN: ICD-10-CM

## 2023-02-27 DIAGNOSIS — E11.65 UNCONTROLLED TYPE 2 DIABETES MELLITUS WITH HYPERGLYCEMIA, WITH LONG-TERM CURRENT USE OF INSULIN: ICD-10-CM

## 2023-02-27 RX ORDER — INSULIN ASPART 100 [IU]/ML
20 INJECTION, SOLUTION INTRAVENOUS; SUBCUTANEOUS
Qty: 90 ML | Refills: 1 | Status: SHIPPED | OUTPATIENT
Start: 2023-02-27 | End: 2023-06-07

## 2023-03-16 ENCOUNTER — OFFICE VISIT (OUTPATIENT)
Dept: ORTHOPEDICS | Facility: CLINIC | Age: 73
End: 2023-03-16
Payer: COMMERCIAL

## 2023-03-16 VITALS — BODY MASS INDEX: 32.35 KG/M2 | WEIGHT: 206.13 LBS | HEIGHT: 67 IN

## 2023-03-16 DIAGNOSIS — M19.032 ARTHRITIS OF WRIST, LEFT: ICD-10-CM

## 2023-03-16 DIAGNOSIS — M65.332 TRIGGER MIDDLE FINGER OF LEFT HAND: Primary | ICD-10-CM

## 2023-03-16 PROCEDURE — 99999 PR PBB SHADOW E&M-EST. PATIENT-LVL III: ICD-10-PCS | Mod: PBBFAC,,, | Performed by: ORTHOPAEDIC SURGERY

## 2023-03-16 PROCEDURE — 1101F PR PT FALLS ASSESS DOC 0-1 FALLS W/OUT INJ PAST YR: ICD-10-PCS | Mod: CPTII,S$GLB,, | Performed by: ORTHOPAEDIC SURGERY

## 2023-03-16 PROCEDURE — 1159F PR MEDICATION LIST DOCUMENTED IN MEDICAL RECORD: ICD-10-PCS | Mod: CPTII,S$GLB,, | Performed by: ORTHOPAEDIC SURGERY

## 2023-03-16 PROCEDURE — 3008F BODY MASS INDEX DOCD: CPT | Mod: CPTII,S$GLB,, | Performed by: ORTHOPAEDIC SURGERY

## 2023-03-16 PROCEDURE — 3288F PR FALLS RISK ASSESSMENT DOCUMENTED: ICD-10-PCS | Mod: CPTII,S$GLB,, | Performed by: ORTHOPAEDIC SURGERY

## 2023-03-16 PROCEDURE — 1159F MED LIST DOCD IN RCRD: CPT | Mod: CPTII,S$GLB,, | Performed by: ORTHOPAEDIC SURGERY

## 2023-03-16 PROCEDURE — 1101F PT FALLS ASSESS-DOCD LE1/YR: CPT | Mod: CPTII,S$GLB,, | Performed by: ORTHOPAEDIC SURGERY

## 2023-03-16 PROCEDURE — 3008F PR BODY MASS INDEX (BMI) DOCUMENTED: ICD-10-PCS | Mod: CPTII,S$GLB,, | Performed by: ORTHOPAEDIC SURGERY

## 2023-03-16 PROCEDURE — 99213 OFFICE O/P EST LOW 20 MIN: CPT | Mod: 25,S$GLB,, | Performed by: ORTHOPAEDIC SURGERY

## 2023-03-16 PROCEDURE — 3288F FALL RISK ASSESSMENT DOCD: CPT | Mod: CPTII,S$GLB,, | Performed by: ORTHOPAEDIC SURGERY

## 2023-03-16 PROCEDURE — 3052F HG A1C>EQUAL 8.0%<EQUAL 9.0%: CPT | Mod: CPTII,S$GLB,, | Performed by: ORTHOPAEDIC SURGERY

## 2023-03-16 PROCEDURE — 99999 PR PBB SHADOW E&M-EST. PATIENT-LVL III: CPT | Mod: PBBFAC,,, | Performed by: ORTHOPAEDIC SURGERY

## 2023-03-16 PROCEDURE — 4010F ACE/ARB THERAPY RXD/TAKEN: CPT | Mod: CPTII,S$GLB,, | Performed by: ORTHOPAEDIC SURGERY

## 2023-03-16 PROCEDURE — 3052F PR MOST RECENT HEMOGLOBIN A1C LEVEL 8.0 - < 9.0%: ICD-10-PCS | Mod: CPTII,S$GLB,, | Performed by: ORTHOPAEDIC SURGERY

## 2023-03-16 PROCEDURE — 1126F PR PAIN SEVERITY QUANTIFIED, NO PAIN PRESENT: ICD-10-PCS | Mod: CPTII,S$GLB,, | Performed by: ORTHOPAEDIC SURGERY

## 2023-03-16 PROCEDURE — 1126F AMNT PAIN NOTED NONE PRSNT: CPT | Mod: CPTII,S$GLB,, | Performed by: ORTHOPAEDIC SURGERY

## 2023-03-16 PROCEDURE — 20605 INTERMEDIATE JOINT ASPIRATION/INJECTION: R INTERCARPAL: ICD-10-PCS | Mod: RT,S$GLB,, | Performed by: ORTHOPAEDIC SURGERY

## 2023-03-16 PROCEDURE — 20605 DRAIN/INJ JOINT/BURSA W/O US: CPT | Mod: RT,S$GLB,, | Performed by: ORTHOPAEDIC SURGERY

## 2023-03-16 PROCEDURE — 4010F PR ACE/ARB THEARPY RXD/TAKEN: ICD-10-PCS | Mod: CPTII,S$GLB,, | Performed by: ORTHOPAEDIC SURGERY

## 2023-03-16 PROCEDURE — 99213 PR OFFICE/OUTPT VISIT, EST, LEVL III, 20-29 MIN: ICD-10-PCS | Mod: 25,S$GLB,, | Performed by: ORTHOPAEDIC SURGERY

## 2023-03-16 RX ORDER — TRIAMCINOLONE ACETONIDE 40 MG/ML
20 INJECTION, SUSPENSION INTRA-ARTICULAR; INTRAMUSCULAR
Status: DISCONTINUED | OUTPATIENT
Start: 2023-03-16 | End: 2023-03-16 | Stop reason: HOSPADM

## 2023-03-16 RX ORDER — LIDOCAINE HYDROCHLORIDE 10 MG/ML
0.5 INJECTION INFILTRATION; PERINEURAL
Status: DISCONTINUED | OUTPATIENT
Start: 2023-03-16 | End: 2023-03-16 | Stop reason: HOSPADM

## 2023-03-16 RX ADMIN — LIDOCAINE HYDROCHLORIDE 0.5 ML: 10 INJECTION INFILTRATION; PERINEURAL at 09:03

## 2023-03-16 RX ADMIN — TRIAMCINOLONE ACETONIDE 20 MG: 40 INJECTION, SUSPENSION INTRA-ARTICULAR; INTRAMUSCULAR at 09:03

## 2023-03-16 NOTE — PROCEDURES
Intermediate Joint Aspiration/Injection: R intercarpal    Date/Time: 3/16/2023 9:00 AM  Performed by: Dai Baires PA-C  Authorized by: Dai Baires PA-C     Consent Done?:  Yes (Verbal)  Indications:  Arthritis and pain  Site marked: The procedure site was marked    Timeout: Prior to procedure the correct patient, procedure, and site was verified      Location:  Wrist  Site:  R intercarpal  Prep: Patient was prepped and draped in usual sterile fashion    Medications:  0.5 mL LIDOcaine HCL 10 mg/ml (1%) 10 mg/mL (1 %); 20 mg triamcinolone acetonide 40 mg/mL  Patient tolerance:  Patient tolerated the procedure well with no immediate complications

## 2023-03-16 NOTE — PROGRESS NOTES
Subjective:      Patient ID: Patria Nieto is a 72 y.o. male.    Chief Complaint: Follow-up (left trigger finger )      HPI: Patria Nieto is here in follow-up of left middle trigger finger.  He was last seen and treated approximately 6 weeks ago by Dr. Scanlon a corticosteroid injection of the flexor tendon sheath.  Today, he reports symptoms are significantly improved and currently controlled.   However, he does complain of chronic dorsal wrist pain for the last 1 year.  Pain is worse with extension of the wrist.  He is not tried anything for this.    Past Medical History:   Diagnosis Date    Colon polyp 06/22/2007    Diabetes mellitus type II     Hx MRSA infection     Hypertension     MRSA infection 2002    Supraventricular tachycardia        Current Outpatient Medications:     aspirin 81 MG Chew, Take 81 mg by mouth once daily., Disp: , Rfl:     atorvastatin (LIPITOR) 40 MG tablet, Take 1 tablet (40 mg total) by mouth every evening., Disp: 90 tablet, Rfl: 3    blood-glucose sensor (DEXCOM G6 SENSOR) Nikki, 3 each by Misc.(Non-Drug; Combo Route) route continuous prn. Change every 10 days., Disp: 3 each, Rfl: PRN    blood-glucose transmitter (DEXCOM G6 TRANSMITTER) Nikki, 1 each by Misc.(Non-Drug; Combo Route) route continuous prn., Disp: 1 each, Rfl: PRN    cyanocobalamin (VITAMIN B-12) 1000 MCG tablet, Take 1 tablet (1,000 mcg total) by mouth once daily., Disp: 90 tablet, Rfl: 5    insulin aspart U-100 (NOVOLOG FLEXPEN U-100 INSULIN) 100 unit/mL (3 mL) InPn pen, Inject 20 Units into the skin 3 (three) times daily with meals. WITH CORRECTION SCALE (MAX 90 UNITS PER DAY), Disp: 90 mL, Rfl: 1    insulin glargine, TOUJEO, (TOUJEO) 300 unit/mL (1.5 mL) InPn pen, Inject 64 Units into the skin once daily. Adjust to max 70 units per MD instructions, Disp: 9 pen, Rfl: 3    metFORMIN (GLUCOPHAGE) 1000 MG tablet, Take 1 tablet (1,000 mg total) by mouth 2 (two) times daily with meals., Disp: 180 tablet, Rfl: 0    metoprolol  "succinate (TOPROL-XL) 50 MG 24 hr tablet, Take 1 tablet (50 mg total) by mouth once daily., Disp: 90 tablet, Rfl: 2    mupirocin (BACTROBAN) 2 % ointment, Apply topically 3 (three) times daily., Disp: 30 g, Rfl: 11    pen needle, diabetic (BD ULTRA-FINE MINI PEN NEEDLE) 31 gauge x 3/16" Ndle, Uses 5 times a day., Disp: 200 each, Rfl: 6    ramipriL (ALTACE) 2.5 MG capsule, Take 1 capsule (2.5 mg total) by mouth once daily., Disp: 90 capsule, Rfl: 1    tirzepatide (MOUNJARO) 7.5 mg/0.5 mL PnIj, Inject 7.5 mg into the skin every 7 days., Disp: 4 pen, Rfl: 0    glucose 4 GM chewable tablet, Take 4 tablets (16 g total) by mouth as needed for Low blood sugar (If having symptoms of blurry vision, palpitations, confusion, shakiness.  Please check sugars and if sugar below 70 please take 4 tablets and re-check sugar everry 15 minutes until sugars are above 70 and symptoms resolve.). (Patient not taking: Reported on 3/16/2023), Disp: 50 tablet, Rfl: 12  No current facility-administered medications for this visit.    Facility-Administered Medications Ordered in Other Visits:     sodium chloride 0.9% bolus 1,000 mL, 1,000 mL, Intravenous, Once, Lindsay Ruelas NP  Review of patient's allergies indicates:  No Known Allergies    Ht 5' 7" (1.702 m)   Wt 93.5 kg (206 lb 2.1 oz)   BMI 32.28 kg/m²     ROS      Objective:    Ortho Exam   GEN: Well developed, well nourished male. AAOX3. No acute distress.   Normocephalic, atraumatic.   [facility]  Breathing unlabored.  Mood and affect appropriate.     Left UE: Skin intact. Swelling minimal to none. TTP near the STT joint and extensor tendon in the same area. ROM full, pain with terminal wrist extension.  Full range of motion middle finger without locking. Sensation intact. Tinels negative. Pulses present. Cap refill brisk.  strength equal bilaterally. Special tests:  Negative Finkelstein      Assessment:     Imaging:  No new        1. Trigger middle finger of left hand    2. Arthritis " of wrist, left          Plan:       Orders Placed This Encounter    Intermediate Joint Aspiration/Injection: R intercarpal     Trigger finger currently well-controlled.  I explained the nature of the problem to the patient - possible STT arthritis versus extensor tendinitis.   I recommended informed corticosteroid injection of the dorsal wrist without complication.  Patient tolerated well.   I explained the potential role of surgery in the treatment of trigger finger. They understand that if non surgical measures do not adequately control symptoms, surgery will be considered in the future.     Follow up in about 6 weeks (around 4/27/2023).

## 2023-04-21 ENCOUNTER — PATIENT MESSAGE (OUTPATIENT)
Dept: ENDOCRINOLOGY | Facility: CLINIC | Age: 73
End: 2023-04-21
Payer: COMMERCIAL

## 2023-05-09 DIAGNOSIS — E11.65 UNCONTROLLED TYPE 2 DIABETES MELLITUS WITH HYPERGLYCEMIA, WITH LONG-TERM CURRENT USE OF INSULIN: ICD-10-CM

## 2023-05-09 DIAGNOSIS — Z79.4 UNCONTROLLED TYPE 2 DIABETES MELLITUS WITH HYPERGLYCEMIA, WITH LONG-TERM CURRENT USE OF INSULIN: ICD-10-CM

## 2023-05-09 RX ORDER — TIRZEPATIDE 7.5 MG/.5ML
7.5 INJECTION, SOLUTION SUBCUTANEOUS
Qty: 4 PEN | Refills: 0 | Status: SHIPPED | OUTPATIENT
Start: 2023-05-09 | End: 2023-06-07

## 2023-05-11 ENCOUNTER — PATIENT MESSAGE (OUTPATIENT)
Dept: ORTHOPEDICS | Facility: CLINIC | Age: 73
End: 2023-05-11
Payer: COMMERCIAL

## 2023-05-31 ENCOUNTER — LAB VISIT (OUTPATIENT)
Dept: LAB | Facility: HOSPITAL | Age: 73
End: 2023-05-31
Attending: NURSE PRACTITIONER
Payer: COMMERCIAL

## 2023-05-31 DIAGNOSIS — E11.65 UNCONTROLLED TYPE 2 DIABETES MELLITUS WITH HYPERGLYCEMIA, WITH LONG-TERM CURRENT USE OF INSULIN: ICD-10-CM

## 2023-05-31 DIAGNOSIS — Z79.4 UNCONTROLLED TYPE 2 DIABETES MELLITUS WITH HYPERGLYCEMIA, WITH LONG-TERM CURRENT USE OF INSULIN: ICD-10-CM

## 2023-05-31 LAB
ALBUMIN SERPL BCP-MCNC: 3.9 G/DL (ref 3.5–5.2)
ALP SERPL-CCNC: 61 U/L (ref 55–135)
ALT SERPL W/O P-5'-P-CCNC: 26 U/L (ref 10–44)
ANION GAP SERPL CALC-SCNC: 12 MMOL/L (ref 8–16)
AST SERPL-CCNC: 14 U/L (ref 10–40)
BILIRUB SERPL-MCNC: 2 MG/DL (ref 0.1–1)
BUN SERPL-MCNC: 17 MG/DL (ref 8–23)
CALCIUM SERPL-MCNC: 9.8 MG/DL (ref 8.7–10.5)
CHLORIDE SERPL-SCNC: 111 MMOL/L (ref 95–110)
CO2 SERPL-SCNC: 22 MMOL/L (ref 23–29)
CREAT SERPL-MCNC: 1 MG/DL (ref 0.5–1.4)
EST. GFR  (NO RACE VARIABLE): >60 ML/MIN/1.73 M^2
ESTIMATED AVG GLUCOSE: 148 MG/DL (ref 68–131)
GLUCOSE SERPL-MCNC: 76 MG/DL (ref 70–110)
HBA1C MFR BLD: 6.8 % (ref 4–5.6)
POTASSIUM SERPL-SCNC: 3.8 MMOL/L (ref 3.5–5.1)
PROT SERPL-MCNC: 6.7 G/DL (ref 6–8.4)
SODIUM SERPL-SCNC: 145 MMOL/L (ref 136–145)

## 2023-05-31 PROCEDURE — 36415 COLL VENOUS BLD VENIPUNCTURE: CPT | Mod: PO | Performed by: NURSE PRACTITIONER

## 2023-05-31 PROCEDURE — 83036 HEMOGLOBIN GLYCOSYLATED A1C: CPT | Performed by: NURSE PRACTITIONER

## 2023-05-31 PROCEDURE — 80053 COMPREHEN METABOLIC PANEL: CPT | Performed by: NURSE PRACTITIONER

## 2023-06-05 NOTE — PROGRESS NOTES
"Subjective:      Patient ID: Patria Nieto is a 72 y.o. male.    Chief Complaint:  No chief complaint on file.    History of Present Illness  Patria Nieto is here for follow up of DM.  Previously seen by me 2/2023.    Illness, injury, steroids: Yes - URI, steroid shot and steroid taper.     With regards to diabetes:    Diagnosed: 2003  DE: 2018  FH: "yes"   Known complications:  DKA Denies   RN Denies  Eye Exam: > 1 year ago but has an upcoming appointment   PN Yes - "comes and goes"   Podiatry: 2020  Nephropathy Denies  CAD Denies  Denies history of pancreatitis & personal/family history of medullary thyroid cancer.     Current regimen:  Metformin 1000mg twice daily  Mounjaro 7.5mg weekly on Wednesday (will take last pen today 6/7/2023)  Toujeo 50 units daily  Novolog 10-15 units plus correction scale before meals only - give 1/2 dose if only having AM coffee  Blood sugar 150 to 200 add 2 units  Blood sugar 201 to 250 add 4 units  Blood sugar 251 to 300 add 6 units  Blood sugar 301 to 350 add 8 units  Blood sugar greater than 350 add 10 units     Discussed SGLT2i - im concerned about the skin infections so will hold off.     Reports compliance.  Self-adjusted insulin due to hypoglycemia.     Other medications tried:  Trulicity     Glucose Monitor: Dexcom G6  4-5 times a day testing  Log reviewed: oral recall - needs to connect Dexcom account      Hypoglycemia:  Yes - since increasing Mounjaro   Knows how to correct with 15 grams of carbs- juice, coke, or a peppermint.     Diet/Exercise:  Eats 2-3 meals a day.   Snacks : Denies  Drinks : coffee (1 cup in AM with half and half), OJ, water, diet soft drinks.   Exercise - tries to stay active - exercises twice a week.     Diabetes Management Status    Hemoglobin A1C   Date Value Ref Range Status   05/31/2023 6.8 (H) 4.0 - 5.6 % Final     Comment:     ADA Screening Guidelines:  5.7-6.4%  Consistent with prediabetes  >or=6.5%  Consistent with diabetes    High levels of " "fetal hemoglobin interfere with the HbA1C  assay. Heterozygous hemoglobin variants (HbS, HgC, etc)do  not significantly interfere with this assay.   However, presence of multiple variants may affect accuracy.     01/31/2023 8.5 (H) 4.0 - 5.6 % Final     Comment:     ADA Screening Guidelines:  5.7-6.4%  Consistent with prediabetes  >or=6.5%  Consistent with diabetes    High levels of fetal hemoglobin interfere with the HbA1C  assay. Heterozygous hemoglobin variants (HbS, HgC, etc)do  not significantly interfere with this assay.   However, presence of multiple variants may affect accuracy.     11/05/2022 9.5 (H) 4.0 - 5.6 % Final     Comment:     ADA Screening Guidelines:  5.7-6.4%  Consistent with prediabetes  >or=6.5%  Consistent with diabetes    High levels of fetal hemoglobin interfere with the HbA1C  assay. Heterozygous hemoglobin variants (HbS, HgC, etc)do  not significantly interfere with this assay.   However, presence of multiple variants may affect accuracy.         Statin: Taking  ACE/ARB: Taking  Screening or Prevention Patient's value Goal Complete/Controlled?   HgA1C Testing and Control   Lab Results   Component Value Date    HGBA1C 6.8 (H) 05/31/2023      Annually/Less than 8% No   Lipid profile : 11/05/2022 Annually Yes   LDL control Lab Results   Component Value Date    LDLCALC 62.4 (L) 11/05/2022    Annually/Less than 100 mg/dl  Yes   Nephropathy screening Lab Results   Component Value Date    LABMICR 6.0 11/05/2022     Lab Results   Component Value Date    PROTEINUA NEG 09/09/2011    Annually Yes   Blood pressure BP Readings from Last 1 Encounters:   06/07/23 110/76    Less than 140/90 Yes   Dilated retinal exam : 08/02/2022 Annually Yes   Foot exam   : 12/02/2020 Annually No     Reports history of "abscess" on buttocks or abdomen. Has seen a provider in the past. He is self-treating.   Concern for yeast infections as well.    Review of Systems   Constitutional:  Negative for fatigue.   Eyes:  " "Negative for visual disturbance.   Respiratory:  Negative for shortness of breath.    Cardiovascular:  Negative for chest pain.   Gastrointestinal:  Negative for abdominal pain.   Musculoskeletal:  Negative for arthralgias.   Skin:  Negative for wound.   Neurological:  Negative for headaches.     Objective:   Physical Exam  Vitals reviewed.   Cardiovascular:      Rate and Rhythm: Normal rate.      Comments: No edema present  Pulmonary:      Effort: Pulmonary effort is normal.   Abdominal:      Palpations: Abdomen is soft.     Injection sites are without edema or erythema. No lipo hypertropthy or atrophy.    Visit Vitals  /76   Pulse 89   Ht 5' 9" (1.753 m)   Wt 93.6 kg (206 lb 7.4 oz)   SpO2 96%   BMI 30.49 kg/m²             Body mass index is 30.49 kg/m².    Lab Review:   Lab Results   Component Value Date    HGBA1C 6.8 (H) 05/31/2023    HGBA1C 8.5 (H) 01/31/2023    HGBA1C 9.5 (H) 11/05/2022       Lab Results   Component Value Date    CHOL 125 11/05/2022    HDL 38 (L) 11/05/2022    LDLCALC 62.4 (L) 11/05/2022    TRIG 123 11/05/2022    CHOLHDL 30.4 11/05/2022     Lab Results   Component Value Date     05/31/2023    K 3.8 05/31/2023     (H) 05/31/2023    CO2 22 (L) 05/31/2023    GLU 76 05/31/2023    BUN 17 05/31/2023    CREATININE 1.0 05/31/2023    CALCIUM 9.8 05/31/2023    PROT 6.7 05/31/2023    ALBUMIN 3.9 05/31/2023    BILITOT 2.0 (H) 05/31/2023    ALKPHOS 61 05/31/2023    AST 14 05/31/2023    ALT 26 05/31/2023    ANIONGAP 12 05/31/2023    ESTGFRAFRICA >60.0 11/12/2021    EGFRNONAA >60.0 11/12/2021    TSH 2.901 11/05/2022     No results found for: [id number]  Assessment and Plan     1. Uncontrolled type 2 diabetes mellitus with hyperglycemia, with long-term current use of insulin  tirzepatide (MOUNJARO) 10 mg/0.5 mL Gamal    insulin glargine, TOUJEO, (TOUJEO) 300 unit/mL (1.5 mL) InPn pen    Hemoglobin A1C    Comprehensive Metabolic Panel    insulin aspart U-100 (NOVOLOG FLEXPEN U-100 INSULIN) " 100 unit/mL (3 mL) InPn pen    blood-glucose sensor (DEXCOM G7 SENSOR) Nikki    DISCONTINUED: insulin aspart U-100 (NOVOLOG FLEXPEN U-100 INSULIN) 100 unit/mL (3 mL) InPn pen      2. Essential hypertension        3. Hyperlipidemia LDL goal <100        4. Type 2 diabetes mellitus with hyperglycemia, with long-term current use of insulin  insulin glargine, TOUJEO, (TOUJEO) 300 unit/mL (1.5 mL) InPn pen      5. Type II diabetes mellitus with hyperosmolarity, uncontrolled  insulin aspart U-100 (NOVOLOG FLEXPEN U-100 INSULIN) 100 unit/mL (3 mL) InPn pen    DISCONTINUED: insulin aspart U-100 (NOVOLOG FLEXPEN U-100 INSULIN) 100 unit/mL (3 mL) InPn pen          Uncontrolled type 2 diabetes mellitus with hyperglycemia, with long-term current use of insulin  -- Labs prior to follow up.  -- A1c goal <7.5%.  -- Medications discussed:  MFM   GLP1-DPP4   OSORIO   SGLT2   Reviewed potential adverse effects of SGLT-2 inhibitors, including genital mycotic infections, slightly increased risk of UTI, hypersensitivity, hypotension, and hyperkalemia. Advised to maintain water intake of 8-10 cups per day. Advised we need to check chemistry panel at baseline and 2 weeks after starting. Discussed FDA warning reports of ketoacidosis associated with SGLT-2 inhibitors. Advised to seek immediate medical attention and stop the medication if symptoms such as difficulty breathing, nausea, vomiting, abdominal pain, confusion, and unusual fatigue/sleepiness. Discussed possible precipitating factors including major illness/reduced food and fluid intake (advised to stop under these circumstances), and reduced insulin dose. Discussed possible effects of increased fracture risk/decreased bone density. Discussed reports of increased risk of leg and foot amputations with canagliflozin and need to seek urgent care if developed new pain or tenderness, sores or ulcers, or infections in legs/feet.   Insulin   -- Reviewed logs/CGM:  Glucose improving.   Instructed  to send glucose logs in 14 days.  Reach out to me sooner for any glucose <70 or consistently >180.  -- Link Dexcom to our account.   -- Medication Changes:   Metformin 1000mg twice daily  Mounjaro 7.5mg weekly on Wednesday THEN increase to 10mg weekly on 6/14/2023  Toujeo 50 units daily THEN reduce to 40 units when you increase Mounjaro   Novolog 10 units plus correction scale before meals only   Blood sugar 150 to 200 add 2 units  Blood sugar 201 to 250 add 4 units  Blood sugar 251 to 300 add 6 units  Blood sugar 301 to 350 add 8 units  Blood sugar greater than 350 add 10 units    Discussed SGLT2i - im concerned about the skin infections so will hold off.      -- Reviewed goals of therapy are to get the best control we can without hypoglycemia.  -- Reviewed patient's current insulin regimen. Clarified proper insulin dose and timing in relation to meals, etc. Insulin injection sites and proper rotation instructed.    -- Advised frequent self blood glucose monitoring.  Patient encouraged to document glucose results and bring them to every clinic visit.  -- Hypoglycemia precautions discussed. Instructed on precautions before driving.    -- Call for Bg repeatedly < 90 or > 180.   -- Close adherence to lifestyle changes recommended.   -- Periodic follow ups for eye evaluations, foot care and dental care suggested.    Essential hypertension  -- On ACEI.  -- Controlled.  -- Blood pressure goals discussed with patient.    Hyperlipidemia LDL goal <100  -- Controlled.  -- On statin per ADA recommendations.          Follow up in about 4 months (around 10/7/2023).

## 2023-06-07 ENCOUNTER — OFFICE VISIT (OUTPATIENT)
Dept: ENDOCRINOLOGY | Facility: CLINIC | Age: 73
End: 2023-06-07
Payer: COMMERCIAL

## 2023-06-07 VITALS
SYSTOLIC BLOOD PRESSURE: 110 MMHG | DIASTOLIC BLOOD PRESSURE: 76 MMHG | BODY MASS INDEX: 30.58 KG/M2 | WEIGHT: 206.44 LBS | OXYGEN SATURATION: 96 % | HEIGHT: 69 IN | HEART RATE: 89 BPM

## 2023-06-07 DIAGNOSIS — I10 ESSENTIAL HYPERTENSION: ICD-10-CM

## 2023-06-07 DIAGNOSIS — E11.65 TYPE 2 DIABETES MELLITUS WITH HYPERGLYCEMIA, WITH LONG-TERM CURRENT USE OF INSULIN: ICD-10-CM

## 2023-06-07 DIAGNOSIS — Z79.4 TYPE 2 DIABETES MELLITUS WITH HYPERGLYCEMIA, WITH LONG-TERM CURRENT USE OF INSULIN: ICD-10-CM

## 2023-06-07 DIAGNOSIS — E78.5 HYPERLIPIDEMIA LDL GOAL <100: ICD-10-CM

## 2023-06-07 DIAGNOSIS — Z79.4 UNCONTROLLED TYPE 2 DIABETES MELLITUS WITH HYPERGLYCEMIA, WITH LONG-TERM CURRENT USE OF INSULIN: Primary | ICD-10-CM

## 2023-06-07 DIAGNOSIS — E11.00 TYPE II DIABETES MELLITUS WITH HYPEROSMOLARITY, UNCONTROLLED: ICD-10-CM

## 2023-06-07 DIAGNOSIS — E11.65 UNCONTROLLED TYPE 2 DIABETES MELLITUS WITH HYPERGLYCEMIA, WITH LONG-TERM CURRENT USE OF INSULIN: Primary | ICD-10-CM

## 2023-06-07 PROCEDURE — 3288F PR FALLS RISK ASSESSMENT DOCUMENTED: ICD-10-PCS | Mod: CPTII,S$GLB,, | Performed by: NURSE PRACTITIONER

## 2023-06-07 PROCEDURE — 99999 PR PBB SHADOW E&M-EST. PATIENT-LVL IV: CPT | Mod: PBBFAC,,, | Performed by: NURSE PRACTITIONER

## 2023-06-07 PROCEDURE — 4010F PR ACE/ARB THEARPY RXD/TAKEN: ICD-10-PCS | Mod: CPTII,S$GLB,, | Performed by: NURSE PRACTITIONER

## 2023-06-07 PROCEDURE — 3288F FALL RISK ASSESSMENT DOCD: CPT | Mod: CPTII,S$GLB,, | Performed by: NURSE PRACTITIONER

## 2023-06-07 PROCEDURE — 3078F DIAST BP <80 MM HG: CPT | Mod: CPTII,S$GLB,, | Performed by: NURSE PRACTITIONER

## 2023-06-07 PROCEDURE — 3008F BODY MASS INDEX DOCD: CPT | Mod: CPTII,S$GLB,, | Performed by: NURSE PRACTITIONER

## 2023-06-07 PROCEDURE — 1101F PR PT FALLS ASSESS DOC 0-1 FALLS W/OUT INJ PAST YR: ICD-10-PCS | Mod: CPTII,S$GLB,, | Performed by: NURSE PRACTITIONER

## 2023-06-07 PROCEDURE — 3078F PR MOST RECENT DIASTOLIC BLOOD PRESSURE < 80 MM HG: ICD-10-PCS | Mod: CPTII,S$GLB,, | Performed by: NURSE PRACTITIONER

## 2023-06-07 PROCEDURE — 3044F HG A1C LEVEL LT 7.0%: CPT | Mod: CPTII,S$GLB,, | Performed by: NURSE PRACTITIONER

## 2023-06-07 PROCEDURE — 1159F PR MEDICATION LIST DOCUMENTED IN MEDICAL RECORD: ICD-10-PCS | Mod: CPTII,S$GLB,, | Performed by: NURSE PRACTITIONER

## 2023-06-07 PROCEDURE — 99214 OFFICE O/P EST MOD 30 MIN: CPT | Mod: S$GLB,,, | Performed by: NURSE PRACTITIONER

## 2023-06-07 PROCEDURE — 99999 PR PBB SHADOW E&M-EST. PATIENT-LVL IV: ICD-10-PCS | Mod: PBBFAC,,, | Performed by: NURSE PRACTITIONER

## 2023-06-07 PROCEDURE — 3008F PR BODY MASS INDEX (BMI) DOCUMENTED: ICD-10-PCS | Mod: CPTII,S$GLB,, | Performed by: NURSE PRACTITIONER

## 2023-06-07 PROCEDURE — 99214 PR OFFICE/OUTPT VISIT, EST, LEVL IV, 30-39 MIN: ICD-10-PCS | Mod: S$GLB,,, | Performed by: NURSE PRACTITIONER

## 2023-06-07 PROCEDURE — 1101F PT FALLS ASSESS-DOCD LE1/YR: CPT | Mod: CPTII,S$GLB,, | Performed by: NURSE PRACTITIONER

## 2023-06-07 PROCEDURE — 1160F PR REVIEW ALL MEDS BY PRESCRIBER/CLIN PHARMACIST DOCUMENTED: ICD-10-PCS | Mod: CPTII,S$GLB,, | Performed by: NURSE PRACTITIONER

## 2023-06-07 PROCEDURE — 1126F AMNT PAIN NOTED NONE PRSNT: CPT | Mod: CPTII,S$GLB,, | Performed by: NURSE PRACTITIONER

## 2023-06-07 PROCEDURE — 4010F ACE/ARB THERAPY RXD/TAKEN: CPT | Mod: CPTII,S$GLB,, | Performed by: NURSE PRACTITIONER

## 2023-06-07 PROCEDURE — 3074F SYST BP LT 130 MM HG: CPT | Mod: CPTII,S$GLB,, | Performed by: NURSE PRACTITIONER

## 2023-06-07 PROCEDURE — 3044F PR MOST RECENT HEMOGLOBIN A1C LEVEL <7.0%: ICD-10-PCS | Mod: CPTII,S$GLB,, | Performed by: NURSE PRACTITIONER

## 2023-06-07 PROCEDURE — 1160F RVW MEDS BY RX/DR IN RCRD: CPT | Mod: CPTII,S$GLB,, | Performed by: NURSE PRACTITIONER

## 2023-06-07 PROCEDURE — 1159F MED LIST DOCD IN RCRD: CPT | Mod: CPTII,S$GLB,, | Performed by: NURSE PRACTITIONER

## 2023-06-07 PROCEDURE — 1126F PR PAIN SEVERITY QUANTIFIED, NO PAIN PRESENT: ICD-10-PCS | Mod: CPTII,S$GLB,, | Performed by: NURSE PRACTITIONER

## 2023-06-07 PROCEDURE — 3074F PR MOST RECENT SYSTOLIC BLOOD PRESSURE < 130 MM HG: ICD-10-PCS | Mod: CPTII,S$GLB,, | Performed by: NURSE PRACTITIONER

## 2023-06-07 RX ORDER — BLOOD-GLUCOSE SENSOR
EACH MISCELLANEOUS
Qty: 3 EACH | Refills: 3 | Status: SHIPPED | OUTPATIENT
Start: 2023-06-07 | End: 2023-11-20 | Stop reason: SDUPTHER

## 2023-06-07 RX ORDER — TIRZEPATIDE 10 MG/.5ML
10 INJECTION, SOLUTION SUBCUTANEOUS
Qty: 4 PEN | Refills: 1 | Status: SHIPPED | OUTPATIENT
Start: 2023-06-07 | End: 2023-06-16 | Stop reason: SDUPTHER

## 2023-06-07 RX ORDER — INSULIN ASPART 100 [IU]/ML
10 INJECTION, SOLUTION INTRAVENOUS; SUBCUTANEOUS
Qty: 90 ML | Refills: 1 | Status: SHIPPED | OUTPATIENT
Start: 2023-06-07 | End: 2023-06-16 | Stop reason: SDUPTHER

## 2023-06-07 RX ORDER — INSULIN ASPART 100 [IU]/ML
10 INJECTION, SOLUTION INTRAVENOUS; SUBCUTANEOUS
Qty: 90 ML | Refills: 1 | Status: SHIPPED | OUTPATIENT
Start: 2023-06-07 | End: 2023-06-07

## 2023-06-07 RX ORDER — INSULIN GLARGINE 300 [IU]/ML
50 INJECTION, SOLUTION SUBCUTANEOUS DAILY
Qty: 9 PEN | Refills: 3 | Status: SHIPPED | OUTPATIENT
Start: 2023-06-07

## 2023-06-07 NOTE — ASSESSMENT & PLAN NOTE
-- Labs prior to follow up.  -- A1c goal <7.5%.  -- Medications discussed:  MFM   GLP1-DPP4   OSORIO   SGLT2   Reviewed potential adverse effects of SGLT-2 inhibitors, including genital mycotic infections, slightly increased risk of UTI, hypersensitivity, hypotension, and hyperkalemia. Advised to maintain water intake of 8-10 cups per day. Advised we need to check chemistry panel at baseline and 2 weeks after starting. Discussed FDA warning reports of ketoacidosis associated with SGLT-2 inhibitors. Advised to seek immediate medical attention and stop the medication if symptoms such as difficulty breathing, nausea, vomiting, abdominal pain, confusion, and unusual fatigue/sleepiness. Discussed possible precipitating factors including major illness/reduced food and fluid intake (advised to stop under these circumstances), and reduced insulin dose. Discussed possible effects of increased fracture risk/decreased bone density. Discussed reports of increased risk of leg and foot amputations with canagliflozin and need to seek urgent care if developed new pain or tenderness, sores or ulcers, or infections in legs/feet.   Insulin   -- Reviewed logs/CGM:  Glucose improving.   Instructed to send glucose logs in 14 days.  Reach out to me sooner for any glucose <70 or consistently >180.  -- Link Dexcom to our account.   -- Medication Changes:   Metformin 1000mg twice daily  Mounjaro 7.5mg weekly on Wednesday THEN increase to 10mg weekly on 6/14/2023  Toujeo 50 units daily THEN reduce to 40 units when you increase Mounjaro   Novolog 10 units plus correction scale before meals only   Blood sugar 150 to 200 add 2 units  Blood sugar 201 to 250 add 4 units  Blood sugar 251 to 300 add 6 units  Blood sugar 301 to 350 add 8 units  Blood sugar greater than 350 add 10 units    Discussed SGLT2i - im concerned about the skin infections so will hold off.      -- Reviewed goals of therapy are to get the best control we can without  hypoglycemia.  -- Reviewed patient's current insulin regimen. Clarified proper insulin dose and timing in relation to meals, etc. Insulin injection sites and proper rotation instructed.    -- Advised frequent self blood glucose monitoring.  Patient encouraged to document glucose results and bring them to every clinic visit.  -- Hypoglycemia precautions discussed. Instructed on precautions before driving.    -- Call for Bg repeatedly < 90 or > 180.   -- Close adherence to lifestyle changes recommended.   -- Periodic follow ups for eye evaluations, foot care and dental care suggested.

## 2023-06-07 NOTE — PATIENT INSTRUCTIONS
Download Dexcom Clarity  Follow prompts in email to link Dexcom account  Message me when this is done     Metformin 1000mg twice daily  Mounjaro 7.5mg weekly on Wednesday THEN increase to 10mg weekly on 6/14/2023  Toujeo 50 units daily THEN reduce to 40 units when you increase Mounjaro   Novolog 10 units plus correction scale before meals only   Blood sugar 150 to 200 add 2 units  Blood sugar 201 to 250 add 4 units  Blood sugar 251 to 300 add 6 units  Blood sugar 301 to 350 add 8 units  Blood sugar greater than 350 add 10 units

## 2023-06-16 ENCOUNTER — PATIENT MESSAGE (OUTPATIENT)
Dept: ENDOCRINOLOGY | Facility: CLINIC | Age: 73
End: 2023-06-16
Payer: COMMERCIAL

## 2023-06-16 DIAGNOSIS — Z79.4 UNCONTROLLED TYPE 2 DIABETES MELLITUS WITH HYPERGLYCEMIA, WITH LONG-TERM CURRENT USE OF INSULIN: ICD-10-CM

## 2023-06-16 DIAGNOSIS — E11.00 TYPE II DIABETES MELLITUS WITH HYPEROSMOLARITY, UNCONTROLLED: ICD-10-CM

## 2023-06-16 DIAGNOSIS — E11.65 UNCONTROLLED TYPE 2 DIABETES MELLITUS WITH HYPERGLYCEMIA, WITH LONG-TERM CURRENT USE OF INSULIN: ICD-10-CM

## 2023-06-16 RX ORDER — TIRZEPATIDE 10 MG/.5ML
10 INJECTION, SOLUTION SUBCUTANEOUS
Qty: 4 PEN | Refills: 1 | Status: SHIPPED | OUTPATIENT
Start: 2023-06-16 | End: 2023-08-06 | Stop reason: SDUPTHER

## 2023-06-16 RX ORDER — INSULIN ASPART 100 [IU]/ML
10 INJECTION, SOLUTION INTRAVENOUS; SUBCUTANEOUS
Qty: 90 ML | Refills: 1 | Status: SHIPPED | OUTPATIENT
Start: 2023-06-16

## 2023-07-24 RX ORDER — METOPROLOL SUCCINATE 50 MG/1
50 TABLET, EXTENDED RELEASE ORAL DAILY
Qty: 90 TABLET | Refills: 0 | Status: SHIPPED | OUTPATIENT
Start: 2023-07-24 | End: 2024-01-24 | Stop reason: SDUPTHER

## 2023-08-06 DIAGNOSIS — E11.65 UNCONTROLLED TYPE 2 DIABETES MELLITUS WITH HYPERGLYCEMIA, WITH LONG-TERM CURRENT USE OF INSULIN: ICD-10-CM

## 2023-08-06 DIAGNOSIS — Z79.4 UNCONTROLLED TYPE 2 DIABETES MELLITUS WITH HYPERGLYCEMIA, WITH LONG-TERM CURRENT USE OF INSULIN: ICD-10-CM

## 2023-08-07 RX ORDER — TIRZEPATIDE 10 MG/.5ML
10 INJECTION, SOLUTION SUBCUTANEOUS
Qty: 4 PEN | Refills: 1 | Status: SHIPPED | OUTPATIENT
Start: 2023-08-07 | End: 2023-09-27 | Stop reason: SDUPTHER

## 2023-08-21 ENCOUNTER — HOSPITAL ENCOUNTER (OUTPATIENT)
Dept: RADIOLOGY | Facility: HOSPITAL | Age: 73
Discharge: HOME | End: 2023-08-21
Attending: ORTHOPAEDIC SURGERY
Payer: COMMERCIAL

## 2023-08-21 ENCOUNTER — OFFICE VISIT (OUTPATIENT)
Dept: ORTHOPEDICS | Facility: CLINIC | Age: 73
End: 2023-08-21
Payer: COMMERCIAL

## 2023-08-21 VITALS — HEIGHT: 69 IN | BODY MASS INDEX: 30.49 KG/M2

## 2023-08-21 DIAGNOSIS — M19.039 WRIST ARTHRITIS: ICD-10-CM

## 2023-08-21 DIAGNOSIS — M25.532 LEFT WRIST PAIN: ICD-10-CM

## 2023-08-21 DIAGNOSIS — M25.532 LEFT WRIST PAIN: Primary | ICD-10-CM

## 2023-08-21 PROCEDURE — 73100 X-RAY EXAM OF WRIST: CPT | Mod: 26,LT,, | Performed by: STUDENT IN AN ORGANIZED HEALTH CARE EDUCATION/TRAINING PROGRAM

## 2023-08-21 PROCEDURE — 99213 PR OFFICE/OUTPT VISIT, EST, LEVL III, 20-29 MIN: ICD-10-PCS | Mod: 25,S$GLB,, | Performed by: ORTHOPAEDIC SURGERY

## 2023-08-21 PROCEDURE — 99999 PR PBB SHADOW E&M-EST. PATIENT-LVL IV: ICD-10-PCS | Mod: PBBFAC,,, | Performed by: ORTHOPAEDIC SURGERY

## 2023-08-21 PROCEDURE — 20605 PR DRAIN/INJECT INTERMEDIATE JOINT/BURSA: ICD-10-PCS | Mod: LT,S$GLB,, | Performed by: ORTHOPAEDIC SURGERY

## 2023-08-21 PROCEDURE — 1125F PR PAIN SEVERITY QUANTIFIED, PAIN PRESENT: ICD-10-PCS | Mod: CPTII,S$GLB,, | Performed by: ORTHOPAEDIC SURGERY

## 2023-08-21 PROCEDURE — 3008F BODY MASS INDEX DOCD: CPT | Mod: CPTII,S$GLB,, | Performed by: ORTHOPAEDIC SURGERY

## 2023-08-21 PROCEDURE — 3288F FALL RISK ASSESSMENT DOCD: CPT | Mod: CPTII,S$GLB,, | Performed by: ORTHOPAEDIC SURGERY

## 2023-08-21 PROCEDURE — 4010F PR ACE/ARB THEARPY RXD/TAKEN: ICD-10-PCS | Mod: CPTII,S$GLB,, | Performed by: ORTHOPAEDIC SURGERY

## 2023-08-21 PROCEDURE — 1101F PR PT FALLS ASSESS DOC 0-1 FALLS W/OUT INJ PAST YR: ICD-10-PCS | Mod: CPTII,S$GLB,, | Performed by: ORTHOPAEDIC SURGERY

## 2023-08-21 PROCEDURE — 20605 DRAIN/INJ JOINT/BURSA W/O US: CPT | Mod: LT,S$GLB,, | Performed by: ORTHOPAEDIC SURGERY

## 2023-08-21 PROCEDURE — 3008F PR BODY MASS INDEX (BMI) DOCUMENTED: ICD-10-PCS | Mod: CPTII,S$GLB,, | Performed by: ORTHOPAEDIC SURGERY

## 2023-08-21 PROCEDURE — 4010F ACE/ARB THERAPY RXD/TAKEN: CPT | Mod: CPTII,S$GLB,, | Performed by: ORTHOPAEDIC SURGERY

## 2023-08-21 PROCEDURE — 1159F PR MEDICATION LIST DOCUMENTED IN MEDICAL RECORD: ICD-10-PCS | Mod: CPTII,S$GLB,, | Performed by: ORTHOPAEDIC SURGERY

## 2023-08-21 PROCEDURE — 3288F PR FALLS RISK ASSESSMENT DOCUMENTED: ICD-10-PCS | Mod: CPTII,S$GLB,, | Performed by: ORTHOPAEDIC SURGERY

## 2023-08-21 PROCEDURE — 99213 OFFICE O/P EST LOW 20 MIN: CPT | Mod: 25,S$GLB,, | Performed by: ORTHOPAEDIC SURGERY

## 2023-08-21 PROCEDURE — 1101F PT FALLS ASSESS-DOCD LE1/YR: CPT | Mod: CPTII,S$GLB,, | Performed by: ORTHOPAEDIC SURGERY

## 2023-08-21 PROCEDURE — 73100 XR WRIST 2 VIEW LEFT: ICD-10-PCS | Mod: 26,LT,, | Performed by: STUDENT IN AN ORGANIZED HEALTH CARE EDUCATION/TRAINING PROGRAM

## 2023-08-21 PROCEDURE — 99999 PR PBB SHADOW E&M-EST. PATIENT-LVL IV: CPT | Mod: PBBFAC,,, | Performed by: ORTHOPAEDIC SURGERY

## 2023-08-21 PROCEDURE — 3044F PR MOST RECENT HEMOGLOBIN A1C LEVEL <7.0%: ICD-10-PCS | Mod: CPTII,S$GLB,, | Performed by: ORTHOPAEDIC SURGERY

## 2023-08-21 PROCEDURE — 1125F AMNT PAIN NOTED PAIN PRSNT: CPT | Mod: CPTII,S$GLB,, | Performed by: ORTHOPAEDIC SURGERY

## 2023-08-21 PROCEDURE — 3044F HG A1C LEVEL LT 7.0%: CPT | Mod: CPTII,S$GLB,, | Performed by: ORTHOPAEDIC SURGERY

## 2023-08-21 PROCEDURE — 1159F MED LIST DOCD IN RCRD: CPT | Mod: CPTII,S$GLB,, | Performed by: ORTHOPAEDIC SURGERY

## 2023-08-21 PROCEDURE — 73100 X-RAY EXAM OF WRIST: CPT | Mod: TC,PN,LT

## 2023-08-21 RX ORDER — TRIAMCINOLONE ACETONIDE 40 MG/ML
20 INJECTION, SUSPENSION INTRA-ARTICULAR; INTRAMUSCULAR
Status: COMPLETED | OUTPATIENT
Start: 2023-08-21 | End: 2023-08-21

## 2023-08-21 RX ADMIN — TRIAMCINOLONE ACETONIDE 20 MG: 40 INJECTION, SUSPENSION INTRA-ARTICULAR; INTRAMUSCULAR at 03:08

## 2023-08-21 NOTE — PROGRESS NOTES
Subjective:      Patient ID: Patria Nieto is a 73 y.o. male.  Chief Complaint: Wrist Pain (left )      HPI  Patria Nieto is a  73 y.o. male presenting today for follow up of left hand symptoms.  He reports that he is having some pain in the left wrist  The triggering of the middle finger resolved after injection   No numbness or tingling reported  Most of the pain on the dorsum of the left wrist.    Review of patient's allergies indicates:  No Known Allergies      Current Outpatient Medications   Medication Sig Dispense Refill    aspirin 81 MG Chew Take 81 mg by mouth once daily.      atorvastatin (LIPITOR) 40 MG tablet Take 1 tablet (40 mg total) by mouth every evening. 90 tablet 3    blood-glucose meter,continuous (DEXCOM G7 ) Misc Use as directed 1 each 3    blood-glucose sensor (DEXCOM G6 SENSOR) Nikki 3 each by Misc.(Non-Drug; Combo Route) route continuous prn. Change every 10 days. 3 each PRN    blood-glucose sensor (DEXCOM G7 SENSOR) Nikki Change every 10 days. 3 each 3    blood-glucose transmitter (DEXCOM G6 TRANSMITTER) Nikki 1 each by Misc.(Non-Drug; Combo Route) route continuous prn. 1 each PRN    cyanocobalamin (VITAMIN B-12) 1000 MCG tablet Take 1 tablet (1,000 mcg total) by mouth once daily. 90 tablet 5    insulin aspart U-100 (NOVOLOG FLEXPEN U-100 INSULIN) 100 unit/mL (3 mL) InPn pen Inject 10 Units into the skin 3 (three) times daily with meals. WITH CORRECTION SCALE (MAX 60 UNITS PER DAY) 90 mL 1    insulin glargine, TOUJEO, (TOUJEO) 300 unit/mL (1.5 mL) InPn pen Inject 50 Units into the skin once daily. Adjust to max 70 units per MD instructions 9 pen 3    metFORMIN (GLUCOPHAGE) 1000 MG tablet Take 1 tablet (1,000 mg total) by mouth 2 (two) times daily with meals. 180 tablet 0    metoprolol succinate (TOPROL-XL) 50 MG 24 hr tablet Take 1 tablet (50 mg total) by mouth once daily. 90 tablet 0    mupirocin (BACTROBAN) 2 % ointment Apply topically 3 (three) times daily. 30 g 11    pen needle,  "diabetic (BD ULTRA-FINE MINI PEN NEEDLE) 31 gauge x 3/16" Ndle Uses 5 times a day. 200 each 6    ramipriL (ALTACE) 2.5 MG capsule Take 1 capsule (2.5 mg total) by mouth once daily. 90 capsule 1    tirzepatide (MOUNJARO) 10 mg/0.5 mL PnIj Inject 10 mg into the skin every 7 days. 4 pen 1    glucose 4 GM chewable tablet Take 4 tablets (16 g total) by mouth as needed for Low blood sugar (If having symptoms of blurry vision, palpitations, confusion, shakiness.  Please check sugars and if sugar below 70 please take 4 tablets and re-check sugar everry 15 minutes until sugars are above 70 and symptoms resolve.). (Patient not taking: Reported on 3/16/2023) 50 tablet 12     No current facility-administered medications for this visit.     Facility-Administered Medications Ordered in Other Visits   Medication Dose Route Frequency Provider Last Rate Last Admin    sodium chloride 0.9% bolus 1,000 mL  1,000 mL Intravenous Once Lindsay Ruleas, NP           Past Medical History:   Diagnosis Date    Colon polyp 06/22/2007    Diabetes mellitus type II     Hx MRSA infection     Hypertension     MRSA infection 2002    Supraventricular tachycardia        Past Surgical History:   Procedure Laterality Date    ABLATION N/A 4/12/2021    Procedure: Ablation;  Surgeon: Geovanni Chowdary MD;  Location: [facility] EP LAB;  Service: Cardiology;  Laterality: N/A;  SVT, RFA, LISA, anes, DM, 3prep    CARDIAC CATHETERIZATION      COLONOSCOPY N/A 12/19/2022    Procedure: COLONOSCOPY;  Surgeon: Jimmie Macias MD;  Location: [facility] ENDO ([address] [place] [address]);  Service: Endoscopy;  Laterality: N/A;  PATIENT REQUEST MAIN CAMPUS and NO SEDATION  Dr. Macias pt but cannot put name on the case  instructions via portal - sm    EXCISION OF GANGLION OF WRIST Left 12/17/2021    Procedure: EXCISION, GANGLION CYST, WRIST;  Surgeon: Micheal Scanlon Jr., MD;  Location: [facility] OR;  Service: Orthopedics;  Laterality: Left;    none         OBJECTIVE:   PHYSICAL EXAM:  Height: 5' 9" " "(175.3 cm)    Vitals:    08/21/23 1446   Height: 5' 9" (1.753 m)   PainSc:   5   PainLoc: Wrist     Ortho/SPM Exam  Examination left wrist mild tenderness over the dorsal radial side of the wrist slight swelling range of motion full some pain with flexion and extension  Range of motion fingers full no triggering   Tinel sign negative       RADIOGRAPHS:  AP lateral x-ray left wrist show some degenerative changes at the radioscaphoid articulation  Comments: I have personally reviewed the imaging and I agree with the above radiologist's report.    ASSESSMENT/PLAN:     IMPRESSION:  Radioscaphoid arthritis left wrist    PLAN:  I explained the nature of the problem to the patient   Recommended injection  After pause for time-out identified the left wrist injected radioscaphoid articulation with combination Kenalog 20 mg 0.5 cc xylocaine sterile technique  Tolerated the procedure well without complication  Recommended a wrist brace part-time use Advil or Motrin by mouth    FOLLOW UP:  6-8 weeks if symptoms persist we may consider surgical treatment    Disclaimer: This note has been generated using voice-recognition software. There may be typographical errors that have been missed during proof-reading.     "

## 2023-08-23 ENCOUNTER — LAB VISIT (OUTPATIENT)
Dept: LAB | Facility: HOSPITAL | Age: 73
End: 2023-08-23
Attending: FAMILY MEDICINE
Payer: COMMERCIAL

## 2023-08-23 DIAGNOSIS — D69.6 THROMBOCYTOPENIA: ICD-10-CM

## 2023-08-23 DIAGNOSIS — E53.8 B12 DEFICIENCY: ICD-10-CM

## 2023-08-23 DIAGNOSIS — E55.9 VITAMIN D DEFICIENCY: ICD-10-CM

## 2023-08-23 DIAGNOSIS — E78.5 HYPERLIPIDEMIA LDL GOAL <100: ICD-10-CM

## 2023-08-23 DIAGNOSIS — I10 ESSENTIAL HYPERTENSION: ICD-10-CM

## 2023-08-23 DIAGNOSIS — E78.49 OTHER HYPERLIPIDEMIA: ICD-10-CM

## 2023-08-23 DIAGNOSIS — Z00.00 VISIT FOR WELL MAN HEALTH CHECK: ICD-10-CM

## 2023-08-23 LAB
25(OH)D3+25(OH)D2 SERPL-MCNC: 28 NG/ML (ref 30–96)
ALBUMIN SERPL BCP-MCNC: 4 G/DL (ref 3.5–5.2)
ALP SERPL-CCNC: 55 U/L (ref 55–135)
ALT SERPL W/O P-5'-P-CCNC: 22 U/L (ref 10–44)
ANION GAP SERPL CALC-SCNC: 7 MMOL/L (ref 8–16)
AST SERPL-CCNC: 13 U/L (ref 10–40)
BASOPHILS # BLD AUTO: 0.02 K/UL (ref 0–0.2)
BASOPHILS NFR BLD: 0.2 % (ref 0–1.9)
BILIRUB SERPL-MCNC: 1.8 MG/DL (ref 0.1–1)
BUN SERPL-MCNC: 19 MG/DL (ref 8–23)
CALCIUM SERPL-MCNC: 10.5 MG/DL (ref 8.7–10.5)
CHLORIDE SERPL-SCNC: 113 MMOL/L (ref 95–110)
CHOLEST SERPL-MCNC: 123 MG/DL (ref 120–199)
CHOLEST/HDLC SERPL: 2.9 {RATIO} (ref 2–5)
CO2 SERPL-SCNC: 23 MMOL/L (ref 23–29)
CREAT SERPL-MCNC: 1.3 MG/DL (ref 0.5–1.4)
DIFFERENTIAL METHOD: ABNORMAL
EOSINOPHIL # BLD AUTO: 0 K/UL (ref 0–0.5)
EOSINOPHIL NFR BLD: 0.1 % (ref 0–8)
ERYTHROCYTE [DISTWIDTH] IN BLOOD BY AUTOMATED COUNT: 13.2 % (ref 11.5–14.5)
EST. GFR  (NO RACE VARIABLE): 58.4 ML/MIN/1.73 M^2
GLUCOSE SERPL-MCNC: 188 MG/DL (ref 70–110)
HCT VFR BLD AUTO: 47.3 % (ref 40–54)
HDLC SERPL-MCNC: 43 MG/DL (ref 40–75)
HDLC SERPL: 35 % (ref 20–50)
HGB BLD-MCNC: 15.7 G/DL (ref 14–18)
IMM GRANULOCYTES # BLD AUTO: 0.06 K/UL (ref 0–0.04)
IMM GRANULOCYTES NFR BLD AUTO: 0.5 % (ref 0–0.5)
LDLC SERPL CALC-MCNC: 62.8 MG/DL (ref 63–159)
LYMPHOCYTES # BLD AUTO: 0.9 K/UL (ref 1–4.8)
LYMPHOCYTES NFR BLD: 7.3 % (ref 18–48)
MCH RBC QN AUTO: 33.1 PG (ref 27–31)
MCHC RBC AUTO-ENTMCNC: 33.2 G/DL (ref 32–36)
MCV RBC AUTO: 100 FL (ref 82–98)
MONOCYTES # BLD AUTO: 0.7 K/UL (ref 0.3–1)
MONOCYTES NFR BLD: 5.8 % (ref 4–15)
NEUTROPHILS # BLD AUTO: 10 K/UL (ref 1.8–7.7)
NEUTROPHILS NFR BLD: 86.1 % (ref 38–73)
NONHDLC SERPL-MCNC: 80 MG/DL
NRBC BLD-RTO: 0 /100 WBC
PLATELET # BLD AUTO: 165 K/UL (ref 150–450)
PMV BLD AUTO: 13 FL (ref 9.2–12.9)
POTASSIUM SERPL-SCNC: 4.9 MMOL/L (ref 3.5–5.1)
PROT SERPL-MCNC: 6.7 G/DL (ref 6–8.4)
RBC # BLD AUTO: 4.74 M/UL (ref 4.6–6.2)
SODIUM SERPL-SCNC: 143 MMOL/L (ref 136–145)
TRIGL SERPL-MCNC: 86 MG/DL (ref 30–150)
TSH SERPL DL<=0.005 MIU/L-ACNC: 2.88 UIU/ML (ref 0.4–4)
VIT B12 SERPL-MCNC: 1014 PG/ML (ref 210–950)
WBC # BLD AUTO: 11.64 K/UL (ref 3.9–12.7)

## 2023-08-23 PROCEDURE — 80061 LIPID PANEL: CPT | Performed by: FAMILY MEDICINE

## 2023-08-23 PROCEDURE — 82306 VITAMIN D 25 HYDROXY: CPT | Performed by: FAMILY MEDICINE

## 2023-08-23 PROCEDURE — 80053 COMPREHEN METABOLIC PANEL: CPT | Performed by: FAMILY MEDICINE

## 2023-08-23 PROCEDURE — 82607 VITAMIN B-12: CPT | Performed by: FAMILY MEDICINE

## 2023-08-23 PROCEDURE — 85025 COMPLETE CBC W/AUTO DIFF WBC: CPT | Performed by: FAMILY MEDICINE

## 2023-08-23 PROCEDURE — 36415 COLL VENOUS BLD VENIPUNCTURE: CPT | Mod: PO | Performed by: FAMILY MEDICINE

## 2023-08-23 PROCEDURE — 84443 ASSAY THYROID STIM HORMONE: CPT | Performed by: FAMILY MEDICINE

## 2023-08-25 ENCOUNTER — OFFICE VISIT (OUTPATIENT)
Dept: FAMILY MEDICINE | Facility: CLINIC | Age: 73
End: 2023-08-25
Payer: COMMERCIAL

## 2023-08-25 ENCOUNTER — PATIENT MESSAGE (OUTPATIENT)
Dept: ENDOCRINOLOGY | Facility: CLINIC | Age: 73
End: 2023-08-25
Payer: COMMERCIAL

## 2023-08-25 VITALS
DIASTOLIC BLOOD PRESSURE: 76 MMHG | SYSTOLIC BLOOD PRESSURE: 122 MMHG | OXYGEN SATURATION: 98 % | HEART RATE: 92 BPM | HEIGHT: 69 IN | WEIGHT: 203.94 LBS | BODY MASS INDEX: 30.21 KG/M2

## 2023-08-25 DIAGNOSIS — B35.3 TINEA PEDIS OF BOTH FEET: ICD-10-CM

## 2023-08-25 DIAGNOSIS — Z00.00 VISIT FOR WELL MAN HEALTH CHECK: Primary | ICD-10-CM

## 2023-08-25 DIAGNOSIS — I10 ESSENTIAL HYPERTENSION: ICD-10-CM

## 2023-08-25 DIAGNOSIS — E78.5 HYPERLIPIDEMIA LDL GOAL <100: ICD-10-CM

## 2023-08-25 DIAGNOSIS — D69.6 THROMBOCYTOPENIA: Chronic | ICD-10-CM

## 2023-08-25 DIAGNOSIS — I15.2 HYPERTENSION ASSOCIATED WITH DIABETES: ICD-10-CM

## 2023-08-25 DIAGNOSIS — E11.65 TYPE 2 DIABETES MELLITUS WITH HYPERGLYCEMIA, WITH LONG-TERM CURRENT USE OF INSULIN: ICD-10-CM

## 2023-08-25 DIAGNOSIS — E11.59 HYPERTENSION ASSOCIATED WITH DIABETES: ICD-10-CM

## 2023-08-25 DIAGNOSIS — Z79.4 TYPE 2 DIABETES MELLITUS WITH HYPERGLYCEMIA, WITH LONG-TERM CURRENT USE OF INSULIN: ICD-10-CM

## 2023-08-25 DIAGNOSIS — E78.49 OTHER HYPERLIPIDEMIA: ICD-10-CM

## 2023-08-25 DIAGNOSIS — L30.4 INTERTRIGO: ICD-10-CM

## 2023-08-25 DIAGNOSIS — E11.40 NEUROPATHY DUE TO TYPE 2 DIABETES MELLITUS: ICD-10-CM

## 2023-08-25 PROCEDURE — 1159F PR MEDICATION LIST DOCUMENTED IN MEDICAL RECORD: ICD-10-PCS | Mod: CPTII,S$GLB,, | Performed by: FAMILY MEDICINE

## 2023-08-25 PROCEDURE — 3044F PR MOST RECENT HEMOGLOBIN A1C LEVEL <7.0%: ICD-10-PCS | Mod: CPTII,S$GLB,, | Performed by: FAMILY MEDICINE

## 2023-08-25 PROCEDURE — 3044F HG A1C LEVEL LT 7.0%: CPT | Mod: CPTII,S$GLB,, | Performed by: FAMILY MEDICINE

## 2023-08-25 PROCEDURE — 1159F MED LIST DOCD IN RCRD: CPT | Mod: CPTII,S$GLB,, | Performed by: FAMILY MEDICINE

## 2023-08-25 PROCEDURE — 3008F BODY MASS INDEX DOCD: CPT | Mod: CPTII,S$GLB,, | Performed by: FAMILY MEDICINE

## 2023-08-25 PROCEDURE — 3078F DIAST BP <80 MM HG: CPT | Mod: CPTII,S$GLB,, | Performed by: FAMILY MEDICINE

## 2023-08-25 PROCEDURE — 1101F PT FALLS ASSESS-DOCD LE1/YR: CPT | Mod: CPTII,S$GLB,, | Performed by: FAMILY MEDICINE

## 2023-08-25 PROCEDURE — 99397 PR PREVENTIVE VISIT,EST,65 & OVER: ICD-10-PCS | Mod: S$GLB,,, | Performed by: FAMILY MEDICINE

## 2023-08-25 PROCEDURE — 3074F SYST BP LT 130 MM HG: CPT | Mod: CPTII,S$GLB,, | Performed by: FAMILY MEDICINE

## 2023-08-25 PROCEDURE — 99999 PR PBB SHADOW E&M-EST. PATIENT-LVL III: ICD-10-PCS | Mod: PBBFAC,,, | Performed by: FAMILY MEDICINE

## 2023-08-25 PROCEDURE — 3078F PR MOST RECENT DIASTOLIC BLOOD PRESSURE < 80 MM HG: ICD-10-PCS | Mod: CPTII,S$GLB,, | Performed by: FAMILY MEDICINE

## 2023-08-25 PROCEDURE — 1101F PR PT FALLS ASSESS DOC 0-1 FALLS W/OUT INJ PAST YR: ICD-10-PCS | Mod: CPTII,S$GLB,, | Performed by: FAMILY MEDICINE

## 2023-08-25 PROCEDURE — 3074F PR MOST RECENT SYSTOLIC BLOOD PRESSURE < 130 MM HG: ICD-10-PCS | Mod: CPTII,S$GLB,, | Performed by: FAMILY MEDICINE

## 2023-08-25 PROCEDURE — 1160F PR REVIEW ALL MEDS BY PRESCRIBER/CLIN PHARMACIST DOCUMENTED: ICD-10-PCS | Mod: CPTII,S$GLB,, | Performed by: FAMILY MEDICINE

## 2023-08-25 PROCEDURE — 3288F FALL RISK ASSESSMENT DOCD: CPT | Mod: CPTII,S$GLB,, | Performed by: FAMILY MEDICINE

## 2023-08-25 PROCEDURE — 4010F ACE/ARB THERAPY RXD/TAKEN: CPT | Mod: CPTII,S$GLB,, | Performed by: FAMILY MEDICINE

## 2023-08-25 PROCEDURE — 1126F AMNT PAIN NOTED NONE PRSNT: CPT | Mod: CPTII,S$GLB,, | Performed by: FAMILY MEDICINE

## 2023-08-25 PROCEDURE — 99397 PER PM REEVAL EST PAT 65+ YR: CPT | Mod: S$GLB,,, | Performed by: FAMILY MEDICINE

## 2023-08-25 PROCEDURE — 99999 PR PBB SHADOW E&M-EST. PATIENT-LVL III: CPT | Mod: PBBFAC,,, | Performed by: FAMILY MEDICINE

## 2023-08-25 PROCEDURE — 4010F PR ACE/ARB THEARPY RXD/TAKEN: ICD-10-PCS | Mod: CPTII,S$GLB,, | Performed by: FAMILY MEDICINE

## 2023-08-25 PROCEDURE — 3008F PR BODY MASS INDEX (BMI) DOCUMENTED: ICD-10-PCS | Mod: CPTII,S$GLB,, | Performed by: FAMILY MEDICINE

## 2023-08-25 PROCEDURE — 3288F PR FALLS RISK ASSESSMENT DOCUMENTED: ICD-10-PCS | Mod: CPTII,S$GLB,, | Performed by: FAMILY MEDICINE

## 2023-08-25 PROCEDURE — 1126F PR PAIN SEVERITY QUANTIFIED, NO PAIN PRESENT: ICD-10-PCS | Mod: CPTII,S$GLB,, | Performed by: FAMILY MEDICINE

## 2023-08-25 PROCEDURE — 1160F RVW MEDS BY RX/DR IN RCRD: CPT | Mod: CPTII,S$GLB,, | Performed by: FAMILY MEDICINE

## 2023-08-25 RX ORDER — GABAPENTIN 100 MG/1
100 CAPSULE ORAL NIGHTLY
Qty: 90 CAPSULE | Refills: 3 | Status: SHIPPED | OUTPATIENT
Start: 2023-08-25 | End: 2024-03-01

## 2023-08-25 RX ORDER — FLUCONAZOLE 200 MG/1
200 TABLET ORAL WEEKLY
Qty: 4 TABLET | Refills: 2 | Status: SHIPPED | OUTPATIENT
Start: 2023-08-25 | End: 2023-11-23

## 2023-08-25 RX ORDER — CLOTRIMAZOLE AND BETAMETHASONE DIPROPIONATE 10; .64 MG/G; MG/G
CREAM TOPICAL 2 TIMES DAILY
Qty: 45 G | Refills: 0 | Status: SHIPPED | OUTPATIENT
Start: 2023-08-25 | End: 2023-09-29 | Stop reason: SDUPTHER

## 2023-08-25 RX ORDER — ATORVASTATIN CALCIUM 40 MG/1
40 TABLET, FILM COATED ORAL NIGHTLY
Qty: 90 TABLET | Refills: 3 | Status: SHIPPED | OUTPATIENT
Start: 2023-08-25 | End: 2023-10-20 | Stop reason: SDUPTHER

## 2023-08-25 NOTE — PATIENT INSTRUCTIONS
Further diabetes management per endocrine  May need to increase insulin again?   Message them.      Continue ramipril. But at 2.5 mg. Can try to decrease or stop? Wants to try EOD for now  Metoprolol per EP at 50 mg XR     Continue atorvastatin 40 mg     You have low B12. Goal >400. Start daily OTC B12 supplementation at 1000 mcg      Increase water intake!!!! Especially with working and running outside    Try gabapentin nightly at 100 mg  Can increase up to 300 mg nightly  For further increase, message me.     Tinea pedis and intertrigo  Diflucan weekly  Foot cream    You have low vitamin D, start taking an OTC vitamin D supplementation at 1000 IU daily.     See outside eye doctor, send me records.      F/u 6 months. Labs prior.

## 2023-08-25 NOTE — Clinical Note
Hi,  He reports sugars are trending up Do you want to change his insulin before his apt to see you?  Thanks!  JM

## 2023-08-25 NOTE — PROGRESS NOTES
Subjective:       Patient ID: Patria Nieto is a 73 y.o. male.    Chief Complaint: Annual Exam      Patria Nieto is a 73 y.o. male who presents today to establish care or for an annual exam    Exercise: training for a 5K in 6 weeks!!  Has lost weight.     Labs: ordered    Colon Cancer Screening: Last Colonoscopy completed on 12/19/2022     DM: managed by endocrine.  reports sugars have been trending up. Toujeo was decreased due to well controlled a1c. Message sent to endocrine.   SVT: saw EP. BB changed to once daily.   DLD: on atorvastatin  HTN: taking ramipril 2.5 mg and metoprolol 50 mg Xr once daily. BP well controlled.   Thrombocytopenia: resolved on recent labs.     PMHx: reviewed in EMR and updated  Meds: reviewed in EMR and updated  Shx: reviewed in EMR and updated  FMHx: reviewed in EMR and updated  Social: reviewed in EMR and updated        Review of Systems   Constitutional:  Negative for chills and fever.   Cardiovascular:         Leg pain, but not with activity.    Gastrointestinal:  Negative for nausea and vomiting.   Neurological:  Negative for dizziness, light-headedness and headaches.         Health Maintenance Due   Topic Date Due    Shingles Vaccine (1 of 2) Never done    COVID-19 Vaccine (3 - Moderna series) 05/07/2021    Eye Exam  08/02/2023    TETANUS VACCINE  08/05/2023     Immunization History   Administered Date(s) Administered    COVID-19, MRNA, LN-S, PF (MODERNA FULL 0.5 ML DOSE) 02/12/2021, 03/12/2021    Influenza (FLUAD) - Quadrivalent - Adjuvanted - PF *Preferred* (65+) 11/18/2021    Influenza - High Dose - PF (65 years and older) 11/16/2017    Pneumococcal Conjugate - 13 Valent 08/10/2020    Pneumococcal Conjugate - 20 Valent 02/23/2023    Pneumococcal Polysaccharide - 23 Valent 01/15/2010    Tdap 08/05/2013         Results for orders placed or performed in visit on 08/23/23   CBC Auto Differential   Result Value Ref Range    WBC 11.64 3.90 - 12.70 K/uL    RBC 4.74 4.60 - 6.20 M/uL     Hemoglobin 15.7 14.0 - 18.0 g/dL    Hematocrit 47.3 40.0 - 54.0 %     (H) 82 - 98 fL    MCH 33.1 (H) 27.0 - 31.0 pg    MCHC 33.2 32.0 - 36.0 g/dL    RDW 13.2 11.5 - 14.5 %    Platelets 165 150 - 450 K/uL    MPV 13.0 (H) 9.2 - 12.9 fL    Immature Granulocytes 0.5 0.0 - 0.5 %    Gran # (ANC) 10.0 (H) 1.8 - 7.7 K/uL    Immature Grans (Abs) 0.06 (H) 0.00 - 0.04 K/uL    Lymph # 0.9 (L) 1.0 - 4.8 K/uL    Mono # 0.7 0.3 - 1.0 K/uL    Eos # 0.0 0.0 - 0.5 K/uL    Baso # 0.02 0.00 - 0.20 K/uL    nRBC 0 0 /100 WBC    Gran % 86.1 (H) 38.0 - 73.0 %    Lymph % 7.3 (L) 18.0 - 48.0 %    Mono % 5.8 4.0 - 15.0 %    Eosinophil % 0.1 0.0 - 8.0 %    Basophil % 0.2 0.0 - 1.9 %    Differential Method Automated    Comprehensive Metabolic Panel   Result Value Ref Range    Sodium 143 136 - 145 mmol/L    Potassium 4.9 3.5 - 5.1 mmol/L    Chloride 113 (H) 95 - 110 mmol/L    CO2 23 23 - 29 mmol/L    Glucose 188 (H) 70 - 110 mg/dL    BUN 19 8 - 23 mg/dL    Creatinine 1.3 0.5 - 1.4 mg/dL    Calcium 10.5 8.7 - 10.5 mg/dL    Total Protein 6.7 6.0 - 8.4 g/dL    Albumin 4.0 3.5 - 5.2 g/dL    Total Bilirubin 1.8 (H) 0.1 - 1.0 mg/dL    Alkaline Phosphatase 55 55 - 135 U/L    AST 13 10 - 40 U/L    ALT 22 10 - 44 U/L    eGFR 58.4 (A) >60 mL/min/1.73 m^2    Anion Gap 7 (L) 8 - 16 mmol/L   Lipid Panel   Result Value Ref Range    Cholesterol 123 120 - 199 mg/dL    Triglycerides 86 30 - 150 mg/dL    HDL 43 40 - 75 mg/dL    LDL Cholesterol 62.8 (L) 63.0 - 159.0 mg/dL    HDL/Cholesterol Ratio 35.0 20.0 - 50.0 %    Total Cholesterol/HDL Ratio 2.9 2.0 - 5.0    Non-HDL Cholesterol 80 mg/dL   TSH   Result Value Ref Range    TSH 2.880 0.400 - 4.000 uIU/mL   Vitamin B12   Result Value Ref Range    Vitamin B-12 1014 (H) 210 - 950 pg/mL   Vitamin D   Result Value Ref Range    Vit D, 25-Hydroxy 28 (L) 30 - 96 ng/mL       Objective:     Vitals:    08/25/23 0923   BP: 122/76   BP Location: Left arm   Patient Position: Sitting   BP Method: Large (Manual)   Pulse:  "92   SpO2: 98%   Weight: 92.5 kg (203 lb 14.8 oz)   Height: 5' 9" (1.753 m)        Physical Exam  Constitutional:       General: He is not in acute distress.     Appearance: He is obese. He is not ill-appearing, toxic-appearing or diaphoretic.   Cardiovascular:      Rate and Rhythm: Normal rate and regular rhythm.   Pulmonary:      Effort: Pulmonary effort is normal.      Breath sounds: Normal breath sounds.   Abdominal:      Palpations: Abdomen is soft.      Tenderness: There is no abdominal tenderness.   Feet:      Right foot:      Skin integrity: Dry skin present.      Toenail Condition: Fungal disease present.     Left foot:      Skin integrity: Dry skin present.      Toenail Condition: Fungal disease present.     Comments: Decreased sensation around toes BL, but sensation noted in all locations of testing.     Tinea pedis noted BL  Intertrigo noted  Neurological:      General: No focal deficit present.      Mental Status: He is alert.   Psychiatric:         Mood and Affect: Mood normal.         Behavior: Behavior normal.         Thought Content: Thought content normal.         Judgment: Judgment normal.         Assessment:       1. Visit for well man health check    2. Type 2 diabetes mellitus with hyperglycemia, with long-term current use of insulin    3. BMI 30.0-30.9,adult    4. Essential hypertension    5. Hypertension associated with diabetes    6. Other hyperlipidemia    7. Thrombocytopenia    8. Hyperlipidemia LDL goal <100    9. Neuropathy due to type 2 diabetes mellitus    10. Intertrigo    11. Tinea pedis of both feet        Plan:         Further diabetes management per endocrine  May need to increase insulin again?   Messaged them.      Continue ramipril. But at 2.5 mg. Can try to decrease or stop? Wants to try EOD for now  Metoprolol per EP at 50 mg XR     Continue atorvastatin 40 mg     You have low B12. Goal >400. Start daily OTC B12 supplementation at 1000 mcg      Increase water intake!!!! " Especially with working and running outside    Try gabapentin nightly at 100 mg  Can increase up to 300 mg nightly  For further increase, message me.     Tinea pedis and intertrigo  Diflucan weekly  Foot cream    You have low vitamin D, start taking an OTC vitamin D supplementation at 1000 IU daily.     See outside eye doctor, send me records.      F/u 6 months. Labs prior.       Visit for [facility] health check    Type 2 diabetes mellitus with hyperglycemia, with long-term current use of insulin  -     CBC Auto Differential; Future; Expected date: 08/25/2023  -     Comprehensive Metabolic Panel; Future; Expected date: 08/25/2023  -     Hemoglobin A1C; Future; Expected date: 08/25/2023    BMI 30.0-30.9,adult    Essential hypertension    Hypertension associated with diabetes    Other hyperlipidemia  -     atorvastatin (LIPITOR) 40 MG tablet; Take 1 tablet (40 mg total) by mouth every evening.  Dispense: 90 tablet; Refill: 3    Thrombocytopenia    Hyperlipidemia LDL goal <100  -     atorvastatin (LIPITOR) 40 MG tablet; Take 1 tablet (40 mg total) by mouth every evening.  Dispense: 90 tablet; Refill: 3    Neuropathy due to type 2 diabetes mellitus  -     gabapentin (NEURONTIN) 100 MG capsule; Take 1 capsule (100 mg total) by mouth every evening.  Dispense: 90 capsule; Refill: 3    Intertrigo  -     fluconazole (DIFLUCAN) 200 MG Tab; Take 1 tablet (200 mg total) by mouth once a week.  Dispense: 4 tablet; Refill: 2    Tinea pedis of both feet  -     clotrimazole-betamethasone 1-0.05% (LOTRISONE) cream; Apply topically 2 (two) times daily.  Dispense: 45 g; Refill: 0

## 2023-09-27 DIAGNOSIS — E11.65 UNCONTROLLED TYPE 2 DIABETES MELLITUS WITH HYPERGLYCEMIA, WITH LONG-TERM CURRENT USE OF INSULIN: ICD-10-CM

## 2023-09-27 DIAGNOSIS — Z79.4 UNCONTROLLED TYPE 2 DIABETES MELLITUS WITH HYPERGLYCEMIA, WITH LONG-TERM CURRENT USE OF INSULIN: ICD-10-CM

## 2023-09-28 RX ORDER — TIRZEPATIDE 10 MG/.5ML
10 INJECTION, SOLUTION SUBCUTANEOUS
Qty: 4 PEN | Refills: 1 | Status: SHIPPED | OUTPATIENT
Start: 2023-09-28 | End: 2023-12-29 | Stop reason: SDUPTHER

## 2023-09-29 DIAGNOSIS — B35.3 TINEA PEDIS OF BOTH FEET: ICD-10-CM

## 2023-09-29 DIAGNOSIS — E11.65 UNCONTROLLED TYPE 2 DIABETES MELLITUS WITH HYPERGLYCEMIA, WITH LONG-TERM CURRENT USE OF INSULIN: ICD-10-CM

## 2023-09-29 DIAGNOSIS — Z79.4 UNCONTROLLED TYPE 2 DIABETES MELLITUS WITH HYPERGLYCEMIA, WITH LONG-TERM CURRENT USE OF INSULIN: ICD-10-CM

## 2023-09-29 RX ORDER — CLOTRIMAZOLE AND BETAMETHASONE DIPROPIONATE 10; .64 MG/G; MG/G
CREAM TOPICAL 2 TIMES DAILY
Qty: 45 G | Refills: 0 | Status: SHIPPED | OUTPATIENT
Start: 2023-09-29 | End: 2024-03-01 | Stop reason: SDUPTHER

## 2023-10-04 ENCOUNTER — LAB VISIT (OUTPATIENT)
Dept: LAB | Facility: HOSPITAL | Age: 73
End: 2023-10-04
Attending: NURSE PRACTITIONER
Payer: COMMERCIAL

## 2023-10-04 DIAGNOSIS — E11.65 UNCONTROLLED TYPE 2 DIABETES MELLITUS WITH HYPERGLYCEMIA, WITH LONG-TERM CURRENT USE OF INSULIN: ICD-10-CM

## 2023-10-04 DIAGNOSIS — Z79.4 UNCONTROLLED TYPE 2 DIABETES MELLITUS WITH HYPERGLYCEMIA, WITH LONG-TERM CURRENT USE OF INSULIN: ICD-10-CM

## 2023-10-04 LAB
ALBUMIN SERPL BCP-MCNC: 3.8 G/DL (ref 3.5–5.2)
ALP SERPL-CCNC: 53 U/L (ref 55–135)
ALT SERPL W/O P-5'-P-CCNC: 28 U/L (ref 10–44)
ANION GAP SERPL CALC-SCNC: 7 MMOL/L (ref 8–16)
AST SERPL-CCNC: 18 U/L (ref 10–40)
BILIRUB SERPL-MCNC: 2.4 MG/DL (ref 0.1–1)
BUN SERPL-MCNC: 18 MG/DL (ref 8–23)
CALCIUM SERPL-MCNC: 9.7 MG/DL (ref 8.7–10.5)
CHLORIDE SERPL-SCNC: 111 MMOL/L (ref 95–110)
CO2 SERPL-SCNC: 24 MMOL/L (ref 23–29)
CREAT SERPL-MCNC: 1 MG/DL (ref 0.5–1.4)
EST. GFR  (NO RACE VARIABLE): >60 ML/MIN/1.73 M^2
ESTIMATED AVG GLUCOSE: 134 MG/DL (ref 68–131)
GLUCOSE SERPL-MCNC: 89 MG/DL (ref 70–110)
HBA1C MFR BLD: 6.3 % (ref 4–5.6)
POTASSIUM SERPL-SCNC: 4.2 MMOL/L (ref 3.5–5.1)
PROT SERPL-MCNC: 6.4 G/DL (ref 6–8.4)
SODIUM SERPL-SCNC: 142 MMOL/L (ref 136–145)

## 2023-10-04 PROCEDURE — 80053 COMPREHEN METABOLIC PANEL: CPT | Performed by: NURSE PRACTITIONER

## 2023-10-04 PROCEDURE — 83036 HEMOGLOBIN GLYCOSYLATED A1C: CPT | Performed by: NURSE PRACTITIONER

## 2023-10-04 PROCEDURE — 36415 COLL VENOUS BLD VENIPUNCTURE: CPT | Mod: PO | Performed by: NURSE PRACTITIONER

## 2023-10-20 DIAGNOSIS — Z79.4 UNCONTROLLED TYPE 2 DIABETES MELLITUS WITH HYPERGLYCEMIA, WITH LONG-TERM CURRENT USE OF INSULIN: ICD-10-CM

## 2023-10-20 DIAGNOSIS — E11.65 UNCONTROLLED TYPE 2 DIABETES MELLITUS WITH HYPERGLYCEMIA, WITH LONG-TERM CURRENT USE OF INSULIN: ICD-10-CM

## 2023-10-23 RX ORDER — METFORMIN HYDROCHLORIDE 1000 MG/1
1000 TABLET ORAL 2 TIMES DAILY WITH MEALS
Qty: 180 TABLET | Refills: 0 | Status: SHIPPED | OUTPATIENT
Start: 2023-10-23 | End: 2024-03-01 | Stop reason: SDUPTHER

## 2023-11-02 LAB
LEFT EYE DM RETINOPATHY: NEGATIVE
RIGHT EYE DM RETINOPATHY: NEGATIVE

## 2023-11-07 ENCOUNTER — PATIENT OUTREACH (OUTPATIENT)
Dept: ADMINISTRATIVE | Facility: HOSPITAL | Age: 73
End: 2023-11-07
Payer: COMMERCIAL

## 2023-11-07 NOTE — PROGRESS NOTES
Non-compliant [facility] report chart review -  11/07/2023  Chart review completed for the following HM test if overdue  (Dilated EYE EXAM)  Care Everywhere and Media reports - updates requested and reviewed.    HM updated with external    DM Eye Exam  2023

## 2023-11-20 ENCOUNTER — PATIENT MESSAGE (OUTPATIENT)
Dept: ENDOCRINOLOGY | Facility: CLINIC | Age: 73
End: 2023-11-20
Payer: COMMERCIAL

## 2023-11-20 DIAGNOSIS — Z79.4 UNCONTROLLED TYPE 2 DIABETES MELLITUS WITH HYPERGLYCEMIA, WITH LONG-TERM CURRENT USE OF INSULIN: ICD-10-CM

## 2023-11-20 DIAGNOSIS — E11.65 UNCONTROLLED TYPE 2 DIABETES MELLITUS WITH HYPERGLYCEMIA, WITH LONG-TERM CURRENT USE OF INSULIN: ICD-10-CM

## 2023-11-20 RX ORDER — BLOOD-GLUCOSE SENSOR
EACH MISCELLANEOUS
Qty: 3 EACH | Refills: 3 | Status: SHIPPED | OUTPATIENT
Start: 2023-11-20 | End: 2024-03-14 | Stop reason: SDUPTHER

## 2023-12-29 DIAGNOSIS — Z79.4 UNCONTROLLED TYPE 2 DIABETES MELLITUS WITH HYPERGLYCEMIA, WITH LONG-TERM CURRENT USE OF INSULIN: ICD-10-CM

## 2023-12-29 DIAGNOSIS — E11.65 UNCONTROLLED TYPE 2 DIABETES MELLITUS WITH HYPERGLYCEMIA, WITH LONG-TERM CURRENT USE OF INSULIN: ICD-10-CM

## 2024-01-02 RX ORDER — TIRZEPATIDE 10 MG/.5ML
10 INJECTION, SOLUTION SUBCUTANEOUS
Qty: 4 PEN | Refills: 1 | Status: SHIPPED | OUTPATIENT
Start: 2024-01-02 | End: 2024-02-23 | Stop reason: SDUPTHER

## 2024-01-17 DIAGNOSIS — E11.9 TYPE 2 DIABETES MELLITUS WITHOUT COMPLICATION: ICD-10-CM

## 2024-01-20 DIAGNOSIS — E11.42 TYPE 2 DIABETES MELLITUS WITH DIABETIC POLYNEUROPATHY, WITH LONG-TERM CURRENT USE OF INSULIN: ICD-10-CM

## 2024-01-20 DIAGNOSIS — Z79.4 TYPE 2 DIABETES MELLITUS WITH DIABETIC POLYNEUROPATHY, WITH LONG-TERM CURRENT USE OF INSULIN: ICD-10-CM

## 2024-01-20 DIAGNOSIS — E78.5 HYPERLIPIDEMIA LDL GOAL <100: ICD-10-CM

## 2024-01-20 DIAGNOSIS — I10 ESSENTIAL HYPERTENSION: ICD-10-CM

## 2024-01-20 DIAGNOSIS — E78.49 OTHER HYPERLIPIDEMIA: ICD-10-CM

## 2024-01-21 NOTE — TELEPHONE ENCOUNTER
No care due was identified.  [facility] Embedded Care Due Messages. Reference number: [id number].   1/20/2024 9:37:19 PM CST

## 2024-01-21 NOTE — TELEPHONE ENCOUNTER
No care due was identified.  [facility] Embedded Care Due Messages. Reference number: [id number].   1/20/2024 9:36:02 PM CST

## 2024-01-22 RX ORDER — RAMIPRIL 2.5 MG/1
2.5 CAPSULE ORAL DAILY
Qty: 90 CAPSULE | Refills: 2 | Status: SHIPPED | OUTPATIENT
Start: 2024-01-22 | End: 2024-03-01

## 2024-01-22 RX ORDER — ATORVASTATIN CALCIUM 40 MG/1
40 TABLET, FILM COATED ORAL NIGHTLY
Qty: 90 TABLET | Refills: 1 | Status: SHIPPED | OUTPATIENT
Start: 2024-01-22 | End: 2024-03-01 | Stop reason: SDUPTHER

## 2024-01-22 NOTE — TELEPHONE ENCOUNTER
Refill Decision Note   Patria Gerson  is requesting a refill authorization.  Brief Assessment and Rationale for Refill:  Approve     Medication Therapy Plan:         Comments:     Note composed:4:00 AM 01/22/2024

## 2024-01-24 RX ORDER — METOPROLOL SUCCINATE 50 MG/1
50 TABLET, EXTENDED RELEASE ORAL DAILY
Qty: 90 TABLET | Refills: 3 | Status: SHIPPED | OUTPATIENT
Start: 2024-01-24 | End: 2024-04-13 | Stop reason: SDUPTHER

## 2024-02-23 DIAGNOSIS — E11.65 UNCONTROLLED TYPE 2 DIABETES MELLITUS WITH HYPERGLYCEMIA, WITH LONG-TERM CURRENT USE OF INSULIN: ICD-10-CM

## 2024-02-23 DIAGNOSIS — Z79.4 UNCONTROLLED TYPE 2 DIABETES MELLITUS WITH HYPERGLYCEMIA, WITH LONG-TERM CURRENT USE OF INSULIN: ICD-10-CM

## 2024-02-23 RX ORDER — TIRZEPATIDE 10 MG/.5ML
10 INJECTION, SOLUTION SUBCUTANEOUS
Qty: 4 PEN | Refills: 1 | Status: SHIPPED | OUTPATIENT
Start: 2024-02-23 | End: 2024-05-01 | Stop reason: SDUPTHER

## 2024-02-28 ENCOUNTER — LAB VISIT (OUTPATIENT)
Dept: LAB | Facility: HOSPITAL | Age: 74
End: 2024-02-28
Attending: FAMILY MEDICINE
Payer: COMMERCIAL

## 2024-02-28 DIAGNOSIS — E11.65 TYPE 2 DIABETES MELLITUS WITH HYPERGLYCEMIA, WITH LONG-TERM CURRENT USE OF INSULIN: ICD-10-CM

## 2024-02-28 DIAGNOSIS — Z79.4 TYPE 2 DIABETES MELLITUS WITH HYPERGLYCEMIA, WITH LONG-TERM CURRENT USE OF INSULIN: ICD-10-CM

## 2024-02-28 LAB
ALBUMIN SERPL BCP-MCNC: 3.7 G/DL (ref 3.5–5.2)
ALP SERPL-CCNC: 61 U/L (ref 55–135)
ALT SERPL W/O P-5'-P-CCNC: 35 U/L (ref 10–44)
ANION GAP SERPL CALC-SCNC: 7 MMOL/L (ref 8–16)
AST SERPL-CCNC: 17 U/L (ref 10–40)
BASOPHILS # BLD AUTO: 0.01 K/UL (ref 0–0.2)
BASOPHILS NFR BLD: 0.2 % (ref 0–1.9)
BILIRUB SERPL-MCNC: 1.7 MG/DL (ref 0.1–1)
BUN SERPL-MCNC: 17 MG/DL (ref 8–23)
CALCIUM SERPL-MCNC: 9.7 MG/DL (ref 8.7–10.5)
CHLORIDE SERPL-SCNC: 112 MMOL/L (ref 95–110)
CO2 SERPL-SCNC: 25 MMOL/L (ref 23–29)
CREAT SERPL-MCNC: 1.1 MG/DL (ref 0.5–1.4)
DIFFERENTIAL METHOD BLD: ABNORMAL
EOSINOPHIL # BLD AUTO: 0.3 K/UL (ref 0–0.5)
EOSINOPHIL NFR BLD: 6.8 % (ref 0–8)
ERYTHROCYTE [DISTWIDTH] IN BLOOD BY AUTOMATED COUNT: 12.8 % (ref 11.5–14.5)
EST. GFR  (NO RACE VARIABLE): >60 ML/MIN/1.73 M^2
ESTIMATED AVG GLUCOSE: 146 MG/DL (ref 68–131)
GLUCOSE SERPL-MCNC: 174 MG/DL (ref 70–110)
HBA1C MFR BLD: 6.7 % (ref 4–5.6)
HCT VFR BLD AUTO: 45.3 % (ref 40–54)
HGB BLD-MCNC: 15.2 G/DL (ref 14–18)
IMM GRANULOCYTES # BLD AUTO: 0.01 K/UL (ref 0–0.04)
IMM GRANULOCYTES NFR BLD AUTO: 0.2 % (ref 0–0.5)
LYMPHOCYTES # BLD AUTO: 1.1 K/UL (ref 1–4.8)
LYMPHOCYTES NFR BLD: 23.3 % (ref 18–48)
MCH RBC QN AUTO: 32.5 PG (ref 27–31)
MCHC RBC AUTO-ENTMCNC: 33.6 G/DL (ref 32–36)
MCV RBC AUTO: 97 FL (ref 82–98)
MONOCYTES # BLD AUTO: 0.4 K/UL (ref 0.3–1)
MONOCYTES NFR BLD: 9.1 % (ref 4–15)
NEUTROPHILS # BLD AUTO: 2.9 K/UL (ref 1.8–7.7)
NEUTROPHILS NFR BLD: 60.4 % (ref 38–73)
NRBC BLD-RTO: 0 /100 WBC
PLATELET # BLD AUTO: 119 K/UL (ref 150–450)
PMV BLD AUTO: 12.1 FL (ref 9.2–12.9)
POTASSIUM SERPL-SCNC: 4.3 MMOL/L (ref 3.5–5.1)
PROT SERPL-MCNC: 6.5 G/DL (ref 6–8.4)
RBC # BLD AUTO: 4.68 M/UL (ref 4.6–6.2)
SODIUM SERPL-SCNC: 144 MMOL/L (ref 136–145)
WBC # BLD AUTO: 4.85 K/UL (ref 3.9–12.7)

## 2024-02-28 PROCEDURE — 83036 HEMOGLOBIN GLYCOSYLATED A1C: CPT | Performed by: FAMILY MEDICINE

## 2024-02-28 PROCEDURE — 80053 COMPREHEN METABOLIC PANEL: CPT | Performed by: FAMILY MEDICINE

## 2024-02-28 PROCEDURE — 36415 COLL VENOUS BLD VENIPUNCTURE: CPT | Mod: PO | Performed by: FAMILY MEDICINE

## 2024-02-28 PROCEDURE — 85025 COMPLETE CBC W/AUTO DIFF WBC: CPT | Performed by: FAMILY MEDICINE

## 2024-03-01 ENCOUNTER — OFFICE VISIT (OUTPATIENT)
Dept: FAMILY MEDICINE | Facility: CLINIC | Age: 74
End: 2024-03-01
Payer: COMMERCIAL

## 2024-03-01 VITALS
WEIGHT: 201.25 LBS | DIASTOLIC BLOOD PRESSURE: 72 MMHG | HEIGHT: 69 IN | SYSTOLIC BLOOD PRESSURE: 120 MMHG | OXYGEN SATURATION: 97 % | HEART RATE: 97 BPM | BODY MASS INDEX: 29.81 KG/M2

## 2024-03-01 DIAGNOSIS — E11.65 TYPE 2 DIABETES MELLITUS WITH HYPERGLYCEMIA, WITH LONG-TERM CURRENT USE OF INSULIN: Primary | ICD-10-CM

## 2024-03-01 DIAGNOSIS — D69.6 THROMBOCYTOPENIA: ICD-10-CM

## 2024-03-01 DIAGNOSIS — E11.40 NEUROPATHY DUE TO TYPE 2 DIABETES MELLITUS: ICD-10-CM

## 2024-03-01 DIAGNOSIS — L03.90 CELLULITIS, UNSPECIFIED CELLULITIS SITE: ICD-10-CM

## 2024-03-01 DIAGNOSIS — I10 ESSENTIAL HYPERTENSION: ICD-10-CM

## 2024-03-01 DIAGNOSIS — E78.49 OTHER HYPERLIPIDEMIA: ICD-10-CM

## 2024-03-01 DIAGNOSIS — E78.5 HYPERLIPIDEMIA LDL GOAL <100: ICD-10-CM

## 2024-03-01 DIAGNOSIS — Z00.00 VISIT FOR WELL MAN HEALTH CHECK: ICD-10-CM

## 2024-03-01 DIAGNOSIS — E11.42 TYPE 2 DIABETES MELLITUS WITH DIABETIC POLYNEUROPATHY, WITH LONG-TERM CURRENT USE OF INSULIN: ICD-10-CM

## 2024-03-01 DIAGNOSIS — B35.3 TINEA PEDIS OF BOTH FEET: ICD-10-CM

## 2024-03-01 DIAGNOSIS — E53.8 B12 DEFICIENCY: ICD-10-CM

## 2024-03-01 DIAGNOSIS — Z23 NEED FOR DIPHTHERIA-TETANUS-PERTUSSIS (TDAP) VACCINE: ICD-10-CM

## 2024-03-01 DIAGNOSIS — Z79.4 TYPE 2 DIABETES MELLITUS WITH HYPERGLYCEMIA, WITH LONG-TERM CURRENT USE OF INSULIN: Primary | ICD-10-CM

## 2024-03-01 DIAGNOSIS — Z79.4 TYPE 2 DIABETES MELLITUS WITH DIABETIC POLYNEUROPATHY, WITH LONG-TERM CURRENT USE OF INSULIN: ICD-10-CM

## 2024-03-01 PROCEDURE — 3044F HG A1C LEVEL LT 7.0%: CPT | Mod: CPTII,S$GLB,, | Performed by: FAMILY MEDICINE

## 2024-03-01 PROCEDURE — 4010F ACE/ARB THERAPY RXD/TAKEN: CPT | Mod: CPTII,S$GLB,, | Performed by: FAMILY MEDICINE

## 2024-03-01 PROCEDURE — 3074F SYST BP LT 130 MM HG: CPT | Mod: CPTII,S$GLB,, | Performed by: FAMILY MEDICINE

## 2024-03-01 PROCEDURE — 3288F FALL RISK ASSESSMENT DOCD: CPT | Mod: CPTII,S$GLB,, | Performed by: FAMILY MEDICINE

## 2024-03-01 PROCEDURE — 3078F DIAST BP <80 MM HG: CPT | Mod: CPTII,S$GLB,, | Performed by: FAMILY MEDICINE

## 2024-03-01 PROCEDURE — 1159F MED LIST DOCD IN RCRD: CPT | Mod: CPTII,S$GLB,, | Performed by: FAMILY MEDICINE

## 2024-03-01 PROCEDURE — 3008F BODY MASS INDEX DOCD: CPT | Mod: CPTII,S$GLB,, | Performed by: FAMILY MEDICINE

## 2024-03-01 PROCEDURE — 90471 IMMUNIZATION ADMIN: CPT | Mod: S$GLB,,, | Performed by: FAMILY MEDICINE

## 2024-03-01 PROCEDURE — 90715 TDAP VACCINE 7 YRS/> IM: CPT | Mod: S$GLB,,, | Performed by: FAMILY MEDICINE

## 2024-03-01 PROCEDURE — 1126F AMNT PAIN NOTED NONE PRSNT: CPT | Mod: CPTII,S$GLB,, | Performed by: FAMILY MEDICINE

## 2024-03-01 PROCEDURE — 99999 PR PBB SHADOW E&M-EST. PATIENT-LVL IV: CPT | Mod: PBBFAC,,, | Performed by: FAMILY MEDICINE

## 2024-03-01 PROCEDURE — 99214 OFFICE O/P EST MOD 30 MIN: CPT | Mod: 25,S$GLB,, | Performed by: FAMILY MEDICINE

## 2024-03-01 PROCEDURE — 1101F PT FALLS ASSESS-DOCD LE1/YR: CPT | Mod: CPTII,S$GLB,, | Performed by: FAMILY MEDICINE

## 2024-03-01 RX ORDER — IBUPROFEN 200 MG
16 TABLET ORAL
Qty: 50 TABLET | Refills: 12 | COMMUNITY
Start: 2024-03-01 | End: 2025-03-01

## 2024-03-01 RX ORDER — MUPIROCIN 20 MG/G
OINTMENT TOPICAL 3 TIMES DAILY
Qty: 30 G | Refills: 11 | Status: SHIPPED | OUTPATIENT
Start: 2024-03-01

## 2024-03-01 RX ORDER — ATORVASTATIN CALCIUM 40 MG/1
40 TABLET, FILM COATED ORAL NIGHTLY
Qty: 90 TABLET | Refills: 1 | Status: SHIPPED | OUTPATIENT
Start: 2024-03-01 | End: 2024-04-13 | Stop reason: SDUPTHER

## 2024-03-01 RX ORDER — LANOLIN ALCOHOL/MO/W.PET/CERES
1000 CREAM (GRAM) TOPICAL DAILY
Qty: 90 TABLET | Refills: 5 | Status: SHIPPED | OUTPATIENT
Start: 2024-03-01

## 2024-03-01 RX ORDER — GABAPENTIN 100 MG/1
100 CAPSULE ORAL NIGHTLY
Qty: 90 CAPSULE | Refills: 3 | Status: CANCELLED | OUTPATIENT
Start: 2024-03-01 | End: 2025-03-01

## 2024-03-01 RX ORDER — METFORMIN HYDROCHLORIDE 1000 MG/1
1000 TABLET ORAL 2 TIMES DAILY WITH MEALS
Qty: 180 TABLET | Refills: 0 | Status: SHIPPED | OUTPATIENT
Start: 2024-03-01

## 2024-03-01 RX ORDER — RAMIPRIL 2.5 MG/1
2.5 CAPSULE ORAL DAILY
Qty: 90 CAPSULE | Refills: 2 | Status: SHIPPED | OUTPATIENT
Start: 2024-03-01 | End: 2024-04-13 | Stop reason: SDUPTHER

## 2024-03-01 RX ORDER — CLOTRIMAZOLE AND BETAMETHASONE DIPROPIONATE 10; .64 MG/G; MG/G
CREAM TOPICAL 2 TIMES DAILY
Qty: 45 G | Refills: 2 | Status: SHIPPED | OUTPATIENT
Start: 2024-03-01

## 2024-03-01 NOTE — PROGRESS NOTES
Subjective:       Patient ID: Patria Nieto is a 73 y.o. male.    Chief Complaint: Follow-up    Lokesh is a 73 y.o. male who presents today for f/u. He has no acute issues.      DM: managed by endocrine, no showed last apt with endocrine. On metformin, mounjaro, toujeo 50 u daily and novolog 10-15 plus SS WM. Skips meals sometimes and when he does, he doesn't take novolog. Since starting Mounjaro is has had several lows in the 60s. Occurs about 2-3x/month. Not using SS as often. Having hypoglycemic sys. Carries candy with him in case. A1C 6.7.   SVT: saw EP. BB changed to once daily. No symptoms  DLD: on atorvastatin at goal 8/2023  HTN: taking ramipril 2.5 mg and metoprolol 50 mg Xr once daily. BP well controlled. Bp at home 110-130/70-85. No hypo/hypertension sys.   Thrombocytopenia: monitor.     Diabetic neuropathy: doesn't think gabapentin helped. Wants to stop.   Rash: resolved.     Ran a 5k! Wants to run a 10k!      Review of Systems   Constitutional:  Negative for fatigue and fever.   HENT:  Negative for sore throat, trouble swallowing and voice change.    Gastrointestinal:  Negative for blood in stool, constipation, diarrhea, nausea and vomiting.   Endocrine: Negative for polydipsia and polyphagia.   Genitourinary:  Negative for dysuria, flank pain and hematuria.   Neurological:  Negative for dizziness, light-headedness and headaches.             Results for orders placed or performed in visit on 02/28/24   CBC Auto Differential   Result Value Ref Range    WBC 4.85 3.90 - 12.70 K/uL    RBC 4.68 4.60 - 6.20 M/uL    Hemoglobin 15.2 14.0 - 18.0 g/dL    Hematocrit 45.3 40.0 - 54.0 %    MCV 97 82 - 98 fL    MCH 32.5 (H) 27.0 - 31.0 pg    MCHC 33.6 32.0 - 36.0 g/dL    RDW 12.8 11.5 - 14.5 %    Platelets 119 (L) 150 - 450 K/uL    MPV 12.1 9.2 - 12.9 fL    Immature Granulocytes 0.2 0.0 - 0.5 %    Gran # (ANC) 2.9 1.8 - 7.7 K/uL    Immature Grans (Abs) 0.01 0.00 - 0.04 K/uL    Lymph # 1.1 1.0 - 4.8 K/uL    Mono # 0.4 0.3  "- 1.0 K/uL    Eos # 0.3 0.0 - 0.5 K/uL    Baso # 0.01 0.00 - 0.20 K/uL    nRBC 0 0 /100 WBC    Gran % 60.4 38.0 - 73.0 %    Lymph % 23.3 18.0 - 48.0 %    Mono % 9.1 4.0 - 15.0 %    Eosinophil % 6.8 0.0 - 8.0 %    Basophil % 0.2 0.0 - 1.9 %    Differential Method Automated    Comprehensive Metabolic Panel   Result Value Ref Range    Sodium 144 136 - 145 mmol/L    Potassium 4.3 3.5 - 5.1 mmol/L    Chloride 112 (H) 95 - 110 mmol/L    CO2 25 23 - 29 mmol/L    Glucose 174 (H) 70 - 110 mg/dL    BUN 17 8 - 23 mg/dL    Creatinine 1.1 0.5 - 1.4 mg/dL    Calcium 9.7 8.7 - 10.5 mg/dL    Total Protein 6.5 6.0 - 8.4 g/dL    Albumin 3.7 3.5 - 5.2 g/dL    Total Bilirubin 1.7 (H) 0.1 - 1.0 mg/dL    Alkaline Phosphatase 61 55 - 135 U/L    AST 17 10 - 40 U/L    ALT 35 10 - 44 U/L    eGFR >60.0 >60 mL/min/1.73 m^2    Anion Gap 7 (L) 8 - 16 mmol/L   Hemoglobin A1C   Result Value Ref Range    Hemoglobin A1C 6.7 (H) 4.0 - 5.6 %    Estimated Avg Glucose 146 (H) 68 - 131 mg/dL       Objective:     Vitals:    03/01/24 0949   BP: 120/72   BP Location: Left arm   Patient Position: Sitting   BP Method: Large (Automatic)   Pulse: 97   SpO2: 97%   Weight: 91.3 kg (201 lb 4.5 oz)   Height: 5' 9" (1.753 m)        Physical Exam  Constitutional:       General: He is not in acute distress.  Cardiovascular:      Rate and Rhythm: Normal rate and regular rhythm.      Heart sounds: No murmur heard.     No gallop.   Pulmonary:      Effort: Pulmonary effort is normal. No respiratory distress.      Breath sounds: Normal breath sounds. No wheezing.   Abdominal:      General: Bowel sounds are normal.      Palpations: Abdomen is soft.      Tenderness: There is no abdominal tenderness.   Musculoskeletal:         General: No swelling.   Skin:     Findings: No rash.   Neurological:      Mental Status: He is alert and oriented to person, place, and time.   Psychiatric:         Mood and Affect: Mood normal.         Behavior: Behavior normal.         Thought " Content: Thought content normal.         Judgment: Judgment normal.       Assessment:       1. Type 2 diabetes mellitus with hyperglycemia, with long-term current use of insulin    2. Essential hypertension    3. Type 2 diabetes mellitus with diabetic polyneuropathy, with long-term current use of insulin    4. B12 deficiency    5. Neuropathy due to type 2 diabetes mellitus    6. Other hyperlipidemia    7. Hyperlipidemia LDL goal <100    8. Tinea pedis of both feet    9. Cellulitis, unspecified cellulitis site    10. Thrombocytopenia    11. Visit for well man health check    12. Need for diphtheria-tetanus-pertussis (Tdap) vaccine        Plan:       Diabetes management per endocrine.   Refer back to them.   Discuss with them: increasing mounjaro and stopping SA insulin? Already skipping SA insulin when not eating.     Continue rampipril  Continue metoprolol  Continue atorvastatin  You have low B12. Goal >400. Start daily OTC B12 supplementation at 1000 mcg      Stop gabapentin.     Monitor platelets. Refer <100.     F/u 6 months. Labs prior. Annual.     Type 2 diabetes mellitus with hyperglycemia, with long-term current use of insulin  -     Ambulatory referral/consult to Endocrinology; Future; Expected date: 03/08/2024  -     glucose 4 GM chewable tablet; Take 4 tablets (16 g total) by mouth as needed for Low blood sugar (If having symptoms of blurry vision, palpitations, confusion, shakiness.  Please check sugars and if sugar below 70 please take 4 tablets and re-check sugar everry 15 minutes until sugars are above 70 and symptoms resolve.).  Dispense: 50 tablet; Refill: 12  -     metFORMIN (GLUCOPHAGE) 1000 MG tablet; Take 1 tablet (1,000 mg total) by mouth 2 (two) times daily with meals.  Dispense: 180 tablet; Refill: 0  -     CBC Auto Differential; Future; Expected date: 03/01/2024  -     Comprehensive Metabolic Panel; Future; Expected date: 03/01/2024  -     Vitamin D; Future; Expected date: 03/01/2024  -      TSH; Future; Expected date: 03/01/2024  -     Microalbumin/Creatinine Ratio, Urine; Standing  -     Hemoglobin A1C; Future; Expected date: 03/01/2024    Essential hypertension  -     ramipriL (ALTACE) 2.5 MG capsule; Take 1 capsule (2.5 mg total) by mouth once daily.  Dispense: 90 capsule; Refill: 2  -     Vitamin D; Future; Expected date: 03/01/2024  -     TSH; Future; Expected date: 03/01/2024    Type 2 diabetes mellitus with diabetic polyneuropathy, with long-term current use of insulin  -     ramipriL (ALTACE) 2.5 MG capsule; Take 1 capsule (2.5 mg total) by mouth once daily.  Dispense: 90 capsule; Refill: 2  -     Vitamin D; Future; Expected date: 03/01/2024  -     TSH; Future; Expected date: 03/01/2024    B12 deficiency  -     cyanocobalamin (VITAMIN B-12) 1000 MCG tablet; Take 1 tablet (1,000 mcg total) by mouth once daily.  Dispense: 90 tablet; Refill: 5  -     Vitamin B12; Future; Expected date: 03/01/2024  -     Vitamin D; Future; Expected date: 03/01/2024  -     TSH; Future; Expected date: 03/01/2024    Neuropathy due to type 2 diabetes mellitus  -     Vitamin D; Future; Expected date: 03/01/2024  -     TSH; Future; Expected date: 03/01/2024    Other hyperlipidemia  -     atorvastatin (LIPITOR) 40 MG tablet; Take 1 tablet (40 mg total) by mouth every evening.  Dispense: 90 tablet; Refill: 1  -     Vitamin D; Future; Expected date: 03/01/2024  -     TSH; Future; Expected date: 03/01/2024  -     Lipid Panel; Future; Expected date: 03/01/2024    Hyperlipidemia LDL goal <100  -     atorvastatin (LIPITOR) 40 MG tablet; Take 1 tablet (40 mg total) by mouth every evening.  Dispense: 90 tablet; Refill: 1  -     Vitamin D; Future; Expected date: 03/01/2024  -     TSH; Future; Expected date: 03/01/2024    Tinea pedis of both feet  -     clotrimazole-betamethasone 1-0.05% (LOTRISONE) cream; Apply topically 2 (two) times daily.  Dispense: 45 g; Refill: 2  -     Vitamin D; Future; Expected date: 03/01/2024  -     TSH;  Future; Expected date: 03/01/2024    Cellulitis, unspecified cellulitis site  -     mupirocin (BACTROBAN) 2 % ointment; Apply topically 3 (three) times daily.  Dispense: 30 g; Refill: 11  -     Vitamin D; Future; Expected date: 03/01/2024  -     TSH; Future; Expected date: 03/01/2024    Thrombocytopenia  -     Vitamin D; Future; Expected date: 03/01/2024  -     TSH; Future; Expected date: 03/01/2024  -     Iron and TIBC; Future; Expected date: 03/01/2024  -     FERRITIN; Future; Expected date: 03/01/2024  -     FOLATE; Future; Expected date: 03/01/2024    Visit for well man health check  -     CBC Auto Differential; Future; Expected date: 03/01/2024  -     Comprehensive Metabolic Panel; Future; Expected date: 03/01/2024  -     Vitamin B12; Future; Expected date: 03/01/2024  -     Vitamin D; Future; Expected date: 03/01/2024  -     TSH; Future; Expected date: 03/01/2024  -     Lipid Panel; Future; Expected date: 03/01/2024  -     Microalbumin/Creatinine Ratio, Urine; Standing  -     Hemoglobin A1C; Future; Expected date: 03/01/2024  -     Iron and TIBC; Future; Expected date: 03/01/2024  -     FERRITIN; Future; Expected date: 03/01/2024  -     FOLATE; Future; Expected date: 03/01/2024    Need for diphtheria-tetanus-pertussis (Tdap) vaccine  -     Tdap Vaccine            Warning signs discussed, patient to call with any further issues or worsening of symptoms. Above note may have utilized dictation, please excuse any transcription errors.

## 2024-03-01 NOTE — PATIENT INSTRUCTIONS
Diabetes management per endocrine.   Refer back to them.     Continue rampipril  Continue metoprolol  Continue atorvastatin  You have low B12. Goal >400. Start daily OTC B12 supplementation at 1000 mcg      Stop gabapentin.     Monitor platelets. Refer <100.     F/u 6 months. Labs prior. Annual.

## 2024-03-14 DIAGNOSIS — E11.65 UNCONTROLLED TYPE 2 DIABETES MELLITUS WITH HYPERGLYCEMIA, WITH LONG-TERM CURRENT USE OF INSULIN: ICD-10-CM

## 2024-03-14 DIAGNOSIS — Z79.4 UNCONTROLLED TYPE 2 DIABETES MELLITUS WITH HYPERGLYCEMIA, WITH LONG-TERM CURRENT USE OF INSULIN: ICD-10-CM

## 2024-03-14 RX ORDER — BLOOD-GLUCOSE SENSOR
EACH MISCELLANEOUS
Qty: 3 EACH | Refills: 3 | Status: SHIPPED | OUTPATIENT
Start: 2024-03-14

## 2024-04-13 DIAGNOSIS — Z79.4 TYPE 2 DIABETES MELLITUS WITH DIABETIC POLYNEUROPATHY, WITH LONG-TERM CURRENT USE OF INSULIN: ICD-10-CM

## 2024-04-13 DIAGNOSIS — E78.5 HYPERLIPIDEMIA LDL GOAL <100: ICD-10-CM

## 2024-04-13 DIAGNOSIS — I10 ESSENTIAL HYPERTENSION: ICD-10-CM

## 2024-04-13 DIAGNOSIS — E11.42 TYPE 2 DIABETES MELLITUS WITH DIABETIC POLYNEUROPATHY, WITH LONG-TERM CURRENT USE OF INSULIN: ICD-10-CM

## 2024-04-13 DIAGNOSIS — E78.49 OTHER HYPERLIPIDEMIA: ICD-10-CM

## 2024-04-13 NOTE — TELEPHONE ENCOUNTER
No care due was identified.  [facility] Embedded Care Due Messages. Reference number: [id number].   4/13/2024 5:04:03 PM CDT

## 2024-04-15 RX ORDER — RAMIPRIL 2.5 MG/1
2.5 CAPSULE ORAL DAILY
Qty: 90 CAPSULE | Refills: 3 | Status: SHIPPED | OUTPATIENT
Start: 2024-04-15

## 2024-04-15 RX ORDER — ATORVASTATIN CALCIUM 40 MG/1
40 TABLET, FILM COATED ORAL NIGHTLY
Qty: 90 TABLET | Refills: 1 | Status: SHIPPED | OUTPATIENT
Start: 2024-04-15

## 2024-04-15 NOTE — TELEPHONE ENCOUNTER
Refill Decision Note   Patria Gerson  is requesting a refill authorization.  Brief Assessment and Rationale for Refill:  Approve     Medication Therapy Plan:         Comments:     Note composed:11:43 AM 04/15/2024

## 2024-04-16 RX ORDER — METOPROLOL SUCCINATE 50 MG/1
50 TABLET, EXTENDED RELEASE ORAL DAILY
Qty: 90 TABLET | Refills: 3 | Status: SHIPPED | OUTPATIENT
Start: 2024-04-16 | End: 2024-05-24 | Stop reason: SDUPTHER

## 2024-04-29 ENCOUNTER — OFFICE VISIT (OUTPATIENT)
Dept: ORTHOPEDICS | Facility: CLINIC | Age: 74
End: 2024-04-29
Payer: COMMERCIAL

## 2024-04-29 VITALS — HEIGHT: 69 IN | WEIGHT: 200.63 LBS | BODY MASS INDEX: 29.71 KG/M2

## 2024-04-29 DIAGNOSIS — M65.341 TRIGGER RING FINGER OF RIGHT HAND: Primary | ICD-10-CM

## 2024-04-29 PROCEDURE — 1159F MED LIST DOCD IN RCRD: CPT | Mod: CPTII,S$GLB,, | Performed by: ORTHOPAEDIC SURGERY

## 2024-04-29 PROCEDURE — 99999 PR PBB SHADOW E&M-EST. PATIENT-LVL III: CPT | Mod: PBBFAC,,, | Performed by: ORTHOPAEDIC SURGERY

## 2024-04-29 PROCEDURE — 1101F PT FALLS ASSESS-DOCD LE1/YR: CPT | Mod: CPTII,S$GLB,, | Performed by: ORTHOPAEDIC SURGERY

## 2024-04-29 PROCEDURE — 3008F BODY MASS INDEX DOCD: CPT | Mod: CPTII,S$GLB,, | Performed by: ORTHOPAEDIC SURGERY

## 2024-04-29 PROCEDURE — 99213 OFFICE O/P EST LOW 20 MIN: CPT | Mod: 25,S$GLB,, | Performed by: ORTHOPAEDIC SURGERY

## 2024-04-29 PROCEDURE — 4010F ACE/ARB THERAPY RXD/TAKEN: CPT | Mod: CPTII,S$GLB,, | Performed by: ORTHOPAEDIC SURGERY

## 2024-04-29 PROCEDURE — 20551 NJX 1 TENDON ORIGIN/INSJ: CPT | Mod: RT,S$GLB,, | Performed by: ORTHOPAEDIC SURGERY

## 2024-04-29 PROCEDURE — 3044F HG A1C LEVEL LT 7.0%: CPT | Mod: CPTII,S$GLB,, | Performed by: ORTHOPAEDIC SURGERY

## 2024-04-29 PROCEDURE — 1125F AMNT PAIN NOTED PAIN PRSNT: CPT | Mod: CPTII,S$GLB,, | Performed by: ORTHOPAEDIC SURGERY

## 2024-04-29 PROCEDURE — 3288F FALL RISK ASSESSMENT DOCD: CPT | Mod: CPTII,S$GLB,, | Performed by: ORTHOPAEDIC SURGERY

## 2024-04-29 RX ORDER — TRIAMCINOLONE ACETONIDE 40 MG/ML
20 INJECTION, SUSPENSION INTRA-ARTICULAR; INTRAMUSCULAR
Status: COMPLETED | OUTPATIENT
Start: 2024-04-29 | End: 2024-04-29

## 2024-04-29 RX ADMIN — TRIAMCINOLONE ACETONIDE 20 MG: 40 INJECTION, SUSPENSION INTRA-ARTICULAR; INTRAMUSCULAR at 03:04

## 2024-04-29 NOTE — PROGRESS NOTES
Subjective:      Patient ID: Patria Nieto is a 73 y.o. male.    Chief Complaint: Pain of the Right Hand (Ring finger trigger ) and Follow-up of the Left Wrist      HPI  Patria Nieto is a  73 y.o. male presenting today for painful locking of the right ring finger.  There was not a history of trauma.  Onset of symptoms began 1 month ago   No numbness or tingling reported  He would similar problems in the opposite left hand which responded well to injection in the past.      Review of patient's allergies indicates:  No Known Allergies      Current Outpatient Medications   Medication Sig Dispense Refill    aspirin 81 MG Chew Take 81 mg by mouth once daily.      atorvastatin (LIPITOR) 40 MG tablet Take 1 tablet (40 mg total) by mouth every evening. 90 tablet 1    blood-glucose meter,continuous (DEXCOM G7 ) Misc Use as directed 1 each 3    blood-glucose sensor (DEXCOM G7 SENSOR) Nikki Change every 10 days. 3 each 3    clotrimazole-betamethasone 1-0.05% (LOTRISONE) cream Apply topically 2 (two) times daily. 45 g 2    cyanocobalamin (VITAMIN B-12) 1000 MCG tablet Take 1 tablet (1,000 mcg total) by mouth once daily. 90 tablet 5    glucose 4 GM chewable tablet Take 4 tablets (16 g total) by mouth as needed for Low blood sugar (If having symptoms of blurry vision, palpitations, confusion, shakiness.  Please check sugars and if sugar below 70 please take 4 tablets and re-check sugar everry 15 minutes until sugars are above 70 and symptoms resolve.). 50 tablet 12    insulin aspart U-100 (NOVOLOG FLEXPEN U-100 INSULIN) 100 unit/mL (3 mL) InPn pen Inject 10 Units into the skin 3 (three) times daily with meals. WITH CORRECTION SCALE (MAX 60 UNITS PER DAY) 90 mL 1    insulin glargine, TOUJEO, (TOUJEO) 300 unit/mL (1.5 mL) InPn pen Inject 50 Units into the skin once daily. Adjust to max 70 units per MD instructions 9 pen 3    metFORMIN (GLUCOPHAGE) 1000 MG tablet Take 1 tablet (1,000 mg total) by mouth 2 (two) times daily with  "meals. 180 tablet 0    metoprolol succinate (TOPROL-XL) 50 MG 24 hr tablet Take 1 tablet (50 mg total) by mouth once daily. 90 tablet 3    mupirocin (BACTROBAN) 2 % ointment Apply topically 3 (three) times daily. 30 g 11    pen needle, diabetic (BD ULTRA-FINE MINI PEN NEEDLE) 31 gauge x 3/16" Ndle Uses 5 times a day. 200 each 6    ramipriL (ALTACE) 2.5 MG capsule Take 1 capsule (2.5 mg total) by mouth once daily. 90 capsule 3    tirzepatide (MOUNJARO) 10 mg/0.5 mL PnIj Inject 10 mg into the skin every 7 days. 4 pen 1    blood-glucose transmitter (DEXCOM G6 TRANSMITTER) Nikki 1 each by Misc.(Non-Drug; Combo Route) route continuous prn. 1 each PRN     No current facility-administered medications for this visit.     Facility-Administered Medications Ordered in Other Visits   Medication Dose Route Frequency Provider Last Rate Last Admin    sodium chloride 0.9% bolus 1,000 mL  1,000 mL Intravenous Once Lindsay Ruelas NP           Past Medical History:   Diagnosis Date    Colon polyp 06/22/2007    Diabetes mellitus type II     Hx MRSA infection     Hypertension     MRSA infection 2002    Supraventricular tachycardia        Past Surgical History:   Procedure Laterality Date    ABLATION N/A 4/12/2021    Procedure: Ablation;  Surgeon: Geovanni Chowdary MD;  Location: [facility] EP LAB;  Service: Cardiology;  Laterality: N/A;  SVT, RFA, LISA, anes, DM, 3prep    CARDIAC CATHETERIZATION      COLONOSCOPY N/A 12/19/2022    Procedure: COLONOSCOPY;  Surgeon: Jimmie Macias MD;  Location: [facility] ENDO ([address] [place] [address]);  Service: Endoscopy;  Laterality: N/A;  PATIENT REQUEST MAIN CAMPUS and NO SEDATION  Dr. Macias pt but cannot put name on the case  instructions via portal - sm    EXCISION OF GANGLION OF WRIST Left 12/17/2021    Procedure: EXCISION, GANGLION CYST, WRIST;  Surgeon: Micheal Scanlon Jr., MD;  Location: [facility] OR;  Service: Orthopedics;  Laterality: Left;    none         Review of Systems:  ROS    OBJECTIVE:     PHYSICAL " "EXAM:  Height: 5' 9" (175.3 cm) Weight: 91 kg (200 lb 9.9 oz)  Vitals:    04/29/24 1541   Weight: 91 kg (200 lb 9.9 oz)   Height: 5' 9" (1.753 m)   PainSc:   6     Well developed, well nourished male in no acute distress  Alert and oriented x 3  HEENT- Normal exam  Lungs- Clear to auscultation  Heart- Regular rate and rhythm  Abdomen- Soft nontender  Extremity exam- examination right hand there is tenderness and locking of the right ring finger   Range of motion other digits full Tinel sign negative Phalen's test negative       RADIOGRAPHS:  None  Comments: I have personally reviewed the imaging and I agree with the above radiologist's report.    ASSESSMENT/PLAN:     IMPRESSION:  Triggering of the right ring finger    PLAN:  I explained the nature of the problem to the patient recommended injection   After pause for time-out identified the right ring finger injected flexor tendon sheath with combination Kenalog 20 mg 0.5 cc xylocaine sterile technique  Tolerated the procedure well without complication   Follow-up 4-6 weeks or as needed       - We talked at length about the anatomy and pathophysiology of   Encounter Diagnosis   Name Primary?    Trigger ring finger of right hand Yes           Disclaimer: This note has been generated using voice-recognition software. There may be typographical errors that have been missed during proof-reading.     "

## 2024-05-01 DIAGNOSIS — E11.65 UNCONTROLLED TYPE 2 DIABETES MELLITUS WITH HYPERGLYCEMIA, WITH LONG-TERM CURRENT USE OF INSULIN: ICD-10-CM

## 2024-05-01 DIAGNOSIS — Z79.4 UNCONTROLLED TYPE 2 DIABETES MELLITUS WITH HYPERGLYCEMIA, WITH LONG-TERM CURRENT USE OF INSULIN: ICD-10-CM

## 2024-05-01 RX ORDER — TIRZEPATIDE 10 MG/.5ML
10 INJECTION, SOLUTION SUBCUTANEOUS
Qty: 4 PEN | Refills: 1 | Status: SHIPPED | OUTPATIENT
Start: 2024-05-01

## 2024-05-21 NOTE — PROGRESS NOTES
Mr. Nieto is a patient of Dr. Chowdary and was last seen in clinic 7/2021.      Subjective:   Patient ID:  Patria Nieto is a 73 y.o. male who presents for follow up of SVT  .     HPI:    Mr. Nieto is a 73 y.o. male with DM, HTN, pSVT, AT (RFA 4/2021) here for follow up.    Background:    Security contractor at [employer]  DM on meds  HTN on meds  PSVT    In early 2020:  Noticed his HR episodically fast over the past month: 150-160 bpm.  Saw Dr Rojas in clinic; found in long-RP SVT at 153 bpm. Pt describes termination with carotid pressure.  Fast HR recurred when he went home. He self-applied carotid pressure; terminated again.  Since increasing BB, hasn't noticed any further HR increases.  Good exertion tolerance.  ECG 2/11: NCT at 153 bpm. Long RP SVT (vs, less likely, AFL with 2:1 conduction).    He was scheduled for RFA SVT, but that was postponed due to COVID.  In recent weeks, he's noted widely fluctuating HRs, to 120s-130s. Both while active or at rest.    4/2021: inducible RA AT (7 o'clock on TVA); ablated, resulting in noninducibility.  Did well for a month, but then had recurrence.  Holter showed about 50% EAT burden. His BB was restarted/increased to 25 am / 50 pm, and since that change he's done well... measures HR and monitors sx, with no recurrence.    7/2021: ECG is NSR. No preexcitation.  Successful ablation of focal AT at the TVA, with either recurrence at that site or the emergence of a second focus. In any case, this seems to be suppressed with the current regimen of BB, which is well tolerated by the patient.  Discussed options: no change, change meds, return for ablation.  He chooses no change at this time. Continue lopressor 25 am / 50 pm.  f/u 1 year or earlier prn.    Update (05/24/2024):    Today he says he has been doing very well. Mr. Nieto reports no chest pain with exertion or at rest, palpitations, SOB, CABRAL, dizziness, or syncope.  Completed a 5K. Some balance issues but no falls.    On ASA  81mg daily and toprol 50mg daily.    I have personally reviewed the patient's EKG today, which shows sinus rhythm with LAFB at 88bpm. DC interval is 160. QRS is 112. QTc is 469.    Relevant Cardiac Test Results:    2D Echo (6/23/2020):  Concentric left ventricular hypertrophy.  Low normal left ventricular systolic function. The estimated ejection fraction is 50%.  Normal LV diastolic function.  Normal right ventricular systolic function.  The sinuses of Valsalva is dilated.  Normal central venous pressure (3 mmHg).    Current Outpatient Medications   Medication Sig    aspirin 81 MG Chew Take 81 mg by mouth once daily.    atorvastatin (LIPITOR) 40 MG tablet Take 1 tablet (40 mg total) by mouth every evening.    blood-glucose meter,continuous (DEXCOM G7 ) Misc Use as directed    blood-glucose sensor (DEXCOM G7 SENSOR) Nikki Change every 10 days.    blood-glucose transmitter (DEXCOM G6 TRANSMITTER) Nikki 1 each by Misc.(Non-Drug; Combo Route) route continuous prn.    clotrimazole-betamethasone 1-0.05% (LOTRISONE) cream Apply topically 2 (two) times daily.    cyanocobalamin (VITAMIN B-12) 1000 MCG tablet Take 1 tablet (1,000 mcg total) by mouth once daily.    glucose 4 GM chewable tablet Take 4 tablets (16 g total) by mouth as needed for Low blood sugar (If having symptoms of blurry vision, palpitations, confusion, shakiness.  Please check sugars and if sugar below 70 please take 4 tablets and re-check sugar everry 15 minutes until sugars are above 70 and symptoms resolve.).    insulin aspart U-100 (NOVOLOG FLEXPEN U-100 INSULIN) 100 unit/mL (3 mL) InPn pen Inject 10 Units into the skin 3 (three) times daily with meals. WITH CORRECTION SCALE (MAX 60 UNITS PER DAY)    insulin glargine, TOUJEO, (TOUJEO) 300 unit/mL (1.5 mL) InPn pen Inject 50 Units into the skin once daily. Adjust to max 70 units per MD instructions    metFORMIN (GLUCOPHAGE) 1000 MG tablet Take 1 tablet (1,000 mg total) by mouth 2 (two) times daily  "with meals.    metoprolol succinate (TOPROL-XL) 50 MG 24 hr tablet Take 1 tablet (50 mg total) by mouth once daily.    mupirocin (BACTROBAN) 2 % ointment Apply topically 3 (three) times daily.    pen needle, diabetic (BD ULTRA-FINE MINI PEN NEEDLE) 31 gauge x 3/16" Ndle Uses 5 times a day.    ramipriL (ALTACE) 2.5 MG capsule Take 1 capsule (2.5 mg total) by mouth once daily.    tirzepatide (MOUNJARO) 10 mg/0.5 mL PnIj Inject 10 mg into the skin every 7 days.     No current facility-administered medications for this visit.     Facility-Administered Medications Ordered in Other Visits   Medication    sodium chloride 0.9% bolus 1,000 mL       Review of Systems   Constitutional: Negative for malaise/fatigue.   Cardiovascular:  Negative for chest pain, dyspnea on exertion, irregular heartbeat, leg swelling and palpitations.   Respiratory:  Negative for shortness of breath.    Hematologic/Lymphatic: Negative for bleeding problem.   Skin:  Negative for rash.   Musculoskeletal:  Negative for myalgias.   Gastrointestinal:  Negative for hematemesis, hematochezia and nausea.   Genitourinary:  Negative for hematuria.   Neurological:  Negative for light-headedness.   Psychiatric/Behavioral:  Negative for altered mental status.    Allergic/Immunologic: Negative for persistent infections.       Objective:          /82   Pulse 84   Ht 5' 9" (1.753 m)   Wt 88.2 kg (194 lb 7.1 oz)   BMI 28.71 kg/m²     Physical Exam  Vitals and nursing note reviewed.   Constitutional:       Appearance: Normal appearance. He is well-developed.   HENT:      Head: Normocephalic.      Nose: Nose normal.   Eyes:      Pupils: Pupils are equal, round, and reactive to light.   Cardiovascular:      Rate and Rhythm: Normal rate and regular rhythm.   Pulmonary:      Effort: No respiratory distress.      Breath sounds: Normal breath sounds.   Musculoskeletal:         General: Normal range of motion.   Skin:     General: Skin is warm and dry.      " Findings: No erythema.   Neurological:      Mental Status: He is alert and oriented to person, place, and time.   Psychiatric:         Speech: Speech normal.         Behavior: Behavior normal.           Lab Results   Component Value Date     02/28/2024    K 4.3 02/28/2024    MG 2.1 06/24/2020    BUN 17 02/28/2024    CREATININE 1.1 02/28/2024    ALT 35 02/28/2024    AST 17 02/28/2024    HGB 15.2 02/28/2024    HCT 45.3 02/28/2024    TSH 2.880 08/23/2023    LDLCALC 62.8 (L) 08/23/2023             Assessment:     1. Atrial tachycardia    2. PSVT (paroxysmal supraventricular tachycardia)    3. History of radiofrequency ablation (RFA) procedure for cardiac arrhythmia      Plan:     In summary, Mr. Nieto is a 73 y.o. male with DM, HTN, pSVT, AT (RFA 4/2021) here for follow up.  Pt with hx of AT ablation in 2021 with recurrence of EAT after procedure now well controlled on metoprolol.   He is doing very well on metoprolol with no cardiac symptoms. NSR on ECG and rhythm strip. He is active and completed a 5K.  He asks about stopping metoprolol as he is trying to limit the number of medications he is taking. He is tolerating it well. We reviewed his past Holter showing 50% AT with RVR now well controlled on BB so recurrence would be likely off metoprolol.  Would only recommend stopping if he was seeking ablation as alternative. He decided to stay the course.    Continue current regimen  RTC 1 yr with updated echo, sooner if needed    *A copy of this note has been sent to Dr. Chowdary*    Follow up in about 1 year (around 5/24/2025).    ------------------------------------------------------------------    LIYAH Fairbanks, NP-C  Cardiac Electrophysiology

## 2024-05-23 ENCOUNTER — TELEPHONE (OUTPATIENT)
Dept: ELECTROPHYSIOLOGY | Facility: CLINIC | Age: 74
End: 2024-05-23
Payer: COMMERCIAL

## 2024-05-24 ENCOUNTER — OFFICE VISIT (OUTPATIENT)
Dept: ELECTROPHYSIOLOGY | Facility: CLINIC | Age: 74
End: 2024-05-24
Payer: COMMERCIAL

## 2024-05-24 VITALS
HEIGHT: 69 IN | DIASTOLIC BLOOD PRESSURE: 82 MMHG | SYSTOLIC BLOOD PRESSURE: 114 MMHG | BODY MASS INDEX: 28.8 KG/M2 | HEART RATE: 84 BPM | WEIGHT: 194.44 LBS

## 2024-05-24 DIAGNOSIS — I47.10 PSVT (PAROXYSMAL SUPRAVENTRICULAR TACHYCARDIA): ICD-10-CM

## 2024-05-24 DIAGNOSIS — I47.19 ATRIAL TACHYCARDIA: Primary | ICD-10-CM

## 2024-05-24 DIAGNOSIS — Z98.890 HISTORY OF RADIOFREQUENCY ABLATION (RFA) PROCEDURE FOR CARDIAC ARRHYTHMIA: ICD-10-CM

## 2024-05-24 LAB
OHS QRS DURATION: 112 MS
OHS QTC CALCULATION: 469 MS

## 2024-05-24 PROCEDURE — 3288F FALL RISK ASSESSMENT DOCD: CPT | Mod: CPTII,S$GLB,, | Performed by: NURSE PRACTITIONER

## 2024-05-24 PROCEDURE — 99214 OFFICE O/P EST MOD 30 MIN: CPT | Mod: S$GLB,,, | Performed by: NURSE PRACTITIONER

## 2024-05-24 PROCEDURE — 1101F PT FALLS ASSESS-DOCD LE1/YR: CPT | Mod: CPTII,S$GLB,, | Performed by: NURSE PRACTITIONER

## 2024-05-24 PROCEDURE — 4010F ACE/ARB THERAPY RXD/TAKEN: CPT | Mod: CPTII,S$GLB,, | Performed by: NURSE PRACTITIONER

## 2024-05-24 PROCEDURE — 1159F MED LIST DOCD IN RCRD: CPT | Mod: CPTII,S$GLB,, | Performed by: NURSE PRACTITIONER

## 2024-05-24 PROCEDURE — 3044F HG A1C LEVEL LT 7.0%: CPT | Mod: CPTII,S$GLB,, | Performed by: NURSE PRACTITIONER

## 2024-05-24 PROCEDURE — 3008F BODY MASS INDEX DOCD: CPT | Mod: CPTII,S$GLB,, | Performed by: NURSE PRACTITIONER

## 2024-05-24 PROCEDURE — 1126F AMNT PAIN NOTED NONE PRSNT: CPT | Mod: CPTII,S$GLB,, | Performed by: NURSE PRACTITIONER

## 2024-05-24 PROCEDURE — 3074F SYST BP LT 130 MM HG: CPT | Mod: CPTII,S$GLB,, | Performed by: NURSE PRACTITIONER

## 2024-05-24 PROCEDURE — 3079F DIAST BP 80-89 MM HG: CPT | Mod: CPTII,S$GLB,, | Performed by: NURSE PRACTITIONER

## 2024-05-24 PROCEDURE — 93005 ELECTROCARDIOGRAM TRACING: CPT | Mod: S$GLB,,, | Performed by: INTERNAL MEDICINE

## 2024-05-24 PROCEDURE — 99999 PR PBB SHADOW E&M-EST. PATIENT-LVL IV: CPT | Mod: PBBFAC,,, | Performed by: NURSE PRACTITIONER

## 2024-05-24 PROCEDURE — 93010 ELECTROCARDIOGRAM REPORT: CPT | Mod: S$GLB,,, | Performed by: INTERNAL MEDICINE

## 2024-05-24 RX ORDER — METOPROLOL SUCCINATE 50 MG/1
50 TABLET, EXTENDED RELEASE ORAL DAILY
Qty: 90 TABLET | Refills: 3 | Status: SHIPPED | OUTPATIENT
Start: 2024-05-24

## 2024-05-31 ENCOUNTER — PATIENT MESSAGE (OUTPATIENT)
Dept: FAMILY MEDICINE | Facility: CLINIC | Age: 74
End: 2024-05-31
Payer: COMMERCIAL

## 2024-06-11 ENCOUNTER — PATIENT MESSAGE (OUTPATIENT)
Dept: ENDOCRINOLOGY | Facility: CLINIC | Age: 74
End: 2024-06-11
Payer: COMMERCIAL

## 2024-06-12 RX ORDER — TIRZEPATIDE 12.5 MG/.5ML
12.5 INJECTION, SOLUTION SUBCUTANEOUS
Qty: 4 PEN | Refills: 3 | Status: SHIPPED | OUTPATIENT
Start: 2024-06-12

## 2024-07-02 NOTE — PROGRESS NOTES
"Subjective:      Patient ID: Patria Nieto is a 73 y.o. male.    Chief Complaint:  Diabetes    History of Present Illness  Patria Nieto is here for follow up of DM.  Previously seen by me 2023.    With regards to Diabetes:    Diagnosed:   DE: 2018  FH: "yes"   Known complications:  DKA Denies   RN Denies  Eye Exam:   PN Yes - "comes and goes"   Podiatry:   Nephropathy Denies  CAD Denies  Denies history of pancreatitis & personal/family history of medullary thyroid cancer.     Diet/Exercise:   Eats 1-2 meals a day.   Snacks : Denies  Drinks : coffee (1 cup in AM with half and half), OJ, water, diet soft drinks.   Exercise - tries to stay active - exercises twice a week.   Illness, injury, steroids: Denies    Current Regimen:  Metformin 1000mg daily  Mounjaro 10 mg weekly   Toujeo 50 units daily   Novolog 10-20 units with meals     Reports compliance.    Other medications tried:  Trulicity   Discussed SGLT2i - im concerned about the skin infections so will hold off.    Glucose Monitor: Dexcom G7   4-5 times a day testing  Log reviewed: oral recall - needs to connect Dexcom account  Fastin  During the day 140-150  Before bed: ----    Hypoglycemia:  Yes - 2-3 times a month - notes this occurs if he takes his Novolog after the meal and will give a little extra   Knows how to correct with 15 grams of carbs- juice, coke, or a peppermint.       Diabetes Management Status    Hemoglobin A1C   Date Value Ref Range Status   2024 6.7 (H) 4.0 - 5.6 % Final     Comment:     ADA Screening Guidelines:  5.7-6.4%  Consistent with prediabetes  >or=6.5%  Consistent with diabetes    High levels of fetal hemoglobin interfere with the HbA1C  assay. Heterozygous hemoglobin variants (HbS, HgC, etc)do  not significantly interfere with this assay.   However, presence of multiple variants may affect accuracy.     10/04/2023 6.3 (H) 4.0 - 5.6 % Final     Comment:     ADA Screening Guidelines:  5.7-6.4%  Consistent with " "prediabetes  >or=6.5%  Consistent with diabetes    High levels of fetal hemoglobin interfere with the HbA1C  assay. Heterozygous hemoglobin variants (HbS, HgC, etc)do  not significantly interfere with this assay.   However, presence of multiple variants may affect accuracy.     05/31/2023 6.8 (H) 4.0 - 5.6 % Final     Comment:     ADA Screening Guidelines:  5.7-6.4%  Consistent with prediabetes  >or=6.5%  Consistent with diabetes    High levels of fetal hemoglobin interfere with the HbA1C  assay. Heterozygous hemoglobin variants (HbS, HgC, etc)do  not significantly interfere with this assay.   However, presence of multiple variants may affect accuracy.         Statin: Taking  ACE/ARB: Taking  Screening or Prevention Patient's value Goal Complete/Controlled?   HgA1C Testing and Control   Lab Results   Component Value Date    HGBA1C 6.7 (H) 02/28/2024      Annually/Less than 8% No   Lipid profile : 08/23/2023 Annually Yes   LDL control Lab Results   Component Value Date    LDLCALC 62.8 (L) 08/23/2023    Annually/Less than 100 mg/dl  Yes   Nephropathy screening Lab Results   Component Value Date    LABMICR 6.0 11/05/2022     Lab Results   Component Value Date    PROTEINUA NEG 09/09/2011    Annually Yes   Blood pressure BP Readings from Last 1 Encounters:   07/10/24 110/78    Less than 140/90 Yes   Dilated retinal exam : 11/02/2023 Annually Yes   Foot exam   : 08/25/2023 Annually No       Review of Systems  As above    Objective:   Physical Exam  Vitals reviewed.   Cardiovascular:      Rate and Rhythm: Normal rate.      Comments: No edema present  Pulmonary:      Effort: Pulmonary effort is normal.   Abdominal:      Palpations: Abdomen is soft.       Injection sites are without edema or erythema. No lipo hypertropthy or atrophy.    Visit Vitals  /78   Pulse 85   Ht 5' 9" (1.753 m)   Wt 88.3 kg (194 lb 12.4 oz)   SpO2 95%   BMI 28.76 kg/m²               Body mass index is 28.76 kg/m².    Lab Review:   Lab Results "   Component Value Date    HGBA1C 6.7 (H) 02/28/2024    HGBA1C 6.3 (H) 10/04/2023    HGBA1C 6.8 (H) 05/31/2023       Lab Results   Component Value Date    CHOL 123 08/23/2023    HDL 43 08/23/2023    LDLCALC 62.8 (L) 08/23/2023    TRIG 86 08/23/2023    CHOLHDL 35.0 08/23/2023     Lab Results   Component Value Date     02/28/2024    K 4.3 02/28/2024     (H) 02/28/2024    CO2 25 02/28/2024     (H) 02/28/2024    BUN 17 02/28/2024    CREATININE 1.1 02/28/2024    CALCIUM 9.7 02/28/2024    PROT 6.5 02/28/2024    ALBUMIN 3.7 02/28/2024    BILITOT 1.7 (H) 02/28/2024    ALKPHOS 61 02/28/2024    AST 17 02/28/2024    ALT 35 02/28/2024    ANIONGAP 7 (L) 02/28/2024    ESTGFRAFRICA >60.0 11/12/2021    EGFRNONAA >60.0 11/12/2021    TSH 2.880 08/23/2023     Vit D, 25-Hydroxy   Date Value Ref Range Status   08/23/2023 28 (L) 30 - 96 ng/mL Final     Comment:     Vitamin D deficiency.........<10 ng/mL                              Vitamin D insufficiency......10-29 ng/mL       Vitamin D sufficiency........> or equal to 30 ng/mL  Vitamin D toxicity............>100 ng/mL       Assessment and Plan     1. Type 2 diabetes mellitus with hyperglycemia, with long-term current use of insulin  Ambulatory referral/consult to Endocrinology    insulin glargine, TOUJEO, (TOUJEO) 300 unit/mL (1.5 mL) InPn pen      2. Uncontrolled type 2 diabetes mellitus with hyperglycemia, with long-term current use of insulin  insulin glargine, TOUJEO, (TOUJEO) 300 unit/mL (1.5 mL) InPn pen    insulin aspart U-100 (NOVOLOG FLEXPEN U-100 INSULIN) 100 unit/mL (3 mL) InPn pen      3. Type II diabetes mellitus with hyperosmolarity, uncontrolled  insulin aspart U-100 (NOVOLOG FLEXPEN U-100 INSULIN) 100 unit/mL (3 mL) InPn pen          Type 2 diabetes mellitus with hyperglycemia, with long-term current use of insulin  -- Labs scheduled in August.  -- A1c goal <7.5%.  -- Medications discussed:  MFM   GLP1-DPP4   OSORIO   SGLT2   Reviewed potential adverse  effects of SGLT-2 inhibitors, including genital mycotic infections, slightly increased risk of UTI, hypersensitivity, hypotension, and hyperkalemia. Advised to maintain water intake of 8-10 cups per day. Advised we need to check chemistry panel at baseline and 2 weeks after starting. Discussed FDA warning reports of ketoacidosis associated with SGLT-2 inhibitors. Advised to seek immediate medical attention and stop the medication if symptoms such as difficulty breathing, nausea, vomiting, abdominal pain, confusion, and unusual fatigue/sleepiness. Discussed possible precipitating factors including major illness/reduced food and fluid intake (advised to stop under these circumstances), and reduced insulin dose. Discussed possible effects of increased fracture risk/decreased bone density. Discussed reports of increased risk of leg and foot amputations with canagliflozin and need to seek urgent care if developed new pain or tenderness, sores or ulcers, or infections in legs/feet.   Insulin   -- Reviewed logs/CGM:  Glucose variable.   Instructed to send glucose logs in 14 days.  Reach out to me sooner for any glucose <70 or consistently >180.  -- Link Dexcom to our account.   -- Medication Changes:   Metformin 1000mg daily  Mounjaro 10 mg weekly   Toujeo 40 units daily   Novolog 10units plus correction scale before meals only  Add correction scale as needed.  Blood sugar 150 to 200 add 1 units  Blood sugar 201 to 250 add 2 units  Blood sugar 251 to 300 add 3 units  Blood sugar 301 to 350 add 4 units  Blood sugar greater than 350 add 5 units      -- Reviewed goals of therapy are to get the best control we can without hypoglycemia.  -- Reviewed patient's current insulin regimen. Clarified proper insulin dose and timing in relation to meals, etc. Insulin injection sites and proper rotation instructed.    -- Advised frequent self blood glucose monitoring.  Patient encouraged to document glucose results and bring them to  every clinic visit.  -- Hypoglycemia precautions discussed. Instructed on precautions before driving.    -- Call for Bg repeatedly < 90 or > 180.   -- Close adherence to lifestyle changes recommended.   -- Periodic follow ups for eye evaluations, foot care and dental care suggested.      Follow up in about 4 months (around 11/10/2024).      Visit today included increased complexity associated with the care of the problems addressed and managing the longitudinal care of the patient due to the serious and/or complex managed problems.

## 2024-07-09 ENCOUNTER — TELEPHONE (OUTPATIENT)
Dept: FAMILY MEDICINE | Facility: CLINIC | Age: 74
End: 2024-07-09
Payer: COMMERCIAL

## 2024-07-09 DIAGNOSIS — Z79.4 UNCONTROLLED TYPE 2 DIABETES MELLITUS WITH HYPERGLYCEMIA, WITH LONG-TERM CURRENT USE OF INSULIN: ICD-10-CM

## 2024-07-09 DIAGNOSIS — E11.65 UNCONTROLLED TYPE 2 DIABETES MELLITUS WITH HYPERGLYCEMIA, WITH LONG-TERM CURRENT USE OF INSULIN: ICD-10-CM

## 2024-07-09 DIAGNOSIS — H91.90 HEARING LOSS, UNSPECIFIED HEARING LOSS TYPE, UNSPECIFIED LATERALITY: Primary | ICD-10-CM

## 2024-07-09 RX ORDER — BLOOD-GLUCOSE SENSOR
EACH MISCELLANEOUS
Qty: 3 EACH | Refills: 3 | Status: SHIPPED | OUTPATIENT
Start: 2024-07-09

## 2024-07-10 ENCOUNTER — OFFICE VISIT (OUTPATIENT)
Dept: ENDOCRINOLOGY | Facility: CLINIC | Age: 74
End: 2024-07-10
Payer: COMMERCIAL

## 2024-07-10 VITALS
OXYGEN SATURATION: 95 % | HEIGHT: 69 IN | DIASTOLIC BLOOD PRESSURE: 78 MMHG | WEIGHT: 194.75 LBS | BODY MASS INDEX: 28.85 KG/M2 | SYSTOLIC BLOOD PRESSURE: 110 MMHG | HEART RATE: 85 BPM

## 2024-07-10 DIAGNOSIS — Z79.4 UNCONTROLLED TYPE 2 DIABETES MELLITUS WITH HYPERGLYCEMIA, WITH LONG-TERM CURRENT USE OF INSULIN: ICD-10-CM

## 2024-07-10 DIAGNOSIS — E11.00 TYPE II DIABETES MELLITUS WITH HYPEROSMOLARITY, UNCONTROLLED: ICD-10-CM

## 2024-07-10 DIAGNOSIS — E11.65 UNCONTROLLED TYPE 2 DIABETES MELLITUS WITH HYPERGLYCEMIA, WITH LONG-TERM CURRENT USE OF INSULIN: ICD-10-CM

## 2024-07-10 DIAGNOSIS — E11.65 TYPE 2 DIABETES MELLITUS WITH HYPERGLYCEMIA, WITH LONG-TERM CURRENT USE OF INSULIN: Primary | ICD-10-CM

## 2024-07-10 DIAGNOSIS — Z79.4 TYPE 2 DIABETES MELLITUS WITH HYPERGLYCEMIA, WITH LONG-TERM CURRENT USE OF INSULIN: Primary | ICD-10-CM

## 2024-07-10 PROCEDURE — 99214 OFFICE O/P EST MOD 30 MIN: CPT | Mod: S$GLB,,, | Performed by: NURSE PRACTITIONER

## 2024-07-10 PROCEDURE — 1159F MED LIST DOCD IN RCRD: CPT | Mod: CPTII,S$GLB,, | Performed by: NURSE PRACTITIONER

## 2024-07-10 PROCEDURE — 3078F DIAST BP <80 MM HG: CPT | Mod: CPTII,S$GLB,, | Performed by: NURSE PRACTITIONER

## 2024-07-10 PROCEDURE — 1160F RVW MEDS BY RX/DR IN RCRD: CPT | Mod: CPTII,S$GLB,, | Performed by: NURSE PRACTITIONER

## 2024-07-10 PROCEDURE — 1101F PT FALLS ASSESS-DOCD LE1/YR: CPT | Mod: CPTII,S$GLB,, | Performed by: NURSE PRACTITIONER

## 2024-07-10 PROCEDURE — 3074F SYST BP LT 130 MM HG: CPT | Mod: CPTII,S$GLB,, | Performed by: NURSE PRACTITIONER

## 2024-07-10 PROCEDURE — 4010F ACE/ARB THERAPY RXD/TAKEN: CPT | Mod: CPTII,S$GLB,, | Performed by: NURSE PRACTITIONER

## 2024-07-10 PROCEDURE — 3008F BODY MASS INDEX DOCD: CPT | Mod: CPTII,S$GLB,, | Performed by: NURSE PRACTITIONER

## 2024-07-10 PROCEDURE — 3288F FALL RISK ASSESSMENT DOCD: CPT | Mod: CPTII,S$GLB,, | Performed by: NURSE PRACTITIONER

## 2024-07-10 PROCEDURE — 3044F HG A1C LEVEL LT 7.0%: CPT | Mod: CPTII,S$GLB,, | Performed by: NURSE PRACTITIONER

## 2024-07-10 PROCEDURE — G2211 COMPLEX E/M VISIT ADD ON: HCPCS | Mod: S$GLB,,, | Performed by: NURSE PRACTITIONER

## 2024-07-10 PROCEDURE — 99999 PR PBB SHADOW E&M-EST. PATIENT-LVL V: CPT | Mod: PBBFAC,,, | Performed by: NURSE PRACTITIONER

## 2024-07-10 PROCEDURE — 1126F AMNT PAIN NOTED NONE PRSNT: CPT | Mod: CPTII,S$GLB,, | Performed by: NURSE PRACTITIONER

## 2024-07-10 RX ORDER — INSULIN GLARGINE 300 [IU]/ML
40 INJECTION, SOLUTION SUBCUTANEOUS DAILY
Qty: 9 PEN | Refills: 3 | Status: SHIPPED | OUTPATIENT
Start: 2024-07-10

## 2024-07-10 RX ORDER — INSULIN ASPART 100 [IU]/ML
10 INJECTION, SOLUTION INTRAVENOUS; SUBCUTANEOUS
Qty: 90 ML | Refills: 3 | Status: SHIPPED | OUTPATIENT
Start: 2024-07-10

## 2024-07-10 NOTE — PATIENT INSTRUCTIONS
Metformin 1000mg daily  Mounjaro 10 mg weekly   Toujeo 40 units daily   Novolog 10units plus correction scale before meals only  Add correction scale as needed.  Blood sugar 150 to 200 add 1 units  Blood sugar 201 to 250 add 2 units  Blood sugar 251 to 300 add 3 units  Blood sugar 301 to 350 add 4 units  Blood sugar greater than 350 add 5 units    Login Dexcom Clarity pamela. In order to share your data, you will need to set up a Dexcom clarity account. When logged into Dexcom Clarity Account, under a settings tab for Clinic Code - use [facility].  LET ME KNOW WHEN THIS IS DONE.

## 2024-07-10 NOTE — ASSESSMENT & PLAN NOTE
-- Labs scheduled in August.  -- A1c goal <7.5%.  -- Medications discussed:  MFM   GLP1-DPP4   OSORIO   SGLT2   Reviewed potential adverse effects of SGLT-2 inhibitors, including genital mycotic infections, slightly increased risk of UTI, hypersensitivity, hypotension, and hyperkalemia. Advised to maintain water intake of 8-10 cups per day. Advised we need to check chemistry panel at baseline and 2 weeks after starting. Discussed FDA warning reports of ketoacidosis associated with SGLT-2 inhibitors. Advised to seek immediate medical attention and stop the medication if symptoms such as difficulty breathing, nausea, vomiting, abdominal pain, confusion, and unusual fatigue/sleepiness. Discussed possible precipitating factors including major illness/reduced food and fluid intake (advised to stop under these circumstances), and reduced insulin dose. Discussed possible effects of increased fracture risk/decreased bone density. Discussed reports of increased risk of leg and foot amputations with canagliflozin and need to seek urgent care if developed new pain or tenderness, sores or ulcers, or infections in legs/feet.   Insulin   -- Reviewed logs/CGM:  Glucose variable.   Instructed to send glucose logs in 14 days.  Reach out to me sooner for any glucose <70 or consistently >180.  -- Link Dexcom to our account.   -- Medication Changes:   Metformin 1000mg daily  Mounjaro 10 mg weekly   Toujeo 40 units daily   Novolog 10units plus correction scale before meals only  Add correction scale as needed.  Blood sugar 150 to 200 add 1 units  Blood sugar 201 to 250 add 2 units  Blood sugar 251 to 300 add 3 units  Blood sugar 301 to 350 add 4 units  Blood sugar greater than 350 add 5 units      -- Reviewed goals of therapy are to get the best control we can without hypoglycemia.  -- Reviewed patient's current insulin regimen. Clarified proper insulin dose and timing in relation to meals, etc. Insulin injection sites and proper rotation  instructed.    -- Advised frequent self blood glucose monitoring.  Patient encouraged to document glucose results and bring them to every clinic visit.  -- Hypoglycemia precautions discussed. Instructed on precautions before driving.    -- Call for Bg repeatedly < 90 or > 180.   -- Close adherence to lifestyle changes recommended.   -- Periodic follow ups for eye evaluations, foot care and dental care suggested.

## 2024-07-17 ENCOUNTER — OFFICE VISIT (OUTPATIENT)
Dept: OTOLARYNGOLOGY | Facility: CLINIC | Age: 74
End: 2024-07-17
Payer: COMMERCIAL

## 2024-07-17 ENCOUNTER — CLINICAL SUPPORT (OUTPATIENT)
Dept: OTOLARYNGOLOGY | Facility: CLINIC | Age: 74
End: 2024-07-17
Payer: COMMERCIAL

## 2024-07-17 VITALS
HEART RATE: 81 BPM | DIASTOLIC BLOOD PRESSURE: 84 MMHG | WEIGHT: 197.19 LBS | SYSTOLIC BLOOD PRESSURE: 130 MMHG | BODY MASS INDEX: 29.12 KG/M2

## 2024-07-17 DIAGNOSIS — H90.3 SENSORINEURAL HEARING LOSS (SNHL) OF BOTH EARS: Primary | ICD-10-CM

## 2024-07-17 DIAGNOSIS — J30.9 CHRONIC ALLERGIC RHINITIS: Chronic | ICD-10-CM

## 2024-07-17 DIAGNOSIS — H90.3 SENSORINEURAL HEARING LOSS (SNHL), BILATERAL: Primary | Chronic | ICD-10-CM

## 2024-07-17 PROCEDURE — 1101F PT FALLS ASSESS-DOCD LE1/YR: CPT | Mod: CPTII,S$GLB,, | Performed by: OTOLARYNGOLOGY

## 2024-07-17 PROCEDURE — 1159F MED LIST DOCD IN RCRD: CPT | Mod: CPTII,S$GLB,, | Performed by: OTOLARYNGOLOGY

## 2024-07-17 PROCEDURE — 3075F SYST BP GE 130 - 139MM HG: CPT | Mod: CPTII,S$GLB,, | Performed by: OTOLARYNGOLOGY

## 2024-07-17 PROCEDURE — 3079F DIAST BP 80-89 MM HG: CPT | Mod: CPTII,S$GLB,, | Performed by: OTOLARYNGOLOGY

## 2024-07-17 PROCEDURE — 3008F BODY MASS INDEX DOCD: CPT | Mod: CPTII,S$GLB,, | Performed by: OTOLARYNGOLOGY

## 2024-07-17 PROCEDURE — 1160F RVW MEDS BY RX/DR IN RCRD: CPT | Mod: CPTII,S$GLB,, | Performed by: OTOLARYNGOLOGY

## 2024-07-17 PROCEDURE — 99999 PR PBB SHADOW E&M-EST. PATIENT-LVL IV: CPT | Mod: PBBFAC,,, | Performed by: OTOLARYNGOLOGY

## 2024-07-17 PROCEDURE — 99999 PR PBB SHADOW E&M-EST. PATIENT-LVL I: CPT | Mod: PBBFAC,,,

## 2024-07-17 PROCEDURE — 92567 TYMPANOMETRY: CPT | Mod: S$GLB,,,

## 2024-07-17 PROCEDURE — 1126F AMNT PAIN NOTED NONE PRSNT: CPT | Mod: CPTII,S$GLB,, | Performed by: OTOLARYNGOLOGY

## 2024-07-17 PROCEDURE — 3288F FALL RISK ASSESSMENT DOCD: CPT | Mod: CPTII,S$GLB,, | Performed by: OTOLARYNGOLOGY

## 2024-07-17 PROCEDURE — 92557 COMPREHENSIVE HEARING TEST: CPT | Mod: S$GLB,,,

## 2024-07-17 PROCEDURE — 4010F ACE/ARB THERAPY RXD/TAKEN: CPT | Mod: CPTII,S$GLB,, | Performed by: OTOLARYNGOLOGY

## 2024-07-17 PROCEDURE — 99204 OFFICE O/P NEW MOD 45 MIN: CPT | Mod: S$GLB,,, | Performed by: OTOLARYNGOLOGY

## 2024-07-17 PROCEDURE — 3044F HG A1C LEVEL LT 7.0%: CPT | Mod: CPTII,S$GLB,, | Performed by: OTOLARYNGOLOGY

## 2024-07-17 NOTE — PROGRESS NOTES
"Patria Nieto, a 73 y.o. male was seen today in the clinic for an audiologic evaluation. The patient's primary complaint was difficulty understanding "soft spoken" people, especially in noisy environments.  Mr. Nieto has noticed this issue over the past year. He denies ear pain, drainage, tinnitus and dizziness. 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    Pure tone testing revealed mild sloping to moderate primarily sensorineural hearing loss, bilaterally. Speech reception thresholds were obtained at 25 dBHL in the right ear and 30 dBHL in the left ear. Speech discrimination scores were 96% in the right ear and 84% in the left ear. Tympanometry revealed Type Ad tympanogram in the right ear with compliance of 1.86 ml and Type A tympanogram in the left ear with compliance of 1.18 ml.     Results were reviewed with the patient and the recommendation of a hearing aid consultation was discussed.    Recommendations:  Otologic evaluation  Annual audiologic evaluation  Hearing protection in noise  Hearing aid consultation when patient is ready to consider amplification              "

## 2024-07-17 NOTE — PROGRESS NOTES
Chief Complaint   Patient presents with    Hearing Loss     Hearing loss in both ears for a year    .     HPI:  Patria Nieto is a very pleasant 73 y.o. male here to see me today for the first time for evaluation of hearing loss.  He reports hearing loss that has been gradually progressing over the last 1 years.  He has not noted any difference in hearing between the ears, with both ears being the worse hearing ear.  He has not noted any tinnitus in either ear.  He has not had any recent issues with ear pain or ear drainage.  He denies a family history of hearing loss, and has not had any previous otologic surgery.  He admits to history of loud noise exposure working in [place] when he was younger.      Past Medical History:   Diagnosis Date    Colon polyp 06/22/2007    Diabetes mellitus type II     Hx MRSA infection     Hypertension     MRSA infection 2002    Supraventricular tachycardia      Social History     Socioeconomic History    Marital status:    Tobacco Use    Smoking status: Never    Smokeless tobacco: Never   Substance and Sexual Activity    Alcohol use: Yes     Alcohol/week: 2.0 standard drinks of alcohol     Types: 2 Cans of beer per week    Drug use: No    Sexual activity: Yes     Birth control/protection: None   Social History Narrative    8/10/20: lives with his wife. No kids at home. One son, lives 6 doors away. No pets at home. No smokers at home.      Social Determinants of Health     Financial Resource Strain: Patient Declined (2/20/2023)    Overall Financial Resource Strain (CARDIA)     Difficulty of Paying Living Expenses: Patient declined   Food Insecurity: Patient Declined (2/20/2023)    Hunger Vital Sign     Worried About Running Out of Food in the Last Year: Patient declined     Ran Out of Food in the Last Year: Patient declined   Transportation Needs: Patient Declined (2/20/2023)    PRAPARE - Transportation     Lack of Transportation (Medical): Patient declined     Lack of  Transportation (Non-Medical): Patient declined   Physical Activity: Patient Declined (2/20/2023)    Exercise Vital Sign     Days of Exercise per Week: Patient declined     Minutes of Exercise per Session: Patient declined   Stress: Patient Declined (2/20/2023)    [employer] - Occupational Stress Questionnaire     Feeling of Stress : Patient declined   Housing Stability: Unknown (2/20/2023)    Housing Stability Vital Sign     Unable to Pay for Housing in the Last Year: Patient refused     Unstable Housing in the Last Year: Patient refused     Past Surgical History:   Procedure Laterality Date    ABLATION N/A 4/12/2021    Procedure: Ablation;  Surgeon: Geovanni Chowdary MD;  Location: [facility] EP LAB;  Service: Cardiology;  Laterality: N/A;  SVT, RFA, LISA, anes, DM, 3prep    CARDIAC CATHETERIZATION      COLONOSCOPY N/A 12/19/2022    Procedure: COLONOSCOPY;  Surgeon: Jimmie Macias MD;  Location: [facility] ENDO (4TH FLR);  Service: Endoscopy;  Laterality: N/A;  PATIENT REQUEST MAIN CAMPUS and NO SEDATION  Dr. Macias pt but cannot put name on the case  instructions via portal - sm    EXCISION OF GANGLION OF WRIST Left 12/17/2021    Procedure: EXCISION, GANGLION CYST, WRIST;  Surgeon: Micheal Scalnon Jr., MD;  Location: [facility] OR;  Service: Orthopedics;  Laterality: Left;    none       Family History   Problem Relation Name Age of Onset    Heart disease Mother Savannah Nieto     Heart disease Father Lokesh Nieto, Sr.     Diabetes Father Lokesh Nieto, Sr.     Diabetes Sister Bailey Murrell          Review of Systems  General: negative for chills, fever or weight loss  Psychological: negative for mood changes or depression  Ophthalmic: negative for blurry vision, photophobia or eye pain  ENT: see HPI  Respiratory: no cough, shortness of breath, or wheezing  Cardiovascular: no chest pain or dyspnea on exertion  Gastrointestinal: no abdominal pain, change in bowel habits, or black/ bloody  stools  Musculoskeletal: negative for gait disturbance or muscular weakness  Neurological: no syncope or seizures; no ataxia  Dermatological: negative for puritis,  rash and jaundice  Hematologic/lymphatic: no easy bruising, no new lumps or bumps      Physical Exam:    Vitals:    07/17/24 0849   BP: 130/84   Pulse: 81       Constitutional: Well appearing / communicating without difficutly.  NAD.  Eyes: EOM I Bilaterally  Head/Face: Normocephalic.  Negative paranasal sinus pressure/tenderness.  Salivary glands WNL.  House Brackmann I Bilaterally.    Right Ear: Auricle normal appearance. External Auditory Canal within normal limits no lesions or masses,TM w/o masses/lesions/perforations. TM mobility noted.   Left Ear: Auricle normal appearance. External Auditory Canal within normal limits no lesions or masses,TM w/o masses/lesions/perforations. TM mobility noted.  Rinne Air conduction >bone conduction bilaterally, Schreiber midline.   Nose: No gross nasal septal deviation. Inferior Turbinates 3+ bilaterally. No septal perforation. No masses/lesions. External nasal skin appears normal without masses/lesions.  Oral Cavity: Gingiva/lips within normal limits.  Dentition/gingiva healthy appearing. Mucus membranes moist. Floor of mouth soft, no masses palpated. Oral Tongue mobile. Hard Palate appears normal.    Oropharynx: Base of tongue appears normal. No masses/lesions noted. Tonsillar fossa/pharyngeal wall without lesions. Posterior oropharynx WNL.  Soft palate without masses. Midline uvula.   Neck/Lymphatic: No LAD I-VI bilaterally.  No thyromegaly.  No masses noted on exam.      Diagnostic studies:  Audiogram interpreted personally by me and discussed in detail with the patient today. Findings:  mild sloping to moderate primarily SNHL bilaterally; Tympanometry: Type A tymps bilaterally      Assessment:    ICD-10-CM ICD-9-CM    1. Sensorineural hearing loss (SNHL), bilateral  H90.3 389.18       2. Chronic allergic rhinitis   J30.9 477.9         The primary encounter diagnosis was Sensorineural hearing loss (SNHL), bilateral. A diagnosis of Chronic allergic rhinitis was also pertinent to this visit.      Plan:  No orders of the defined types were placed in this encounter.    We reviewed the patient's recent audiogram and hearing loss in detail.  We also discussed that he is a good candidate for hearing aids, if and when he the patient is motivated.    We also discussed the use hearing protection when exposed to loud noise, including lawn equipment. Recommend audiogram in 1 year.     Recommend nasal saline rinse and FLonase 2 sprays to bilateral nostrils daily.     Thank you kindly for allowing me to participate in the patient's care.       Vanesa Dudley MD

## 2024-07-20 DIAGNOSIS — E11.65 UNCONTROLLED TYPE 2 DIABETES MELLITUS WITH HYPERGLYCEMIA, WITH LONG-TERM CURRENT USE OF INSULIN: ICD-10-CM

## 2024-07-20 DIAGNOSIS — Z79.4 UNCONTROLLED TYPE 2 DIABETES MELLITUS WITH HYPERGLYCEMIA, WITH LONG-TERM CURRENT USE OF INSULIN: ICD-10-CM

## 2024-07-22 RX ORDER — TIRZEPATIDE 10 MG/.5ML
10 INJECTION, SOLUTION SUBCUTANEOUS
Qty: 4 PEN | Refills: 1 | Status: SHIPPED | OUTPATIENT
Start: 2024-07-22

## 2024-07-28 DIAGNOSIS — E78.49 OTHER HYPERLIPIDEMIA: ICD-10-CM

## 2024-07-28 DIAGNOSIS — Z79.4 TYPE 2 DIABETES MELLITUS WITH DIABETIC POLYNEUROPATHY, WITH LONG-TERM CURRENT USE OF INSULIN: ICD-10-CM

## 2024-07-28 DIAGNOSIS — E78.5 HYPERLIPIDEMIA LDL GOAL <100: ICD-10-CM

## 2024-07-28 DIAGNOSIS — I10 ESSENTIAL HYPERTENSION: ICD-10-CM

## 2024-07-28 DIAGNOSIS — E11.42 TYPE 2 DIABETES MELLITUS WITH DIABETIC POLYNEUROPATHY, WITH LONG-TERM CURRENT USE OF INSULIN: ICD-10-CM

## 2024-07-28 RX ORDER — ATORVASTATIN CALCIUM 40 MG/1
40 TABLET, FILM COATED ORAL NIGHTLY
Qty: 90 TABLET | Refills: 1 | Status: SHIPPED | OUTPATIENT
Start: 2024-07-28

## 2024-07-28 RX ORDER — RAMIPRIL 2.5 MG/1
2.5 CAPSULE ORAL DAILY
Qty: 90 CAPSULE | Refills: 3 | Status: SHIPPED | OUTPATIENT
Start: 2024-07-28

## 2024-07-28 NOTE — TELEPHONE ENCOUNTER
Care Due:                  Date            Visit Type   Department     Provider  --------------------------------------------------------------------------------                                EP -                              PRIMARY      [facility]  Last Visit: 03-      CARE ([facility])   [facility]       Charles Hanna                              EP -                              PRIMARY      [facility]  Next Visit: 09-      CARE ([facility])   [facility]       Charlesmackenzie Hanna                                                            Last  Test          Frequency    Reason                     Performed    Due Date  --------------------------------------------------------------------------------    HBA1C.......  6 months...  metFORMIN................  02- 08-    Lipid Panel.  12 months..  atorvastatin.............  08- 08-    [facility] Embedded Care Due Messages. Reference number: [id number].   7/28/2024 1:07:44 PM CDT  
English

## 2024-07-29 RX ORDER — METOPROLOL SUCCINATE 50 MG/1
50 TABLET, EXTENDED RELEASE ORAL DAILY
Qty: 90 TABLET | Refills: 3 | Status: SHIPPED | OUTPATIENT
Start: 2024-07-29

## 2024-08-13 ENCOUNTER — PATIENT OUTREACH (OUTPATIENT)
Dept: ADMINISTRATIVE | Facility: HOSPITAL | Age: 74
End: 2024-08-13
Payer: COMMERCIAL

## 2024-08-13 NOTE — PROGRESS NOTES
08/13/2024  VB chart audit performed. Care Everywhere updates requested and reviewed  Overdue HM topic chart audit and/or requested. LINKS triggered and reconciled. Media reviewed.

## 2024-08-15 ENCOUNTER — PATIENT OUTREACH (OUTPATIENT)
Dept: ADMINISTRATIVE | Facility: HOSPITAL | Age: 74
End: 2024-08-15
Payer: COMMERCIAL

## 2024-08-15 NOTE — PROGRESS NOTES
Population Health Chart Review & Patient Outreach Details      Additional [facility] Health Notes:               Updates Requested / Reviewed:      Updated Care Coordination Note, Care Everywhere, and Immunizations Reconciliation Completed or Queried: [facility] Topics Overdue:      [facility] Score: 0     Patient is not due for any topics at this time.    Shingles/Zoster Vaccine  RSV Vaccine                  Health Maintenance Topic(s) Outreach Outcomes & Actions Taken:    Lab(s) - Outreach Outcomes & Actions Taken  : Overdue Lab(s) Scheduled

## 2024-08-29 ENCOUNTER — OFFICE VISIT (OUTPATIENT)
Dept: ORTHOPEDICS | Facility: CLINIC | Age: 74
End: 2024-08-29
Payer: COMMERCIAL

## 2024-08-29 VITALS — WEIGHT: 196.19 LBS | BODY MASS INDEX: 29.06 KG/M2 | HEIGHT: 69 IN

## 2024-08-29 DIAGNOSIS — M65.332 TRIGGER MIDDLE FINGER OF LEFT HAND: Primary | ICD-10-CM

## 2024-08-29 PROCEDURE — 99999 PR PBB SHADOW E&M-EST. PATIENT-LVL III: CPT | Mod: PBBFAC,,, | Performed by: ORTHOPAEDIC SURGERY

## 2024-08-29 RX ORDER — TRIAMCINOLONE ACETONIDE 40 MG/ML
20 INJECTION, SUSPENSION INTRA-ARTICULAR; INTRAMUSCULAR
Status: COMPLETED | OUTPATIENT
Start: 2024-08-29 | End: 2024-08-29

## 2024-08-29 RX ADMIN — TRIAMCINOLONE ACETONIDE 20 MG: 40 INJECTION, SUSPENSION INTRA-ARTICULAR; INTRAMUSCULAR at 02:08

## 2024-08-29 NOTE — PROGRESS NOTES
Subjective:      Patient ID: Patria Nieto is a 74 y.o. male.  Chief Complaint: Pain of the Left Hand (Middle trigger )      HPI  Patria Nieto is a  74 y.o. male presenting today for follow up of left hand symptoms.  He reports that he is reporting locking of the left middle finger symptoms worse with use  He has done well with injections in the past   No numbness or tingling.    Review of patient's allergies indicates:  No Known Allergies      Current Outpatient Medications   Medication Sig Dispense Refill    aspirin 81 MG Chew Take 81 mg by mouth once daily.      atorvastatin (LIPITOR) 40 MG tablet Take 1 tablet (40 mg total) by mouth every evening. 90 tablet 1    blood-glucose meter,continuous (DEXCOM G7 ) Misc Use as directed 1 each 3    blood-glucose sensor (DEXCOM G7 SENSOR) Nikki Change every 10 days. 3 each 3    clotrimazole-betamethasone 1-0.05% (LOTRISONE) cream Apply topically 2 (two) times daily. 45 g 2    cyanocobalamin (VITAMIN B-12) 1000 MCG tablet Take 1 tablet (1,000 mcg total) by mouth once daily. 90 tablet 5    glucose 4 GM chewable tablet Take 4 tablets (16 g total) by mouth as needed for Low blood sugar (If having symptoms of blurry vision, palpitations, confusion, shakiness.  Please check sugars and if sugar below 70 please take 4 tablets and re-check sugar everry 15 minutes until sugars are above 70 and symptoms resolve.). 50 tablet 12    insulin aspart U-100 (NOVOLOG FLEXPEN U-100 INSULIN) 100 unit/mL (3 mL) InPn pen Inject 10 Units into the skin 3 (three) times daily with meals. WITH CORRECTION SCALE (MAX 60 UNITS PER DAY) 90 mL 3    insulin glargine, TOUJEO, (TOUJEO) 300 unit/mL (1.5 mL) InPn pen Inject 40 Units into the skin once daily. Adjust to max 70 units per MD instructions 9 Pen 3    metFORMIN (GLUCOPHAGE) 1000 MG tablet Take 1 tablet (1,000 mg total) by mouth 2 (two) times daily with meals. 180 tablet 0    metoprolol succinate (TOPROL-XL) 50 MG 24 hr tablet Take 1 tablet (50  "mg total) by mouth once daily. 90 tablet 3    mupirocin (BACTROBAN) 2 % ointment Apply topically 3 (three) times daily. 30 g 11    pen needle, diabetic (BD ULTRA-FINE MINI PEN NEEDLE) 31 gauge x 3/16" Ndle Uses 5 times a day. 200 each 6    ramipriL (ALTACE) 2.5 MG capsule Take 1 capsule (2.5 mg total) by mouth once daily. 90 capsule 3    tirzepatide (MOUNJARO) 10 mg/0.5 mL PnIj Inject 10 mg into the skin every 7 days. 4 Pen 1    tirzepatide (MOUNJARO) 12.5 mg/0.5 mL PnIj Inject 12.5 mg into the skin every 7 days. 4 Pen 3    blood-glucose transmitter (DEXCOM G6 TRANSMITTER) Nikki 1 each by Misc.(Non-Drug; Combo Route) route continuous prn. 1 each PRN     No current facility-administered medications for this visit.     Facility-Administered Medications Ordered in Other Visits   Medication Dose Route Frequency Provider Last Rate Last Admin    sodium chloride 0.9% bolus 1,000 mL  1,000 mL Intravenous Once Lindsay Ruelas NP           Past Medical History:   Diagnosis Date    Colon polyp 06/22/2007    Diabetes mellitus type II     Hx MRSA infection     Hypertension     MRSA infection 2002    Supraventricular tachycardia        Past Surgical History:   Procedure Laterality Date    ABLATION N/A 4/12/2021    Procedure: Ablation;  Surgeon: Geovanni Chowdary MD;  Location: [facility] EP LAB;  Service: Cardiology;  Laterality: N/A;  SVT, RFA, LISA, anes, DM, 3prep    CARDIAC CATHETERIZATION      COLONOSCOPY N/A 12/19/2022    Procedure: COLONOSCOPY;  Surgeon: Jimmie Macias MD;  Location: [facility] ENDO ([address] [place] [address]);  Service: Endoscopy;  Laterality: N/A;  PATIENT REQUEST MAIN CAMPUS and NO SEDATION  Dr. Macias pt but cannot put name on the case  instructions via portal - sm    EXCISION OF GANGLION OF WRIST Left 12/17/2021    Procedure: EXCISION, GANGLION CYST, WRIST;  Surgeon: Micheal Scanlon Jr., MD;  Location: [facility] OR;  Service: Orthopedics;  Laterality: Left;    none         OBJECTIVE:   PHYSICAL EXAM:  Height: 5' 9" (175.3 cm) " "Weight: 89 kg (196 lb 3.4 oz)  Vitals:    08/29/24 1433   Weight: 89 kg (196 lb 3.4 oz)   Height: 5' 9" (1.753 m)   PainSc: 0-No pain     Ortho/SPM Exam  Examination left hand there is tenderness along the flexor tendon sheath left middle finger   Range of motion limited due to pain   Positive triggering    strength decreased   Sensation intact no    RADIOGRAPHS:  None  Comments: I have personally reviewed the imaging and I agree with the above radiologist's report.    ASSESSMENT/PLAN:     IMPRESSION:  Triggering of the left middle finger    PLAN:  Discussed options including injection versus surgery   He would like injection  After pause for time-out identified the left middle finger injected flexor tendon sheath combination Kenalog 20 mg 0.5 cc xylocaine sterile technique   Tolerated the procedure well without complication   I would like him to consider surgery if symptoms persist    FOLLOW UP:  As needed    Disclaimer: This note has been generated using voice-recognition software. There may be typographical errors that have been missed during proof-reading.     "

## 2024-08-31 ENCOUNTER — LAB VISIT (OUTPATIENT)
Dept: LAB | Facility: HOSPITAL | Age: 74
End: 2024-08-31
Attending: FAMILY MEDICINE
Payer: COMMERCIAL

## 2024-08-31 DIAGNOSIS — Z00.00 VISIT FOR WELL MAN HEALTH CHECK: ICD-10-CM

## 2024-08-31 DIAGNOSIS — E11.65 TYPE 2 DIABETES MELLITUS WITH HYPERGLYCEMIA, WITH LONG-TERM CURRENT USE OF INSULIN: ICD-10-CM

## 2024-08-31 DIAGNOSIS — Z79.4 TYPE 2 DIABETES MELLITUS WITH HYPERGLYCEMIA, WITH LONG-TERM CURRENT USE OF INSULIN: ICD-10-CM

## 2024-08-31 LAB
ALBUMIN/CREAT UR: 14 UG/MG (ref 0–30)
CREAT UR-MCNC: 50 MG/DL (ref 23–375)
MICROALBUMIN UR DL<=1MG/L-MCNC: 7 UG/ML

## 2024-08-31 PROCEDURE — 82570 ASSAY OF URINE CREATININE: CPT | Performed by: FAMILY MEDICINE

## 2024-09-03 ENCOUNTER — OFFICE VISIT (OUTPATIENT)
Dept: FAMILY MEDICINE | Facility: CLINIC | Age: 74
End: 2024-09-03
Payer: COMMERCIAL

## 2024-09-03 VITALS
TEMPERATURE: 98 F | SYSTOLIC BLOOD PRESSURE: 112 MMHG | WEIGHT: 189.63 LBS | HEART RATE: 91 BPM | OXYGEN SATURATION: 97 % | DIASTOLIC BLOOD PRESSURE: 70 MMHG | HEIGHT: 69 IN | BODY MASS INDEX: 28.08 KG/M2

## 2024-09-03 DIAGNOSIS — Z79.4 TYPE 2 DIABETES MELLITUS WITH HYPERGLYCEMIA, WITH LONG-TERM CURRENT USE OF INSULIN: Primary | ICD-10-CM

## 2024-09-03 DIAGNOSIS — R79.0 ABNORMAL IRON SATURATION: ICD-10-CM

## 2024-09-03 DIAGNOSIS — Z12.11 COLON CANCER SCREENING: ICD-10-CM

## 2024-09-03 DIAGNOSIS — Z23 NEED FOR INFLUENZA VACCINATION: ICD-10-CM

## 2024-09-03 DIAGNOSIS — R79.89 ELEVATED LIVER FUNCTION TESTS: ICD-10-CM

## 2024-09-03 DIAGNOSIS — E11.65 TYPE 2 DIABETES MELLITUS WITH HYPERGLYCEMIA, WITH LONG-TERM CURRENT USE OF INSULIN: Primary | ICD-10-CM

## 2024-09-03 DIAGNOSIS — B35.3 TINEA PEDIS OF BOTH FEET: ICD-10-CM

## 2024-09-03 PROCEDURE — 90653 IIV ADJUVANT VACCINE IM: CPT | Mod: S$GLB,,, | Performed by: FAMILY MEDICINE

## 2024-09-03 PROCEDURE — 3066F NEPHROPATHY DOC TX: CPT | Mod: CPTII,S$GLB,, | Performed by: FAMILY MEDICINE

## 2024-09-03 PROCEDURE — 1126F AMNT PAIN NOTED NONE PRSNT: CPT | Mod: CPTII,S$GLB,, | Performed by: FAMILY MEDICINE

## 2024-09-03 PROCEDURE — 4010F ACE/ARB THERAPY RXD/TAKEN: CPT | Mod: CPTII,S$GLB,, | Performed by: FAMILY MEDICINE

## 2024-09-03 PROCEDURE — 1159F MED LIST DOCD IN RCRD: CPT | Mod: CPTII,S$GLB,, | Performed by: FAMILY MEDICINE

## 2024-09-03 PROCEDURE — 3044F HG A1C LEVEL LT 7.0%: CPT | Mod: CPTII,S$GLB,, | Performed by: FAMILY MEDICINE

## 2024-09-03 PROCEDURE — 1101F PT FALLS ASSESS-DOCD LE1/YR: CPT | Mod: CPTII,S$GLB,, | Performed by: FAMILY MEDICINE

## 2024-09-03 PROCEDURE — 3288F FALL RISK ASSESSMENT DOCD: CPT | Mod: CPTII,S$GLB,, | Performed by: FAMILY MEDICINE

## 2024-09-03 PROCEDURE — 3061F NEG MICROALBUMINURIA REV: CPT | Mod: CPTII,S$GLB,, | Performed by: FAMILY MEDICINE

## 2024-09-03 PROCEDURE — 3008F BODY MASS INDEX DOCD: CPT | Mod: CPTII,S$GLB,, | Performed by: FAMILY MEDICINE

## 2024-09-03 PROCEDURE — 99397 PER PM REEVAL EST PAT 65+ YR: CPT | Mod: 25,S$GLB,, | Performed by: FAMILY MEDICINE

## 2024-09-03 PROCEDURE — 1160F RVW MEDS BY RX/DR IN RCRD: CPT | Mod: CPTII,S$GLB,, | Performed by: FAMILY MEDICINE

## 2024-09-03 PROCEDURE — 99999 PR PBB SHADOW E&M-EST. PATIENT-LVL V: CPT | Mod: PBBFAC,,, | Performed by: FAMILY MEDICINE

## 2024-09-03 PROCEDURE — 3078F DIAST BP <80 MM HG: CPT | Mod: CPTII,S$GLB,, | Performed by: FAMILY MEDICINE

## 2024-09-03 PROCEDURE — 3074F SYST BP LT 130 MM HG: CPT | Mod: CPTII,S$GLB,, | Performed by: FAMILY MEDICINE

## 2024-09-03 PROCEDURE — 90471 IMMUNIZATION ADMIN: CPT | Mod: S$GLB,,, | Performed by: FAMILY MEDICINE

## 2024-09-03 RX ORDER — CLOTRIMAZOLE AND BETAMETHASONE DIPROPIONATE 10; .64 MG/G; MG/G
CREAM TOPICAL 2 TIMES DAILY
Qty: 45 G | Refills: 2 | Status: SHIPPED | OUTPATIENT
Start: 2024-09-03

## 2024-09-03 NOTE — PATIENT INSTRUCTIONS
Diabetes management per endocrine.   Refer back to them.   Discuss with them: increasing mounjaro and stopping SA insulin? Already skipping SA insulin when not eating.     Continue rampipril  Continue metoprolol  Continue atorvastatin  You have low B12. Goal >400. Start daily OTC B12 supplementation at 1000 mcg        Monitor platelets. Refer <100.   Related to liver issues? Get US liver  Repeat iron studies  Consider hepatology and/or hematology     F/u 6 months. Labs in 1 month. Further labs prior to f/u TBD.

## 2024-09-03 NOTE — PROGRESS NOTES
Subjective:       Patient ID: Patria Nieto is a 74 y.o. male.    Chief Complaint: Annual Exam      Patria Nieto is a 74 y.o. male who presents today for an annual exam     Exercise: training for a 10k in October   Has lost weight. Has personal goal to be at 180lb for 10k.  Losing weight due to mounjaro.      Labs: ordered     Colon Cancer Screening: Last Colonoscopy completed on 12/19/2022. Repeat due in 2 years. Ordered today.      DM: managed by endocrine. On metformin, mounjaro, toujeo 40 u daily (previously 50 u) and novolog 10-15 plus SS WM. Skips meals sometimes and when he does, he doesn't take novolog. No recent symptoms of hypoglycemia.   SVT: saw EP. BB changed to once daily. No symptoms, no palpitations. Running 3x/week. No symptoms at all.   DLD: on atorvastatin at goal 8/2024  HTN: taking ramipril 2.5 mg and metoprolol 50 mg Xr once daily. BP well controlled. Bp at home 110-130/70-85. No hypo/hypertension symptoms.     Thrombocytopenia: monitor. Refer <100.   Elevated bili: has history of chronically elevated bilirubin. However, higher then usual. Also iron high, and was not high before. Also has intermittent left sided abdominal pain. He denies taking any oral iron supplementation.      Diabetic neuropathy: Not taking gabapentin. Has limited sensation. Has nail issues. Wants to see podiatry.   Diabetic screening: Eye exam November 2023  Rash: resolved.         PMHx: reviewed in EMR and updated  Meds: reviewed in EMR and updated  Shx: reviewed in EMR and updated  FMHx: reviewed in EMR and updated  Social: reviewed in EMR and updated        Review of Systems   Constitutional:  Negative for chills and fever.   Respiratory:  Negative for shortness of breath.    Cardiovascular:  Negative for chest pain.   Gastrointestinal:  Positive for abdominal pain. Negative for constipation, diarrhea, nausea and vomiting.   Neurological:  Negative for dizziness, light-headedness and headaches.         Health Maintenance  Due   Topic Date Due    Shingles Vaccine (1 of 2) Never done    RSV Vaccine (Age 60+ and Pregnant patients) (1 - 1-dose 60+ series) Never done    COVID-19 Vaccine (3 - 2023-24 season) 09/01/2024    Eye Exam  11/02/2024     Immunization History   Administered Date(s) Administered    COVID-19, MRNA, LN-S, PF (MODERNA FULL 0.5 ML DOSE) 02/12/2021, 03/12/2021    Influenza (FLUAD) - Quadrivalent - Adjuvanted - PF *Preferred* (65+) 11/18/2021    Influenza (FLUAD) - Trivalent - Adjuvanted - PF (65+) 09/03/2024    Influenza - High Dose - PF (65 years and older) 11/16/2017    Pneumococcal Conjugate - 13 Valent 08/10/2020    Pneumococcal Conjugate - 20 Valent 02/23/2023    Pneumococcal Polysaccharide - 23 Valent 01/15/2010    Tdap 08/05/2013, 03/01/2024         Results for orders placed or performed in visit on 08/31/24   CBC Auto Differential   Result Value Ref Range    WBC 6.45 3.90 - 12.70 K/uL    RBC 4.76 4.60 - 6.20 M/uL    Hemoglobin 15.3 14.0 - 18.0 g/dL    Hematocrit 47.5 40.0 - 54.0 %     (H) 82 - 98 fL    MCH 32.1 (H) 27.0 - 31.0 pg    MCHC 32.2 32.0 - 36.0 g/dL    RDW 13.1 11.5 - 14.5 %    Platelets 129 (L) 150 - 450 K/uL    MPV 12.2 9.2 - 12.9 fL    Immature Granulocytes 0.2 0.0 - 0.5 %    Gran # (ANC) 4.6 1.8 - 7.7 K/uL    Immature Grans (Abs) 0.01 0.00 - 0.04 K/uL    Lymph # 1.1 1.0 - 4.8 K/uL    Mono # 0.5 0.3 - 1.0 K/uL    Eos # 0.3 0.0 - 0.5 K/uL    Baso # 0.02 0.00 - 0.20 K/uL    nRBC 0 0 /100 WBC    Gran % 71.9 38.0 - 73.0 %    Lymph % 16.3 (L) 18.0 - 48.0 %    Mono % 7.4 4.0 - 15.0 %    Eosinophil % 3.9 0.0 - 8.0 %    Basophil % 0.3 0.0 - 1.9 %    Differential Method Automated    Comprehensive Metabolic Panel   Result Value Ref Range    Sodium 144 136 - 145 mmol/L    Potassium 4.6 3.5 - 5.1 mmol/L    Chloride 112 (H) 95 - 110 mmol/L    CO2 23 23 - 29 mmol/L    Glucose 157 (H) 70 - 110 mg/dL    BUN 21 8 - 23 mg/dL    Creatinine 1.0 0.5 - 1.4 mg/dL    Calcium 10.4 8.7 - 10.5 mg/dL    Total Protein 6.6  "6.0 - 8.4 g/dL    Albumin 4.0 3.5 - 5.2 g/dL    Total Bilirubin 3.0 (H) 0.1 - 1.0 mg/dL    Alkaline Phosphatase 52 (L) 55 - 135 U/L    AST 13 10 - 40 U/L    ALT 31 10 - 44 U/L    eGFR >60.0 >60 mL/min/1.73 m^2    Anion Gap 9 8 - 16 mmol/L   Vitamin B12   Result Value Ref Range    Vitamin B-12 906 210 - 950 pg/mL   Vitamin D   Result Value Ref Range    Vit D, 25-Hydroxy 51 30 - 96 ng/mL   TSH   Result Value Ref Range    TSH 2.241 0.400 - 4.000 uIU/mL   Lipid Panel   Result Value Ref Range    Cholesterol 117 (L) 120 - 199 mg/dL    Triglycerides 90 30 - 150 mg/dL    HDL 40 40 - 75 mg/dL    LDL Cholesterol 59.0 (L) 63.0 - 159.0 mg/dL    HDL/Cholesterol Ratio 34.2 20.0 - 50.0 %    Total Cholesterol/HDL Ratio 2.9 2.0 - 5.0    Non-HDL Cholesterol 77 mg/dL   Hemoglobin A1C   Result Value Ref Range    Hemoglobin A1C 6.8 (H) 4.0 - 5.6 %    Estimated Avg Glucose 148 (H) 68 - 131 mg/dL   Iron and TIBC   Result Value Ref Range    Iron 171 (H) 45 - 160 ug/dL    Transferrin 218 200 - 375 mg/dL    TIBC 323 250 - 450 ug/dL    Saturated Iron 53 (H) 20 - 50 %   FERRITIN   Result Value Ref Range    Ferritin 138 20.0 - 300.0 ng/mL   FOLATE   Result Value Ref Range    Folate 11.1 4.0 - 24.0 ng/mL       Objective:     Vitals:    09/03/24 1041   BP: 112/70   BP Location: Right arm   Patient Position: Sitting   BP Method: Large (Manual)   Pulse: 91   Temp: 97.8 °F (36.6 °C)   SpO2: 97%   Weight: 86 kg (189 lb 9.5 oz)   Height: 5' 9" (1.753 m)        Physical Exam  Constitutional:       General: He is not in acute distress.     Appearance: He is not ill-appearing, toxic-appearing or diaphoretic.   Cardiovascular:      Rate and Rhythm: Normal rate and regular rhythm.   Pulmonary:      Effort: Pulmonary effort is normal.      Breath sounds: Normal breath sounds.   Abdominal:      General: There is no distension.      Palpations: Abdomen is soft.          Comments: Location of pain  No obvious splenomegaly noted     Musculoskeletal:         " General: No swelling.   Feet:      Right foot:      Protective Sensation: 10 sites tested.        Skin integrity: Dry skin present. No ulcer or callus.      Left foot:      Protective Sensation: 10 sites tested.        Skin integrity: Dry skin present. No ulcer or callus.      Comments: Only sensation noted with monofilament was the left great toe, otherwise has no sensation.     Missing nails.   Neurological:      General: No focal deficit present.      Mental Status: He is alert.   Psychiatric:         Mood and Affect: Mood normal.         Behavior: Behavior normal.         Thought Content: Thought content normal.         Judgment: Judgment normal.         Assessment:       1. Type 2 diabetes mellitus with hyperglycemia, with long-term current use of insulin    2. Abnormal iron saturation    3. Elevated liver function tests    4. BMI 28.0-28.9,adult    5. Tinea pedis of both feet    6. Need for influenza vaccination    7. Colon cancer screening        Plan:       Diabetes management per endocrine.   Discuss with them: increasing mounjaro and stopping SA insulin? Already skipping SA insulin when not eating.     Continue rampipril  Continue metoprolol  Continue atorvastatin  You have low B12. Goal >400. Start daily OTC B12 supplementation at 1000 mcg        Monitor platelets. Refer <100.   Related to liver issues? Get US liver  Repeat iron studies  Consider hepatology and/or hematology     F/u 6 months. Labs in 1 month. Further labs prior to f/u TBD.       Type 2 diabetes mellitus with hyperglycemia, with long-term current use of insulin  -     Ambulatory referral/consult to Podiatry; Future; Expected date: 09/10/2024    Abnormal iron saturation  -     Cancel: US Abdomen Limited_Liver; Future; Expected date: 09/03/2024  -     Comprehensive Metabolic Panel; Future; Expected date: 09/03/2024  -     CBC Auto Differential; Future; Expected date: 09/03/2024  -     Iron and TIBC; Future; Expected date: 09/03/2024  -      Ferritin; Future; Expected date: 09/03/2024  -     US Abdomen Limited; Future; Expected date: 09/03/2024    Elevated liver function tests  -     Cancel: US Abdomen Limited_Liver; Future; Expected date: 09/03/2024  -     Comprehensive Metabolic Panel; Future; Expected date: 09/03/2024  -     US Abdomen Limited; Future; Expected date: 09/03/2024    BMI 28.0-28.9,adult    Tinea pedis of both feet  -     clotrimazole-betamethasone 1-0.05% (LOTRISONE) cream; Apply topically 2 (two) times daily.  Dispense: 45 g; Refill: 2    Need for influenza vaccination  -     influenza (Fluad) 45 mcg/0.5 mL vaccine 0.5 mL    Colon cancer screening  -     Ambulatory referral/consult to Endo Procedure ; Future; Expected date: 09/04/2024

## 2024-09-17 DIAGNOSIS — E11.65 UNCONTROLLED TYPE 2 DIABETES MELLITUS WITH HYPERGLYCEMIA, WITH LONG-TERM CURRENT USE OF INSULIN: ICD-10-CM

## 2024-09-17 DIAGNOSIS — E11.00 TYPE II DIABETES MELLITUS WITH HYPEROSMOLARITY, UNCONTROLLED: ICD-10-CM

## 2024-09-17 DIAGNOSIS — Z79.4 UNCONTROLLED TYPE 2 DIABETES MELLITUS WITH HYPERGLYCEMIA, WITH LONG-TERM CURRENT USE OF INSULIN: ICD-10-CM

## 2024-09-17 RX ORDER — INSULIN ASPART 100 [IU]/ML
10 INJECTION, SOLUTION INTRAVENOUS; SUBCUTANEOUS
Qty: 90 ML | Refills: 3 | Status: SHIPPED | OUTPATIENT
Start: 2024-09-17

## 2024-09-27 ENCOUNTER — HOSPITAL ENCOUNTER (OUTPATIENT)
Dept: RADIOLOGY | Facility: HOSPITAL | Age: 74
Discharge: HOME | End: 2024-09-27
Attending: FAMILY MEDICINE
Payer: COMMERCIAL

## 2024-09-27 DIAGNOSIS — R79.0 ABNORMAL IRON SATURATION: ICD-10-CM

## 2024-09-27 DIAGNOSIS — R79.89 ELEVATED LIVER FUNCTION TESTS: ICD-10-CM

## 2024-09-27 PROCEDURE — 76705 ECHO EXAM OF ABDOMEN: CPT | Mod: TC

## 2024-09-27 PROCEDURE — 76705 ECHO EXAM OF ABDOMEN: CPT | Mod: 26,,, | Performed by: RADIOLOGY

## 2024-09-30 ENCOUNTER — TELEPHONE (OUTPATIENT)
Dept: FAMILY MEDICINE | Facility: CLINIC | Age: 74
End: 2024-09-30
Payer: COMMERCIAL

## 2024-09-30 DIAGNOSIS — K76.89 LIVER CYST: Primary | ICD-10-CM

## 2024-09-30 NOTE — TELEPHONE ENCOUNTER
Spoke to patient in regards to results. Patient verbalized understanding. Patient stated no pain in the right upper abdomen but he has Pain in left upper abdomen. No pain when eating fried or fatty food. Set up US in 4 months.

## 2024-09-30 NOTE — TELEPHONE ENCOUNTER
"Please call patient with results    He has gallstones noted  Is he having any right upper abdomen pain?  Pain with fatty food?  Fried food?  If so we can refer to a surgeon    He has a liver cyst, 1.2 cm  I usually get more concerned if this is >4 cm, but it is described as "minimally complex" rather then a simple cyst    Please set up repeat imaging in 4 months  If growing or changing may refer or get MRI of the liver    "

## 2024-10-05 ENCOUNTER — TELEPHONE (OUTPATIENT)
Dept: FAMILY MEDICINE | Facility: CLINIC | Age: 74
End: 2024-10-05
Payer: COMMERCIAL

## 2024-10-05 ENCOUNTER — LAB VISIT (OUTPATIENT)
Dept: LAB | Facility: HOSPITAL | Age: 74
End: 2024-10-05
Attending: FAMILY MEDICINE
Payer: COMMERCIAL

## 2024-10-05 DIAGNOSIS — E83.52 HYPERCALCEMIA: ICD-10-CM

## 2024-10-05 DIAGNOSIS — R17 ELEVATED BILIRUBIN: Primary | ICD-10-CM

## 2024-10-05 DIAGNOSIS — R79.89 ELEVATED LIVER FUNCTION TESTS: ICD-10-CM

## 2024-10-05 DIAGNOSIS — K76.89 LIVER CYST: ICD-10-CM

## 2024-10-05 DIAGNOSIS — R79.0 ABNORMAL IRON SATURATION: ICD-10-CM

## 2024-10-05 LAB
ALBUMIN SERPL BCP-MCNC: 4.1 G/DL (ref 3.5–5.2)
ALP SERPL-CCNC: 53 U/L (ref 55–135)
ALT SERPL W/O P-5'-P-CCNC: 35 U/L (ref 10–44)
ANION GAP SERPL CALC-SCNC: 10 MMOL/L (ref 8–16)
AST SERPL-CCNC: 16 U/L (ref 10–40)
BASOPHILS # BLD AUTO: 0.02 K/UL (ref 0–0.2)
BASOPHILS NFR BLD: 0.4 % (ref 0–1.9)
BILIRUB SERPL-MCNC: 3.1 MG/DL (ref 0.1–1)
BUN SERPL-MCNC: 16 MG/DL (ref 8–23)
CALCIUM SERPL-MCNC: 11.3 MG/DL (ref 8.7–10.5)
CHLORIDE SERPL-SCNC: 110 MMOL/L (ref 95–110)
CO2 SERPL-SCNC: 26 MMOL/L (ref 23–29)
CREAT SERPL-MCNC: 1 MG/DL (ref 0.5–1.4)
DIFFERENTIAL METHOD BLD: ABNORMAL
EOSINOPHIL # BLD AUTO: 0.3 K/UL (ref 0–0.5)
EOSINOPHIL NFR BLD: 5 % (ref 0–8)
ERYTHROCYTE [DISTWIDTH] IN BLOOD BY AUTOMATED COUNT: 13.4 % (ref 11.5–14.5)
EST. GFR  (NO RACE VARIABLE): >60 ML/MIN/1.73 M^2
FERRITIN SERPL-MCNC: 106 NG/ML (ref 20–300)
GLUCOSE SERPL-MCNC: 122 MG/DL (ref 70–110)
HCT VFR BLD AUTO: 48.4 % (ref 40–54)
HGB BLD-MCNC: 16.2 G/DL (ref 14–18)
IMM GRANULOCYTES # BLD AUTO: 0.01 K/UL (ref 0–0.04)
IMM GRANULOCYTES NFR BLD AUTO: 0.2 % (ref 0–0.5)
IRON SERPL-MCNC: 158 UG/DL (ref 45–160)
LYMPHOCYTES # BLD AUTO: 1.2 K/UL (ref 1–4.8)
LYMPHOCYTES NFR BLD: 21 % (ref 18–48)
MCH RBC QN AUTO: 32.8 PG (ref 27–31)
MCHC RBC AUTO-ENTMCNC: 33.5 G/DL (ref 32–36)
MCV RBC AUTO: 98 FL (ref 82–98)
MONOCYTES # BLD AUTO: 0.5 K/UL (ref 0.3–1)
MONOCYTES NFR BLD: 8.6 % (ref 4–15)
NEUTROPHILS # BLD AUTO: 3.6 K/UL (ref 1.8–7.7)
NEUTROPHILS NFR BLD: 64.8 % (ref 38–73)
NRBC BLD-RTO: 0 /100 WBC
PLATELET # BLD AUTO: 134 K/UL (ref 150–450)
PMV BLD AUTO: 12.8 FL (ref 9.2–12.9)
POTASSIUM SERPL-SCNC: 4.9 MMOL/L (ref 3.5–5.1)
PROT SERPL-MCNC: 6.9 G/DL (ref 6–8.4)
RBC # BLD AUTO: 4.94 M/UL (ref 4.6–6.2)
SATURATED IRON: 45 % (ref 20–50)
SODIUM SERPL-SCNC: 146 MMOL/L (ref 136–145)
TOTAL IRON BINDING CAPACITY: 355 UG/DL (ref 250–450)
TRANSFERRIN SERPL-MCNC: 240 MG/DL (ref 200–375)
WBC # BLD AUTO: 5.58 K/UL (ref 3.9–12.7)

## 2024-10-05 PROCEDURE — 85025 COMPLETE CBC W/AUTO DIFF WBC: CPT | Performed by: FAMILY MEDICINE

## 2024-10-05 PROCEDURE — 80053 COMPREHEN METABOLIC PANEL: CPT | Performed by: FAMILY MEDICINE

## 2024-10-05 PROCEDURE — 82728 ASSAY OF FERRITIN: CPT | Performed by: FAMILY MEDICINE

## 2024-10-05 PROCEDURE — 36415 COLL VENOUS BLD VENIPUNCTURE: CPT | Mod: PO | Performed by: FAMILY MEDICINE

## 2024-10-05 PROCEDURE — 83540 ASSAY OF IRON: CPT | Performed by: FAMILY MEDICINE

## 2024-10-06 DIAGNOSIS — Z79.4 UNCONTROLLED TYPE 2 DIABETES MELLITUS WITH HYPERGLYCEMIA, WITH LONG-TERM CURRENT USE OF INSULIN: ICD-10-CM

## 2024-10-06 DIAGNOSIS — E11.65 UNCONTROLLED TYPE 2 DIABETES MELLITUS WITH HYPERGLYCEMIA, WITH LONG-TERM CURRENT USE OF INSULIN: ICD-10-CM

## 2024-10-06 NOTE — TELEPHONE ENCOUNTER
Team, Please call patient  Vandana, please f/u on labs when I get repeats    His bili, which has always been high, is now persistent > 3.0 for 1 month    Given this and the liver cyst, I am going to refer to a liver doctor    He also looks dehydrated  His sodium is high  His calcium is high  I believe he has been running and in on mounjaro  Please tell him to increase his water intake    Please repeat labs in 7 days or so, I have ordered multiple labs.     Hepatology f/u after labs are drawn.

## 2024-10-07 ENCOUNTER — TELEPHONE (OUTPATIENT)
Dept: FAMILY MEDICINE | Facility: CLINIC | Age: 74
End: 2024-10-07
Payer: COMMERCIAL

## 2024-10-07 ENCOUNTER — PATIENT MESSAGE (OUTPATIENT)
Dept: FAMILY MEDICINE | Facility: CLINIC | Age: 74
End: 2024-10-07
Payer: COMMERCIAL

## 2024-10-07 RX ORDER — TIRZEPATIDE 10 MG/.5ML
10 INJECTION, SOLUTION SUBCUTANEOUS
Qty: 4 PEN | Refills: 1 | Status: SHIPPED | OUTPATIENT
Start: 2024-10-07

## 2024-10-07 NOTE — TELEPHONE ENCOUNTER
Called patient to go over recent lab results and repeat labs per Dr Hanna. No answer. Left voicemail.

## 2024-10-08 ENCOUNTER — TELEPHONE (OUTPATIENT)
Dept: FAMILY MEDICINE | Facility: CLINIC | Age: 74
End: 2024-10-08
Payer: COMMERCIAL

## 2024-10-08 NOTE — TELEPHONE ENCOUNTER
----- Message from Kayla sent at 10/8/2024 10:31 AM CDT -----  Good morning Doctor  I called this patient to schedule Hepatology.  Patient would like to speak to you if you have a moment so he can understand better why we are doing this.   I read your notes and explained that this was based on the recent labs and his cyst, but he will prefer for you to explain  Thanks Doctor Garza

## 2024-10-08 NOTE — TELEPHONE ENCOUNTER
Spoke to pt and answered all his questions. Pt verbalized understanding. Will have labs done Friday morning. Appt with hepatology not till January.   JANETH

## 2024-10-12 ENCOUNTER — LAB VISIT (OUTPATIENT)
Dept: LAB | Facility: HOSPITAL | Age: 74
End: 2024-10-12
Attending: FAMILY MEDICINE
Payer: COMMERCIAL

## 2024-10-12 DIAGNOSIS — E83.52 HYPERCALCEMIA: ICD-10-CM

## 2024-10-12 DIAGNOSIS — R17 ELEVATED BILIRUBIN: ICD-10-CM

## 2024-10-12 LAB
ALBUMIN SERPL BCP-MCNC: 4 G/DL (ref 3.5–5.2)
ALP SERPL-CCNC: 54 U/L (ref 55–135)
ALT SERPL W/O P-5'-P-CCNC: 35 U/L (ref 10–44)
ANION GAP SERPL CALC-SCNC: 7 MMOL/L (ref 8–16)
AST SERPL-CCNC: 17 U/L (ref 10–40)
BASOPHILS # BLD AUTO: 0.02 K/UL (ref 0–0.2)
BASOPHILS NFR BLD: 0.3 % (ref 0–1.9)
BILIRUB DIRECT SERPL-MCNC: 0.6 MG/DL (ref 0.1–0.3)
BILIRUB SERPL-MCNC: 2.4 MG/DL (ref 0.1–1)
BILIRUB SERPL-MCNC: 2.4 MG/DL (ref 0.1–1)
BUN SERPL-MCNC: 15 MG/DL (ref 8–23)
CALCIUM SERPL-MCNC: 10.4 MG/DL (ref 8.7–10.5)
CHLORIDE SERPL-SCNC: 113 MMOL/L (ref 95–110)
CO2 SERPL-SCNC: 23 MMOL/L (ref 23–29)
CREAT SERPL-MCNC: 1.2 MG/DL (ref 0.5–1.4)
DIFFERENTIAL METHOD BLD: ABNORMAL
EOSINOPHIL # BLD AUTO: 0.3 K/UL (ref 0–0.5)
EOSINOPHIL NFR BLD: 4.6 % (ref 0–8)
ERYTHROCYTE [DISTWIDTH] IN BLOOD BY AUTOMATED COUNT: 13.5 % (ref 11.5–14.5)
EST. GFR  (NO RACE VARIABLE): >60 ML/MIN/1.73 M^2
GLUCOSE SERPL-MCNC: 180 MG/DL (ref 70–110)
HAV IGM SERPL QL IA: NORMAL
HBV CORE IGM SERPL QL IA: NORMAL
HBV SURFACE AG SERPL QL IA: NORMAL
HCT VFR BLD AUTO: 47.4 % (ref 40–54)
HCV AB SERPL QL IA: NORMAL
HGB BLD-MCNC: 15.9 G/DL (ref 14–18)
IGG SERPL-MCNC: 727 MG/DL (ref 650–1600)
IMM GRANULOCYTES # BLD AUTO: 0.02 K/UL (ref 0–0.04)
IMM GRANULOCYTES NFR BLD AUTO: 0.3 % (ref 0–0.5)
INR PPP: 1 (ref 0.8–1.2)
LYMPHOCYTES # BLD AUTO: 1.1 K/UL (ref 1–4.8)
LYMPHOCYTES NFR BLD: 18.5 % (ref 18–48)
MCH RBC QN AUTO: 33.5 PG (ref 27–31)
MCHC RBC AUTO-ENTMCNC: 33.5 G/DL (ref 32–36)
MCV RBC AUTO: 100 FL (ref 82–98)
MONOCYTES # BLD AUTO: 0.4 K/UL (ref 0.3–1)
MONOCYTES NFR BLD: 7.1 % (ref 4–15)
NEUTROPHILS # BLD AUTO: 4.2 K/UL (ref 1.8–7.7)
NEUTROPHILS NFR BLD: 69.2 % (ref 38–73)
NRBC BLD-RTO: 0 /100 WBC
PLATELET # BLD AUTO: 133 K/UL (ref 150–450)
PMV BLD AUTO: 12.6 FL (ref 9.2–12.9)
POTASSIUM SERPL-SCNC: 4.5 MMOL/L (ref 3.5–5.1)
PROT SERPL-MCNC: 6.6 G/DL (ref 6–8.4)
PROTHROMBIN TIME: 11.4 SEC (ref 9–12.5)
PTH-INTACT SERPL-MCNC: 71.2 PG/ML (ref 9–77)
RBC # BLD AUTO: 4.75 M/UL (ref 4.6–6.2)
SODIUM SERPL-SCNC: 143 MMOL/L (ref 136–145)
WBC # BLD AUTO: 6.04 K/UL (ref 3.9–12.7)

## 2024-10-12 PROCEDURE — 36415 COLL VENOUS BLD VENIPUNCTURE: CPT | Mod: PO | Performed by: FAMILY MEDICINE

## 2024-10-12 PROCEDURE — 80053 COMPREHEN METABOLIC PANEL: CPT | Performed by: FAMILY MEDICINE

## 2024-10-12 PROCEDURE — 82248 BILIRUBIN DIRECT: CPT | Performed by: FAMILY MEDICINE

## 2024-10-12 PROCEDURE — 83970 ASSAY OF PARATHORMONE: CPT | Performed by: FAMILY MEDICINE

## 2024-10-12 PROCEDURE — 86038 ANTINUCLEAR ANTIBODIES: CPT | Performed by: FAMILY MEDICINE

## 2024-10-12 PROCEDURE — 82784 ASSAY IGA/IGD/IGG/IGM EACH: CPT | Performed by: FAMILY MEDICINE

## 2024-10-12 PROCEDURE — 86015 ACTIN ANTIBODY EACH: CPT | Performed by: FAMILY MEDICINE

## 2024-10-12 PROCEDURE — 82103 ALPHA-1-ANTITRYPSIN TOTAL: CPT | Performed by: FAMILY MEDICINE

## 2024-10-12 PROCEDURE — 85025 COMPLETE CBC W/AUTO DIFF WBC: CPT | Performed by: FAMILY MEDICINE

## 2024-10-12 PROCEDURE — 86381 MITOCHONDRIAL ANTIBODY EACH: CPT | Performed by: FAMILY MEDICINE

## 2024-10-12 PROCEDURE — 82390 ASSAY OF CERULOPLASMIN: CPT | Performed by: FAMILY MEDICINE

## 2024-10-12 PROCEDURE — 80074 ACUTE HEPATITIS PANEL: CPT | Performed by: FAMILY MEDICINE

## 2024-10-12 PROCEDURE — 85610 PROTHROMBIN TIME: CPT | Performed by: FAMILY MEDICINE

## 2024-10-14 LAB
A1AT SERPL-MCNC: 115 MG/DL (ref 100–190)
ANA SER QL IF: NORMAL
CERULOPLASMIN SERPL-MCNC: 23 MG/DL (ref 15–45)

## 2024-10-15 LAB
MITOCHONDRIA AB TITR SER IF: NORMAL {TITER}
SMOOTH MUSCLE AB TITR SER IF: NORMAL {TITER}

## 2024-10-27 DIAGNOSIS — E11.42 TYPE 2 DIABETES MELLITUS WITH DIABETIC POLYNEUROPATHY, WITH LONG-TERM CURRENT USE OF INSULIN: ICD-10-CM

## 2024-10-27 DIAGNOSIS — Z79.4 TYPE 2 DIABETES MELLITUS WITH DIABETIC POLYNEUROPATHY, WITH LONG-TERM CURRENT USE OF INSULIN: ICD-10-CM

## 2024-10-27 DIAGNOSIS — E78.5 HYPERLIPIDEMIA LDL GOAL <100: ICD-10-CM

## 2024-10-27 DIAGNOSIS — E78.49 OTHER HYPERLIPIDEMIA: ICD-10-CM

## 2024-10-27 DIAGNOSIS — I10 ESSENTIAL HYPERTENSION: ICD-10-CM

## 2024-10-27 DIAGNOSIS — E11.65 TYPE 2 DIABETES MELLITUS WITH HYPERGLYCEMIA, WITH LONG-TERM CURRENT USE OF INSULIN: ICD-10-CM

## 2024-10-27 DIAGNOSIS — Z79.4 TYPE 2 DIABETES MELLITUS WITH HYPERGLYCEMIA, WITH LONG-TERM CURRENT USE OF INSULIN: ICD-10-CM

## 2024-10-27 RX ORDER — ATORVASTATIN CALCIUM 40 MG/1
40 TABLET, FILM COATED ORAL NIGHTLY
Qty: 90 TABLET | Refills: 1 | Status: SHIPPED | OUTPATIENT
Start: 2024-10-27

## 2024-10-27 RX ORDER — METFORMIN HYDROCHLORIDE 1000 MG/1
1000 TABLET ORAL 2 TIMES DAILY WITH MEALS
Qty: 180 TABLET | Refills: 0 | Status: SHIPPED | OUTPATIENT
Start: 2024-10-27

## 2024-10-27 RX ORDER — RAMIPRIL 2.5 MG/1
2.5 CAPSULE ORAL DAILY
Qty: 90 CAPSULE | Refills: 3 | Status: SHIPPED | OUTPATIENT
Start: 2024-10-27

## 2024-10-31 ENCOUNTER — OFFICE VISIT (OUTPATIENT)
Dept: PODIATRY | Facility: CLINIC | Age: 74
End: 2024-10-31
Payer: COMMERCIAL

## 2024-10-31 VITALS — SYSTOLIC BLOOD PRESSURE: 129 MMHG | DIASTOLIC BLOOD PRESSURE: 85 MMHG | HEART RATE: 86 BPM

## 2024-10-31 DIAGNOSIS — B35.3 TINEA PEDIS OF BOTH FEET: ICD-10-CM

## 2024-10-31 DIAGNOSIS — E11.49 TYPE II DIABETES MELLITUS WITH NEUROLOGICAL MANIFESTATIONS: ICD-10-CM

## 2024-10-31 DIAGNOSIS — E11.9 ENCOUNTER FOR DIABETIC FOOT EXAM: Primary | ICD-10-CM

## 2024-10-31 DIAGNOSIS — Q66.70 PES CAVUS: ICD-10-CM

## 2024-10-31 DIAGNOSIS — M77.40 METATARSALGIA, UNSPECIFIED LATERALITY: ICD-10-CM

## 2024-10-31 DIAGNOSIS — Z79.4 TYPE 2 DIABETES MELLITUS WITH HYPERGLYCEMIA, WITH LONG-TERM CURRENT USE OF INSULIN: ICD-10-CM

## 2024-10-31 DIAGNOSIS — L90.9 FAT PAD ATROPHY OF FOOT: ICD-10-CM

## 2024-10-31 DIAGNOSIS — E11.65 TYPE 2 DIABETES MELLITUS WITH HYPERGLYCEMIA, WITH LONG-TERM CURRENT USE OF INSULIN: ICD-10-CM

## 2024-10-31 PROCEDURE — 4010F ACE/ARB THERAPY RXD/TAKEN: CPT | Mod: CPTII,S$GLB,, | Performed by: STUDENT IN AN ORGANIZED HEALTH CARE EDUCATION/TRAINING PROGRAM

## 2024-10-31 PROCEDURE — 3079F DIAST BP 80-89 MM HG: CPT | Mod: CPTII,S$GLB,, | Performed by: STUDENT IN AN ORGANIZED HEALTH CARE EDUCATION/TRAINING PROGRAM

## 2024-10-31 PROCEDURE — 3061F NEG MICROALBUMINURIA REV: CPT | Mod: CPTII,S$GLB,, | Performed by: STUDENT IN AN ORGANIZED HEALTH CARE EDUCATION/TRAINING PROGRAM

## 2024-10-31 PROCEDURE — 1159F MED LIST DOCD IN RCRD: CPT | Mod: CPTII,S$GLB,, | Performed by: STUDENT IN AN ORGANIZED HEALTH CARE EDUCATION/TRAINING PROGRAM

## 2024-10-31 PROCEDURE — 3074F SYST BP LT 130 MM HG: CPT | Mod: CPTII,S$GLB,, | Performed by: STUDENT IN AN ORGANIZED HEALTH CARE EDUCATION/TRAINING PROGRAM

## 2024-10-31 PROCEDURE — 99999 PR PBB SHADOW E&M-EST. PATIENT-LVL III: CPT | Mod: PBBFAC,,, | Performed by: STUDENT IN AN ORGANIZED HEALTH CARE EDUCATION/TRAINING PROGRAM

## 2024-10-31 PROCEDURE — 3044F HG A1C LEVEL LT 7.0%: CPT | Mod: CPTII,S$GLB,, | Performed by: STUDENT IN AN ORGANIZED HEALTH CARE EDUCATION/TRAINING PROGRAM

## 2024-10-31 PROCEDURE — 99203 OFFICE O/P NEW LOW 30 MIN: CPT | Mod: S$GLB,,, | Performed by: STUDENT IN AN ORGANIZED HEALTH CARE EDUCATION/TRAINING PROGRAM

## 2024-10-31 PROCEDURE — 3066F NEPHROPATHY DOC TX: CPT | Mod: CPTII,S$GLB,, | Performed by: STUDENT IN AN ORGANIZED HEALTH CARE EDUCATION/TRAINING PROGRAM

## 2024-10-31 PROCEDURE — 1126F AMNT PAIN NOTED NONE PRSNT: CPT | Mod: CPTII,S$GLB,, | Performed by: STUDENT IN AN ORGANIZED HEALTH CARE EDUCATION/TRAINING PROGRAM

## 2024-10-31 RX ORDER — CLOTRIMAZOLE AND BETAMETHASONE DIPROPIONATE 10; .64 MG/G; MG/G
CREAM TOPICAL 2 TIMES DAILY
Qty: 45 G | Refills: 2 | Status: SHIPPED | OUTPATIENT
Start: 2024-10-31

## 2024-11-04 DIAGNOSIS — Z79.4 UNCONTROLLED TYPE 2 DIABETES MELLITUS WITH HYPERGLYCEMIA, WITH LONG-TERM CURRENT USE OF INSULIN: ICD-10-CM

## 2024-11-04 DIAGNOSIS — E11.65 UNCONTROLLED TYPE 2 DIABETES MELLITUS WITH HYPERGLYCEMIA, WITH LONG-TERM CURRENT USE OF INSULIN: ICD-10-CM

## 2024-11-04 RX ORDER — BLOOD-GLUCOSE SENSOR
EACH MISCELLANEOUS
Qty: 3 EACH | Refills: 3 | Status: SHIPPED | OUTPATIENT
Start: 2024-11-04

## 2024-11-18 NOTE — PROGRESS NOTES
"Subjective:      Patient ID: Patria Nieto is a 74 y.o. male.    Chief Complaint:  Diabetes    History of Present Illness  Patria Nieto is here for follow up of DM.  Previously seen by me 2024.    With regards to Diabetes:    Diagnosed:   DE: 2018  FH: "yes"   Known complications:  DKA Denies   RN Denies  Eye Exam:   PN Yes - "comes and goes"   Podiatry: 10/2024  Nephropathy Denies  CAD Denies  Denies history of pancreatitis & personal/family history of medullary thyroid cancer.     Diet/Exercise:   Eats 1-2 meals a day.   Snacks : Denies  Drinks : coffee (1 cup in AM with half and half), OJ, water, diet soft drinks.   Exercise - tries to stay active - exercises twice a week.   Illness, injury, steroids: Denies    Current Regimen:  Metformin 1000mg daily  Mounjaro 10 mg weekly   Toujeo 40 units daily   Novolog 10-14units after meals   Also taking Novolog for hyperglycemia (12 units this AM)     Other medications tried:  Trulicity   Discussed SGLT2i     Glucose Monitor: Dexcom G7   4-5 times a day testing  Log reviewed: oral recall - needs to connect Dexcom account  Fastins  PP hyperglycemia but trends back down to goal within 2 hours  Before bed: ----    Hypoglycemia:  One episode - took additional Novolog   Knows how to correct with 15 grams of carbs- juice, coke, or a peppermint.       Diabetes Management Status    Hemoglobin A1C   Date Value Ref Range Status   2024 6.8 (H) 4.0 - 5.6 % Final     Comment:     ADA Screening Guidelines:  5.7-6.4%  Consistent with prediabetes  >or=6.5%  Consistent with diabetes    High levels of fetal hemoglobin interfere with the HbA1C  assay. Heterozygous hemoglobin variants (HbS, HgC, etc)do  not significantly interfere with this assay.   However, presence of multiple variants may affect accuracy.     2024 6.7 (H) 4.0 - 5.6 % Final     Comment:     ADA Screening Guidelines:  5.7-6.4%  Consistent with prediabetes  >or=6.5%  Consistent with diabetes    High " "levels of fetal hemoglobin interfere with the HbA1C  assay. Heterozygous hemoglobin variants (HbS, HgC, etc)do  not significantly interfere with this assay.   However, presence of multiple variants may affect accuracy.     10/04/2023 6.3 (H) 4.0 - 5.6 % Final     Comment:     ADA Screening Guidelines:  5.7-6.4%  Consistent with prediabetes  >or=6.5%  Consistent with diabetes    High levels of fetal hemoglobin interfere with the HbA1C  assay. Heterozygous hemoglobin variants (HbS, HgC, etc)do  not significantly interfere with this assay.   However, presence of multiple variants may affect accuracy.         Statin: Taking  ACE/ARB: Taking  Screening or Prevention Patient's value Goal Complete/Controlled?   HgA1C Testing and Control   Lab Results   Component Value Date    HGBA1C 6.8 (H) 08/31/2024      Annually/Less than 8% No   Lipid profile : 08/31/2024 Annually Yes   LDL control Lab Results   Component Value Date    LDLCALC 59.0 (L) 08/31/2024    Annually/Less than 100 mg/dl  Yes   Nephropathy screening Lab Results   Component Value Date    LABMICR 7.0 08/31/2024     Lab Results   Component Value Date    PROTEINUA NEG 09/09/2011    Annually Yes   Blood pressure BP Readings from Last 1 Encounters:   11/20/24 122/70    Less than 140/90 Yes   Dilated retinal exam : 11/02/2023 Annually Yes   Foot exam   : 10/31/2024 Annually No       Review of Systems  As above    Objective:   Physical Exam  Vitals reviewed.   Cardiovascular:      Rate and Rhythm: Normal rate.      Comments: No edema present  Pulmonary:      Effort: Pulmonary effort is normal.   Abdominal:      Palpations: Abdomen is soft.       Injection sites are without edema or erythema. No lipo hypertropthy or atrophy.    Visit Vitals  /70 (BP Location: Right arm, Patient Position: Sitting)   Pulse 95   Ht 5' 9" (1.753 m)   Wt 86 kg (189 lb 11.3 oz)   SpO2 97%   BMI 28.01 kg/m²         Body mass index is 28.01 kg/m².    Lab Review:   Lab Results   Component " Value Date    HGBA1C 6.8 (H) 08/31/2024    HGBA1C 6.7 (H) 02/28/2024    HGBA1C 6.3 (H) 10/04/2023       Lab Results   Component Value Date    CHOL 117 (L) 08/31/2024    HDL 40 08/31/2024    LDLCALC 59.0 (L) 08/31/2024    TRIG 90 08/31/2024    CHOLHDL 34.2 08/31/2024     Lab Results   Component Value Date     10/12/2024    K 4.5 10/12/2024     (H) 10/12/2024    CO2 23 10/12/2024     (H) 10/12/2024    BUN 15 10/12/2024    CREATININE 1.2 10/12/2024    CALCIUM 10.4 10/12/2024    PROT 6.6 10/12/2024    ALBUMIN 4.0 10/12/2024    BILITOT 2.4 (H) 10/12/2024    BILITOT 2.4 (H) 10/12/2024    ALKPHOS 54 (L) 10/12/2024    AST 17 10/12/2024    ALT 35 10/12/2024    ANIONGAP 7 (L) 10/12/2024    ESTGFRAFRICA >60.0 11/12/2021    EGFRNONAA >60.0 11/12/2021    TSH 2.241 08/31/2024     Vit D, 25-Hydroxy   Date Value Ref Range Status   08/31/2024 51 30 - 96 ng/mL Final     Comment:     Vitamin D deficiency.........<10 ng/mL                              Vitamin D insufficiency......10-29 ng/mL       Vitamin D sufficiency........> or equal to 30 ng/mL  Vitamin D toxicity............>100 ng/mL       Assessment and Plan     1. Type 2 diabetes mellitus with hyperglycemia, with long-term current use of insulin  tirzepatide (MOUNJARO) 12.5 mg/0.5 mL PnIj    insulin aspart, niacinamide, (FIASP FLEXTOUCH U-100 INSULIN) 100 unit/mL (3 mL) InPn    insulin glargine, TOUJEO, (TOUJEO) 300 unit/mL (1.5 mL) InPn pen      2. Uncontrolled type 2 diabetes mellitus with hyperglycemia, with long-term current use of insulin  insulin glargine, TOUJEO, (TOUJEO) 300 unit/mL (1.5 mL) InPn pen          Type 2 diabetes mellitus with hyperglycemia, with long-term current use of insulin  -- Labs prior to follow up - seeing PCP in March.  -- A1c goal <7.5%.  -- Medications discussed:  MFM   GLP1-DPP4   OSORIO   SGLT2   Reviewed potential adverse effects of SGLT-2 inhibitors, including genital mycotic infections, slightly increased risk of UTI,  hypersensitivity, hypotension, and hyperkalemia. Advised to maintain water intake of 8-10 cups per day. Advised we need to check chemistry panel at baseline and 2 weeks after starting. Discussed FDA warning reports of ketoacidosis associated with SGLT-2 inhibitors. Advised to seek immediate medical attention and stop the medication if symptoms such as difficulty breathing, nausea, vomiting, abdominal pain, confusion, and unusual fatigue/sleepiness. Discussed possible precipitating factors including major illness/reduced food and fluid intake (advised to stop under these circumstances), and reduced insulin dose. Discussed possible effects of increased fracture risk/decreased bone density. Discussed reports of increased risk of leg and foot amputations with canagliflozin and need to seek urgent care if developed new pain or tenderness, sores or ulcers, or infections in legs/feet.   Insulin   -- Reviewed logs/CGM:  Reports fasting hyperglycemia.   Instructed to send glucose logs in 14 days.  Reach out to me sooner for any glucose <70 or consistently >180.  -- Link Dexcom to our account.   -- Patient concerned about PP hyperglycemia - discussed glucose goals which he is hitting 2 hours PP. Will try Fiasp in place of Novolog AND instructed to administer prior to the meal.  -- Medication Changes:   Metformin 1000mg daily  Mounjaro 12.5 mg weekly   Toujeo 44 units daily   Fiasp 10units plus correction scale before meals only  Add correction scale as needed.  Blood sugar 150 to 200 add 1 units  Blood sugar 201 to 250 add 2 units  Blood sugar 251 to 300 add 3 units  Blood sugar 301 to 350 add 4 units  Blood sugar greater than 350 add 5 units      -- Reviewed goals of therapy are to get the best control we can without hypoglycemia.  -- Reviewed patient's current insulin regimen. Clarified proper insulin dose and timing in relation to meals, etc. Insulin injection sites and proper rotation instructed.    -- Advised frequent  self blood glucose monitoring.  Patient encouraged to document glucose results and bring them to every clinic visit.  -- Hypoglycemia precautions discussed. Instructed on precautions before driving.    -- Call for Bg repeatedly < 90 or > 180.   -- Close adherence to lifestyle changes recommended.   -- Periodic follow ups for eye evaluations, foot care and dental care suggested.        Follow up in about 4 months (around 3/20/2025).      Visit today included increased complexity associated with the care of the problems addressed and managing the longitudinal care of the patient due to the serious and/or complex managed problems.

## 2024-11-20 ENCOUNTER — OFFICE VISIT (OUTPATIENT)
Dept: ENDOCRINOLOGY | Facility: CLINIC | Age: 74
End: 2024-11-20
Payer: COMMERCIAL

## 2024-11-20 VITALS
DIASTOLIC BLOOD PRESSURE: 70 MMHG | WEIGHT: 189.69 LBS | OXYGEN SATURATION: 97 % | HEART RATE: 95 BPM | SYSTOLIC BLOOD PRESSURE: 122 MMHG | HEIGHT: 69 IN | BODY MASS INDEX: 28.09 KG/M2

## 2024-11-20 DIAGNOSIS — Z79.4 UNCONTROLLED TYPE 2 DIABETES MELLITUS WITH HYPERGLYCEMIA, WITH LONG-TERM CURRENT USE OF INSULIN: ICD-10-CM

## 2024-11-20 DIAGNOSIS — E11.65 TYPE 2 DIABETES MELLITUS WITH HYPERGLYCEMIA, WITH LONG-TERM CURRENT USE OF INSULIN: Primary | ICD-10-CM

## 2024-11-20 DIAGNOSIS — Z79.4 TYPE 2 DIABETES MELLITUS WITH HYPERGLYCEMIA, WITH LONG-TERM CURRENT USE OF INSULIN: Primary | ICD-10-CM

## 2024-11-20 DIAGNOSIS — E11.65 UNCONTROLLED TYPE 2 DIABETES MELLITUS WITH HYPERGLYCEMIA, WITH LONG-TERM CURRENT USE OF INSULIN: ICD-10-CM

## 2024-11-20 PROCEDURE — 1159F MED LIST DOCD IN RCRD: CPT | Mod: CPTII,S$GLB,, | Performed by: NURSE PRACTITIONER

## 2024-11-20 PROCEDURE — G2211 COMPLEX E/M VISIT ADD ON: HCPCS | Mod: S$GLB,,, | Performed by: NURSE PRACTITIONER

## 2024-11-20 PROCEDURE — 99999 PR PBB SHADOW E&M-EST. PATIENT-LVL V: CPT | Mod: PBBFAC,,, | Performed by: NURSE PRACTITIONER

## 2024-11-20 PROCEDURE — 1126F AMNT PAIN NOTED NONE PRSNT: CPT | Mod: CPTII,S$GLB,, | Performed by: NURSE PRACTITIONER

## 2024-11-20 PROCEDURE — 3061F NEG MICROALBUMINURIA REV: CPT | Mod: CPTII,S$GLB,, | Performed by: NURSE PRACTITIONER

## 2024-11-20 PROCEDURE — 3078F DIAST BP <80 MM HG: CPT | Mod: CPTII,S$GLB,, | Performed by: NURSE PRACTITIONER

## 2024-11-20 PROCEDURE — 4010F ACE/ARB THERAPY RXD/TAKEN: CPT | Mod: CPTII,S$GLB,, | Performed by: NURSE PRACTITIONER

## 2024-11-20 PROCEDURE — 99214 OFFICE O/P EST MOD 30 MIN: CPT | Mod: S$GLB,,, | Performed by: NURSE PRACTITIONER

## 2024-11-20 PROCEDURE — 1101F PT FALLS ASSESS-DOCD LE1/YR: CPT | Mod: CPTII,S$GLB,, | Performed by: NURSE PRACTITIONER

## 2024-11-20 PROCEDURE — 1160F RVW MEDS BY RX/DR IN RCRD: CPT | Mod: CPTII,S$GLB,, | Performed by: NURSE PRACTITIONER

## 2024-11-20 PROCEDURE — 3008F BODY MASS INDEX DOCD: CPT | Mod: CPTII,S$GLB,, | Performed by: NURSE PRACTITIONER

## 2024-11-20 PROCEDURE — 3044F HG A1C LEVEL LT 7.0%: CPT | Mod: CPTII,S$GLB,, | Performed by: NURSE PRACTITIONER

## 2024-11-20 PROCEDURE — 3288F FALL RISK ASSESSMENT DOCD: CPT | Mod: CPTII,S$GLB,, | Performed by: NURSE PRACTITIONER

## 2024-11-20 PROCEDURE — 3066F NEPHROPATHY DOC TX: CPT | Mod: CPTII,S$GLB,, | Performed by: NURSE PRACTITIONER

## 2024-11-20 PROCEDURE — 3074F SYST BP LT 130 MM HG: CPT | Mod: CPTII,S$GLB,, | Performed by: NURSE PRACTITIONER

## 2024-11-20 RX ORDER — TIRZEPATIDE 12.5 MG/.5ML
12.5 INJECTION, SOLUTION SUBCUTANEOUS
Qty: 4 PEN | Refills: 3 | Status: SHIPPED | OUTPATIENT
Start: 2024-11-20

## 2024-11-20 RX ORDER — INSULIN ASPART INJECTION 100 [IU]/ML
10 INJECTION, SOLUTION SUBCUTANEOUS
Qty: 10 ML | Refills: 3 | Status: SHIPPED | OUTPATIENT
Start: 2024-11-20 | End: 2025-11-20

## 2024-11-20 RX ORDER — INSULIN GLARGINE 300 [IU]/ML
44 INJECTION, SOLUTION SUBCUTANEOUS DAILY
Qty: 9 PEN | Refills: 3 | Status: SHIPPED | OUTPATIENT
Start: 2024-11-20

## 2024-11-20 NOTE — PATIENT INSTRUCTIONS
Dexcom [place] pamela  Username: [email or web address] ([email or web address])  Click forgot password  It will send you a code to put in the pamela then reset your password.   Once you are in the Dexcom clarity pamela follow the below step.   In order to share your data, you will need to set up a Dexcom clarity account. When logged into Dexcom Clarity Account, under a settings tab for Clinic Code - use [facility].    Novolog and Fiasp are both fast-acting - only use one.     Apple ID password: [email or web address]!

## 2024-11-20 NOTE — ASSESSMENT & PLAN NOTE
-- Labs prior to follow up - seeing PCP in March.  -- A1c goal <7.5%.  -- Medications discussed:  MFM   GLP1-DPP4   OSORIO   SGLT2   Reviewed potential adverse effects of SGLT-2 inhibitors, including genital mycotic infections, slightly increased risk of UTI, hypersensitivity, hypotension, and hyperkalemia. Advised to maintain water intake of 8-10 cups per day. Advised we need to check chemistry panel at baseline and 2 weeks after starting. Discussed FDA warning reports of ketoacidosis associated with SGLT-2 inhibitors. Advised to seek immediate medical attention and stop the medication if symptoms such as difficulty breathing, nausea, vomiting, abdominal pain, confusion, and unusual fatigue/sleepiness. Discussed possible precipitating factors including major illness/reduced food and fluid intake (advised to stop under these circumstances), and reduced insulin dose. Discussed possible effects of increased fracture risk/decreased bone density. Discussed reports of increased risk of leg and foot amputations with canagliflozin and need to seek urgent care if developed new pain or tenderness, sores or ulcers, or infections in legs/feet.   Insulin   -- Reviewed logs/CGM:  Reports fasting hyperglycemia.   Instructed to send glucose logs in 14 days.  Reach out to me sooner for any glucose <70 or consistently >180.  -- Link Dexcom to our account.   -- Patient concerned about PP hyperglycemia - discussed glucose goals which he is hitting 2 hours PP. Will try Fiasp in place of Novolog AND instructed to administer prior to the meal.  -- Medication Changes:   Metformin 1000mg daily  Mounjaro 12.5 mg weekly   Toujeo 44 units daily   Fiasp 10units plus correction scale before meals only  Add correction scale as needed.  Blood sugar 150 to 200 add 1 units  Blood sugar 201 to 250 add 2 units  Blood sugar 251 to 300 add 3 units  Blood sugar 301 to 350 add 4 units  Blood sugar greater than 350 add 5 units      -- Reviewed goals of  therapy are to get the best control we can without hypoglycemia.  -- Reviewed patient's current insulin regimen. Clarified proper insulin dose and timing in relation to meals, etc. Insulin injection sites and proper rotation instructed.    -- Advised frequent self blood glucose monitoring.  Patient encouraged to document glucose results and bring them to every clinic visit.  -- Hypoglycemia precautions discussed. Instructed on precautions before driving.    -- Call for Bg repeatedly < 90 or > 180.   -- Close adherence to lifestyle changes recommended.   -- Periodic follow ups for eye evaluations, foot care and dental care suggested.

## 2024-11-21 ENCOUNTER — CLINICAL SUPPORT (OUTPATIENT)
Dept: ENDOSCOPY | Facility: HOSPITAL | Age: 74
End: 2024-11-21
Attending: FAMILY MEDICINE
Payer: COMMERCIAL

## 2024-11-21 DIAGNOSIS — Z12.11 COLON CANCER SCREENING: ICD-10-CM

## 2024-12-05 DIAGNOSIS — E11.65 TYPE 2 DIABETES MELLITUS WITH HYPERGLYCEMIA, WITH LONG-TERM CURRENT USE OF INSULIN: ICD-10-CM

## 2024-12-05 DIAGNOSIS — Z79.4 TYPE 2 DIABETES MELLITUS WITH HYPERGLYCEMIA, WITH LONG-TERM CURRENT USE OF INSULIN: ICD-10-CM

## 2024-12-09 RX ORDER — TIRZEPATIDE 12.5 MG/.5ML
12.5 INJECTION, SOLUTION SUBCUTANEOUS
Qty: 4 PEN | Refills: 3 | Status: SHIPPED | OUTPATIENT
Start: 2024-12-09

## 2024-12-30 DIAGNOSIS — B35.3 TINEA PEDIS OF BOTH FEET: ICD-10-CM

## 2024-12-30 DIAGNOSIS — E11.65 TYPE 2 DIABETES MELLITUS WITH HYPERGLYCEMIA, WITH LONG-TERM CURRENT USE OF INSULIN: ICD-10-CM

## 2024-12-30 DIAGNOSIS — Z79.4 TYPE 2 DIABETES MELLITUS WITH HYPERGLYCEMIA, WITH LONG-TERM CURRENT USE OF INSULIN: ICD-10-CM

## 2024-12-30 RX ORDER — INSULIN ASPART INJECTION 100 [IU]/ML
10 INJECTION, SOLUTION SUBCUTANEOUS
Qty: 15 ML | Refills: 3 | Status: SHIPPED | OUTPATIENT
Start: 2024-12-30 | End: 2025-12-30

## 2025-01-02 RX ORDER — CLOTRIMAZOLE AND BETAMETHASONE DIPROPIONATE 10; .64 MG/G; MG/G
CREAM TOPICAL 2 TIMES DAILY
Qty: 45 G | Refills: 2 | Status: SHIPPED | OUTPATIENT
Start: 2025-01-02

## 2025-01-21 ENCOUNTER — PATIENT MESSAGE (OUTPATIENT)
Dept: HEPATOLOGY | Facility: CLINIC | Age: 75
End: 2025-01-21
Payer: COMMERCIAL

## 2025-01-24 ENCOUNTER — TELEPHONE (OUTPATIENT)
Dept: HEPATOLOGY | Facility: CLINIC | Age: 75
End: 2025-01-24
Payer: COMMERCIAL

## 2025-01-24 NOTE — TELEPHONE ENCOUNTER
Pt was been scheduled to [facility],due to the weather offering to do vv.Pt declined requesting to be scheduled a a clinic visit.Rescheduled to  on 3/25/2025 at 3;00 pm.Mailed appt.Pt voice understanding.

## 2025-01-25 ENCOUNTER — PATIENT MESSAGE (OUTPATIENT)
Dept: ENDOCRINOLOGY | Facility: CLINIC | Age: 75
End: 2025-01-25
Payer: COMMERCIAL

## 2025-01-28 DIAGNOSIS — E11.65 UNCONTROLLED TYPE 2 DIABETES MELLITUS WITH HYPERGLYCEMIA, WITH LONG-TERM CURRENT USE OF INSULIN: ICD-10-CM

## 2025-01-28 DIAGNOSIS — Z79.4 UNCONTROLLED TYPE 2 DIABETES MELLITUS WITH HYPERGLYCEMIA, WITH LONG-TERM CURRENT USE OF INSULIN: ICD-10-CM

## 2025-01-28 RX ORDER — BLOOD-GLUCOSE SENSOR
EACH MISCELLANEOUS
Qty: 3 EACH | Refills: 3 | Status: SHIPPED | OUTPATIENT
Start: 2025-01-28

## 2025-01-30 ENCOUNTER — TELEPHONE (OUTPATIENT)
Dept: PHARMACY | Facility: CLINIC | Age: 75
End: 2025-01-30
Payer: COMMERCIAL

## 2025-01-31 NOTE — TELEPHONE ENCOUNTER
[facility]/Population Health Chart Review & Patient Outreach Details For Medication Adherence Project    Reason for Outreach Encounter: 3rd Party payor non-compliance report ([employer], [employer], C, etc)  2.  Patient Outreach Method: Reviewed patient chart  and [employer] message  3.   Medication in question:    Hyperlipidemia Medications               atorvastatin (LIPITOR) 40 MG tablet Take 1 tablet (40 mg total) by mouth every evening.                  atorvastatin  last filled  10/27 for 90 day supply      4.  Reviewed and or Updates Made To: Patient Chart  5. Outreach Outcomes and/or actions taken: Sent inquiry to patient: Waiting for response  Additional Notes:

## 2025-02-01 DIAGNOSIS — Z79.4 UNCONTROLLED TYPE 2 DIABETES MELLITUS WITH HYPERGLYCEMIA, WITH LONG-TERM CURRENT USE OF INSULIN: ICD-10-CM

## 2025-02-01 DIAGNOSIS — E11.65 UNCONTROLLED TYPE 2 DIABETES MELLITUS WITH HYPERGLYCEMIA, WITH LONG-TERM CURRENT USE OF INSULIN: ICD-10-CM

## 2025-02-01 RX ORDER — BLOOD-GLUCOSE SENSOR
EACH MISCELLANEOUS
Qty: 3 EACH | Refills: 3 | Status: CANCELLED | OUTPATIENT
Start: 2025-02-01

## 2025-02-24 ENCOUNTER — OFFICE VISIT (OUTPATIENT)
Dept: ORTHOPEDICS | Facility: CLINIC | Age: 75
End: 2025-02-24
Payer: COMMERCIAL

## 2025-02-24 VITALS — HEIGHT: 69 IN | BODY MASS INDEX: 28.01 KG/M2

## 2025-02-24 DIAGNOSIS — M65.341 TRIGGER RING FINGER OF RIGHT HAND: Primary | ICD-10-CM

## 2025-02-24 PROCEDURE — 99999 PR PBB SHADOW E&M-EST. PATIENT-LVL III: CPT | Mod: PBBFAC,,, | Performed by: ORTHOPAEDIC SURGERY

## 2025-02-24 RX ORDER — TRIAMCINOLONE ACETONIDE 40 MG/ML
20 INJECTION, SUSPENSION INTRA-ARTICULAR; INTRAMUSCULAR
Status: COMPLETED | OUTPATIENT
Start: 2025-02-24 | End: 2025-02-24

## 2025-02-24 RX ADMIN — TRIAMCINOLONE ACETONIDE 20 MG: 40 INJECTION, SUSPENSION INTRA-ARTICULAR; INTRAMUSCULAR at 04:02

## 2025-02-24 NOTE — PROGRESS NOTES
"Subjective:      Patient ID: Patria Nieto is a 74 y.o. male.  Chief Complaint: Follow-up (Right trigger finger )      HPI  Patria Nieto is a  74 y.o. male presenting today for follow up of triggering of the hands.  He reports that he is having problems now in the right ring finger although previously it was his left middle finger   No numbness or tingling reported.    Review of patient's allergies indicates:  No Known Allergies      Current Medications[1]    Past Medical History:   Diagnosis Date    Colon polyp 06/22/2007    Diabetes mellitus type II     Hx MRSA infection     Hypertension     MRSA infection 2002    Supraventricular tachycardia        Past Surgical History:   Procedure Laterality Date    ABLATION N/A 4/12/2021    Procedure: Ablation;  Surgeon: Geovanni Chowdary MD;  Location: [facility] EP LAB;  Service: Cardiology;  Laterality: N/A;  SVT, RFA, LISA, anes, DM, 3prep    CARDIAC CATHETERIZATION      COLONOSCOPY N/A 12/19/2022    Procedure: COLONOSCOPY;  Surgeon: Jimmie Macias MD;  [facility]  Service: Endoscopy;  Laterality: N/A;  PATIENT REQUEST MAIN CAMPUS and NO SEDATION  Dr. Macias pt but cannot put name on the case  instructions via portal - sm    EXCISION OF GANGLION OF WRIST Left 12/17/2021    Procedure: EXCISION, GANGLION CYST, WRIST;  Surgeon: Micheal Scanlon Jr., MD;  Location: [facility] OR;  Service: Orthopedics;  Laterality: Left;    none         OBJECTIVE:   PHYSICAL EXAM:  Height: 5' 9" (175.3 cm)    Vitals:    02/24/25 1552   Height: 5' 9" (1.753 m)   PainSc:   3   PainLoc: Finger     Ortho/SPM Exam  Examination right hand there is tenderness along the flexor tendon sheath to the right ring finger with mild triggering   Tinel sign negative  strength decreased    RADIOGRAPHS:  None  Comments: I have personally reviewed the imaging and I agree with the above radiologist's report.    ASSESSMENT/PLAN:     IMPRESSION:  Triggering of the right ring finger    PLAN:  Options " discussed including injection versus surgery   He would like injection  After pause for time-out identified the right ring finger injected flexor tendon sheath with combination Kenalog 20 mg 0.5 cc xylocaine sterile technique   Tolerated the procedure well without complication   Consider surgery if symptoms persist    FOLLOW UP:  4-6 weeks    Disclaimer: This note has been generated using voice-recognition software. There may be typographical errors that have been missed during proof-reading.          [1]   Current Outpatient Medications   Medication Sig Dispense Refill    aspirin 81 MG Chew Take 81 mg by mouth once daily.      atorvastatin (LIPITOR) 40 MG tablet Take 1 tablet (40 mg total) by mouth every evening. 90 tablet 1    blood-glucose meter,continuous (DEXCOM G7 ) Misc Use as directed 1 each 3    blood-glucose sensor (DEXCOM G7 SENSOR) Inkki Change every 10 days. 3 each 3    clotrimazole-betamethasone 1-0.05% (LOTRISONE) cream Apply topically 2 (two) times daily. 45 g 2    cyanocobalamin (VITAMIN B-12) 1000 MCG tablet Take 1 tablet (1,000 mcg total) by mouth once daily. 90 tablet 5    glucose 4 GM chewable tablet Take 4 tablets (16 g total) by mouth as needed for Low blood sugar (If having symptoms of blurry vision, palpitations, confusion, shakiness.  Please check sugars and if sugar below 70 please take 4 tablets and re-check sugar everry 15 minutes until sugars are above 70 and symptoms resolve.). 50 tablet 12    insulin aspart, niacinamide, (FIASP FLEXTOUCH U-100 INSULIN) 100 unit/mL (3 mL) InPn Inject 10 Units into the skin 3 (three) times daily with meals. Plus correction scale. Max TDD 45units. 15 mL 3    insulin glargine, TOUJEO, (TOUJEO) 300 unit/mL (1.5 mL) InPn pen Inject 44 Units into the skin once daily. Adjust to max 70 units per MD instructions 9 Pen 3    metFORMIN (GLUCOPHAGE) 1000 MG tablet Take 1 tablet (1,000 mg total) by mouth 2 (two) times daily with meals. 180 tablet 0     "metoprolol succinate (TOPROL-XL) 50 MG 24 hr tablet Take 1 tablet (50 mg total) by mouth once daily. 90 tablet 3    mupirocin (BACTROBAN) 2 % ointment Apply topically 3 (three) times daily. 30 g 11    pen needle, diabetic (BD ULTRA-FINE MINI PEN NEEDLE) 31 gauge x 3/16" Ndle Uses 5 times a day. 200 each 6    ramipriL (ALTACE) 2.5 MG capsule Take 1 capsule (2.5 mg total) by mouth once daily. 90 capsule 3    tirzepatide (MOUNJARO) 12.5 mg/0.5 mL PnIj Inject 12.5 mg into the skin every 7 days. 4 Pen 3    blood-glucose transmitter (DEXCOM G6 TRANSMITTER) Nikki 1 each by Misc.(Non-Drug; Combo Route) route continuous prn. 1 each PRN     No current facility-administered medications for this visit.     Facility-Administered Medications Ordered in Other Visits   Medication Dose Route Frequency Provider Last Rate Last Admin    sodium chloride 0.9% bolus 1,000 mL  1,000 mL Intravenous Once Lindsay Ruelas NP         "

## 2025-03-05 ENCOUNTER — TELEPHONE (OUTPATIENT)
Dept: FAMILY MEDICINE | Facility: CLINIC | Age: 75
End: 2025-03-05
Payer: COMMERCIAL

## 2025-03-05 NOTE — TELEPHONE ENCOUNTER
Called patient to confirm appointment with Dr Hanna on 3/6. Spoke to patient. Appointment confirmed.

## 2025-03-06 ENCOUNTER — LAB VISIT (OUTPATIENT)
Dept: LAB | Facility: HOSPITAL | Age: 75
End: 2025-03-06
Attending: FAMILY MEDICINE
Payer: COMMERCIAL

## 2025-03-06 ENCOUNTER — PATIENT MESSAGE (OUTPATIENT)
Dept: ENDOCRINOLOGY | Facility: CLINIC | Age: 75
End: 2025-03-06
Payer: COMMERCIAL

## 2025-03-06 ENCOUNTER — OFFICE VISIT (OUTPATIENT)
Dept: FAMILY MEDICINE | Facility: CLINIC | Age: 75
End: 2025-03-06
Payer: COMMERCIAL

## 2025-03-06 VITALS
HEART RATE: 97 BPM | SYSTOLIC BLOOD PRESSURE: 102 MMHG | BODY MASS INDEX: 26.74 KG/M2 | WEIGHT: 180.56 LBS | TEMPERATURE: 97 F | HEIGHT: 69 IN | OXYGEN SATURATION: 96 % | DIASTOLIC BLOOD PRESSURE: 70 MMHG

## 2025-03-06 DIAGNOSIS — E78.5 HYPERLIPIDEMIA LDL GOAL <100: ICD-10-CM

## 2025-03-06 DIAGNOSIS — Z79.4 TYPE 2 DIABETES MELLITUS WITH HYPERGLYCEMIA, WITH LONG-TERM CURRENT USE OF INSULIN: Primary | ICD-10-CM

## 2025-03-06 DIAGNOSIS — R35.0 URINARY FREQUENCY: ICD-10-CM

## 2025-03-06 DIAGNOSIS — Z79.4 TYPE 2 DIABETES MELLITUS WITH DIABETIC POLYNEUROPATHY, WITH LONG-TERM CURRENT USE OF INSULIN: ICD-10-CM

## 2025-03-06 DIAGNOSIS — R63.4 WEIGHT LOSS: ICD-10-CM

## 2025-03-06 DIAGNOSIS — I15.2 HYPERTENSION ASSOCIATED WITH DIABETES: ICD-10-CM

## 2025-03-06 DIAGNOSIS — E11.59 HYPERTENSION ASSOCIATED WITH DIABETES: ICD-10-CM

## 2025-03-06 DIAGNOSIS — Z12.11 COLON CANCER SCREENING: ICD-10-CM

## 2025-03-06 DIAGNOSIS — E78.49 OTHER HYPERLIPIDEMIA: ICD-10-CM

## 2025-03-06 DIAGNOSIS — R07.89 CHEST PRESSURE: ICD-10-CM

## 2025-03-06 DIAGNOSIS — E11.42 TYPE 2 DIABETES MELLITUS WITH DIABETIC POLYNEUROPATHY, WITH LONG-TERM CURRENT USE OF INSULIN: ICD-10-CM

## 2025-03-06 DIAGNOSIS — E11.65 TYPE 2 DIABETES MELLITUS WITH HYPERGLYCEMIA, WITH LONG-TERM CURRENT USE OF INSULIN: Primary | ICD-10-CM

## 2025-03-06 DIAGNOSIS — I47.10 PSVT (PAROXYSMAL SUPRAVENTRICULAR TACHYCARDIA): ICD-10-CM

## 2025-03-06 LAB
BILIRUB UR QL STRIP: NEGATIVE
CLARITY UR REFRACT.AUTO: CLEAR
COLOR UR AUTO: YELLOW
GLUCOSE UR QL STRIP: NEGATIVE
HGB UR QL STRIP: NEGATIVE
KETONES UR QL STRIP: NEGATIVE
LEUKOCYTE ESTERASE UR QL STRIP: NEGATIVE
NITRITE UR QL STRIP: NEGATIVE
PH UR STRIP: 6 [PH] (ref 5–8)
PROT UR QL STRIP: NEGATIVE
SP GR UR STRIP: 1.01 (ref 1–1.03)
URN SPEC COLLECT METH UR: NORMAL

## 2025-03-06 PROCEDURE — 99214 OFFICE O/P EST MOD 30 MIN: CPT | Mod: S$GLB,,, | Performed by: FAMILY MEDICINE

## 2025-03-06 PROCEDURE — 3074F SYST BP LT 130 MM HG: CPT | Mod: CPTII,S$GLB,, | Performed by: FAMILY MEDICINE

## 2025-03-06 PROCEDURE — 4010F ACE/ARB THERAPY RXD/TAKEN: CPT | Mod: CPTII,S$GLB,, | Performed by: FAMILY MEDICINE

## 2025-03-06 PROCEDURE — 1160F RVW MEDS BY RX/DR IN RCRD: CPT | Mod: CPTII,S$GLB,, | Performed by: FAMILY MEDICINE

## 2025-03-06 PROCEDURE — 99999 PR PBB SHADOW E&M-EST. PATIENT-LVL V: CPT | Mod: PBBFAC,,, | Performed by: FAMILY MEDICINE

## 2025-03-06 PROCEDURE — 3008F BODY MASS INDEX DOCD: CPT | Mod: CPTII,S$GLB,, | Performed by: FAMILY MEDICINE

## 2025-03-06 PROCEDURE — 81003 URINALYSIS AUTO W/O SCOPE: CPT | Performed by: FAMILY MEDICINE

## 2025-03-06 PROCEDURE — 3288F FALL RISK ASSESSMENT DOCD: CPT | Mod: CPTII,S$GLB,, | Performed by: FAMILY MEDICINE

## 2025-03-06 PROCEDURE — 1159F MED LIST DOCD IN RCRD: CPT | Mod: CPTII,S$GLB,, | Performed by: FAMILY MEDICINE

## 2025-03-06 PROCEDURE — 3078F DIAST BP <80 MM HG: CPT | Mod: CPTII,S$GLB,, | Performed by: FAMILY MEDICINE

## 2025-03-06 PROCEDURE — 87086 URINE CULTURE/COLONY COUNT: CPT | Performed by: FAMILY MEDICINE

## 2025-03-06 PROCEDURE — 1101F PT FALLS ASSESS-DOCD LE1/YR: CPT | Mod: CPTII,S$GLB,, | Performed by: FAMILY MEDICINE

## 2025-03-06 PROCEDURE — 1126F AMNT PAIN NOTED NONE PRSNT: CPT | Mod: CPTII,S$GLB,, | Performed by: FAMILY MEDICINE

## 2025-03-06 RX ORDER — ATORVASTATIN CALCIUM 40 MG/1
40 TABLET, FILM COATED ORAL NIGHTLY
Qty: 90 TABLET | Refills: 1 | Status: SHIPPED | OUTPATIENT
Start: 2025-03-06

## 2025-03-06 RX ORDER — METOPROLOL SUCCINATE 25 MG/1
25 TABLET, EXTENDED RELEASE ORAL DAILY
Qty: 90 TABLET | Refills: 1 | Status: SHIPPED | OUTPATIENT
Start: 2025-03-06

## 2025-03-06 RX ORDER — METFORMIN HYDROCHLORIDE 1000 MG/1
1000 TABLET ORAL
Start: 2025-03-06

## 2025-03-06 NOTE — Clinical Note
Hey, GMI over last 90 days per dexcom in 7.9 Having both high and lows. Seems to be getting low overnight around 1 AM and then rising, from then. Today hasn't eaten since 7 PM but sugar is 206. Maybe you can pull up his dexcom logs? Let me know if you want me to change anything?  Thanks  JM

## 2025-03-06 NOTE — PROGRESS NOTES
"Subjective:       Patient ID: Patria Nieto is a 74 y.o. male.    Chief Complaint: Diabetes    Lokesh is a 74 y.o. male who presents today for f/u    DM: managed by endocrine. On metformin 1000 mg daily, mounjaro 12.5, toujeo 45 u daily (previously 50 u) and fiasp (in place of novolog) 10U plus SS WM. Reviewed dexcom <2%, low or very low. But 13% very high. Reports sugars have been labile recently, message sent to endocrine today.   GMI is 7.9      HTN: taking ramipril 2.5 mg and metoprolol 50 mg Xr once daily. Reports BP at home 110s/70s at home.  Reports ortho static symptoms. Reports this is occurring daily for the last few months    SVT: saw EP. BB changed to once daily. Running once a week now. No palpitations.     DLD: on atorvastatin at goal 8/2024    Thrombocytopenia: monitor. Refer <100.   Elevated bili: has history of chronically elevated bilirubin. Had spiked. Has apt with hepatology. 3/25/2025    Also having chest pressure. Ongoing for a few months. He doesn't know what is triggering this. He reports substernal chest pain and some SOB. Running doesn't trigger chest pain. He is not "eating that much." But doesn't think stress is triggering chest pain.     Weight is down further.       Review of Systems   Constitutional:  Negative for chills and fever.   HENT:  Negative for trouble swallowing.    Respiratory:  Positive for shortness of breath. Negative for chest tightness.    Cardiovascular:  Positive for chest pain.        Chest pressure   Gastrointestinal:  Negative for abdominal pain, nausea and vomiting.   Endocrine:        No night sweats   Genitourinary:  Negative for difficulty urinating and dysuria.   Neurological:  Positive for dizziness.   Hematological:  Negative for adenopathy. Does not bruise/bleed easily.           Objective:     Vitals:    03/06/25 1046   BP: 102/70   BP Location: Left arm   Patient Position: Sitting   Pulse: 97   Temp: 97.4 °F (36.3 °C)   TempSrc: Temporal   SpO2: 96%   Weight: " "81.9 kg (180 lb 8.9 oz)   Height: 5' 9" (1.753 m)        Physical Exam  Vitals and nursing note reviewed.   Constitutional:       General: He is not in acute distress.     Appearance: He is not ill-appearing, toxic-appearing or diaphoretic.   Cardiovascular:      Rate and Rhythm: Normal rate and regular rhythm.   Pulmonary:      Effort: Pulmonary effort is normal.      Breath sounds: Normal breath sounds.   Abdominal:      General: There is no distension.      Palpations: Abdomen is soft.      Tenderness: There is no abdominal tenderness.   Musculoskeletal:         General: No swelling.   Neurological:      General: No focal deficit present.      Mental Status: He is alert.   Psychiatric:         Mood and Affect: Mood normal.         Behavior: Behavior normal.         Thought Content: Thought content normal.         Judgment: Judgment normal.         Assessment:       1. Type 2 diabetes mellitus with hyperglycemia, with long-term current use of insulin    2. Colon cancer screening    3. Type 2 diabetes mellitus with diabetic polyneuropathy, with long-term current use of insulin    4. Hypertension associated with diabetes    5. PSVT (paroxysmal supraventricular tachycardia)    6. Other hyperlipidemia    7. Hyperlipidemia LDL goal <100    8. Chest pressure    9. Urinary frequency    10. Weight loss        Plan:       Stop ramipril  Decrease metoprolol to 25 mg instead of 50 mg XR  If heart racing occurs, need to return to cardiology    Continue mounjaro 12.5 mg,   Continue metformin 1000 mg daily  Continue toujeo  Patient things fiasp is causing labile sugars?  Per last endocrinology note: Will try Fiasp in place of Novolog AND instructed to administer prior to the meal.   Keep apt with endocrinology    Continue atorvastatin 40 mg    F/u 1 month for BP  If weight loss persists consider decreasing mounjaro?    Consider finasteride?    Evaluate chest pressure with EKG and stress test  Given lowering of BB, will also " obtain an updated Holter    Consider decreasing statin?    F/u with endocrine and hepatology  Will set up EP    F/u 1 month for BP. With heather Rodney at the next visit.   F/u 4 months, DM.       Type 2 diabetes mellitus with hyperglycemia, with long-term current use of insulin  -     metFORMIN (GLUCOPHAGE) 1000 MG tablet; Take 1 tablet (1,000 mg total) by mouth daily with breakfast.  -     Hemoglobin A1C; Future; Expected date: 03/06/2025  -     Comprehensive Metabolic Panel; Future; Expected date: 03/06/2025  -     CBC Auto Differential; Future; Expected date: 03/06/2025  -     CBC Auto Differential; Future; Expected date: 03/06/2025  -     Comprehensive Metabolic Panel; Future; Expected date: 03/06/2025  -     Hemoglobin A1C; Future; Expected date: 03/06/2025    Colon cancer screening  -     Ambulatory referral/consult to Endo Procedure ; Future; Expected date: 03/07/2025    Type 2 diabetes mellitus with diabetic polyneuropathy, with long-term current use of insulin  -     Ambulatory referral/consult to Optometry; Future; Expected date: 03/13/2025    Hypertension associated with diabetes  -     metoprolol succinate (TOPROL-XL) 25 MG 24 hr tablet; Take 1 tablet (25 mg total) by mouth once daily.  Dispense: 90 tablet; Refill: 1  -     Ambulatory referral/consult to Cardiac Electrophysiology; Future; Expected date: 03/13/2025    PSVT (paroxysmal supraventricular tachycardia)  -     metoprolol succinate (TOPROL-XL) 25 MG 24 hr tablet; Take 1 tablet (25 mg total) by mouth once daily.  Dispense: 90 tablet; Refill: 1  -     Holter monitor - 48 hour; Future  -     Ambulatory referral/consult to Cardiac Electrophysiology; Future; Expected date: 03/13/2025    Other hyperlipidemia  -     atorvastatin (LIPITOR) 40 MG tablet; Take 1 tablet (40 mg total) by mouth every evening.  Dispense: 90 tablet; Refill: 1    Hyperlipidemia LDL goal <100  -     atorvastatin (LIPITOR) 40 MG tablet; Take 1 tablet (40 mg total) by  mouth every evening.  Dispense: 90 tablet; Refill: 1  -     Lipid Panel; Future; Expected date: 03/06/2025    Chest pressure  -     IN OFFICE EKG 12-LEAD (to Muse)  -     Stress Echo Which stress agent will be used? Treadmill Exercise; Color Flow Doppler? No; Future  -     Holter monitor - 48 hour; Future    Urinary frequency  -     Urine culture; Future; Expected date: 03/06/2025  -     Urinalysis; Future; Expected date: 03/06/2025    Weight loss  -     TSH; Future; Expected date: 03/06/2025  -     Sedimentation rate; Future; Expected date: 03/06/2025  -     C-Reactive Protein; Future; Expected date: 03/06/2025  -     X-Ray Chest PA And Lateral; Future; Expected date: 03/06/2025  -     B-TYPE NATRIURETIC PEPTIDE; Future; Expected date: 03/06/2025

## 2025-03-06 NOTE — PATIENT INSTRUCTIONS
Stop ramipril  Decrease metoprolol to 25 mg instead of 50 mg XR  If heart racing occurs, need to return to cardiology    Continue mounjaro 12.5 mg,   Continue metformin 1000 mg daily  Continue toujeo  Patient things fiasp is causing labile sugars?  Per last endocrinology note: Will try Fiasp in place of Novolog AND instructed to administer prior to the meal.   Keep apt with endocrinology    Continue atorvastatin 40 mg    F/u 1 month for BP  If weight loss persists consider decreasing mounjaro?    Consider finasteride?    Evaluate chest pressure with EKG and stress test  Given lowering of BB, will also obtain an updated Holter    Consider decreasing statin?    F/u 1 month for BP. With daniella.   F/u 4 months, DM.

## 2025-03-08 LAB — BACTERIA UR CULT: NORMAL

## 2025-03-10 ENCOUNTER — HOSPITAL ENCOUNTER (OUTPATIENT)
Dept: RADIOLOGY | Facility: HOSPITAL | Age: 75
Discharge: HOME | End: 2025-03-10
Attending: FAMILY MEDICINE
Payer: COMMERCIAL

## 2025-03-10 DIAGNOSIS — R63.4 WEIGHT LOSS: ICD-10-CM

## 2025-03-10 PROCEDURE — 71046 X-RAY EXAM CHEST 2 VIEWS: CPT | Mod: TC,FY,PO

## 2025-03-10 PROCEDURE — 71046 X-RAY EXAM CHEST 2 VIEWS: CPT | Mod: 26,,, | Performed by: RADIOLOGY

## 2025-03-17 NOTE — PROGRESS NOTES
"Subjective:      Patient ID: Patria Nieto is a 74 y.o. male.    Chief Complaint:  Type 2 diabetes mellitus with hyperglycemia, with long-term    History of Present Illness  Patria Nieto is here for follow up of DM.  Previously seen by me 2024.    With regards to Diabetes:    Diagnosed:   DE: 2018  FH: "yes"   Known complications:  DKA Denies   RN Denies  Eye Exam:  Scheduled 3/2025  PN Yes - "comes and goes"   Podiatry: 10/2024  Nephropathy Denies  CAD Denies  Denies history of pancreatitis & personal/family history of medullary thyroid cancer.     Diet/Exercise:   Eats 1-2 meals a day.   Snacks : Denies  Drinks : coffee (1 cup in AM with half and half), OJ, water, diet soft drinks.   Exercise - tries to stay active - exercises twice a week.   Illness, injury, steroids: Denies    Current Regimen:  Metformin 1000 mg daily  Mounjaro 12.5 mg weekly   Toujeo 44 units daily   Fiasp 10 units plus correction scale before meals only  Add correction scale as needed.  Blood sugar 150 to 200 add 1 units  Blood sugar 201 to 250 add 2 units  Blood sugar 251 to 300 add 3 units  Blood sugar 301 to 350 add 4 units  Blood sugar greater than 350 add 5 units    Reports appetite suppression with Mounjaro 12.5mg weekly.    Other medications tried:  Trulicity   Discussed SGLT2i       Glucose Monitor: Dexcom G7   Patient has not been using Dexcom recently since his phone broke.  Has been checking SMBG  Fastins - 190s  Pre prandial 130s  Post prandial:  160s 2 hours after eating  Before bed: unknown    Hypoglycemia:  Rare - when takes insulin and forgets to eat.  Gets "woozy".  Has been trying to pay better attention to making sure he eats on time.  Knows how to correct with 15 grams of carbs- juice, coke, or a peppermint.       Diabetes Management Status    Hemoglobin A1C   Date Value Ref Range Status   2025 7.3 (H) 4.0 - 5.6 % Final     Comment:     ADA Screening Guidelines:  5.7-6.4%  Consistent with " prediabetes  >or=6.5%  Consistent with diabetes    High levels of fetal hemoglobin interfere with the HbA1C  assay. Heterozygous hemoglobin variants (HbS, HgC, etc)do  not significantly interfere with this assay.   However, presence of multiple variants may affect accuracy.     08/31/2024 6.8 (H) 4.0 - 5.6 % Final     Comment:     ADA Screening Guidelines:  5.7-6.4%  Consistent with prediabetes  >or=6.5%  Consistent with diabetes    High levels of fetal hemoglobin interfere with the HbA1C  assay. Heterozygous hemoglobin variants (HbS, HgC, etc)do  not significantly interfere with this assay.   However, presence of multiple variants may affect accuracy.     02/28/2024 6.7 (H) 4.0 - 5.6 % Final     Comment:     ADA Screening Guidelines:  5.7-6.4%  Consistent with prediabetes  >or=6.5%  Consistent with diabetes    High levels of fetal hemoglobin interfere with the HbA1C  assay. Heterozygous hemoglobin variants (HbS, HgC, etc)do  not significantly interfere with this assay.   However, presence of multiple variants may affect accuracy.         Statin: Taking Atorvastatin 40 mg daily  ACE/ARB: Not Taking  Screening or Prevention Patient's value Goal Complete/Controlled?   HgA1C Testing and Control   Lab Results   Component Value Date    HGBA1C 7.3 (H) 03/06/2025      Annually/Less than 8% No   Lipid profile : 03/06/2025 Annually Yes   LDL control Lab Results   Component Value Date    LDLCALC 46.6 (L) 03/06/2025    Annually/Less than 100 mg/dl  Yes   Nephropathy screening Lab Results   Component Value Date    LABMICR 7.0 08/31/2024     Lab Results   Component Value Date    PROTEINUA Negative 03/06/2025    Annually Yes   Blood pressure BP Readings from Last 1 Encounters:   03/19/25 123/80    Less than 140/90 Yes   Dilated retinal exam : 11/02/2023 Annually Yes   Foot exam   : 10/31/2024 Annually No       Review of Systems  As above    Objective:   Physical Exam  Vitals reviewed.   Cardiovascular:      Rate and Rhythm:  "Normal rate.      Comments: No edema present  Pulmonary:      Effort: Pulmonary effort is normal.   Abdominal:      Palpations: Abdomen is soft.       Injection sites are without edema or erythema. No lipo hypertrophy or atrophy.    Visit Vitals  /80 (BP Location: Right arm, Patient Position: Sitting)   Pulse 96   Ht 5' 9" (1.753 m)   Wt 83.8 kg (184 lb 11.9 oz)   SpO2 97%   BMI 27.28 kg/m²           Body mass index is 27.28 kg/m².    Lab Review:   Lab Results   Component Value Date    HGBA1C 7.3 (H) 03/06/2025    HGBA1C 6.8 (H) 08/31/2024    HGBA1C 6.7 (H) 02/28/2024       Lab Results   Component Value Date    CHOL 105 (L) 03/06/2025    HDL 45 03/06/2025    LDLCALC 46.6 (L) 03/06/2025    TRIG 67 03/06/2025    CHOLHDL 42.9 03/06/2025     Lab Results   Component Value Date     03/06/2025    K 4.0 03/06/2025     03/06/2025    CO2 20 (L) 03/06/2025     (H) 03/06/2025    BUN 26 (H) 03/06/2025    CREATININE 1.1 03/06/2025    CALCIUM 10.8 (H) 03/06/2025    PROT 6.9 03/06/2025    ALBUMIN 4.0 03/06/2025    BILITOT 3.1 (H) 03/06/2025    ALKPHOS 57 03/06/2025    AST 39 03/06/2025    ALT 38 03/06/2025    ANIONGAP 12 03/06/2025    ESTGFRAFRICA >60.0 11/12/2021    EGFRNONAA >60.0 11/12/2021    TSH 1.564 03/06/2025     Vit D, 25-Hydroxy   Date Value Ref Range Status   08/31/2024 51 30 - 96 ng/mL Final     Comment:     Vitamin D deficiency.........<10 ng/mL                              Vitamin D insufficiency......10-29 ng/mL       Vitamin D sufficiency........> or equal to 30 ng/mL  Vitamin D toxicity............>100 ng/mL       Assessment and Plan     1. Type 2 diabetes mellitus with hyperglycemia, with long-term current use of insulin  Comprehensive Metabolic Panel    Hemoglobin A1C    Microalbumin/Creatinine Ratio, Urine    glucagon (BAQSIMI) 3 mg/actuation Spry    insulin glargine, TOUJEO, (TOUJEO) 300 unit/mL (1.5 mL) InPn pen      2. Uncontrolled type 2 diabetes mellitus with hyperglycemia, with " long-term current use of insulin  insulin glargine, TOUJEO, (TOUJEO) 300 unit/mL (1.5 mL) InPn pen      3. Neuropathy due to type 2 diabetes mellitus            Type 2 diabetes mellitus with hyperglycemia, with long-term current use of insulin  -- Labs prior to follow up   -- A1c at goal <7.5%.  -- Medications discussed:  MFM   GLP1-DPP4   OSORIO   SGLT2   Reviewed potential adverse effects of SGLT-2 inhibitors, including genital mycotic infections, slightly increased risk of UTI, hypersensitivity, hypotension, and hyperkalemia. Advised to maintain water intake of 8-10 cups per day. Advised we need to check chemistry panel at baseline and 2 weeks after starting. Discussed FDA warning reports of ketoacidosis associated with SGLT-2 inhibitors. Advised to seek immediate medical attention and stop the medication if symptoms such as difficulty breathing, nausea, vomiting, abdominal pain, confusion, and unusual fatigue/sleepiness. Discussed possible precipitating factors including major illness/reduced food and fluid intake (advised to stop under these circumstances), and reduced insulin dose. Discussed possible effects of increased fracture risk/decreased bone density. Discussed reports of increased risk of leg and foot amputations with canagliflozin and need to seek urgent care if developed new pain or tenderness, sores or ulcers, or infections in legs/feet.   Insulin   -- Reviewed logs/CGM:  Patient not currently on Dexcom x2 days since he broke his phone.  Instructions provided on connecting back to clinic once he gets a replacement phone.  Once connected instructed to reach out to clinic for Dexcom review.    Per SMBG  Fastins - 190s  Pre prandial 130s  Post prandial:  160s 2 hours after eating  Before bed: unknown  -- Current Regimen:  Continue:  Metformin 1000 mg daily  Continue:  Mounjaro 12.5 mg weekly   Increase Toujeo 48 units daily   Continue Fiasp 10 units plus correction scale before meals only  Add  correction scale as needed.  Blood sugar 150 to 200 add 1 units  Blood sugar 201 to 250 add 2 units  Blood sugar 251 to 300 add 3 units  Blood sugar 301 to 350 add 4 units  Blood sugar greater than 350 add 5 units    -- Reviewed goals of therapy are to get the best control we can without hypoglycemia.  -- Reviewed patient's current insulin regimen. Clarified proper insulin dose and timing in relation to meals, etc. Insulin injection sites and proper rotation instructed.    -- Advised frequent self blood glucose monitoring.  Patient encouraged to document glucose results and bring them to every clinic visit.  -- Hypoglycemia precautions discussed.  -- Close adherence to lifestyle changes recommended.   -- Periodic follow ups for eye evaluations, foot care and dental care suggested.    Neuropathy due to type 2 diabetes mellitus  -- Optimize glucose control.       Follow up in about 4 months (around 7/19/2025).      Visit today included increased complexity associated with the care of the problems addressed and managing the longitudinal care of the patient due to the serious and/or complex managed problems.

## 2025-03-19 ENCOUNTER — OFFICE VISIT (OUTPATIENT)
Dept: ENDOCRINOLOGY | Facility: CLINIC | Age: 75
End: 2025-03-19
Payer: COMMERCIAL

## 2025-03-19 VITALS
BODY MASS INDEX: 27.36 KG/M2 | DIASTOLIC BLOOD PRESSURE: 80 MMHG | HEART RATE: 96 BPM | OXYGEN SATURATION: 97 % | HEIGHT: 69 IN | WEIGHT: 184.75 LBS | SYSTOLIC BLOOD PRESSURE: 123 MMHG

## 2025-03-19 DIAGNOSIS — E11.65 UNCONTROLLED TYPE 2 DIABETES MELLITUS WITH HYPERGLYCEMIA, WITH LONG-TERM CURRENT USE OF INSULIN: ICD-10-CM

## 2025-03-19 DIAGNOSIS — E11.65 TYPE 2 DIABETES MELLITUS WITH HYPERGLYCEMIA, WITH LONG-TERM CURRENT USE OF INSULIN: Primary | ICD-10-CM

## 2025-03-19 DIAGNOSIS — Z79.4 TYPE 2 DIABETES MELLITUS WITH HYPERGLYCEMIA, WITH LONG-TERM CURRENT USE OF INSULIN: Primary | ICD-10-CM

## 2025-03-19 DIAGNOSIS — E11.40 NEUROPATHY DUE TO TYPE 2 DIABETES MELLITUS: ICD-10-CM

## 2025-03-19 DIAGNOSIS — Z79.4 UNCONTROLLED TYPE 2 DIABETES MELLITUS WITH HYPERGLYCEMIA, WITH LONG-TERM CURRENT USE OF INSULIN: ICD-10-CM

## 2025-03-19 PROCEDURE — 4010F ACE/ARB THERAPY RXD/TAKEN: CPT | Mod: CPTII,S$GLB,, | Performed by: NURSE PRACTITIONER

## 2025-03-19 PROCEDURE — 99214 OFFICE O/P EST MOD 30 MIN: CPT | Mod: S$GLB,,, | Performed by: NURSE PRACTITIONER

## 2025-03-19 PROCEDURE — 1101F PT FALLS ASSESS-DOCD LE1/YR: CPT | Mod: CPTII,S$GLB,, | Performed by: NURSE PRACTITIONER

## 2025-03-19 PROCEDURE — 1126F AMNT PAIN NOTED NONE PRSNT: CPT | Mod: CPTII,S$GLB,, | Performed by: NURSE PRACTITIONER

## 2025-03-19 PROCEDURE — 99999 PR PBB SHADOW E&M-EST. PATIENT-LVL IV: CPT | Mod: PBBFAC,,, | Performed by: NURSE PRACTITIONER

## 2025-03-19 PROCEDURE — 3074F SYST BP LT 130 MM HG: CPT | Mod: CPTII,S$GLB,, | Performed by: NURSE PRACTITIONER

## 2025-03-19 PROCEDURE — 3288F FALL RISK ASSESSMENT DOCD: CPT | Mod: CPTII,S$GLB,, | Performed by: NURSE PRACTITIONER

## 2025-03-19 PROCEDURE — 1159F MED LIST DOCD IN RCRD: CPT | Mod: CPTII,S$GLB,, | Performed by: NURSE PRACTITIONER

## 2025-03-19 PROCEDURE — 3008F BODY MASS INDEX DOCD: CPT | Mod: CPTII,S$GLB,, | Performed by: NURSE PRACTITIONER

## 2025-03-19 PROCEDURE — 3079F DIAST BP 80-89 MM HG: CPT | Mod: CPTII,S$GLB,, | Performed by: NURSE PRACTITIONER

## 2025-03-19 PROCEDURE — G2211 COMPLEX E/M VISIT ADD ON: HCPCS | Mod: S$GLB,,, | Performed by: NURSE PRACTITIONER

## 2025-03-19 PROCEDURE — 3051F HG A1C>EQUAL 7.0%<8.0%: CPT | Mod: CPTII,S$GLB,, | Performed by: NURSE PRACTITIONER

## 2025-03-19 PROCEDURE — 1160F RVW MEDS BY RX/DR IN RCRD: CPT | Mod: CPTII,S$GLB,, | Performed by: NURSE PRACTITIONER

## 2025-03-19 RX ORDER — GLUCAGON 3 MG/1
1 POWDER NASAL
Qty: 2 EACH | Refills: 3 | Status: SHIPPED | OUTPATIENT
Start: 2025-03-19

## 2025-03-19 RX ORDER — INSULIN GLARGINE 300 [IU]/ML
48 INJECTION, SOLUTION SUBCUTANEOUS DAILY
Qty: 9 PEN | Refills: 3 | Status: SHIPPED | OUTPATIENT
Start: 2025-03-19

## 2025-03-19 NOTE — PATIENT INSTRUCTIONS
Let us know when you resume Dexcom.     Dexcom Sharing Instructions:  In order to share your data, you will need to set up a Dexcom clarity account. When logged into Dexcom Clarity Account, under a settings tab for Clinic Code - use [facility].    Current Regimen: Changes made in BOLD  Continue:  Metformin 1000 mg daily  Continue:  Mounjaro 12.5 mg weekly   Increase Toujeo 48 units daily   Continue Fiasp 10 units plus correction scale before meals only  Add correction scale as needed.  Blood sugar 150 to 200 add 1 units  Blood sugar 201 to 250 add 2 units  Blood sugar 251 to 300 add 3 units  Blood sugar 301 to 350 add 4 units  Blood sugar greater than 350 add 5 units

## 2025-03-25 ENCOUNTER — OFFICE VISIT (OUTPATIENT)
Dept: HEPATOLOGY | Facility: CLINIC | Age: 75
End: 2025-03-25
Attending: INTERNAL MEDICINE
Payer: COMMERCIAL

## 2025-03-25 VITALS
HEIGHT: 69 IN | BODY MASS INDEX: 26.07 KG/M2 | WEIGHT: 176 LBS | DIASTOLIC BLOOD PRESSURE: 79 MMHG | SYSTOLIC BLOOD PRESSURE: 138 MMHG | TEMPERATURE: 98 F | HEART RATE: 95 BPM | RESPIRATION RATE: 18 BRPM | OXYGEN SATURATION: 97 %

## 2025-03-25 DIAGNOSIS — E80.6 HYPERBILIRUBINEMIA: Primary | ICD-10-CM

## 2025-03-25 DIAGNOSIS — Z79.4 TYPE 2 DIABETES MELLITUS WITH HYPERGLYCEMIA, WITH LONG-TERM CURRENT USE OF INSULIN: ICD-10-CM

## 2025-03-25 DIAGNOSIS — E11.65 TYPE 2 DIABETES MELLITUS WITH HYPERGLYCEMIA, WITH LONG-TERM CURRENT USE OF INSULIN: ICD-10-CM

## 2025-03-25 DIAGNOSIS — K76.89 LIVER CYST: ICD-10-CM

## 2025-03-25 PROCEDURE — 1126F AMNT PAIN NOTED NONE PRSNT: CPT | Mod: CPTII,S$GLB,, | Performed by: INTERNAL MEDICINE

## 2025-03-25 PROCEDURE — 99203 OFFICE O/P NEW LOW 30 MIN: CPT | Mod: S$GLB,,, | Performed by: INTERNAL MEDICINE

## 2025-03-25 PROCEDURE — 3075F SYST BP GE 130 - 139MM HG: CPT | Mod: CPTII,S$GLB,, | Performed by: INTERNAL MEDICINE

## 2025-03-25 PROCEDURE — 1101F PT FALLS ASSESS-DOCD LE1/YR: CPT | Mod: CPTII,S$GLB,, | Performed by: INTERNAL MEDICINE

## 2025-03-25 PROCEDURE — 3051F HG A1C>EQUAL 7.0%<8.0%: CPT | Mod: CPTII,S$GLB,, | Performed by: INTERNAL MEDICINE

## 2025-03-25 PROCEDURE — 99999 PR PBB SHADOW E&M-EST. PATIENT-LVL V: CPT | Mod: PBBFAC,,, | Performed by: INTERNAL MEDICINE

## 2025-03-25 PROCEDURE — 3078F DIAST BP <80 MM HG: CPT | Mod: CPTII,S$GLB,, | Performed by: INTERNAL MEDICINE

## 2025-03-25 PROCEDURE — 4010F ACE/ARB THERAPY RXD/TAKEN: CPT | Mod: CPTII,S$GLB,, | Performed by: INTERNAL MEDICINE

## 2025-03-25 PROCEDURE — 3288F FALL RISK ASSESSMENT DOCD: CPT | Mod: CPTII,S$GLB,, | Performed by: INTERNAL MEDICINE

## 2025-03-25 PROCEDURE — 3008F BODY MASS INDEX DOCD: CPT | Mod: CPTII,S$GLB,, | Performed by: INTERNAL MEDICINE

## 2025-03-25 PROCEDURE — 1159F MED LIST DOCD IN RCRD: CPT | Mod: CPTII,S$GLB,, | Performed by: INTERNAL MEDICINE

## 2025-03-26 RX ORDER — TIRZEPATIDE 12.5 MG/.5ML
12.5 INJECTION, SOLUTION SUBCUTANEOUS
Qty: 4 PEN | Refills: 3 | Status: SHIPPED | OUTPATIENT
Start: 2025-03-26

## 2025-03-31 NOTE — PROGRESS NOTES
HEPATOLOGY CONSULTATION    Referring Physician:   Current Corresponding Physician: Dr. Charles Hanna    Reason for Consultation: Consultation for evaluation of Abnormal Abdominal/Liver Imaging    History of Present Illness: Patria Nieto is a 74 y.o. malewho presents for evaluation of   Chief Complaint   Patient presents with    Abnormal Abdominal/Liver Imaging     This 74-year-old gentleman with no prior history of chronic liver disease has a small mildly complex liver cyst seen on ultrasound.  He has no symptoms of chronic liver disease.  Weight and appetite have been stable.  His liver enzymes are normal although he has a slight hyperbilirubinemia which appears to be due to Gilbert syndrome.  It is predominantly unconjugated.  Morphologically there is no evidence of advanced chronic liver disease on ultrasound.    Past Medical History:   Diagnosis Date    Colon polyp 06/22/2007    Diabetes mellitus type II     Hx MRSA infection     Hypertension     MRSA infection 2002    Supraventricular tachycardia      Encounter Medications[1]  Review of patient's allergies indicates:  No Known Allergies  Family History   Problem Relation Name Age of Onset    Heart disease Mother Savannah Nieto     Heart disease Father Lokesh Nieto, Sr.     Diabetes Father Lokesh Nieto, Sr.     Diabetes Sister Bailey Murrell        Social History[2]  Review of Systems   Constitutional:  Negative for activity change, appetite change, chills, fatigue and unexpected weight change.   HENT:  Negative for congestion, facial swelling and tinnitus.    Eyes:  Negative for visual disturbance.   Respiratory:  Negative for cough, shortness of breath and wheezing.    Cardiovascular:  Negative for chest pain and palpitations.   Gastrointestinal:  Negative for abdominal distention.   Genitourinary:  Negative for dysuria.   Musculoskeletal:  Negative for arthralgias, joint swelling and myalgias.   Neurological:  Negative for syncope and headaches.   Hematological:   Does not bruise/bleed easily.   Psychiatric/Behavioral:  Negative for confusion.      Vitals:    03/25/25 1511   BP: 138/79   Pulse: 95   Resp: 18   Temp: 97.7 °F (36.5 °C)       Physical Exam  Constitutional:       Appearance: He is well-developed.   Eyes:      General: No scleral icterus.  Cardiovascular:      Rate and Rhythm: Normal rate and regular rhythm.      Heart sounds: Normal heart sounds.   Pulmonary:      Effort: Pulmonary effort is normal. No respiratory distress.      Breath sounds: Normal breath sounds. No wheezing.   Abdominal:      General: Bowel sounds are normal. There is no distension.      Palpations: Abdomen is soft. There is no mass.      Tenderness: There is no abdominal tenderness. There is no rebound.   Musculoskeletal:         General: Normal range of motion.   Lymphadenopathy:      Cervical: No cervical adenopathy.   Skin:     General: Skin is warm and dry.   Neurological:      Mental Status: He is alert and oriented to person, place, and time.         MELD 3.0: 12 at 10/12/2024  9:52 AM  MELD-Na: 11 at 10/12/2024  9:52 AM  Calculated from:  Serum Creatinine: 1.2 mg/dL at 10/12/2024  9:52 AM  Serum Sodium: 143 mmol/L (Using max of 137 mmol/L) at 10/12/2024  9:52 AM  Total Bilirubin: 2.4 mg/dL at 10/12/2024  9:52 AM  Serum Albumin: 4 g/dL (Using max of 3.5 g/dL) at 10/12/2024  9:52 AM  INR(ratio): 1 at 10/12/2024  9:52 AM  Age at listing (hypothetical): 74 years  Sex: Male at 10/12/2024  9:52 AM      Lab Results   Component Value Date     (H) 03/06/2025    BUN 26 (H) 03/06/2025    CREATININE 1.1 03/06/2025    CALCIUM 10.8 (H) 03/06/2025     03/06/2025    K 4.0 03/06/2025     03/06/2025    PROT 6.9 03/06/2025    CO2 20 (L) 03/06/2025    ANIONGAP 12 03/06/2025    WBC 10.89 03/06/2025    RBC 5.04 03/06/2025    HGB 16.6 03/06/2025    HCT 49.6 03/06/2025    MCV 98 03/06/2025    MCH 32.9 (H) 03/06/2025    MCHC 33.5 03/06/2025     Lab Results   Component Value Date    RDW 13.2  03/06/2025     03/06/2025    MPV 12.7 03/06/2025    GRAN 8.5 (H) 03/06/2025    GRAN 78.0 (H) 03/06/2025    LYMPH 1.5 03/06/2025    LYMPH 14.0 (L) 03/06/2025    MONO 0.6 03/06/2025    MONO 5.2 03/06/2025    EOSINOPHIL 1.9 03/06/2025    BASOPHIL 0.5 03/06/2025    EOS 0.2 03/06/2025    BASO 0.05 03/06/2025    APTT 24.6 04/09/2021    BNP 10 03/06/2025    CHOL 105 (L) 03/06/2025    TRIG 67 03/06/2025    HDL 45 03/06/2025    CHOLHDL 42.9 03/06/2025    TOTALCHOLEST 2.3 03/06/2025    ALBUMIN 4.0 03/06/2025    BILIDIR 0.6 (H) 10/12/2024    AST 39 03/06/2025    ALT 38 03/06/2025    ALKPHOS 57 03/06/2025    MG 2.1 06/24/2020    LABPROT 11.4 10/12/2024    INR 1.0 10/12/2024    PSA 0.42 11/12/2021       Assessment and Plan:  Problem List[3]  Patria Nieto is a 74 y.o. male withAbnormal Abdominal/Liver Imaging    Impression:    Small mildly complex liver cyst   Unconjugated hyperbilirubinemia likely Gilbert's syndrome.    Plan:    I provided reassurance.  I will be repeating the ultrasound.  If there was no change in size I think periodic surveillance by ultrasound would be indicated.  If there has been any change in the characteristics I will arrange a contrast MRI.         [1]   Outpatient Encounter Medications as of 3/25/2025   Medication Sig Dispense Refill    aspirin 81 MG Chew Take 81 mg by mouth once daily.      atorvastatin (LIPITOR) 40 MG tablet Take 1 tablet (40 mg total) by mouth every evening. 90 tablet 1    blood-glucose meter,continuous (DEXCOM G7 ) Misc Use as directed 1 each 3    blood-glucose sensor (DEXCOM G7 SENSOR) Nikki Change every 10 days. 3 each 3    clotrimazole-betamethasone 1-0.05% (LOTRISONE) cream Apply topically 2 (two) times daily. 45 g 2    cyanocobalamin (VITAMIN B-12) 1000 MCG tablet Take 1 tablet (1,000 mcg total) by mouth once daily. 90 tablet 5    glucagon (BAQSIMI) 3 mg/actuation Spry 1 each by Nasal route as needed. 2 each 3    insulin aspart, niacinamide, (FIASP FLEXTOUCH U-100  "INSULIN) 100 unit/mL (3 mL) InPn Inject 10 Units into the skin 3 (three) times daily with meals. Plus correction scale. Max TDD 45units. 15 mL 3    insulin glargine, TOUJEO, (TOUJEO) 300 unit/mL (1.5 mL) InPn pen Inject 48 Units into the skin once daily. Adjust to max 70 units per MD instructions 9 Pen 3    metFORMIN (GLUCOPHAGE) 1000 MG tablet Take 1 tablet (1,000 mg total) by mouth daily with breakfast.      metoprolol succinate (TOPROL-XL) 25 MG 24 hr tablet Take 1 tablet (25 mg total) by mouth once daily. 90 tablet 1    mupirocin (BACTROBAN) 2 % ointment Apply topically 3 (three) times daily. 30 g 11    pen needle, diabetic (BD ULTRA-FINE MINI PEN NEEDLE) 31 gauge x 3/16" Ndle Uses 5 times a day. 200 each 6    [DISCONTINUED] tirzepatide (MOUNJARO) 12.5 mg/0.5 mL PnIj Inject 12.5 mg into the skin every 7 days. 4 Pen 3    glucose 4 GM chewable tablet Take 4 tablets (16 g total) by mouth as needed for Low blood sugar (If having symptoms of blurry vision, palpitations, confusion, shakiness.  Please check sugars and if sugar below 70 please take 4 tablets and re-check sugar everry 15 minutes until sugars are above 70 and symptoms resolve.). 50 tablet 12    [DISCONTINUED] atorvastatin (LIPITOR) 40 MG tablet Take 1 tablet (40 mg total) by mouth every evening. 90 tablet 1    [DISCONTINUED] blood-glucose transmitter (DEXCOM G6 TRANSMITTER) Nikki 1 each by Misc.(Non-Drug; Combo Route) route continuous prn. 1 each PRN    [DISCONTINUED] insulin glargine, TOUJEO, (TOUJEO) 300 unit/mL (1.5 mL) InPn pen Inject 44 Units into the skin once daily. Adjust to max 70 units per MD instructions 9 Pen 3    [DISCONTINUED] metFORMIN (GLUCOPHAGE) 1000 MG tablet Take 1 tablet (1,000 mg total) by mouth 2 (two) times daily with meals. 180 tablet 0    [DISCONTINUED] metoprolol succinate (TOPROL-XL) 50 MG 24 hr tablet Take 1 tablet (50 mg total) by mouth once daily. 90 tablet 3    [DISCONTINUED] ramipriL (ALTACE) 2.5 MG capsule Take 1 capsule " (2.5 mg total) by mouth once daily. 90 capsule 3     Facility-Administered Encounter Medications as of 3/25/2025   Medication Dose Route Frequency Provider Last Rate Last Admin    sodium chloride 0.9% bolus 1,000 mL  1,000 mL Intravenous Once Lindsay Ruelas, NP       [2]   Social History  Socioeconomic History    Marital status:    Tobacco Use    Smoking status: Never    Smokeless tobacco: Never   Substance and Sexual Activity    Alcohol use: Yes     Alcohol/week: 2.0 standard drinks of alcohol     Types: 2 Cans of beer per week    Drug use: No    Sexual activity: Yes     Birth control/protection: None   Social History Narrative    8/10/20: lives with his wife. No kids at home. One son, lives 6 doors away. No pets at home. No smokers at home.      Social Drivers of Health     Financial Resource Strain: Patient Declined (2/20/2023)    Overall Financial Resource Strain (CARDIA)     Difficulty of Paying Living Expenses: Patient declined   Food Insecurity: Patient Declined (2/20/2023)    Hunger Vital Sign     Worried About Running Out of Food in the Last Year: Patient declined     Ran Out of Food in the Last Year: Patient declined   Transportation Needs: Patient Declined (2/20/2023)    PRAPARE - Transportation     Lack of Transportation (Medical): Patient declined     Lack of Transportation (Non-Medical): Patient declined   Physical Activity: Insufficiently Active (3/5/2025)    Exercise Vital Sign     Days of Exercise per Week: 3 days     Minutes of Exercise per Session: 30 min   Stress: No Stress Concern Present (3/5/2025)    [employer] [place] [employer] - Occupational Stress Questionnaire     Feeling of Stress : Not at all   Housing Stability: Unknown (2/20/2023)    Housing Stability Vital Sign     Unable to Pay for Housing in the Last Year: Patient refused     Unstable Housing in the Last Year: Patient refused   [3]   Patient Active Problem List  Diagnosis    Hyperlipidemia LDL goal <100     Essential hypertension    Other hyperlipidemia    Allergic rhinitis    Hypertension associated with diabetes    BMI 30.0-30.9,adult    Type 2 diabetes mellitus with hyperglycemia, with long-term current use of insulin    Hyperbilirubinemia    Thickened nails    Erectile dysfunction associated with type 2 diabetes mellitus    Thrombocytopenia    PSVT (paroxysmal supraventricular tachycardia)    NSTEMI (non-ST elevated myocardial infarction)    Neuropathy due to type 2 diabetes mellitus    Situational anxiety    Ganglion cyst    B12 deficiency    Trigger ring finger of right hand    Wrist arthritis    BMI 28.0-28.9,adult    Liver cyst

## 2025-04-02 ENCOUNTER — HOSPITAL ENCOUNTER (OUTPATIENT)
Dept: CARDIOLOGY | Facility: HOSPITAL | Age: 75
Discharge: HOME | End: 2025-04-02
Attending: FAMILY MEDICINE
Payer: COMMERCIAL

## 2025-04-02 ENCOUNTER — RESULTS FOLLOW-UP (OUTPATIENT)
Dept: FAMILY MEDICINE | Facility: CLINIC | Age: 75
End: 2025-04-02

## 2025-04-02 ENCOUNTER — TELEPHONE (OUTPATIENT)
Dept: CARDIOLOGY | Facility: HOSPITAL | Age: 75
End: 2025-04-02
Payer: COMMERCIAL

## 2025-04-02 VITALS
DIASTOLIC BLOOD PRESSURE: 83 MMHG | WEIGHT: 176 LBS | HEART RATE: 91 BPM | BODY MASS INDEX: 26.07 KG/M2 | SYSTOLIC BLOOD PRESSURE: 139 MMHG | HEIGHT: 69 IN

## 2025-04-02 DIAGNOSIS — I21.4 NSTEMI (NON-ST ELEVATED MYOCARDIAL INFARCTION): ICD-10-CM

## 2025-04-02 DIAGNOSIS — I15.2 HYPERTENSION ASSOCIATED WITH DIABETES: ICD-10-CM

## 2025-04-02 DIAGNOSIS — E11.59 HYPERTENSION ASSOCIATED WITH DIABETES: ICD-10-CM

## 2025-04-02 DIAGNOSIS — R07.89 CHEST PRESSURE: ICD-10-CM

## 2025-04-02 DIAGNOSIS — I10 ESSENTIAL HYPERTENSION: Primary | ICD-10-CM

## 2025-04-02 DIAGNOSIS — I10 ESSENTIAL HYPERTENSION: ICD-10-CM

## 2025-04-02 DIAGNOSIS — I47.10 PSVT (PAROXYSMAL SUPRAVENTRICULAR TACHYCARDIA): ICD-10-CM

## 2025-04-02 DIAGNOSIS — R93.1 ABNORMAL ECHOCARDIOGRAM: ICD-10-CM

## 2025-04-02 LAB
ASCENDING AORTA: 3.89 CM
AV LVOT MEAN GRADIENT: 2 MMHG
AV LVOT PEAK GRADIENT: 4 MMHG
BSA FOR ECHO PROCEDURE: 1.97 M2
CV ECHO LV RWT: 0.43 CM
DOP CALC LVOT AREA: 3.1 CM2
DOP CALC LVOT DIAMETER: 2 CM
DOP CALC LVOT PEAK VEL: 0.9 M/S
DOP CALC LVOT STROKE VOLUME: 51.8 CM3
DOP CALCLVOT PEAK VEL VTI: 16.5 CM
E WAVE DECELERATION TIME: 171 MSEC
E/A RATIO: 0.42
E/E' RATIO: 7 M/S
ECHO EF ESTIMATED: 59 %
ECHO LV POSTERIOR WALL: 1 CM (ref 0.6–1.1)
FRACTIONAL SHORTENING: 31.9 % (ref 28–44)
GLOBAL LONGITUIDAL STRAIN: 13 %
INTERVENTRICULAR SEPTUM: 1 CM (ref 0.6–1.1)
LA MAJOR: 5.8 CM
LA MINOR: 5.9 CM
LA WIDTH: 5.8 CM
LEFT ATRIUM SIZE: 4 CM
LEFT ATRIUM VOLUME INDEX MOD: 25 ML/M2
LEFT ATRIUM VOLUME INDEX: 59 ML/M2
LEFT ATRIUM VOLUME MOD: 49 ML
LEFT ATRIUM VOLUME: 115 CM3
LEFT INTERNAL DIMENSION IN SYSTOLE: 3.2 CM (ref 2.1–4)
LEFT VENTRICLE DIASTOLIC VOLUME INDEX: 51.53 ML/M2
LEFT VENTRICLE DIASTOLIC VOLUME: 101 ML
LEFT VENTRICLE MASS INDEX: 83.9 G/M2
LEFT VENTRICLE SYSTOLIC VOLUME INDEX: 21.4 ML/M2
LEFT VENTRICLE SYSTOLIC VOLUME: 42 ML
LEFT VENTRICULAR INTERNAL DIMENSION IN DIASTOLE: 4.7 CM (ref 3.5–6)
LEFT VENTRICULAR MASS: 164.5 G
LV LATERAL E/E' RATIO: 6.6 M/S
LV SEPTAL E/E' RATIO: 8.3 M/S
MV A" WAVE DURATION": 12.56 MSEC
MV PEAK A VEL: 0.79 M/S
MV PEAK E VEL: 0.33 M/S
MV STENOSIS PRESSURE HALF TIME: 49.62 MS
MV VALVE AREA P 1/2 METHOD: 4.43 CM2
OHS CV RV/LV RATIO: 0.6 CM
OHS LV EJECTION FRACTION SIMPSONS BIPLANE MOD: 42 %
PISA TR MAX VEL: 2.5 M/S
PULM VEIN A" WAVE DURATION": 125.59 MS
PULM VEIN S/D RATIO: 1.47
PULMONIC VEIN PEAK A VELOCITY: 0.2 M/S
PV PEAK D VEL: 0.3 M/S
PV PEAK S VEL: 0.44 M/S
RA MAJOR: 3.64 CM
RA PRESSURE ESTIMATED: 3 MMHG
RA WIDTH: 4 CM
RIGHT VENTRICLE DIASTOLIC BASEL DIMENSION: 2.8 CM
RV TB RVSP: 6 MMHG
SINUS: 4.2 CM
STJ: 3.55 CM
TDI LATERAL: 0.05 M/S
TDI SEPTAL: 0.04 M/S
TDI: 0.05 M/S
TRICUSPID ANNULAR PLANE SYSTOLIC EXCURSION: 3.34 CM
TV PEAK GRADIENT: 26 MMHG
TV REST PULMONARY ARTERY PRESSURE: 28 MMHG
Z-SCORE OF LEFT VENTRICULAR DIMENSION IN END DIASTOLE: -1.77
Z-SCORE OF LEFT VENTRICULAR DIMENSION IN END SYSTOLE: -0.59

## 2025-04-02 PROCEDURE — 93226 XTRNL ECG REC<48 HR SCAN A/R: CPT | Mod: PO

## 2025-04-02 PROCEDURE — 93306 TTE W/DOPPLER COMPLETE: CPT | Mod: PO

## 2025-04-02 PROCEDURE — 93306 TTE W/DOPPLER COMPLETE: CPT | Mod: 26,,, | Performed by: INTERNAL MEDICINE

## 2025-04-02 PROCEDURE — 93356 MYOCRD STRAIN IMG SPCKL TRCK: CPT | Mod: ,,, | Performed by: INTERNAL MEDICINE

## 2025-04-02 NOTE — TELEPHONE ENCOUNTER
Patient seen today for stress echo at [facility]. Patient had baseline wall abnormalities noted by sonographer. This was reviewed in conjunction with cardiologist on site, Dr. Mazariegos. No prior echo imaging was available to compare if this was new or existing wall motion abnormality. Dr. Mazariegos recommended to cancel the stress to test for today and proceed with a resting echo. Recommendation made for nuclear stress testing, but will defer to Dr. Hanna regarding stress testing.    Holter monitor placed today.

## 2025-04-02 NOTE — TELEPHONE ENCOUNTER
Thank you for letting me know.     I ordered the stress test, can you help schedule?    Kayla, can we get into cardiology?

## 2025-04-04 ENCOUNTER — HOSPITAL ENCOUNTER (OUTPATIENT)
Dept: RADIOLOGY | Facility: HOSPITAL | Age: 75
Discharge: HOME | End: 2025-04-04
Attending: INTERNAL MEDICINE
Payer: COMMERCIAL

## 2025-04-04 DIAGNOSIS — K76.89 LIVER CYST: ICD-10-CM

## 2025-04-07 ENCOUNTER — OFFICE VISIT (OUTPATIENT)
Dept: FAMILY MEDICINE | Facility: CLINIC | Age: 75
End: 2025-04-07
Payer: COMMERCIAL

## 2025-04-07 VITALS
OXYGEN SATURATION: 98 % | HEART RATE: 86 BPM | DIASTOLIC BLOOD PRESSURE: 80 MMHG | SYSTOLIC BLOOD PRESSURE: 134 MMHG | WEIGHT: 185.19 LBS | HEIGHT: 69 IN | BODY MASS INDEX: 27.43 KG/M2

## 2025-04-07 DIAGNOSIS — I10 ESSENTIAL HYPERTENSION: Primary | ICD-10-CM

## 2025-04-07 DIAGNOSIS — E78.49 OTHER HYPERLIPIDEMIA: ICD-10-CM

## 2025-04-07 PROCEDURE — 3288F FALL RISK ASSESSMENT DOCD: CPT | Mod: CPTII,S$GLB,,

## 2025-04-07 PROCEDURE — 3079F DIAST BP 80-89 MM HG: CPT | Mod: CPTII,S$GLB,,

## 2025-04-07 PROCEDURE — 4010F ACE/ARB THERAPY RXD/TAKEN: CPT | Mod: CPTII,S$GLB,,

## 2025-04-07 PROCEDURE — 1160F RVW MEDS BY RX/DR IN RCRD: CPT | Mod: CPTII,S$GLB,,

## 2025-04-07 PROCEDURE — 99214 OFFICE O/P EST MOD 30 MIN: CPT | Mod: S$GLB,,,

## 2025-04-07 PROCEDURE — 1101F PT FALLS ASSESS-DOCD LE1/YR: CPT | Mod: CPTII,S$GLB,,

## 2025-04-07 PROCEDURE — 1159F MED LIST DOCD IN RCRD: CPT | Mod: CPTII,S$GLB,,

## 2025-04-07 PROCEDURE — G2211 COMPLEX E/M VISIT ADD ON: HCPCS | Mod: S$GLB,,,

## 2025-04-07 PROCEDURE — 1126F AMNT PAIN NOTED NONE PRSNT: CPT | Mod: CPTII,S$GLB,,

## 2025-04-07 PROCEDURE — 3075F SYST BP GE 130 - 139MM HG: CPT | Mod: CPTII,S$GLB,,

## 2025-04-07 PROCEDURE — 99999 PR PBB SHADOW E&M-EST. PATIENT-LVL IV: CPT | Mod: PBBFAC,,,

## 2025-04-07 PROCEDURE — 3051F HG A1C>EQUAL 7.0%<8.0%: CPT | Mod: CPTII,S$GLB,,

## 2025-04-07 PROCEDURE — 3008F BODY MASS INDEX DOCD: CPT | Mod: CPTII,S$GLB,,

## 2025-04-07 NOTE — PROGRESS NOTES
"Subjective     Patient ID: Patria Nieto is a 74 y.o. male.    Chief Complaint: Blood Pressure Check      HPI      HTN: last visit his blood pressure was 102/70. He was having some some dizziness, Ramipril was stopped and Metoprolol decreased to 25 mg.   Blood pressure today in clinic 134/80. States that he is no longer having dizziness.    DLD: on atorvastatin         Review of Systems   Constitutional:  Negative for fatigue and fever.   Respiratory:  Negative for cough, shortness of breath and wheezing.    Cardiovascular:  Negative for chest pain, palpitations and leg swelling.   Gastrointestinal:  Negative for diarrhea, nausea and vomiting.   Neurological:  Negative for dizziness, light-headedness and headaches.          Objective     Vitals:    04/07/25 1034   BP: 134/80   Pulse: 86   SpO2: 98%   Weight: 84 kg (185 lb 3 oz)   Height: 5' 9" (1.753 m)   PainSc: 0-No pain      Physical Exam  Constitutional:       General: He is not in acute distress.     Appearance: He is not toxic-appearing.   HENT:      Head: Normocephalic and atraumatic.   Cardiovascular:      Rate and Rhythm: Normal rate and regular rhythm.   Pulmonary:      Effort: Pulmonary effort is normal. No respiratory distress.      Breath sounds: Normal breath sounds. No wheezing.   Musculoskeletal:      Right lower leg: No edema.      Left lower leg: No edema.   Neurological:      Mental Status: He is alert and oriented to person, place, and time.              Assessment and Plan     1. Essential hypertension    2. Other hyperlipidemia      ECHO reviewed  Labs reviewed     HTN: Blood pressure at goal in clinic. States that dizziness has resolved. Will continue current regimen of metoprolol 25 mg daily    HLD: on atorvastatin. Will continue current regimen     Appt with Dr. Hanna July, labs prior       30 minutes of total time spent on the encounter, which includes face to face time and non-face to face time preparing to see the patient (eg, review of " tests), Obtaining and/or reviewing separately obtained history, Documenting clinical information in the electronic or other health record, Independently interpreting results (not separately reported) and communicating results to the patient/family/caregiver, or Care coordination (not separately reported).      Vandana King PA-C  Family Practice/Internal Medicine   Office Phone: [phone]

## 2025-04-08 ENCOUNTER — HOSPITAL ENCOUNTER (OUTPATIENT)
Dept: RADIOLOGY | Facility: HOSPITAL | Age: 75
Discharge: HOME | End: 2025-04-08
Attending: INTERNAL MEDICINE
Payer: COMMERCIAL

## 2025-04-08 PROCEDURE — 76700 US EXAM ABDOM COMPLETE: CPT | Mod: TC

## 2025-04-08 PROCEDURE — 76700 US EXAM ABDOM COMPLETE: CPT | Mod: 26,,, | Performed by: STUDENT IN AN ORGANIZED HEALTH CARE EDUCATION/TRAINING PROGRAM

## 2025-04-09 ENCOUNTER — HOSPITAL ENCOUNTER (OUTPATIENT)
Dept: CARDIOLOGY | Facility: HOSPITAL | Age: 75
Discharge: HOME | End: 2025-04-09
Attending: FAMILY MEDICINE
Payer: COMMERCIAL

## 2025-04-09 VITALS
DIASTOLIC BLOOD PRESSURE: 84 MMHG | SYSTOLIC BLOOD PRESSURE: 138 MMHG | WEIGHT: 176 LBS | BODY MASS INDEX: 26.07 KG/M2 | HEART RATE: 90 BPM | HEIGHT: 69 IN

## 2025-04-09 DIAGNOSIS — R93.1 ABNORMAL ECHOCARDIOGRAM: ICD-10-CM

## 2025-04-09 LAB
CV STRESS BASE HR: 82 BPM
DIASTOLIC BLOOD PRESSURE: 87 MMHG
EJECTION FRACTION- HIGH: 59 %
END DIASTOLIC INDEX-HIGH: 155 ML/M2
END DIASTOLIC INDEX-LOW: 91 ML/M2
END SYSTOLIC INDEX-HIGH: 78 ML/M2
END SYSTOLIC INDEX-LOW: 40 ML/M2
NUC REST DIASTOLIC VOLUME INDEX: 72
NUC REST EJECTION FRACTION: 49
NUC REST SYSTOLIC VOLUME INDEX: 37
NUC STRESS DIASTOLIC VOLUME INDEX: 83
NUC STRESS EJECTION FRACTION: 57 %
NUC STRESS SYSTOLIC VOLUME INDEX: 35
OHS CV CPX 1 MINUTE RECOVERY HEART RATE: 101 BPM
OHS CV CPX 85 PERCENT MAX PREDICTED HEART RATE MALE: 125
OHS CV CPX MAX PREDICTED HEART RATE: 147
OHS CV CPX PATIENT IS FEMALE: 0
OHS CV CPX PATIENT IS MALE: 1
OHS CV CPX PEAK DIASTOLIC BLOOD PRESSURE: 89 MMHG
OHS CV CPX PEAK HEAR RATE: 89 BPM
OHS CV CPX PEAK RATE PRESSURE PRODUCT: NORMAL
OHS CV CPX PEAK SYSTOLIC BLOOD PRESSURE: 136 MMHG
OHS CV CPX PERCENT MAX PREDICTED HEART RATE ACHIEVED: 61
OHS CV CPX RATE PRESSURE PRODUCT PRESENTING: NORMAL
RETIRED EF AND QEF - SEE NOTES: 47 %
SYSTOLIC BLOOD PRESSURE: 134 MMHG

## 2025-04-09 PROCEDURE — 93017 CV STRESS TEST TRACING ONLY: CPT

## 2025-04-09 PROCEDURE — A9502 TC99M TETROFOSMIN: HCPCS | Performed by: FAMILY MEDICINE

## 2025-04-09 PROCEDURE — 93016 CV STRESS TEST SUPVJ ONLY: CPT | Mod: ,,, | Performed by: INTERNAL MEDICINE

## 2025-04-09 PROCEDURE — 93018 CV STRESS TEST I&R ONLY: CPT | Mod: ,,, | Performed by: INTERNAL MEDICINE

## 2025-04-09 PROCEDURE — 63600175 PHARM REV CODE 636 W HCPCS: Performed by: FAMILY MEDICINE

## 2025-04-09 PROCEDURE — 78452 HT MUSCLE IMAGE SPECT MULT: CPT | Mod: 26,,, | Performed by: INTERNAL MEDICINE

## 2025-04-09 RX ORDER — REGADENOSON 0.08 MG/ML
0.4 INJECTION, SOLUTION INTRAVENOUS
Status: COMPLETED | OUTPATIENT
Start: 2025-04-09 | End: 2025-04-09

## 2025-04-09 RX ORDER — AMINOPHYLLINE 25 MG/ML
75 INJECTION, SOLUTION INTRAVENOUS
Status: COMPLETED | OUTPATIENT
Start: 2025-04-09 | End: 2025-04-09

## 2025-04-09 RX ADMIN — AMINOPHYLLINE 75 MG: 25 INJECTION, SOLUTION INTRAVENOUS at 07:04

## 2025-04-09 RX ADMIN — TETROFOSMIN 10.4 MILLICURIE: 1.38 INJECTION, POWDER, LYOPHILIZED, FOR SOLUTION INTRAVENOUS at 07:04

## 2025-04-09 RX ADMIN — TETROFOSMIN 31.8 MILLICURIE: 1.38 INJECTION, POWDER, LYOPHILIZED, FOR SOLUTION INTRAVENOUS at 07:04

## 2025-04-09 RX ADMIN — REGADENOSON 0.4 MG: 0.08 INJECTION, SOLUTION INTRAVENOUS at 07:04

## 2025-04-15 ENCOUNTER — OFFICE VISIT (OUTPATIENT)
Dept: CARDIOLOGY | Facility: CLINIC | Age: 75
End: 2025-04-15
Payer: COMMERCIAL

## 2025-04-15 VITALS
WEIGHT: 184.31 LBS | BODY MASS INDEX: 27.22 KG/M2 | DIASTOLIC BLOOD PRESSURE: 83 MMHG | HEART RATE: 89 BPM | SYSTOLIC BLOOD PRESSURE: 146 MMHG

## 2025-04-15 DIAGNOSIS — E11.65 TYPE 2 DIABETES MELLITUS WITH HYPERGLYCEMIA, WITH LONG-TERM CURRENT USE OF INSULIN: ICD-10-CM

## 2025-04-15 DIAGNOSIS — I10 ESSENTIAL HYPERTENSION: ICD-10-CM

## 2025-04-15 DIAGNOSIS — E78.5 HYPERLIPIDEMIA LDL GOAL <100: ICD-10-CM

## 2025-04-15 DIAGNOSIS — I42.8 NON-ISCHEMIC CARDIOMYOPATHY: ICD-10-CM

## 2025-04-15 DIAGNOSIS — Z79.4 TYPE 2 DIABETES MELLITUS WITH HYPERGLYCEMIA, WITH LONG-TERM CURRENT USE OF INSULIN: ICD-10-CM

## 2025-04-15 DIAGNOSIS — R93.1 ABNORMAL ECHOCARDIOGRAM: Primary | ICD-10-CM

## 2025-04-15 DIAGNOSIS — I47.10 PSVT (PAROXYSMAL SUPRAVENTRICULAR TACHYCARDIA): ICD-10-CM

## 2025-04-15 PROCEDURE — 1159F MED LIST DOCD IN RCRD: CPT | Mod: CPTII,S$GLB,, | Performed by: INTERNAL MEDICINE

## 2025-04-15 PROCEDURE — 99204 OFFICE O/P NEW MOD 45 MIN: CPT | Mod: S$GLB,,, | Performed by: INTERNAL MEDICINE

## 2025-04-15 PROCEDURE — 3077F SYST BP >= 140 MM HG: CPT | Mod: CPTII,S$GLB,, | Performed by: INTERNAL MEDICINE

## 2025-04-15 PROCEDURE — 3079F DIAST BP 80-89 MM HG: CPT | Mod: CPTII,S$GLB,, | Performed by: INTERNAL MEDICINE

## 2025-04-15 PROCEDURE — 4010F ACE/ARB THERAPY RXD/TAKEN: CPT | Mod: CPTII,S$GLB,, | Performed by: INTERNAL MEDICINE

## 2025-04-15 PROCEDURE — 99999 PR PBB SHADOW E&M-EST. PATIENT-LVL IV: CPT | Mod: PBBFAC,,, | Performed by: INTERNAL MEDICINE

## 2025-04-15 PROCEDURE — 3008F BODY MASS INDEX DOCD: CPT | Mod: CPTII,S$GLB,, | Performed by: INTERNAL MEDICINE

## 2025-04-15 PROCEDURE — 3051F HG A1C>EQUAL 7.0%<8.0%: CPT | Mod: CPTII,S$GLB,, | Performed by: INTERNAL MEDICINE

## 2025-04-15 PROCEDURE — 1126F AMNT PAIN NOTED NONE PRSNT: CPT | Mod: CPTII,S$GLB,, | Performed by: INTERNAL MEDICINE

## 2025-04-15 PROCEDURE — 1160F RVW MEDS BY RX/DR IN RCRD: CPT | Mod: CPTII,S$GLB,, | Performed by: INTERNAL MEDICINE

## 2025-04-15 RX ORDER — RAMIPRIL 2.5 MG/1
2.5 CAPSULE ORAL DAILY
Qty: 90 CAPSULE | Refills: 3 | Status: SHIPPED | OUTPATIENT
Start: 2025-04-15 | End: 2026-04-15

## 2025-04-15 RX ORDER — METOPROLOL SUCCINATE 50 MG/1
50 TABLET, EXTENDED RELEASE ORAL DAILY
Qty: 90 TABLET | Refills: 3 | Status: SHIPPED | OUTPATIENT
Start: 2025-04-15

## 2025-04-15 NOTE — PROGRESS NOTES
Patient ID: Patria Nieto is a 74 y.o. male.    CHIEF COMPLAINT:    Patient presents for initial evaluation by a cardiologist following an abnormal echo showing mildly reduced EF.    HPI:    Patient, a 74-year-old male, was referred by his PCP for evaluation of mildly abnormal EF discovered on a recent echo. He reports occasional dyspnea, describing it as a need to take a deep breath, occurring intermittently. He also mentions chest pressure 2-3 times per week, lasting 30 seconds or less each time.    He underwent a nuclear stress test recently that showed no evidence of ischemia.     He exercises regularly, doing treadmill and weightlifting 3 times per week, and feels better with heavy exercise compared to when he does not exercise.  He has not no CV limitations with exercise.    He has a history of SVT for which he underwent an ablation with Dr. Chowdary in 2021, reporting improvement since. His mildly abnormal EF was first noted in 2020 when his PCP ordered an echo. He denies a history of MI, HF, or stroke.    He checks BP at home, reporting typical readings with systolic ranging from 102-120 and diastolic in the mid-70s, with occasional readings as high as 83-84.      MEDICATIONS:  - Aspirin 81 mg daily  - Atorvastatin 40 mg daily  - Metoprolol Succinate 25 mg daily      MEDICAL HISTORY:  - HTN  - HLD  - Diabetes  - Non-ischemic cardiomyopathy: Diagnosed 2020, with mildly abnormal/low normal heart function         Physical Exam    Vitals: Pulse: 89. Blood pressure: 146/83.  Cardiovascular: All normal.  Lungs: All normal.  Extremities: All normal.  LABS:  - CBC and CMP: 03/2025, remarkable  - Total Cholesterol: 03/2025, 105  - HDL: 03/2025, 45  - LDL: 03/2025, 46  - Triglycerides: 03/2025, 67  - BNP: 03/2025, normal  - A1C: 03/2025, 7.3  - TSH: 03/2025, normal  TEST RESULTS  (IMAGING REVIEWED DURING  CLINIC VISIT):  - Echo 06/2020: LVEF 50%  - Echo 04/02/2025: normal LV size, LVEF 40-45%, GLS -13%, no significant  valve disease, CVP 3  - Nuclear stress test 04/09/2025: normal myocardial perfusion, no ischemia/scar, LVEF high 40s at rest  - SVT ablation 2021 (Dr. Chowdary)  - ECG 04/2025: SR, no Q waves/dynamic ST changes, non-specific IVCD  - Holter 04/2025: SR, no significant arrhythmia/ectopy          Assessment & Plan    I42.8 Non-ischemic cardiomyopathy  R93.1 Abnormal echocardiogram  I10 Essential hypertension  E78.5 Hyperlipidemia LDL goal <100  I47.10 PSVT (paroxysmal supraventricular tachycardia)  E11.65, Z79.4 Type 2 diabetes mellitus with hyperglycemia, with long-term current use of insulin    NON-ISCHEMIC CARDIOMYOPATHY:  - Diagnosed with non-ischemic cardiomyopathy without signs of heart failure.  - Prior studies from 2020 show similar findings, suggesting stability.  - Optimized medical management to maximize EF.  - Considered patient preference to minimize medications while balancing clinical benefit.  - Explained the nature of non-ischemic cardiomyopathy and its stability since 2020.  - Clarified the purpose of medications: metoprolol for EF, ramipril for EF.  - Increased Metoprolol Succinate from 25 mg to 50 mg daily.  - Restarted Ramipril 2.5 mg daily.    ESSENTIAL HYPERTENSION:  - Clarified the purpose of medications.  - blood pressure is controlled at this time.    HYPERLIPIDEMIA LDL GOAL <100:  - Continued Atorvastatin 40 mg daily.  LDL at goal.    PSVT (PAROXYSMAL SUPRAVENTRICULAR TACHYCARDIA):  - Clarified the purpose of medications: metoprolol for rate control.  - Increased Metoprolol Succinate from 25 mg to 50 mg daily.         This note was generated with the assistance of ambient listening technology. Verbal consent was obtained by the patient and accompanying visitor(s) for the recording of patient appointment to facilitate this note. I attest to having reviewed and edited the generated note for accuracy, though some syntax or spelling errors may persist. Please contact the author of this note for any  clarification.

## 2025-04-17 ENCOUNTER — CLINICAL SUPPORT (OUTPATIENT)
Dept: ENDOSCOPY | Facility: HOSPITAL | Age: 75
End: 2025-04-17
Attending: FAMILY MEDICINE
Payer: COMMERCIAL

## 2025-04-17 ENCOUNTER — TELEPHONE (OUTPATIENT)
Dept: ENDOSCOPY | Facility: HOSPITAL | Age: 75
End: 2025-04-17

## 2025-04-17 DIAGNOSIS — Z12.11 COLON CANCER SCREENING: ICD-10-CM

## 2025-05-01 ENCOUNTER — TELEPHONE (OUTPATIENT)
Dept: ELECTROPHYSIOLOGY | Facility: CLINIC | Age: 75
End: 2025-05-01
Payer: COMMERCIAL

## 2025-05-02 ENCOUNTER — TELEPHONE (OUTPATIENT)
Dept: ELECTROPHYSIOLOGY | Facility: CLINIC | Age: 75
End: 2025-05-02
Payer: COMMERCIAL

## 2025-05-02 ENCOUNTER — OFFICE VISIT (OUTPATIENT)
Dept: CARDIOLOGY | Facility: CLINIC | Age: 75
End: 2025-05-02
Payer: COMMERCIAL

## 2025-05-02 VITALS
DIASTOLIC BLOOD PRESSURE: 72 MMHG | BODY MASS INDEX: 26.36 KG/M2 | SYSTOLIC BLOOD PRESSURE: 108 MMHG | WEIGHT: 178 LBS | HEIGHT: 69 IN | HEART RATE: 80 BPM

## 2025-05-02 DIAGNOSIS — E11.59 HYPERTENSION ASSOCIATED WITH DIABETES: ICD-10-CM

## 2025-05-02 DIAGNOSIS — I10 ESSENTIAL HYPERTENSION: ICD-10-CM

## 2025-05-02 DIAGNOSIS — I47.10 PSVT (PAROXYSMAL SUPRAVENTRICULAR TACHYCARDIA): Primary | ICD-10-CM

## 2025-05-02 DIAGNOSIS — Z79.4 TYPE 2 DIABETES MELLITUS WITH HYPERGLYCEMIA, WITH LONG-TERM CURRENT USE OF INSULIN: ICD-10-CM

## 2025-05-02 DIAGNOSIS — E11.65 TYPE 2 DIABETES MELLITUS WITH HYPERGLYCEMIA, WITH LONG-TERM CURRENT USE OF INSULIN: ICD-10-CM

## 2025-05-02 DIAGNOSIS — I42.8 NON-ISCHEMIC CARDIOMYOPATHY: ICD-10-CM

## 2025-05-02 DIAGNOSIS — I15.2 HYPERTENSION ASSOCIATED WITH DIABETES: ICD-10-CM

## 2025-05-02 PROCEDURE — 99999 PR PBB SHADOW E&M-EST. PATIENT-LVL IV: CPT | Mod: PBBFAC,,, | Performed by: INTERNAL MEDICINE

## 2025-05-02 NOTE — PROGRESS NOTES
Subjective   Patient ID:  Patria Nieto is a 74 y.o. male who presents for follow-up of Atrial Tachycardia      HPI  I had the pleasure of seeing Mr Nieto today in our electrophysiology clinic in follow-up for his atrial tachycardia. As you are aware he is a pleasant 74 year-old man, former patient of Dr. Chowdary, with type 2 diabetes mellitus, hypertension, and atrial tachycardia. Originally diagnosed with a long RP narrow complex tachycardia in 2020. Reportedly terminated with carotid pressure. Underwent EPS/ablation in April of 2021 where an RA tricuspid valve tach was induced and ablated (7 o'clock TV annulus). Had recurrence with a holter noting 50% AT burden. Metoprolol was initiated and felt better. Repeat ablation was discussed. He declined due to feeling well on metoprolol. He returns for follow-up. Recently observed to have a LVEF of 40-45% with no ischemia noted on stress testing. A holter monitor from 4/2025 (personally reviewed) noted sinus rhythm with rare ectopy. No sustained AT.    My interpretation of today's in-clinic ECG is sinus rhythm with normal intervals and a PVC.     Review of Systems   Constitutional: Negative for fever and malaise/fatigue.   HENT:  Negative for congestion and sore throat.    Eyes:  Negative for blurred vision and visual disturbance.   Cardiovascular:  Negative for chest pain, dyspnea on exertion, irregular heartbeat, near-syncope, palpitations and syncope.   Respiratory:  Negative for cough and shortness of breath.    Hematologic/Lymphatic: Negative for bleeding problem. Does not bruise/bleed easily.   Skin: Negative.    Musculoskeletal: Negative.    Gastrointestinal:  Negative for bloating, abdominal pain, hematochezia and melena.   Neurological:  Negative for focal weakness and weakness.   Psychiatric/Behavioral: Negative.            Objective     Physical Exam  Vitals reviewed.   Constitutional:       General: He is not in acute distress.     Appearance: He is  well-developed. He is not diaphoretic.   HENT:      Head: Normocephalic and atraumatic.   Eyes:      General:         Right eye: No discharge.         Left eye: No discharge.      Conjunctiva/sclera: Conjunctivae normal.   Cardiovascular:      Rate and Rhythm: Normal rate and regular rhythm.      Heart sounds: No murmur heard.     No friction rub. No gallop.   Pulmonary:      Effort: Pulmonary effort is normal. No respiratory distress.      Breath sounds: Normal breath sounds. No wheezing or rales.   Abdominal:      General: Bowel sounds are normal. There is no distension.      Palpations: Abdomen is soft.      Tenderness: There is no abdominal tenderness.   Musculoskeletal:      Cervical back: Neck supple.   Skin:     General: Skin is warm and dry.   Neurological:      Mental Status: He is alert and oriented to person, place, and time.   Psychiatric:         Behavior: Behavior normal.         Thought Content: Thought content normal.         Judgment: Judgment normal.            Assessment and Plan     1. PSVT (paroxysmal supraventricular tachycardia)    2. Hypertension associated with diabetes    3. Non-ischemic cardiomyopathy    4. Essential hypertension    5. Type 2 diabetes mellitus with hyperglycemia, with long-term current use of insulin        Plan:  In summary, Mr Nieto is a pleasant 74 year-old man, former patient of Dr. Chowdary, with type 2 diabetes mellitus, hypertension, and atrial tachycardia s/p mapping/ablation by Dr. Chowdary with subsequent recurrence treated with metoprolol. No symptomatic recurrences since metoprolol initiation. Unclear etiology of mild LV dysfunction. No AT noted on recent holter. He was very symptomatic with his episodes of AT. None in years. Continue metoprolol.    RTC in 1 year    Thank you for allowing me to participate in the care of this patient. Please do not hesitate to call me with any questions or concerns.    Juarez Schwarz MD, PhD  Cardiac Electrophysiology

## 2025-05-03 DIAGNOSIS — E11.65 TYPE 2 DIABETES MELLITUS WITH HYPERGLYCEMIA, WITH LONG-TERM CURRENT USE OF INSULIN: ICD-10-CM

## 2025-05-03 DIAGNOSIS — E78.49 OTHER HYPERLIPIDEMIA: ICD-10-CM

## 2025-05-03 DIAGNOSIS — E78.5 HYPERLIPIDEMIA LDL GOAL <100: ICD-10-CM

## 2025-05-03 DIAGNOSIS — Z79.4 TYPE 2 DIABETES MELLITUS WITH HYPERGLYCEMIA, WITH LONG-TERM CURRENT USE OF INSULIN: ICD-10-CM

## 2025-05-03 NOTE — TELEPHONE ENCOUNTER
No care due was identified.  Health [facility] Embedded Care Due Messages. Reference number: [id number].   5/03/2025 10:46:02 AM CDT

## 2025-05-03 NOTE — TELEPHONE ENCOUNTER
No care due was identified.  Health [facility] Embedded Care Due Messages. Reference number: [id number].   5/03/2025 10:55:29 AM CDT

## 2025-05-04 RX ORDER — ATORVASTATIN CALCIUM 40 MG/1
40 TABLET, FILM COATED ORAL NIGHTLY
Qty: 90 TABLET | Refills: 3 | Status: SHIPPED | OUTPATIENT
Start: 2025-05-04

## 2025-05-04 RX ORDER — METFORMIN HYDROCHLORIDE 1000 MG/1
1000 TABLET ORAL
Qty: 90 TABLET | Refills: 1 | Status: SHIPPED | OUTPATIENT
Start: 2025-05-04

## 2025-05-04 NOTE — TELEPHONE ENCOUNTER
Refill Decision Note   Patria Nieto  is requesting a refill authorization.  Brief Assessment and Rationale for Refill:  Approve     Medication Therapy Plan:         Comments:     Note composed:11:17 AM 05/04/2025             Appointments     Last Visit   3/6/2025 Charles Hanna DO   Next Visit   5/3/2025 Charles Hanna, DO

## 2025-05-04 NOTE — TELEPHONE ENCOUNTER
Refill Routing Note   Medication(s) are not appropriate for processing by [facility] for the following reason(s):        New or recently adjusted medication    [facility] action(s):  Defer               Appointments  past 12m or future 3m with PCP    Date Provider   Last Visit   3/6/2025 Charles Hanna DO   Next Visit   7/7/2025 Charles Hanna,    ED visits in past 90 days: 0        Note composed:11:29 AM 05/04/2025

## 2025-05-05 DIAGNOSIS — I47.10 PSVT (PAROXYSMAL SUPRAVENTRICULAR TACHYCARDIA): Primary | ICD-10-CM

## 2025-05-21 ENCOUNTER — TELEPHONE (OUTPATIENT)
Dept: PHARMACY | Facility: CLINIC | Age: 75
End: 2025-05-21
Payer: COMMERCIAL

## 2025-05-21 NOTE — TELEPHONE ENCOUNTER
[facility]/Population Health Chart Review & Patient Outreach Details For Medication Adherence Project    Reason for Outreach Encounter: 3rd Party payor non-compliance report ([employer], [employer], [employer], etc)  2.  Patient Outreach Method: Reviewed patient chart   3.   Medication in question:    Hyperlipidemia Medications              atorvastatin (LIPITOR) 40 MG tablet Take 1 tablet (40 mg total) by mouth every evening.                  LF 90 ds 5/4/25    4.  Reviewed and or Updates Made To: Patient Chart  5. Outreach Outcomes and/or actions taken: Patient filled medication and is on track to be adherent  Additional Notes:

## 2025-05-28 DIAGNOSIS — Z79.4 UNCONTROLLED TYPE 2 DIABETES MELLITUS WITH HYPERGLYCEMIA, WITH LONG-TERM CURRENT USE OF INSULIN: ICD-10-CM

## 2025-05-28 DIAGNOSIS — E11.65 UNCONTROLLED TYPE 2 DIABETES MELLITUS WITH HYPERGLYCEMIA, WITH LONG-TERM CURRENT USE OF INSULIN: ICD-10-CM

## 2025-05-28 RX ORDER — BLOOD-GLUCOSE SENSOR
EACH MISCELLANEOUS
Qty: 3 EACH | Refills: 3 | Status: SHIPPED | OUTPATIENT
Start: 2025-05-28

## 2025-05-28 RX ORDER — BLOOD-GLUCOSE SENSOR
EACH MISCELLANEOUS
Qty: 3 EACH | Refills: 3 | Status: CANCELLED | OUTPATIENT
Start: 2025-05-28

## 2025-06-16 RX ORDER — PEN NEEDLE, DIABETIC 30 GX3/16"
NEEDLE, DISPOSABLE MISCELLANEOUS
Qty: 200 EACH | Refills: 6 | Status: SHIPPED | OUTPATIENT
Start: 2025-06-16

## 2025-06-30 ENCOUNTER — LAB VISIT (OUTPATIENT)
Dept: LAB | Facility: HOSPITAL | Age: 75
End: 2025-06-30
Attending: FAMILY MEDICINE
Payer: COMMERCIAL

## 2025-06-30 DIAGNOSIS — E11.65 TYPE 2 DIABETES MELLITUS WITH HYPERGLYCEMIA, WITH LONG-TERM CURRENT USE OF INSULIN: ICD-10-CM

## 2025-06-30 DIAGNOSIS — Z79.4 TYPE 2 DIABETES MELLITUS WITH HYPERGLYCEMIA, WITH LONG-TERM CURRENT USE OF INSULIN: ICD-10-CM

## 2025-06-30 LAB
ABSOLUTE EOSINOPHIL (OHS): 0.27 K/UL
ABSOLUTE MONOCYTE (OHS): 0.5 K/UL (ref 0.3–1)
ABSOLUTE NEUTROPHIL COUNT (OHS): 3.5 K/UL (ref 1.8–7.7)
ALBUMIN SERPL BCP-MCNC: 3.9 G/DL (ref 3.5–5.2)
ALP SERPL-CCNC: 59 UNIT/L (ref 40–150)
ALT SERPL W/O P-5'-P-CCNC: 34 UNIT/L (ref 10–44)
ANION GAP (OHS): 7 MMOL/L (ref 8–16)
AST SERPL-CCNC: 22 UNIT/L (ref 11–45)
BASOPHILS # BLD AUTO: 0.03 K/UL
BASOPHILS NFR BLD AUTO: 0.5 %
BILIRUB SERPL-MCNC: 2.2 MG/DL (ref 0.1–1)
BUN SERPL-MCNC: 20 MG/DL (ref 8–23)
CALCIUM SERPL-MCNC: 9.8 MG/DL (ref 8.7–10.5)
CHLORIDE SERPL-SCNC: 111 MMOL/L (ref 95–110)
CO2 SERPL-SCNC: 21 MMOL/L (ref 23–29)
CREAT SERPL-MCNC: 1 MG/DL (ref 0.5–1.4)
EAG (OHS): 146 MG/DL (ref 68–131)
ERYTHROCYTE [DISTWIDTH] IN BLOOD BY AUTOMATED COUNT: 13.2 % (ref 11.5–14.5)
GFR SERPLBLD CREATININE-BSD FMLA CKD-EPI: >60 ML/MIN/1.73/M2
GLUCOSE SERPL-MCNC: 150 MG/DL (ref 70–110)
HBA1C MFR BLD: 6.7 % (ref 4–5.6)
HCT VFR BLD AUTO: 45.6 % (ref 40–54)
HGB BLD-MCNC: 15.1 GM/DL (ref 14–18)
IMM GRANULOCYTES # BLD AUTO: 0.02 K/UL (ref 0–0.04)
IMM GRANULOCYTES NFR BLD AUTO: 0.4 % (ref 0–0.5)
LYMPHOCYTES # BLD AUTO: 1.19 K/UL (ref 1–4.8)
MCH RBC QN AUTO: 32.7 PG (ref 27–31)
MCHC RBC AUTO-ENTMCNC: 33.1 G/DL (ref 32–36)
MCV RBC AUTO: 99 FL (ref 82–98)
NUCLEATED RBC (/100WBC) (OHS): 0 /100 WBC
PLATELET # BLD AUTO: 129 K/UL (ref 150–450)
PMV BLD AUTO: 12.6 FL (ref 9.2–12.9)
POTASSIUM SERPL-SCNC: 4.4 MMOL/L (ref 3.5–5.1)
PROT SERPL-MCNC: 6.4 GM/DL (ref 6–8.4)
RBC # BLD AUTO: 4.62 M/UL (ref 4.6–6.2)
RELATIVE EOSINOPHIL (OHS): 4.9 %
RELATIVE LYMPHOCYTE (OHS): 21.6 % (ref 18–48)
RELATIVE MONOCYTE (OHS): 9.1 % (ref 4–15)
RELATIVE NEUTROPHIL (OHS): 63.5 % (ref 38–73)
SODIUM SERPL-SCNC: 139 MMOL/L (ref 136–145)
WBC # BLD AUTO: 5.51 K/UL (ref 3.9–12.7)

## 2025-06-30 PROCEDURE — 82040 ASSAY OF SERUM ALBUMIN: CPT

## 2025-06-30 PROCEDURE — 36415 COLL VENOUS BLD VENIPUNCTURE: CPT | Mod: PO

## 2025-06-30 PROCEDURE — 83036 HEMOGLOBIN GLYCOSYLATED A1C: CPT

## 2025-06-30 PROCEDURE — 85025 COMPLETE CBC W/AUTO DIFF WBC: CPT

## 2025-07-09 ENCOUNTER — CLINICAL SUPPORT (OUTPATIENT)
Dept: ENDOSCOPY | Facility: HOSPITAL | Age: 75
End: 2025-07-09
Attending: FAMILY MEDICINE
Payer: COMMERCIAL

## 2025-07-09 ENCOUNTER — TELEPHONE (OUTPATIENT)
Dept: ENDOSCOPY | Facility: HOSPITAL | Age: 75
End: 2025-07-09

## 2025-07-09 ENCOUNTER — OFFICE VISIT (OUTPATIENT)
Dept: FAMILY MEDICINE | Facility: CLINIC | Age: 75
End: 2025-07-09
Payer: COMMERCIAL

## 2025-07-09 VITALS — HEIGHT: 69 IN | BODY MASS INDEX: 27.7 KG/M2 | WEIGHT: 187 LBS

## 2025-07-09 VITALS
OXYGEN SATURATION: 98 % | HEIGHT: 69 IN | BODY MASS INDEX: 27.75 KG/M2 | SYSTOLIC BLOOD PRESSURE: 116 MMHG | DIASTOLIC BLOOD PRESSURE: 70 MMHG | HEART RATE: 90 BPM | WEIGHT: 187.38 LBS

## 2025-07-09 DIAGNOSIS — Z23 NEED FOR SHINGLES VACCINE: ICD-10-CM

## 2025-07-09 DIAGNOSIS — E11.42 TYPE 2 DIABETES MELLITUS WITH DIABETIC POLYNEUROPATHY, WITH LONG-TERM CURRENT USE OF INSULIN: Primary | ICD-10-CM

## 2025-07-09 DIAGNOSIS — E78.49 OTHER HYPERLIPIDEMIA: ICD-10-CM

## 2025-07-09 DIAGNOSIS — Z12.11 COLON CANCER SCREENING: ICD-10-CM

## 2025-07-09 DIAGNOSIS — Z79.4 TYPE 2 DIABETES MELLITUS WITH DIABETIC POLYNEUROPATHY, WITH LONG-TERM CURRENT USE OF INSULIN: Primary | ICD-10-CM

## 2025-07-09 DIAGNOSIS — D69.6 THROMBOCYTOPENIA: ICD-10-CM

## 2025-07-09 DIAGNOSIS — B35.3 TINEA PEDIS OF BOTH FEET: ICD-10-CM

## 2025-07-09 DIAGNOSIS — E53.8 B12 DEFICIENCY: ICD-10-CM

## 2025-07-09 DIAGNOSIS — E11.40 NEUROPATHY DUE TO TYPE 2 DIABETES MELLITUS: ICD-10-CM

## 2025-07-09 DIAGNOSIS — I42.8 NON-ISCHEMIC CARDIOMYOPATHY: ICD-10-CM

## 2025-07-09 DIAGNOSIS — Z00.00 VISIT FOR WELL MAN HEALTH CHECK: ICD-10-CM

## 2025-07-09 DIAGNOSIS — Z12.11 ENCOUNTER FOR SCREENING COLONOSCOPY: Primary | ICD-10-CM

## 2025-07-09 DIAGNOSIS — E11.65 TYPE 2 DIABETES MELLITUS WITH HYPERGLYCEMIA, WITH LONG-TERM CURRENT USE OF INSULIN: ICD-10-CM

## 2025-07-09 DIAGNOSIS — I47.10 PSVT (PAROXYSMAL SUPRAVENTRICULAR TACHYCARDIA): ICD-10-CM

## 2025-07-09 DIAGNOSIS — E78.5 HYPERLIPIDEMIA LDL GOAL <100: ICD-10-CM

## 2025-07-09 DIAGNOSIS — Z79.4 TYPE 2 DIABETES MELLITUS WITH HYPERGLYCEMIA, WITH LONG-TERM CURRENT USE OF INSULIN: ICD-10-CM

## 2025-07-09 DIAGNOSIS — I10 ESSENTIAL HYPERTENSION: ICD-10-CM

## 2025-07-09 PROCEDURE — 99999 PR PBB SHADOW E&M-EST. PATIENT-LVL V: CPT | Mod: PBBFAC,,, | Performed by: FAMILY MEDICINE

## 2025-07-09 RX ORDER — ATORVASTATIN CALCIUM 20 MG/1
20 TABLET, FILM COATED ORAL NIGHTLY
Qty: 90 TABLET | Refills: 1 | Status: SHIPPED | OUTPATIENT
Start: 2025-07-09

## 2025-07-09 RX ORDER — POLYETHYLENE GLYCOL 3350, SODIUM SULFATE ANHYDROUS, SODIUM BICARBONATE, SODIUM CHLORIDE, POTASSIUM CHLORIDE 236; 22.74; 6.74; 5.86; 2.97 G/4L; G/4L; G/4L; G/4L; G/4L
4 POWDER, FOR SOLUTION ORAL ONCE
Qty: 4000 ML | Refills: 0 | Status: SHIPPED | OUTPATIENT
Start: 2025-07-09 | End: 2025-07-09

## 2025-07-09 NOTE — PATIENT INSTRUCTIONS
DM: continue insulin, mounjaro, metformin, endocrine f/u  HTN: metoprolol 50 mg, ramipril 2.5 mg  DLD: LDL <100, wants to decrease dose. Will decrease atorvastatin to 20 mg. Per request    Shingrix dose 1 today  Next dose at f/u    C-scope at [facility].     Monitor platelets  Monitor bili    F/u 4 months, annual, labs prior.     Lab Results   Component Value Date    LDLCALC 46.6 (L) 03/06/2025    LDLCALC 59.0 (L) 08/31/2024    LDLCALC 62.8 (L) 08/23/2023       Lab Results   Component Value Date    HGBA1C 6.7 (H) 06/30/2025    HGBA1C 7.3 (H) 03/06/2025    HGBA1C 6.8 (H) 08/31/2024

## 2025-07-09 NOTE — TELEPHONE ENCOUNTER
.Patient is scheduled for a Colonoscopy on 9/12/25 with Dr. BUD Davis  Referral for procedure from PAT appointment

## 2025-07-09 NOTE — PROGRESS NOTES
Subjective:       Patient ID: Patria Nieto is a 74 y.o. male.    Chief Complaint: Diabetes    Lokesh is a 74 y.o. male who presents today for f/u    DM: managed by endocrine. On metformin 1000 mg daily, mounjaro 12.5, toujeo 45 u daily (previously 50 u) and fiasp (in place of novolog) 10U plus SS WM. A1c well controlled.      HTN: on metoprolol 50 mg XR. Increased by cardiology. No orthostatic symptoms at this time. No dizziness. No light headedness. ACE restarted by cardiology due to decreased EF. Reports 110-120/70-75. Does report some lower then this. But no symptoms     SVT: saw EP. BB changed to once daily. Running once a week now. No palpitations.    High bili: trending down now. Seeing hepatology. Liver US 3/25/2025 appears stable.      DLD: on atorvastatin at goal 8/2024. He wants to decrease dose. Per cardiology ldl goal <100.   Lab Results  Component Value Date   LDLCALC 46.6 (L) 03/06/2025   LDLCALC 59.0 (L) 08/31/2024   LDLCALC 62.8 (L) 08/23/2023       Thrombocytopenia: monitor. Refer <100.   Elevated bili: improved     3 weeks ago he was playing basketball and he was hit and fell and hit his head. No LOC. His symptoms of headaches and vision issues are mostly resolved. Declines f/u or referral or further evaluation.     Weight is up slightly. Perhaps why BP is better.       Review of Systems   Constitutional:  Negative for chills and fever.   Respiratory:  Negative for shortness of breath.    Cardiovascular:  Negative for chest pain.   Gastrointestinal:  Negative for nausea and vomiting.   Neurological:  Positive for headaches.             Results for orders placed or performed in visit on 06/30/25   Comprehensive Metabolic Panel    Collection Time: 06/30/25  8:29 AM   Result Value Ref Range    Sodium 139 136 - 145 mmol/L    Potassium 4.4 3.5 - 5.1 mmol/L    Chloride 111 (H) 95 - 110 mmol/L    CO2 21 (L) 23 - 29 mmol/L    Glucose 150 (H) 70 - 110 mg/dL    BUN 20 8 - 23 mg/dL    Creatinine 1.0 0.5 - 1.4  "mg/dL    Calcium 9.8 8.7 - 10.5 mg/dL    Protein Total 6.4 6.0 - 8.4 gm/dL    Albumin 3.9 3.5 - 5.2 g/dL    Bilirubin Total 2.2 (H) 0.1 - 1.0 mg/dL    ALP 59 40 - 150 unit/L    AST 22 11 - 45 unit/L    ALT 34 10 - 44 unit/L    Anion Gap 7 (L) 8 - 16 mmol/L    eGFR >60 >60 mL/min/1.73/m2   Hemoglobin A1C    Collection Time: 06/30/25  8:29 AM   Result Value Ref Range    Hemoglobin A1c 6.7 (H) 4.0 - 5.6 %    Estimated Average Glucose 146 (H) 68 - 131 mg/dL   CBC with Differential    Collection Time: 06/30/25  8:29 AM   Result Value Ref Range    WBC 5.51 3.90 - 12.70 K/uL    RBC 4.62 4.60 - 6.20 M/uL    HGB 15.1 14.0 - 18.0 gm/dL    HCT 45.6 40.0 - 54.0 %    MCV 99 (H) 82 - 98 fL    MCH 32.7 (H) 27.0 - 31.0 pg    MCHC 33.1 32.0 - 36.0 g/dL    RDW 13.2 11.5 - 14.5 %    Platelet Count 129 (L) 150 - 450 K/uL    MPV 12.6 9.2 - 12.9 fL    Nucleated RBC 0 <=0 /100 WBC    Neut % 63.5 38 - 73 %    Lymph % 21.6 18 - 48 %    Mono % 9.1 4 - 15 %    Eos % 4.9 <=8 %    Basophil % 0.5 <=1.9 %    Imm Grans % 0.4 0.0 - 0.5 %    Neut # 3.50 1.8 - 7.7 K/uL    Lymph # 1.19 1 - 4.8 K/uL    Mono # 0.50 0.3 - 1 K/uL    Eos # 0.27 <=0.5 K/uL    Baso # 0.03 <=0.2 K/uL    Imm Grans # 0.02 0.00 - 0.04 K/uL       Objective:     Vitals:    07/09/25 0847   BP: 116/70   BP Location: Left arm   Patient Position: Sitting   Pulse: 90   Temp: (P) 97.8 °F (36.6 °C)   TempSrc: Oral   SpO2: 98%   Weight: 85 kg (187 lb 6.3 oz)   Height: 5' 9" (1.753 m)        Physical Exam  Vitals and nursing note reviewed.   Constitutional:       General: He is not in acute distress.     Appearance: He is not ill-appearing, toxic-appearing or diaphoretic.   Cardiovascular:      Rate and Rhythm: Normal rate and regular rhythm.   Pulmonary:      Effort: Pulmonary effort is normal.      Breath sounds: Normal breath sounds.   Abdominal:      Palpations: Abdomen is soft.      Tenderness: There is no abdominal tenderness.   Musculoskeletal:         General: No swelling. "   Neurological:      General: No focal deficit present.      Mental Status: He is alert and oriented to person, place, and time. Mental status is at baseline.      Cranial Nerves: No cranial nerve deficit.      Sensory: No sensory deficit.      Motor: No weakness.      Gait: Gait normal.   Psychiatric:         Mood and Affect: Mood normal.         Behavior: Behavior normal.         Thought Content: Thought content normal.         Judgment: Judgment normal.         Assessment:       1. Type 2 diabetes mellitus with diabetic polyneuropathy, with long-term current use of insulin    2. Other hyperlipidemia    3. Hyperlipidemia LDL goal <100    4. Non-ischemic cardiomyopathy    5. PSVT (paroxysmal supraventricular tachycardia)    6. Colon cancer screening    7. Need for shingles vaccine    8. Type 2 diabetes mellitus with hyperglycemia, with long-term current use of insulin    9. B12 deficiency    10. Essential hypertension    11. Neuropathy due to type 2 diabetes mellitus    12. Tinea pedis of both feet    13. Thrombocytopenia    14. Visit for well [place] health check        Plan:         DM: continue insulin, mounjaro, metformin, endocrine f/u  HTN: metoprolol 50 mg, ramipril 2.5 mg  DLD: LDL <100, wants to decrease dose. Will decrease atorvastatin to 20 mg. Per request    Shingrix dose 1 today  Next dose at f/u    [facility] at [facility].     Monitor platelets  Monitor bili    F/u 4 months, annual, labs prior.         Type 2 diabetes mellitus with diabetic polyneuropathy, with long-term current use of insulin  -     Vitamin D; Future; Expected date: 07/09/2025  -     TSH; Future; Expected date: 07/09/2025    Other hyperlipidemia  -     atorvastatin (LIPITOR) 20 MG tablet; Take 1 tablet (20 mg total) by mouth every evening.  Dispense: 90 tablet; Refill: 1  -     Vitamin D; Future; Expected date: 07/09/2025  -     TSH; Future; Expected date: 07/09/2025  -     Lipid Panel; Future; Expected date:  07/09/2025    Hyperlipidemia LDL goal <100  -     atorvastatin (LIPITOR) 20 MG tablet; Take 1 tablet (20 mg total) by mouth every evening.  Dispense: 90 tablet; Refill: 1  -     Vitamin D; Future; Expected date: 07/09/2025  -     TSH; Future; Expected date: 07/09/2025    Non-ischemic cardiomyopathy    PSVT (paroxysmal supraventricular tachycardia)    Colon cancer screening  -     Ambulatory referral/consult to Endo Procedure ; Future; Expected date: 07/10/2025    Need for shingles vaccine  -     varicella zoster (Shingrix) IM vaccine (>/= 49 yo)    Type 2 diabetes mellitus with hyperglycemia, with long-term current use of insulin  -     Microalbumin/Creatinine Ratio, Urine; Standing  -     CBC Auto Differential; Future; Expected date: 07/09/2025  -     Comprehensive Metabolic Panel; Future; Expected date: 07/09/2025  -     Vitamin D; Future; Expected date: 07/09/2025  -     TSH; Future; Expected date: 07/09/2025  -     Hemoglobin A1C; Future; Expected date: 07/09/2025    B12 deficiency  -     Vitamin B12; Future; Expected date: 07/09/2025  -     Vitamin D; Future; Expected date: 07/09/2025  -     TSH; Future; Expected date: 07/09/2025    Essential hypertension  -     Vitamin D; Future; Expected date: 07/09/2025  -     TSH; Future; Expected date: 07/09/2025    Neuropathy due to type 2 diabetes mellitus  -     Vitamin D; Future; Expected date: 07/09/2025  -     TSH; Future; Expected date: 07/09/2025    Tinea pedis of both feet  -     Vitamin D; Future; Expected date: 07/09/2025  -     TSH; Future; Expected date: 07/09/2025    Thrombocytopenia  -     Vitamin D; Future; Expected date: 07/09/2025  -     TSH; Future; Expected date: 07/09/2025  -     Iron and TIBC; Future; Expected date: 07/09/2025  -     FERRITIN; Future; Expected date: 07/09/2025  -     FOLATE; Future; Expected date: 07/09/2025    Visit for well man health check  -     Microalbumin/Creatinine Ratio, Urine; Standing  -     CBC Auto Differential;  Future; Expected date: 07/09/2025  -     Comprehensive Metabolic Panel; Future; Expected date: 07/09/2025  -     Vitamin B12; Future; Expected date: 07/09/2025  -     Vitamin D; Future; Expected date: 07/09/2025  -     TSH; Future; Expected date: 07/09/2025  -     Lipid Panel; Future; Expected date: 07/09/2025  -     Hemoglobin A1C; Future; Expected date: 07/09/2025  -     Iron and TIBC; Future; Expected date: 07/09/2025  -     FERRITIN; Future; Expected date: 07/09/2025  -     FOLATE; Future; Expected date: 07/09/2025

## 2025-07-15 DIAGNOSIS — Z79.4 TYPE 2 DIABETES MELLITUS WITH HYPERGLYCEMIA, WITH LONG-TERM CURRENT USE OF INSULIN: ICD-10-CM

## 2025-07-15 DIAGNOSIS — E11.65 TYPE 2 DIABETES MELLITUS WITH HYPERGLYCEMIA, WITH LONG-TERM CURRENT USE OF INSULIN: ICD-10-CM

## 2025-07-15 RX ORDER — TIRZEPATIDE 12.5 MG/.5ML
12.5 INJECTION, SOLUTION SUBCUTANEOUS
Qty: 4 PEN | Refills: 3 | Status: CANCELLED | OUTPATIENT
Start: 2025-07-15

## 2025-07-16 DIAGNOSIS — E11.65 TYPE 2 DIABETES MELLITUS WITH HYPERGLYCEMIA, WITH LONG-TERM CURRENT USE OF INSULIN: ICD-10-CM

## 2025-07-16 DIAGNOSIS — Z79.4 TYPE 2 DIABETES MELLITUS WITH HYPERGLYCEMIA, WITH LONG-TERM CURRENT USE OF INSULIN: ICD-10-CM

## 2025-07-17 RX ORDER — TIRZEPATIDE 12.5 MG/.5ML
12.5 INJECTION, SOLUTION SUBCUTANEOUS
Qty: 4 PEN | Refills: 3 | Status: SHIPPED | OUTPATIENT
Start: 2025-07-17

## 2025-07-28 NOTE — PROGRESS NOTES
"  Subjective:      Patient ID: Patria Nieto is a 75 y.o. male.    Chief Complaint:  Follow-up    History of Present Illness  Patria Nieto is here for follow up of DM.  Previously seen by me 3/2025.    With regards to Diabetes:    Diagnosed: 2003  DE: 2018  FH: "yes"   Known complications:  DKA Denies   RN Denies  Eye Exam:  3/2025  PN Yes - "comes and goes"   Podiatry: 10/2024  Nephropathy Denies  CAD Denies  Denies history of pancreatitis & personal/family history of medullary thyroid cancer.     Diet/Exercise:   Eats 1-2 meals a day.   Snacks : Denies  Drinks : coffee (1 cup in AM with half and half), OJ, water, diet soft drinks.   Exercise - tries to stay active - exercises twice a week.   Illness, injury, steroids: Denies    Current Regimen:  Metformin 1000 mg daily  Mounjaro 12.5 mg weekly   Toujeo 45 units daily   Fiasp 5-12 units before meals (adjusts dose based on glucose)    Reports appetite suppression with Mounjaro 12.5mg weekly.    Other medications tried:  Trulicity     Glucose Monitor: Dexcom G7   Sensor reviewed     Hypoglycemia:  Yes - not eating as much as he anticipated   Knows how to correct with 15 grams of carbs- juice, coke, or a peppermint.       Diabetes Management Status    Hemoglobin A1C   Date Value Ref Range Status   03/06/2025 7.3 (H) 4.0 - 5.6 % Final     Comment:     ADA Screening Guidelines:  5.7-6.4%  Consistent with prediabetes  >or=6.5%  Consistent with diabetes    High levels of fetal hemoglobin interfere with the HbA1C  assay. Heterozygous hemoglobin variants (HbS, HgC, etc)do  not significantly interfere with this assay.   However, presence of multiple variants may affect accuracy.     08/31/2024 6.8 (H) 4.0 - 5.6 % Final     Comment:     ADA Screening Guidelines:  5.7-6.4%  Consistent with prediabetes  >or=6.5%  Consistent with diabetes    High levels of fetal hemoglobin interfere with the HbA1C  assay. Heterozygous hemoglobin variants (HbS, HgC, etc)do  not significantly " interfere with this assay.   However, presence of multiple variants may affect accuracy.     02/28/2024 6.7 (H) 4.0 - 5.6 % Final     Comment:     ADA Screening Guidelines:  5.7-6.4%  Consistent with prediabetes  >or=6.5%  Consistent with diabetes    High levels of fetal hemoglobin interfere with the HbA1C  assay. Heterozygous hemoglobin variants (HbS, HgC, etc)do  not significantly interfere with this assay.   However, presence of multiple variants may affect accuracy.       Hemoglobin A1c   Date Value Ref Range Status   06/30/2025 6.7 (H) 4.0 - 5.6 % Final     Comment:     ADA Screening Guidelines:  5.7-6.4%  Consistent with prediabetes  >=6.5%  Consistent with diabetes    High levels of fetal hemoglobin interfere with the HbA1C  assay. Heterozygous hemoglobin variants (HbS, HgC, etc)do  not significantly interfere with this assay.   However, presence of multiple variants may affect accuracy.       Statin: Taking Atorvastatin 40 mg daily  ACE/ARB: Not Taking  Screening or Prevention Patient's value Goal Complete/Controlled?   HgA1C Testing and Control   Lab Results   Component Value Date    HGBA1C 6.7 (H) 06/30/2025      Annually/Less than 8% No   Lipid profile : 03/06/2025 Annually Yes   LDL control Lab Results   Component Value Date    LDLCALC 46.6 (L) 03/06/2025    Annually/Less than 100 mg/dl  Yes   Nephropathy screening Lab Results   Component Value Date    LABMICR 7.0 08/31/2024     Lab Results   Component Value Date    PROTEINUA Negative 03/06/2025    Annually Yes   Blood pressure BP Readings from Last 1 Encounters:   07/30/25 135/72    Less than 140/90 Yes   Dilated retinal exam : 04/22/2025 Annually Yes   Foot exam   : 10/31/2024 Annually No       Review of Systems  As above    Objective:   Physical Exam  Vitals reviewed.   Cardiovascular:      Rate and Rhythm: Normal rate.      Comments: No edema present  Pulmonary:      Effort: Pulmonary effort is normal.   Abdominal:      Palpations: Abdomen is soft.  "      Injection sites are without edema or erythema. No lipo hypertrophy or atrophy.    Visit Vitals  /72 (BP Location: Left arm, Patient Position: Sitting)   Pulse 95   Ht 5' 9" (1.753 m)   Wt 83 kg (183 lb 1.5 oz)   SpO2 96%   BMI 27.04 kg/m²         Body mass index is 27.04 kg/m².    Lab Review:   Lab Results   Component Value Date    HGBA1C 6.7 (H) 06/30/2025    HGBA1C 7.3 (H) 03/06/2025    HGBA1C 6.8 (H) 08/31/2024       Lab Results   Component Value Date    CHOL 105 (L) 03/06/2025    HDL 45 03/06/2025    LDLCALC 46.6 (L) 03/06/2025    TRIG 67 03/06/2025    CHOLHDL 42.9 03/06/2025     Lab Results   Component Value Date     06/30/2025    K 4.4 06/30/2025     (H) 06/30/2025    CO2 21 (L) 06/30/2025     (H) 06/30/2025    BUN 20 06/30/2025    CREATININE 1.0 06/30/2025    CALCIUM 9.8 06/30/2025    PROT 6.4 06/30/2025    ALBUMIN 3.9 06/30/2025    BILITOT 2.2 (H) 06/30/2025    ALKPHOS 59 06/30/2025    AST 22 06/30/2025    ALT 34 06/30/2025    ANIONGAP 7 (L) 06/30/2025    ESTGFRAFRICA >60.0 11/12/2021    EGFRNONAA >60.0 11/12/2021    TSH 1.564 03/06/2025     Vit D, 25-Hydroxy   Date Value Ref Range Status   08/31/2024 51 30 - 96 ng/mL Final     Comment:     Vitamin D deficiency.........<10 ng/mL                              Vitamin D insufficiency......10-29 ng/mL       Vitamin D sufficiency........> or equal to 30 ng/mL  Vitamin D toxicity............>100 ng/mL       Assessment and Plan     1. Type 2 diabetes mellitus with hyperglycemia, with long-term current use of insulin  empagliflozin (JARDIANCE) 10 mg tablet    Basic Metabolic Panel    insulin glargine, TOUJEO, (TOUJEO) 300 unit/mL (1.5 mL) InPn pen      2. Uncontrolled type 2 diabetes mellitus with hyperglycemia, with long-term current use of insulin  insulin glargine, TOUJEO, (TOUJEO) 300 unit/mL (1.5 mL) InPn pen      3. Other hyperlipidemia            Type 2 diabetes mellitus with hyperglycemia, with long-term current use of " insulin  -- Labs prior to follow up   -- A1c at goal <7.5%.  -- Medications discussed:  MFM   GLP1-DPP4   OSORIO   SGLT2   Reviewed potential adverse effects of SGLT-2 inhibitors, including genital mycotic infections, slightly increased risk of UTI, hypersensitivity, hypotension, and hyperkalemia. Advised to maintain water intake of 8-10 cups per day. Advised we need to check chemistry panel at baseline and 2 weeks after starting. Discussed FDA warning reports of ketoacidosis associated with SGLT-2 inhibitors. Advised to seek immediate medical attention and stop the medication if symptoms such as difficulty breathing, nausea, vomiting, abdominal pain, confusion, and unusual fatigue/sleepiness. Discussed possible precipitating factors including major illness/reduced food and fluid intake (advised to stop under these circumstances), and reduced insulin dose. Discussed possible effects of increased fracture risk/decreased bone density. Discussed reports of increased risk of leg and foot amputations with canagliflozin and need to seek urgent care if developed new pain or tenderness, sores or ulcers, or infections in legs/feet.   Insulin   -- Reviewed logs/CGM:    Glucose variable.   Instructed to send glucose logs in 14 days.  Reach out to me sooner for any glucose <70 or consistently >180.  -- Current Regimen:  Metformin 1000 mg daily  Mounjaro 12.5 mg weekly   Jardiance 10mg daily (BMP in 1 month)  Toujeo 38 units daily   Fiasp before meals:   If glucose < 150 give 7 units   If glucose > 150 give 9 units    -- Reviewed goals of therapy are to get the best control we can without hypoglycemia.  -- Reviewed patient's current insulin regimen. Clarified proper insulin dose and timing in relation to meals, etc. Insulin injection sites and proper rotation instructed.    -- Advised frequent self blood glucose monitoring.  Patient encouraged to document glucose results and bring them to every clinic visit.  -- Hypoglycemia  precautions discussed.  -- Close adherence to lifestyle changes recommended.   -- Periodic follow ups for eye evaluations, foot care and dental care suggested.    Other hyperlipidemia  -- Controlled.  -- On statin per ADA recommendations.        Follow up in about 4 months (around 11/30/2025).      Visit today included increased complexity associated with the care of the problems addressed and managing the longitudinal care of the patient due to the serious and/or complex managed problems.

## 2025-07-30 ENCOUNTER — OFFICE VISIT (OUTPATIENT)
Dept: ENDOCRINOLOGY | Facility: CLINIC | Age: 75
End: 2025-07-30
Payer: COMMERCIAL

## 2025-07-30 VITALS
BODY MASS INDEX: 27.11 KG/M2 | HEART RATE: 95 BPM | SYSTOLIC BLOOD PRESSURE: 135 MMHG | WEIGHT: 183.06 LBS | HEIGHT: 69 IN | DIASTOLIC BLOOD PRESSURE: 72 MMHG | OXYGEN SATURATION: 96 %

## 2025-07-30 DIAGNOSIS — E11.65 TYPE 2 DIABETES MELLITUS WITH HYPERGLYCEMIA, WITH LONG-TERM CURRENT USE OF INSULIN: Primary | ICD-10-CM

## 2025-07-30 DIAGNOSIS — Z79.4 UNCONTROLLED TYPE 2 DIABETES MELLITUS WITH HYPERGLYCEMIA, WITH LONG-TERM CURRENT USE OF INSULIN: ICD-10-CM

## 2025-07-30 DIAGNOSIS — E78.49 OTHER HYPERLIPIDEMIA: ICD-10-CM

## 2025-07-30 DIAGNOSIS — E11.65 UNCONTROLLED TYPE 2 DIABETES MELLITUS WITH HYPERGLYCEMIA, WITH LONG-TERM CURRENT USE OF INSULIN: ICD-10-CM

## 2025-07-30 DIAGNOSIS — Z79.4 TYPE 2 DIABETES MELLITUS WITH HYPERGLYCEMIA, WITH LONG-TERM CURRENT USE OF INSULIN: Primary | ICD-10-CM

## 2025-07-30 PROCEDURE — 99999 PR PBB SHADOW E&M-EST. PATIENT-LVL V: CPT | Mod: PBBFAC,,, | Performed by: NURSE PRACTITIONER

## 2025-07-30 RX ORDER — INSULIN GLARGINE 300 [IU]/ML
38 INJECTION, SOLUTION SUBCUTANEOUS DAILY
Qty: 9 PEN | Refills: 3 | Status: SHIPPED | OUTPATIENT
Start: 2025-07-30

## 2025-07-30 NOTE — ASSESSMENT & PLAN NOTE
-- Labs prior to follow up   -- A1c at goal <7.5%.  -- Medications discussed:  MFM   GLP1-DPP4   OSORIO   SGLT2   Reviewed potential adverse effects of SGLT-2 inhibitors, including genital mycotic infections, slightly increased risk of UTI, hypersensitivity, hypotension, and hyperkalemia. Advised to maintain water intake of 8-10 cups per day. Advised we need to check chemistry panel at baseline and 2 weeks after starting. Discussed FDA warning reports of ketoacidosis associated with SGLT-2 inhibitors. Advised to seek immediate medical attention and stop the medication if symptoms such as difficulty breathing, nausea, vomiting, abdominal pain, confusion, and unusual fatigue/sleepiness. Discussed possible precipitating factors including major illness/reduced food and fluid intake (advised to stop under these circumstances), and reduced insulin dose. Discussed possible effects of increased fracture risk/decreased bone density. Discussed reports of increased risk of leg and foot amputations with canagliflozin and need to seek urgent care if developed new pain or tenderness, sores or ulcers, or infections in legs/feet.   Insulin   -- Reviewed logs/CGM:    Glucose variable.   Instructed to send glucose logs in 14 days.  Reach out to me sooner for any glucose <70 or consistently >180.  -- Current Regimen:  Metformin 1000 mg daily  Mounjaro 12.5 mg weekly   Jardiance 10mg daily (BMP in 1 month)  Toujeo 38 units daily   Fiasp before meals:   If glucose < 150 give 7 units   If glucose > 150 give 9 units    -- Reviewed goals of therapy are to get the best control we can without hypoglycemia.  -- Reviewed patient's current insulin regimen. Clarified proper insulin dose and timing in relation to meals, etc. Insulin injection sites and proper rotation instructed.    -- Advised frequent self blood glucose monitoring.  Patient encouraged to document glucose results and bring them to every clinic visit.  -- Hypoglycemia precautions  discussed.  -- Close adherence to lifestyle changes recommended.   -- Periodic follow ups for eye evaluations, foot care and dental care suggested.

## 2025-07-30 NOTE — PATIENT INSTRUCTIONS
Metformin 1000 mg daily  Mounjaro 12.5 mg weekly   Jardiance 10mg daily   Toujeo 38 units daily   Fiasp before meals:   If glucose < 150 give 7 units   If glucose > 150 give 9 units    Let me know when you start Jardiance.

## 2025-08-18 ENCOUNTER — TELEPHONE (OUTPATIENT)
Dept: ENDOCRINOLOGY | Facility: CLINIC | Age: 75
End: 2025-08-18
Payer: COMMERCIAL

## (undated) DEVICE — SEE MEDLINE ITEM 156955

## (undated) DEVICE — PAD CAST 2 IN X 4YDS STERILE

## (undated) DEVICE — ADHESIVE DERMABOND ADVANCED

## (undated) DEVICE — ALCOHOL 70% ISOP W/GREEN 16OZ

## (undated) DEVICE — PAD RADI FEMORAL

## (undated) DEVICE — STOCKINET 4INX48

## (undated) DEVICE — SEE MEDLINE ITEM 157117

## (undated) DEVICE — CATH TACTICATH ABLAT BIDIR F-J

## (undated) DEVICE — COVER OVERHEAD SURG LT BLUE

## (undated) DEVICE — GAUZE SPONGE 4X4 12PLY

## (undated) DEVICE — INTRODUCER HEMOSTASIS 7.5F

## (undated) DEVICE — SOL NACL .9P 500ML

## (undated) DEVICE — PAD DEFIB CADENCE ADULT R2

## (undated) DEVICE — TOWEL OR DISP STRL BLUE 4/PK

## (undated) DEVICE — MANIFOLD 4 PORT

## (undated) DEVICE — BANDAGE ESMARK ELASTIC ST 4X9

## (undated) DEVICE — PAD PREP 50/CA

## (undated) DEVICE — CATH RESPONSE QPLR JSN 6F 120

## (undated) DEVICE — LINE PRESSURE MONITORING 96IN

## (undated) DEVICE — ELECTRODE REM PLYHSV RETURN 9

## (undated) DEVICE — SEE MEDLINE ITEM 157173

## (undated) DEVICE — BANDAGE MATRIX HK LOOP 2IN 5YD

## (undated) DEVICE — DRESSING XEROFORM FOIL PK 1X8

## (undated) DEVICE — SYR 10CC LUER LOCK

## (undated) DEVICE — ELECTRODE POLYHESIVEPRE-ATTACH

## (undated) DEVICE — APPLICATOR CHLORAPREP ORN 26ML

## (undated) DEVICE — SET TUBING COOL POINT IRR

## (undated) DEVICE — SEE L#120831

## (undated) DEVICE — KIT PROBE COVER WITH GEL

## (undated) DEVICE — CATH CS OCTAPOLAR DX 7FRX110CM

## (undated) DEVICE — PACK BASIC

## (undated) DEVICE — PATCH ENSITE PRECISION NAVX SE

## (undated) DEVICE — PACK EP DRAPE

## (undated) DEVICE — SUT ETHILON 5-0 PS-2 18IN

## (undated) DEVICE — GLOVE SURG BIOGEL LATEX SZ 7.5

## (undated) DEVICE — SEE MEDLINE ITEM 157116

## (undated) DEVICE — CATH HIS OCTAPOLAR 7FRX115CM

## (undated) DEVICE — CATH MAP BI-D HD SENSOR ENABLE

## (undated) DEVICE — BLADE SCALP OPHTL BEVEL STR

## (undated) DEVICE — INTRO AGILIS MED CRL 8.5F 71CM